# Patient Record
Sex: MALE | Race: WHITE | NOT HISPANIC OR LATINO | Employment: OTHER | URBAN - METROPOLITAN AREA
[De-identification: names, ages, dates, MRNs, and addresses within clinical notes are randomized per-mention and may not be internally consistent; named-entity substitution may affect disease eponyms.]

---

## 2017-03-13 ENCOUNTER — OFFICE VISIT (OUTPATIENT)
Dept: FAMILY MEDICINE | Facility: CLINIC | Age: 72
End: 2017-03-13
Payer: COMMERCIAL

## 2017-03-13 VITALS
SYSTOLIC BLOOD PRESSURE: 106 MMHG | HEIGHT: 70 IN | DIASTOLIC BLOOD PRESSURE: 60 MMHG | BODY MASS INDEX: 21.7 KG/M2 | HEART RATE: 88 BPM | WEIGHT: 151.6 LBS

## 2017-03-13 DIAGNOSIS — E78.5 HYPERLIPIDEMIA LDL GOAL <100: ICD-10-CM

## 2017-03-13 DIAGNOSIS — F17.200 TOBACCO USE DISORDER: ICD-10-CM

## 2017-03-13 DIAGNOSIS — E11.42 TYPE 2 DIABETES MELLITUS WITH DIABETIC POLYNEUROPATHY, WITHOUT LONG-TERM CURRENT USE OF INSULIN (H): Primary | ICD-10-CM

## 2017-03-13 DIAGNOSIS — Z12.11 SCREEN FOR COLON CANCER: ICD-10-CM

## 2017-03-13 LAB — HBA1C MFR BLD: 10.4 % (ref 4.3–6)

## 2017-03-13 PROCEDURE — 99214 OFFICE O/P EST MOD 30 MIN: CPT | Performed by: FAMILY MEDICINE

## 2017-03-13 PROCEDURE — 36415 COLL VENOUS BLD VENIPUNCTURE: CPT | Performed by: FAMILY MEDICINE

## 2017-03-13 PROCEDURE — 83036 HEMOGLOBIN GLYCOSYLATED A1C: CPT | Performed by: FAMILY MEDICINE

## 2017-03-13 RX ORDER — PIOGLITAZONEHYDROCHLORIDE 45 MG/1
45 TABLET ORAL DAILY
Qty: 90 TABLET | Refills: 0 | Status: SHIPPED | OUTPATIENT
Start: 2017-03-13 | End: 2017-06-13

## 2017-03-13 RX ORDER — GLIMEPIRIDE 4 MG/1
8 TABLET ORAL
Qty: 180 TABLET | Refills: 0 | Status: SHIPPED | OUTPATIENT
Start: 2017-03-13 | End: 2017-06-13

## 2017-03-13 NOTE — PROGRESS NOTES
"  SUBJECTIVE:                                                    Roddy Sidhu is a 71 year old male who presents to clinic today for the following health issues:      Diabetes Follow-up      Patient is checking blood sugars: not at all    Diabetic concerns: None     Symptoms of hypoglycemia (low blood sugar): shaky sometimes     Paresthesias (numbness or burning in feet) or sores: Yes for years. Pt. States Dr. Davenport is aware of this.      Date of last diabetic eye exam: 8/20/2015     Hyperlipidemia Follow-Up      Rate your low fat/cholesterol diet?: good    Taking statin?  Yes, no muscle aches from statin    Other lipid medications/supplements?:  none       Amount of exercise or physical activity: None    Problems taking medications regularly: No    Medication side effects: none  Diet: regular (no restrictions)        Problem list and histories reviewed & adjusted, as indicated.  Additional history: he admits that he has not been as active over the winter months because he can't get outside and do his usual walking in outdoor activities. He has been following his diet pretty carefully. He has been unable to give up smoking his pipe        Reviewed and updated as needed this visit by clinical staff  Tobacco  Allergies  Med Hx  Surg Hx  Fam Hx  Soc Hx      Reviewed and updated as needed this visit by Provider               ROS:  Constitutional, HEENT, cardiovascular, pulmonary, gi and gu systems are negative, except as otherwise noted.        OBJECTIVE:                                                    /60 (BP Location: Right arm, Patient Position: Chair, Cuff Size: Adult Regular)  Pulse 88  Ht 5' 9.5\" (1.765 m)  Wt 151 lb 9.6 oz (68.8 kg)  BMI 22.07 kg/m2    GENERAL: healthy, alert and no distress  EYES: Eyes grossly normal to inspection, extraocular movements - intact, and PERRL  NECK: no tenderness, no adenopathy, no asymmetry, no masses, no stiffness; thyroid- normal to palpation  RESP: lungs clear " to auscultation - no rales, no rhonchi, no wheezes  CV: regular rates and rhythm, normal S1 S2, no S3 or S4 and no murmur, no click or rub -  MS: extremities- no gross deformities noted, no edema  Diabetic foot exam: no trophic changes or ulcerative lesions and reduced sensation at plantar surface of both feet with filament testing    Diagnostic test results:  Results for orders placed or performed in visit on 03/13/17 (from the past 24 hour(s))   Hemoglobin A1c   Result Value Ref Range    Hemoglobin A1C 10.4 (H) 4.3 - 6.0 %        ASSESSMENT/PLAN:                                                    ASSESSMENT:  1. Type 2 diabetes mellitus with diabetic polyneuropathy, without long-term current use of insulin (H)    2. Hyperlipidemia LDL goal <100    3. Tobacco use disorder    4. Screen for colon cancer        PLAN:  Orders Placed This Encounter     Fecal colorectal cancer screen (FIT)     Hemoglobin A1c     pioglitazone (ACTOS) 45 MG tablet     metFORMIN (GLUCOPHAGE) 500 MG tablet     glimepiride (AMARYL) 4 MG tablet     We discussed the fact that since he has not been able to achieve adequate control of his blood sugars with 3 oral medications, the next step would be to go to insulin. He would really like to put this off as long as possible and states he will work hard at getting more physical activity and then we'll recheck a glycosylated hemoglobin in 3 months      Patient Instructions   Diabetes Plan  1. Hemoglobin A1c goal is between 7% and 8%  Your Hemoglobin A1c is not at goal  Plan is:Continue medications at current doses. Really work on increased exercise and losing weight.   2. LDL (bad cholesterol) goal is less than 100  Your LDL is at goal  Plan is: Continue medications at current doses  3. Blood pressure goal is less than 140/90.  Your blood pressure is at goal.  Plan is: Diet and exercise  Recheck in 3 months, will repeat the glycosylated hemoglobin then. If not improving, will need to consider  insulin. Also, important to quit smoking.        TRACY Davenport  [facility]

## 2017-03-13 NOTE — MR AVS SNAPSHOT
After Visit Summary   3/13/2017    Roddy Sidhu    MRN: [id number]           Patient Information     Date Of Birth          1945        Visit Information        Provider Department      3/13/2017 11:00 AM TRACY Davenport MD [facility]        Today's Diagnoses     Type 2 diabetes mellitus with diabetic polyneuropathy, without long-term current use of insulin (H)    -  1    Screen for colon cancer          Care Instructions    Diabetes Plan  1. Hemoglobin A1c goal is between 7% and 8%  Your Hemoglobin A1c is not at goal  Plan is:Continue medications at current doses. Really work on increased exercise and losing weight.   2. LDL (bad cholesterol) goal is less than 100  Your LDL is at goal  Plan is: Continue medications at current doses  3. Blood pressure goal is less than 140/90.  Your blood pressure is at goal.  Plan is: Diet and exercise  Recheck in 3 months, will repeat the glycosylated hemoglobin then. If not improving, will need to consider insulin. Also, important to quit smoking.          Follow-ups after your visit        Future tests that were ordered for you today     Open Future Orders        Priority Expected Expires Ordered    Hemoglobin A1c Routine 6/13/2017 9/13/2017 3/13/2017    Fecal colorectal cancer screen (FIT) Routine 4/3/2017 6/5/2017 3/13/2017            Who to contact     If you have questions or need follow up information about today's clinic visit or your schedule please contact [facility] directly at [phone].  Normal or non-critical lab and imaging results will be communicated to you by MyChart, letter or phone within 4 business days after the clinic has received the results. If you do not hear from us within 7 days, please contact the clinic through MyChart or phone. If you have a critical or abnormal lab result, we will notify you by phone as soon as possible.  Submit refill requests through [employer] or call your pharmacy and  "they will forward the refill request to us. Please allow 3 business days for your refill to be completed.          Additional Information About Your Visit        [employer] Information     [employer] lets you send messages to your doctor, view your test results, renew your prescriptions, schedule appointments and more. To sign up, go to [email or web address] . Click on \"Log in\" on the left side of the screen, which will take you to the Welcome page. Then click on \"Sign up Now\" on the right side of the page.     You will be asked to enter the access code listed below, as well as some personal information. Please follow the directions to create your username and password.     Your access code is: [email or web address]  Expires: 2017 11:44 AM     Your access code will  in 90 days. If you need help or a new code, please call your [place] clinic or [phone].        Care EveryWhere ID     This is your Care EveryWhere ID. This could be used by other organizations to access your [place] medical records  [id number]        Your Vitals Were     Pulse Height BMI (Body Mass Index)             88 5' 9.5\" (1.765 m) 22.07 kg/m2          Blood Pressure from Last 3 Encounters:   17 106/60   16 120/72   16 137/80    Weight from Last 3 Encounters:   17 151 lb 9.6 oz (68.8 kg)   16 146 lb 4.8 oz (66.4 kg)   16 143 lb (64.9 kg)              We Performed the Following     Hemoglobin A1c          Where to get your medicines      These medications were sent to [facility] [address]  [address] [facility] [address]     Phone:  [phone]     glimepiride 4 MG tablet    metFORMIN 500 MG tablet    pioglitazone 45 MG tablet          Primary Care Provider Office Phone # Fax #    TRACY Davenport [phone]       [facility] [address] [facility] [address]        Thank you!     Thank you for " choosing [facility]  for your care. Our goal is always to provide you with excellent care. Hearing back from our patients is one way we can continue to improve our services. Please take a few minutes to complete the written survey that you may receive in the mail after your visit with us. Thank you!             Your Updated Medication List - Protect others around you: Learn how to safely use, store and throw away your medicines at www.disposemymeds.org.          This list is accurate as of: 3/13/17 11:44 AM.  Always use your most recent med list.                   Brand Name Dispense Instructions for use    * ACE/ARB NOT PRESCRIBED (INTENTIONAL)      1 each continuous prn. ACE & ARB not prescribed due to Symptomatic hypotension not due to excessive diuresis       ALEVE 220 MG tablet   Generic drug:  naproxen sodium      Take  by mouth 2 times daily (with meals).       aspirin 81 MG tablet     100 tablet    Take 1 tablet by mouth daily.       blood glucose monitoring lancets     102 each    1 each 2 times daily.       * blood glucose test strip     3 each    1 Device by In Vitro route 4 times daily (before meals and nightly). Test 4 times daily, before meals and before bed.       * blood glucose monitoring test strip    ACCU-CHEK SMARTVIEW    1 Box    Test blood sugar two times daily       gabapentin 100 MG capsule    NEURONTIN    270 capsule    Take 1 capsule (100 mg) by mouth 3 times daily       glimepiride 4 MG tablet    AMARYL    180 tablet    Take 2 tablets (8 mg) by mouth every morning (before breakfast)       metFORMIN 500 MG tablet    GLUCOPHAGE    360 tablet    Take 2 before breakfast and 2 before dinner       * order for DME     1 each    Glucometer, brand as covered by insurance.       pioglitazone 45 MG tablet    ACTOS    90 tablet    Take 1 tablet (45 mg) by mouth daily       simvastatin 40 MG tablet    ZOCOR    90 tablet    Take 1 tablet (40 mg) by mouth At Bedtime       vitamin D  2000 UNITS Caps     30 capsule    Take 1 capsule by mouth daily       * Notice:  This list has 4 medication(s) that are the same as other medications prescribed for you. Read the directions carefully, and ask your doctor or other care provider to review them with you.

## 2017-03-13 NOTE — PATIENT INSTRUCTIONS
Diabetes Plan  1. Hemoglobin A1c goal is between 7% and 8%  Your Hemoglobin A1c is not at goal  Plan is:Continue medications at current doses. Really work on increased exercise and losing weight.   2. LDL (bad cholesterol) goal is less than 100  Your LDL is at goal  Plan is: Continue medications at current doses  3. Blood pressure goal is less than 140/90.  Your blood pressure is at goal.  Plan is: Diet and exercise  Recheck in 3 months, will repeat the glycosylated hemoglobin then. If not improving, will need to consider insulin. Also, important to quit smoking.

## 2017-03-13 NOTE — NURSING NOTE
"Chief Complaint   Patient presents with     Diabetes     Lipids     Health Maintenance     due for fit test.        Initial /60 (BP Location: Right arm, Patient Position: Chair, Cuff Size: Adult Regular)  Pulse 88  Ht 5' 9.5\" (1.765 m)  Wt 151 lb 9.6 oz (68.8 kg)  BMI 22.07 kg/m2 Estimated body mass index is 22.07 kg/(m^2) as calculated from the following:    Height as of this encounter: 5' 9.5\" (1.765 m).    Weight as of this encounter: 151 lb 9.6 oz (68.8 kg).  Medication Reconciliation: complete     Rhonda Soto, CMA      "

## 2017-03-20 PROCEDURE — 82274 ASSAY TEST FOR BLOOD FECAL: CPT | Performed by: FAMILY MEDICINE

## 2017-03-22 DIAGNOSIS — Z12.11 SCREEN FOR COLON CANCER: ICD-10-CM

## 2017-03-22 LAB — HEMOCCULT STL QL IA: NEGATIVE

## 2017-03-22 NOTE — LETTER
[facility]  [address]  [place] [address]  Phone: [phone]      3/23/2017     Roddy Sidhu  [address] [facility] [address]      Dear Roddy:    Thank you for allowing me to participate in your care. Your recent test results were reviewed and listed below.      Your results are provided below for your review  Your Fit test result:    Negative            Please repeat this test in one year    Thank you for choosing [facility]. As a result, please continue with the treatment plan discussed in the office. Return as discussed or sooner if symptoms worsen or fail to improve. If you have any further questions or concerns, please do not hesitate to contact us.      Sincerely,        Dr. Osvaldo Davenport

## 2017-04-10 DIAGNOSIS — E11.43 DIABETIC AUTONOMIC NEUROPATHY ASSOCIATED WITH TYPE 2 DIABETES MELLITUS (H): ICD-10-CM

## 2017-04-12 RX ORDER — GABAPENTIN 100 MG/1
100 CAPSULE ORAL 3 TIMES DAILY
Qty: 270 CAPSULE | Refills: 1 | Status: SHIPPED | OUTPATIENT
Start: 2017-04-12 | End: 2017-09-22

## 2017-04-12 NOTE — TELEPHONE ENCOUNTER
Routing refill request to provider for review/approval because:  Drug not on the [facility] refill protocol     Alison Yee RN

## 2017-06-13 ENCOUNTER — OFFICE VISIT (OUTPATIENT)
Dept: FAMILY MEDICINE | Facility: CLINIC | Age: 72
End: 2017-06-13
Payer: COMMERCIAL

## 2017-06-13 VITALS
BODY MASS INDEX: 21.3 KG/M2 | SYSTOLIC BLOOD PRESSURE: 122 MMHG | HEART RATE: 80 BPM | WEIGHT: 148.8 LBS | HEIGHT: 70 IN | DIASTOLIC BLOOD PRESSURE: 70 MMHG

## 2017-06-13 DIAGNOSIS — E11.42 TYPE 2 DIABETES MELLITUS WITH DIABETIC POLYNEUROPATHY, WITHOUT LONG-TERM CURRENT USE OF INSULIN (H): Primary | ICD-10-CM

## 2017-06-13 DIAGNOSIS — F17.200 TOBACCO USE DISORDER: ICD-10-CM

## 2017-06-13 DIAGNOSIS — E78.5 HYPERLIPIDEMIA LDL GOAL <100: ICD-10-CM

## 2017-06-13 LAB — HBA1C MFR BLD: 9.2 % (ref 4.3–6)

## 2017-06-13 PROCEDURE — 36415 COLL VENOUS BLD VENIPUNCTURE: CPT | Performed by: FAMILY MEDICINE

## 2017-06-13 PROCEDURE — 99214 OFFICE O/P EST MOD 30 MIN: CPT | Performed by: FAMILY MEDICINE

## 2017-06-13 PROCEDURE — 83036 HEMOGLOBIN GLYCOSYLATED A1C: CPT | Performed by: FAMILY MEDICINE

## 2017-06-13 RX ORDER — SIMVASTATIN 40 MG
40 TABLET ORAL AT BEDTIME
Qty: 90 TABLET | Refills: 1 | Status: SHIPPED | OUTPATIENT
Start: 2017-06-13 | End: 2018-01-02

## 2017-06-13 RX ORDER — GLIMEPIRIDE 4 MG/1
8 TABLET ORAL
Qty: 180 TABLET | Refills: 0 | Status: SHIPPED | OUTPATIENT
Start: 2017-06-13 | End: 2017-09-22 | Stop reason: ALTCHOICE

## 2017-06-13 RX ORDER — PIOGLITAZONEHYDROCHLORIDE 45 MG/1
45 TABLET ORAL DAILY
Qty: 90 TABLET | Refills: 0 | Status: SHIPPED | OUTPATIENT
Start: 2017-06-13 | End: 2017-09-22 | Stop reason: ALTCHOICE

## 2017-06-13 NOTE — NURSING NOTE
"Chief Complaint   Patient presents with     Diabetes       Initial /70 (BP Location: Right arm, Patient Position: Chair, Cuff Size: Adult Regular)  Pulse 80  Ht 5' 9.5\" (1.765 m)  Wt 148 lb 12.8 oz (67.5 kg)  BMI 21.66 kg/m2 Estimated body mass index is 21.66 kg/(m^2) as calculated from the following:    Height as of this encounter: 5' 9.5\" (1.765 m).    Weight as of this encounter: 148 lb 12.8 oz (67.5 kg).  Medication Reconciliation: complete     Rhonda Soto, CMA      "

## 2017-06-13 NOTE — PATIENT INSTRUCTIONS
Diabetes Plan  1. Hemoglobin A1c goal is between 7% and 8%  Your Hemoglobin A1c is not at goal, but better than last time  Plan is:Continue medications at current doses, Diabetes education referral and consider going to insulin  2. LDL (bad cholesterol) goal is less than 100  Your LDL is at goal  Plan is: Continue medications at current doses  3. Blood pressure goal is less than 140/90.  Your blood pressure is at goal.  Plan is: Continue medications at current doses    4. Other: Smoking cessation is recommended    Recheck in 3 months

## 2017-06-13 NOTE — PROGRESS NOTES
"  SUBJECTIVE:                                                    Roddy Sidhu is a 71 year old male who presents to clinic today for the following health issues:      Diabetes Follow-up      Patient is checking blood sugars: not at all    Diabetic concerns: None     Symptoms of hypoglycemia (low blood sugar): shaky, weak, fatigue     Paresthesias (numbness or burning in feet) or sores: Yes, pt. States Dr. Davenport is aware of this.     Date of last diabetic eye exam: pt. Is due        Amount of exercise or physical activity: very active outside    Problems taking medications regularly: No    Medication side effects: none    Diet: regular (no restrictions)    Last visit we discussed switching from oral medications to insulin because we were having difficulty getting adequate glycemic control. He was very reluctant to do this and stated he would get more physically active. He has done so and has lost some weight, so is hoping his glycosylated hemoglobin is improved at this time      PROBLEMS TO ADD ON...  Hyperlipidemia Follow-Up      Rate your low fat/cholesterol diet?: good    Taking statin?  Yes, no muscle aches from statin    Other lipid medications/supplements?:  none       Problem list and histories reviewed & adjusted, as indicated.  Additional history: none        Reviewed and updated as needed this visit by clinical staff  Tobacco  Allergies  Med Hx  Surg Hx  Fam Hx  Soc Hx      Reviewed and updated as needed this visit by Provider               ROS:  Constitutional, HEENT, cardiovascular, pulmonary, gi and gu systems are negative, except as otherwise noted.        OBJECTIVE:                                                    /70 (BP Location: Right arm, Patient Position: Chair, Cuff Size: Adult Regular)  Pulse 80  Ht 5' 9.5\" (1.765 m)  Wt 148 lb 12.8 oz (67.5 kg)  BMI 21.66 kg/m2    GENERAL: healthy, alert and no distress  EYES: Eyes grossly normal to inspection, extraocular movements - " intact, and PERRL  NECK: no tenderness, no adenopathy, no asymmetry, no masses, no stiffness; thyroid- normal to palpation  RESP: lungs clear to auscultation - no rales, no rhonchi, no wheezes  CV: regular rates and rhythm, normal S1 S2, no S3 or S4 and no murmur, no click or rub -  MS: extremities- no gross deformities noted, no edema    Diagnostic test results:  Results for orders placed or performed in visit on 06/13/17 (from the past 24 hour(s))   Hemoglobin A1c   Result Value Ref Range    Hemoglobin A1C 9.2 (H) 4.3 - 6.0 %        ASSESSMENT/PLAN:                                                    ASSESSMENT:  1. Type 2 diabetes mellitus with diabetic polyneuropathy, without long-term current use of insulin (H)    2. Hyperlipidemia LDL goal <100    3. Tobacco use disorder      Glycemic control is improved from last visit but still not at goal    PLAN:  Orders Placed This Encounter     Hemoglobin A1c     DIABETES EDUCATOR REFERRAL     pioglitazone (ACTOS) 45 MG tablet     metFORMIN (GLUCOPHAGE) 500 MG tablet     glimepiride (AMARYL) 4 MG tablet     simvastatin (ZOCOR) 40 MG tablet     He still is very reluctant to switch to insulin. We discussed that it might be helpful for him to see the diabetic educator and make sure there is not something else that he could be doing in terms of his diet and activity to improve glycemic control. Perhaps she could reassure him that taking insulin is not as difficult as he might imagine    Patient Instructions   Diabetes Plan  1. Hemoglobin A1c goal is between 7% and 8%  Your Hemoglobin A1c is not at goal, but better than last time  Plan is:Continue medications at current doses, Diabetes education referral and consider going to insulin  2. LDL (bad cholesterol) goal is less than 100  Your LDL is at goal  Plan is: Continue medications at current doses  3. Blood pressure goal is less than 140/90.  Your blood pressure is at goal.  Plan is: Continue medications at current  doses    4. Other: Smoking cessation is recommended    Recheck in 3 months                    TRACY Davenport  [facility]

## 2017-06-13 NOTE — MR AVS SNAPSHOT
After Visit Summary   6/13/2017    Roddy Sidhu    MRN: [id number]           Patient Information     Date Of Birth          1945        Visit Information        Provider Department      6/13/2017 9:00 AM TRACY Davenport MD [facility]        Today's Diagnoses     Tobacco use disorder    -  1    Type 2 diabetes mellitus with diabetic polyneuropathy, without long-term current use of insulin (H)        Hyperlipidemia LDL goal <100          Care Instructions    Diabetes Plan  1. Hemoglobin A1c goal is between 7% and 8%  Your Hemoglobin A1c is not at goal, but better than last time  Plan is:Continue medications at current doses, Diabetes education referral and consider going to insulin  2. LDL (bad cholesterol) goal is less than 100  Your LDL is at goal  Plan is: Continue medications at current doses  3. Blood pressure goal is less than 140/90.  Your blood pressure is at goal.  Plan is: Continue medications at current doses    4. Other: Smoking cessation is recommended    Recheck in 3 months                        Follow-ups after your visit        Additional Services     DIABETES EDUCATOR REFERRAL       DIABETES SELF MANAGEMENT TRAINING (DSMT)      Your provider has referred you to Diabetes Education: FMG: Diabetes Education - All [facility] [phone]   [email or web address]    Type of training and number of hours: Previous Diagnosis: Follow-up DSMT - 2 hours.    Medicare covers: 10 hours of initial DSMT in 12 month period from the time of first visit, plus 2 hours of follow-up DSMT annually, and additional hours as requested for insulin training.    Diabetes Type: Type 2 - On Oral Medication             Diabetes Co-Morbidities: neuropathy               A1C Goal:  <8.0       A1C is: Lab Results       Component                Value               Date                       A1C                      9.2                 06/13/2017               If an urgent visit is needed or A1C is above 12, Care Team to call the Diabetes Education Team at [phone] or send an In Basket message to the Diabetes Education Pool (P DIAB ED-PATIENT CARE).    Diabetes Education Topics: Knowledge: Healthy Eating and Reducing Risks (Preventing Acute and Chronic Diabetes Complications) and Other: Since not well controlled on 3 oral meds have discussed switching to insulin, but he is very reluctant; discuss this issue please    Special Educational Needs Requiring Individual DSMT: None       MEDICAL NUTRITION THERAPY (MNT) for Diabetes    Medical Nutrition Therapy with a Registered Dietitian can be provided in coordination with Diabetes Self-Management Training to assist in achieving optimal diabetes management.    MNT Type and Hours: Do not initiate MNT at this time.                       Medicare will cover: 3 hours initial MNT in 12 month period after first visit, plus 2 hours of follow-up MNT annually    Please be aware that coverage of these services is subject to the terms and limitations of your health insurance plan.  Call member services at your health plan to determine Diabetes Self-Management Training benefits and ask which blood glucose monitor brands are covered by your plan.      Please bring the following with you to your appointment:    (1)  List of current medications   (2)  List of Blood Glucose Monitor brands that are covered by your insurance plan  (3)  Blood Glucose Monitor and log book  (4)   Food records for the 3 days prior to your visit    The Certified Diabetes Educator may make diabetes medication adjustments per the CDE Protocol and Collaborative Practice Agreement.                  Future tests that were ordered for you today     Open Future Orders        Priority Expected Expires Ordered    Hemoglobin A1c Routine 9/13/2017 12/14/2017 6/13/2017            Who to contact     If you have questions or need follow up information about  "today's clinic visit or your schedule please contact [facility] directly at [phone].  Normal or non-critical lab and imaging results will be communicated to you by [employer], letter or phone within 4 business days after the clinic has received the results. If you do not hear from us within 7 days, please contact the clinic through [employer] or phone. If you have a critical or abnormal lab result, we will notify you by phone as soon as possible.  Submit refill requests through [employer] or call your pharmacy and they will forward the refill request to us. Please allow 3 business days for your refill to be completed.          Additional Information About Your Visit        [employer] Information     [employer] lets you send messages to your doctor, view your test results, renew your prescriptions, schedule appointments and more. To sign up, go to [email or web address] . Click on \"Log in\" on the left side of the screen, which will take you to the Welcome page. Then click on \"Sign up Now\" on the right side of the page.     You will be asked to enter the access code listed below, as well as some personal information. Please follow the directions to create your username and password.     Your access code is: [email or web address]  Expires: 2017  9:26 AM     Your access code will  in 90 days. If you need help or a new code, please call your [place] clinic or [phone].        Care EveryWhere ID     This is your Care EveryWhere ID. This could be used by other organizations to access your [place] medical records  [id number]        Your Vitals Were     Pulse Height BMI (Body Mass Index)             80 5' 9.5\" (1.765 m) 21.66 kg/m2          Blood Pressure from Last 3 Encounters:   17 122/70   17 106/60   16 120/72    Weight from Last 3 Encounters:   17 148 lb 12.8 oz (67.5 kg)   17 151 lb 9.6 oz (68.8 kg)   16 146 lb 4.8 oz (66.4 kg)              We Performed the " Following     DIABETES EDUCATOR REFERRAL     Hemoglobin A1c          Where to get your medicines      These medications were sent to [place], [place] - [address]  [address] [facility] [address]     Phone:  [phone]     glimepiride 4 MG tablet    metFORMIN 500 MG tablet    pioglitazone 45 MG tablet    simvastatin 40 MG tablet          Primary Care Provider Office Phone # Fax #    TRACY Davenport [phone]       [facility] [address] [facility] [address]        Thank you!     Thank you for choosing [facility]  for your care. Our goal is always to provide you with excellent care. Hearing back from our patients is one way we can continue to improve our services. Please take a few minutes to complete the written survey that you may receive in the mail after your visit with us. Thank you!             Your Updated Medication List - Protect others around you: Learn how to safely use, store and throw away your medicines at www.disposemymeds.org.          This list is accurate as of: 6/13/17  9:26 AM.  Always use your most recent med list.                   Brand Name Dispense Instructions for use    * ACE/ARB NOT PRESCRIBED (INTENTIONAL)      1 each continuous prn. ACE & ARB not prescribed due to Symptomatic hypotension not due to excessive diuresis       ALEVE 220 MG tablet   Generic drug:  naproxen sodium      Take  by mouth 2 times daily (with meals).       aspirin 81 MG tablet     100 tablet    Take 1 tablet by mouth daily.       blood glucose monitoring lancets     102 each    1 each 2 times daily.       * blood glucose test strip     3 each    1 Device by In Vitro route 4 times daily (before meals and nightly). Test 4 times daily, before meals and before bed.       * blood glucose monitoring test strip    ACCU-CHEK SMARTVIEW    1 Box    Test blood sugar two times daily       gabapentin 100 MG  capsule    NEURONTIN    270 capsule    Take 1 capsule (100 mg) by mouth 3 times daily       glimepiride 4 MG tablet    AMARYL    180 tablet    Take 2 tablets (8 mg) by mouth every morning (before breakfast)       metFORMIN 500 MG tablet    GLUCOPHAGE    360 tablet    Take 2 before breakfast and 2 before dinner       * order for DME     1 each    Glucometer, brand as covered by insurance.       pioglitazone 45 MG tablet    ACTOS    90 tablet    Take 1 tablet (45 mg) by mouth daily       simvastatin 40 MG tablet    ZOCOR    90 tablet    Take 1 tablet (40 mg) by mouth At Bedtime       vitamin D 2000 UNITS Caps     30 capsule    Take 1 capsule by mouth daily       * Notice:  This list has 4 medication(s) that are the same as other medications prescribed for you. Read the directions carefully, and ask your doctor or other care provider to review them with you.

## 2017-07-06 ENCOUNTER — TRANSFERRED RECORDS (OUTPATIENT)
Dept: HEALTH INFORMATION MANAGEMENT | Facility: CLINIC | Age: 72
End: 2017-07-06

## 2017-07-12 DIAGNOSIS — E11.42 TYPE 2 DIABETES MELLITUS WITH DIABETIC POLYNEUROPATHY, WITHOUT LONG-TERM CURRENT USE OF INSULIN (H): ICD-10-CM

## 2017-07-12 NOTE — TELEPHONE ENCOUNTER
Actos         Last Written Prescription Date: 06/13/2017  Last Fill Quantity: 90, # refills: 0  Last Office Visit with [facility] or [facility] prescribing provider:  06/13/2017        BP Readings from Last 3 Encounters:   06/13/17 122/70   03/13/17 106/60   12/08/16 120/72     Lab Results   Component Value Date    MICROL 25 06/17/2016     Lab Results   Component Value Date    UMALCR 31.34 06/17/2016     Creatinine   Date Value Ref Range Status   08/20/2015 0.73 0.66 - 1.25 mg/dL Final   ]  GFR Estimate   Date Value Ref Range Status   08/20/2015 >90  Non  GFR Calc   >60 mL/min/1.7m2 Final   12/10/2013 >90 >60 mL/min/1.7m2 Final   12/27/2012 >90 >60 mL/min/1.7m2 Final     GFR Estimate If Black   Date Value Ref Range Status   08/20/2015 >90   GFR Calc   >60 mL/min/1.7m2 Final   12/10/2013 >90 >60 mL/min/1.7m2 Final   12/27/2012 >90 >60 mL/min/1.7m2 Final     Lab Results   Component Value Date    CHOL 122 12/31/2014     Lab Results   Component Value Date    HDL 47 12/31/2014     Lab Results   Component Value Date    LDL 92 08/20/2015    LDL 46 12/31/2014     Lab Results   Component Value Date    TRIG 147 12/31/2014     Lab Results   Component Value Date    CHOLHDLRATIO 2.6 12/31/2014     Lab Results   Component Value Date    AST 27 12/27/2012     Lab Results   Component Value Date    ALT 40 12/27/2012     Lab Results   Component Value Date    A1C 9.2 06/13/2017    A1C 10.4 03/13/2017    A1C 9.2 11/28/2016    A1C 8.9 09/30/2016    A1C 9.2 09/23/2016     Potassium   Date Value Ref Range Status   08/20/2015 4.8 3.4 - 5.3 mmol/L Final

## 2017-07-13 RX ORDER — PIOGLITAZONEHYDROCHLORIDE 45 MG/1
TABLET ORAL
Qty: 90 TABLET | Refills: 0 | OUTPATIENT
Start: 2017-07-13

## 2017-07-18 DIAGNOSIS — E11.42 TYPE 2 DIABETES MELLITUS WITH DIABETIC POLYNEUROPATHY, WITHOUT LONG-TERM CURRENT USE OF INSULIN (H): ICD-10-CM

## 2017-07-19 RX ORDER — GLIMEPIRIDE 4 MG/1
TABLET ORAL
Qty: 180 TABLET | Refills: 0 | OUTPATIENT
Start: 2017-07-19

## 2017-07-19 RX ORDER — PIOGLITAZONEHYDROCHLORIDE 45 MG/1
TABLET ORAL
Qty: 90 TABLET | Refills: 0 | OUTPATIENT
Start: 2017-07-19

## 2017-09-22 ENCOUNTER — OFFICE VISIT (OUTPATIENT)
Dept: FAMILY MEDICINE | Facility: CLINIC | Age: 72
End: 2017-09-22
Payer: COMMERCIAL

## 2017-09-22 VITALS
HEIGHT: 70 IN | WEIGHT: 147.4 LBS | DIASTOLIC BLOOD PRESSURE: 78 MMHG | BODY MASS INDEX: 21.1 KG/M2 | SYSTOLIC BLOOD PRESSURE: 128 MMHG | HEART RATE: 72 BPM

## 2017-09-22 DIAGNOSIS — E11.42 TYPE 2 DIABETES MELLITUS WITH DIABETIC POLYNEUROPATHY, WITHOUT LONG-TERM CURRENT USE OF INSULIN (H): ICD-10-CM

## 2017-09-22 DIAGNOSIS — E11.43 DIABETIC AUTONOMIC NEUROPATHY ASSOCIATED WITH TYPE 2 DIABETES MELLITUS (H): ICD-10-CM

## 2017-09-22 LAB — HBA1C MFR BLD: 9.1 % (ref 4.3–6)

## 2017-09-22 PROCEDURE — 83036 HEMOGLOBIN GLYCOSYLATED A1C: CPT | Performed by: FAMILY MEDICINE

## 2017-09-22 PROCEDURE — 99214 OFFICE O/P EST MOD 30 MIN: CPT | Performed by: FAMILY MEDICINE

## 2017-09-22 PROCEDURE — 36415 COLL VENOUS BLD VENIPUNCTURE: CPT | Performed by: FAMILY MEDICINE

## 2017-09-22 RX ORDER — GABAPENTIN 100 MG/1
100 CAPSULE ORAL 3 TIMES DAILY
Qty: 270 CAPSULE | Refills: 1 | Status: SHIPPED | OUTPATIENT
Start: 2017-09-22 | End: 2018-01-02

## 2017-09-22 NOTE — MR AVS SNAPSHOT
After Visit Summary   9/22/2017    Roddy Sidhu    MRN: [id number]           Patient Information     Date Of Birth          1945        Visit Information        Provider Department      9/22/2017 10:40 AM TRACY Dav[facility]        Today's Diagnoses     Type 2 diabetes mellitus with diabetic polyneuropathy, without long-term current use of insulin (H)        Diabetic autonomic neuropathy associated with type 2 diabetes mellitus (H)          Care Instructions    Diabetes Plan  1. Hemoglobin A1c goal is between 7% and 8%  Your Hemoglobin A1c is not at goal  Plan is:We are stopping the pioglitasone and glimepiride, (continuing metformin). Adding Lantus insulin 14 units every am  2. LDL (bad cholesterol) goal is less than 100  Your LDL is at goal  Plan is: Continue medications at current doses  3. Blood pressure goal is less than 140/90.  Your blood pressure is at goal.  Plan is: diet and exercise  4. Other: Smoking cessation is recommended    Recheck in 3 months                        Follow-ups after your visit        Who to contact     If you have questions or need follow up information about today's clinic visit or your schedule please contact [facility] directly at [phone].  Normal or non-critical lab and imaging results will be communicated to you by MyChart, letter or phone within 4 business days after the clinic has received the results. If you do not hear from us within 7 days, please contact the clinic through [email or web address] or phone. If you have a critical or abnormal lab result, we will notify you by phone as soon as possible.  Submit refill requests through [employer] or call your pharmacy and they will forward the refill request to us. Please allow 3 business days for your refill to be completed.          Additional Information About Your Visit        [employer] Information     [employer] lets you send messages to your doctor, view your test  "results, renew your prescriptions, schedule appointments and more. To sign up, go to [email or web address] . Click on \"Log in\" on the left side of the screen, which will take you to the Welcome page. Then click on \"Sign up Now\" on the right side of the page.     You will be asked to enter the access code listed below, as well as some personal information. Please follow the directions to create your username and password.     Your access code is: [email or web address]  Expires: 2017 11:02 AM     Your access code will  in 90 days. If you need help or a new code, please call your [place] clinic or [phone].        Care EveryWhere ID     This is your Care EveryWhere ID. This could be used by other organizations to access your [place] medical records  [id number]        Your Vitals Were     Pulse Height BMI (Body Mass Index)             72 5' 9.5\" (1.765 m) 21.46 kg/m2          Blood Pressure from Last 3 Encounters:   17 128/78   17 122/70   17 106/60    Weight from Last 3 Encounters:   17 147 lb 6.4 oz (66.9 kg)   17 148 lb 12.8 oz (67.5 kg)   17 151 lb 9.6 oz (68.8 kg)              Today, you had the following     No orders found for display         Today's Medication Changes          These changes are accurate as of: 17 11:02 AM.  If you have any questions, ask your nurse or doctor.               Start taking these medicines.        Dose/Directions    insulin glargine 100 UNIT/ML injection   Commonly known as:  LANTUS SOLOSTAR   Used for:  Type 2 diabetes mellitus with diabetic polyneuropathy, without long-term current use of insulin (H)   Started by:  TRACY Davenport MD        Dose:  14 Units   Inject 14 Units Subcutaneous every morning   Quantity:  15 mL   Refills:  3         Stop taking these medicines if you haven't already. Please contact your care team if you have questions.     glimepiride 4 MG tablet   Commonly known as:  AMARYL   Stopped by:  TRACY Davenport MD "           pioglitazone 45 MG tablet   Commonly known as:  ACTOS   Stopped by:  TRACY Davenport MD                Where to get your medicines      [place] [facility] [place] [address]  [address] [facility] [address]     Phone:  [phone]     gabapentin 100 MG capsule    insulin glargine 100 UNIT/ML injection    metFORMIN 500 MG tablet                Primary Care Provider Office Phone # Fax #    R Osvaldo Davenport [phone] [address] [facility] [address]        Equal Access to Services     [facility]: Hadii aad ku hadasho Soomaali, waaxda luqadaha, qaybta kaalmada adeegyada, waxay idiin hayaan adeeg anju tellez . So [facility] [phone].    ATENCIÓN: Si habla español, tiene a purcell disposición servicios gratuitos de asistencia lingüística. [facility] [phone].    We comply with applicable federal civil rights laws and [place] laws. We do not discriminate on the basis of race, color, national origin, age, disability sex, sexual orientation or gender identity.            Thank you!     Thank you for choosing [facility]  for your care. Our goal is always to provide you with excellent care. Hearing back from our patients is one way we can continue to improve our services. Please take a few minutes to complete the written survey that you may receive in the mail after your visit with us. Thank you!             Your Updated Medication List - Protect others around you: Learn how to safely use, store and throw away your medicines at www.disposemymeds.org.          This list is accurate as of: 9/22/17 11:02 AM.  Always use your most recent med list.                   Brand Name Dispense Instructions for use Diagnosis    * ACE/ARB NOT PRESCRIBED (INTENTIONAL)      1 each continuous prn. ACE & ARB not prescribed due to Symptomatic hypotension not due to excessive diuresis    Type 2  diabetes, HbA1C goal < 8% (H)       ALEVE 220 MG tablet   Generic drug:  naproxen sodium      Take  by mouth 2 times daily (with meals).        aspirin 81 MG tablet     100 tablet    Take 1 tablet by mouth daily.    Type 2 diabetes, HbA1C goal < 8% (H)       blood glucose monitoring lancets     102 each    1 each 2 times daily.    Type 2 diabetes, HbA1C goal < 8% (H)       * blood glucose test strip     3 each    1 Device by In Vitro route 4 times daily (before meals and nightly). Test 4 times daily, before meals and before bed.    Type 2 diabetes, HbA1C goal < 8% (H)       * blood glucose monitoring test strip    ACCU-CHEK SMARTVIEW    1 Box    Test blood sugar two times daily    Type 2 diabetes, HbA1C goal < 8% (H)       gabapentin 100 MG capsule    NEURONTIN    270 capsule    Take 1 capsule (100 mg) by mouth 3 times daily    Diabetic autonomic neuropathy associated with type 2 diabetes mellitus (H)       insulin glargine 100 UNIT/ML injection    LANTUS SOLOSTAR    15 mL    Inject 14 Units Subcutaneous every morning    Type 2 diabetes mellitus with diabetic polyneuropathy, without long-term current use of insulin (H)       metFORMIN 500 MG tablet    GLUCOPHAGE    360 tablet    Take 2 before breakfast and 2 before dinner    Type 2 diabetes mellitus with diabetic polyneuropathy, without long-term current use of insulin (H)       * order for DME     1 each    Glucometer, brand as covered by insurance.    Type 2 diabetes, HbA1C goal < 8% (H)       simvastatin 40 MG tablet    ZOCOR    90 tablet    Take 1 tablet (40 mg) by mouth At Bedtime    Hyperlipidemia LDL goal <100       vitamin D 2000 UNITS Caps     30 capsule    Take 1 capsule by mouth daily        * Notice:  This list has 4 medication(s) that are the same as other medications prescribed for you. Read the directions carefully, and ask your doctor or other care provider to review them with you.

## 2017-09-22 NOTE — PROGRESS NOTES
"  SUBJECTIVE:   Roddy Sidhu is a 72 year old male who presents to clinic today for the following health issues:      Diabetes Follow-up      Patient is checking blood sugars: not at all    Diabetic concerns: None     Symptoms of hypoglycemia (low blood sugar): shaky, weak     Paresthesias (numbness or burning in feet) or sores: Yes, pt. States he has a hx of this and Dr. Davenport is aware of this.      Date of last diabetic eye exam: 7/2017    Hyperlipidemia Follow-Up      Rate your low fat/cholesterol diet?: good    Taking statin?  Yes, no muscle aches from statin    Other lipid medications/supplements?:  none          Amount of exercise or physical activity: tries to move around some everyday    Problems taking medications regularly: No    Medication side effects: none  Diet: regular (no restrictions)          Problem list and histories reviewed & adjusted, as indicated.  Additional history: none        Reviewed and updated as needed this visit by clinical staffTobacco  Allergies  Med Hx  Surg Hx  Fam Hx  Soc Hx      Reviewed and updated as needed this visit by Provider               ROS:  Constitutional, HEENT, cardiovascular, pulmonary, gi and gu systems are negative, except as otherwise noted.          OBJECTIVE:                                                    /78 (BP Location: Right arm, Patient Position: Chair, Cuff Size: Adult Regular)  Pulse 72  Ht 5' 9.5\" (1.765 m)  Wt 147 lb 6.4 oz (66.9 kg)  BMI 21.46 kg/m2    GENERAL: healthy, alert and no distress  EYES: Eyes grossly normal to inspection, extraocular movements - intact, and PERRL  HENT: ear canals- normal; TMs- normal; Nose- normal; Mouth- no ulcers, no lesions  NECK: no tenderness, no adenopathy, no asymmetry, no masses, no stiffness; thyroid- normal to palpation  RESP: lungs clear to auscultation - no rales, no rhonchi, no wheezes  CV: regular rates and rhythm, normal S1 S2, no S3 or S4 and no murmur, no click or rub -  MS: " extremities- no gross deformities noted, no edema    Diagnostic test results:  Results for orders placed or performed in visit on 09/22/17 (from the past 24 hour(s))   Hemoglobin A1c   Result Value Ref Range    Hemoglobin A1C 9.1 (H) 4.3 - 6.0 %          ASSESSMENT/PLAN:                                                    ASSESSMENT:  1. Type 2 diabetes mellitus with diabetic polyneuropathy, without long-term current use of insulin (H)    2. Diabetic autonomic neuropathy associated with type 2 diabetes mellitus (H)        PLAN:  We had discussed at his last visit that if he was unable to bring his glycosylated hemoglobin down into the acceptable range by today's visit that we would need to go to insulin. He is agreeable at this point, so the glimepiride and I'll glitazone were discontinued, and we'll start on Lantus 14 units in the morning. The RN came into the room and showed him how to administer the insulin injections        Orders Placed This Encounter     metFORMIN (GLUCOPHAGE) 500 MG tablet     gabapentin (NEURONTIN) 100 MG capsule     insulin glargine (LANTUS SOLOSTAR) 100 UNIT/ML injection       Patient Instructions   Diabetes Plan  1. Hemoglobin A1c goal is between 7% and 8%  Your Hemoglobin A1c is not at goal  Plan is:We are stopping the pioglitasone and glimepiride, (continuing metformin). Adding Lantus insulin 14 units every am  2. LDL (bad cholesterol) goal is less than 100  Your LDL is at goal  Plan is: Continue medications at current doses  3. Blood pressure goal is less than 140/90.  Your blood pressure is at goal.  Plan is: diet and exercise  4. Other: Smoking cessation is recommended    Recheck in 3 months                    TRACY Davenport  [facility]

## 2017-09-22 NOTE — NURSING NOTE
"Chief Complaint   Patient presents with     Diabetes     Lipids       Initial /78 (BP Location: Right arm, Patient Position: Chair, Cuff Size: Adult Regular)  Pulse 72  Ht 5' 9.5\" (1.765 m)  Wt 147 lb 6.4 oz (66.9 kg)  BMI 21.46 kg/m2 Estimated body mass index is 21.46 kg/(m^2) as calculated from the following:    Height as of this encounter: 5' 9.5\" (1.765 m).    Weight as of this encounter: 147 lb 6.4 oz (66.9 kg).  Medication Reconciliation: complete     Rhonda Soto, CMA      "

## 2017-09-22 NOTE — PATIENT INSTRUCTIONS
Diabetes Plan  1. Hemoglobin A1c goal is between 7% and 8%  Your Hemoglobin A1c is not at goal  Plan is:We are stopping the pioglitasone and glimepiride, (continuing metformin). Adding Lantus insulin 14 units every am  2. LDL (bad cholesterol) goal is less than 100  Your LDL is at goal  Plan is: Continue medications at current doses  3. Blood pressure goal is less than 140/90.  Your blood pressure is at goal.  Plan is: diet and exercise  4. Other: Smoking cessation is recommended    Recheck in 3 months

## 2018-01-02 ENCOUNTER — OFFICE VISIT (OUTPATIENT)
Dept: FAMILY MEDICINE | Facility: CLINIC | Age: 73
End: 2018-01-02
Payer: COMMERCIAL

## 2018-01-02 VITALS
DIASTOLIC BLOOD PRESSURE: 70 MMHG | WEIGHT: 143 LBS | RESPIRATION RATE: 16 BRPM | HEART RATE: 68 BPM | BODY MASS INDEX: 20.47 KG/M2 | HEIGHT: 70 IN | SYSTOLIC BLOOD PRESSURE: 120 MMHG

## 2018-01-02 DIAGNOSIS — F17.200 TOBACCO USE DISORDER: ICD-10-CM

## 2018-01-02 DIAGNOSIS — Z13.6 ENCOUNTER FOR ABDOMINAL AORTIC ANEURYSM SCREENING: ICD-10-CM

## 2018-01-02 DIAGNOSIS — E11.43 DIABETIC AUTONOMIC NEUROPATHY ASSOCIATED WITH TYPE 2 DIABETES MELLITUS (H): ICD-10-CM

## 2018-01-02 DIAGNOSIS — E11.42 TYPE 2 DIABETES MELLITUS WITH DIABETIC POLYNEUROPATHY, WITHOUT LONG-TERM CURRENT USE OF INSULIN (H): Primary | ICD-10-CM

## 2018-01-02 DIAGNOSIS — E78.5 HYPERLIPIDEMIA LDL GOAL <100: ICD-10-CM

## 2018-01-02 DIAGNOSIS — Z11.59 NEED FOR HEPATITIS C SCREENING TEST: ICD-10-CM

## 2018-01-02 LAB
ANION GAP SERPL CALCULATED.3IONS-SCNC: 8 MMOL/L (ref 3–14)
BUN SERPL-MCNC: 16 MG/DL (ref 7–30)
CALCIUM SERPL-MCNC: 9.1 MG/DL (ref 8.5–10.1)
CHLORIDE SERPL-SCNC: 99 MMOL/L (ref 94–109)
CHOLEST SERPL-MCNC: 118 MG/DL
CO2 SERPL-SCNC: 26 MMOL/L (ref 20–32)
CREAT SERPL-MCNC: 0.7 MG/DL (ref 0.66–1.25)
GFR SERPL CREATININE-BSD FRML MDRD: >90 ML/MIN/1.7M2
GLUCOSE SERPL-MCNC: 127 MG/DL (ref 70–99)
HBA1C MFR BLD: 7.3 % (ref 4.3–6)
HCV AB SERPL QL IA: NONREACTIVE
HDLC SERPL-MCNC: 48 MG/DL
LDLC SERPL CALC-MCNC: 35 MG/DL
NONHDLC SERPL-MCNC: 70 MG/DL
POTASSIUM SERPL-SCNC: 4.8 MMOL/L (ref 3.4–5.3)
SODIUM SERPL-SCNC: 133 MMOL/L (ref 133–144)
TRIGL SERPL-MCNC: 174 MG/DL

## 2018-01-02 PROCEDURE — 36415 COLL VENOUS BLD VENIPUNCTURE: CPT | Performed by: FAMILY MEDICINE

## 2018-01-02 PROCEDURE — 83036 HEMOGLOBIN GLYCOSYLATED A1C: CPT | Performed by: FAMILY MEDICINE

## 2018-01-02 PROCEDURE — 80048 BASIC METABOLIC PNL TOTAL CA: CPT | Performed by: FAMILY MEDICINE

## 2018-01-02 PROCEDURE — 99214 OFFICE O/P EST MOD 30 MIN: CPT | Performed by: FAMILY MEDICINE

## 2018-01-02 PROCEDURE — G0472 HEP C SCREEN HIGH RISK/OTHER: HCPCS | Performed by: FAMILY MEDICINE

## 2018-01-02 PROCEDURE — 80061 LIPID PANEL: CPT | Performed by: FAMILY MEDICINE

## 2018-01-02 RX ORDER — GABAPENTIN 100 MG/1
100 CAPSULE ORAL 3 TIMES DAILY
Qty: 270 CAPSULE | Refills: 1 | Status: SHIPPED | OUTPATIENT
Start: 2018-01-02 | End: 2018-06-08

## 2018-01-02 RX ORDER — SIMVASTATIN 40 MG
40 TABLET ORAL AT BEDTIME
Qty: 90 TABLET | Refills: 1 | Status: SHIPPED | OUTPATIENT
Start: 2018-01-02 | End: 2018-06-08

## 2018-01-02 NOTE — PATIENT INSTRUCTIONS
Diabetes Plan  1. Hemoglobin A1c goal is between 7% and 8%  Your Hemoglobin A1c is at goal  Plan is:Continue medications at current doses  2. LDL (bad cholesterol) goal is less than 100  Your LDL is at goal  Plan is: Continue medications at current doses  3. Blood pressure goal is less than 140/90.  Your blood pressure is at goal.  Plan is: Continue diet and exercise  4. Other: Smoking cessation is recommended    Schedule the ultrasound  Soon.    Recheck in 6 months

## 2018-01-02 NOTE — MR AVS SNAPSHOT
After Visit Summary   1/2/2018    Roddy Sidhu    MRN: [id number]           Patient Information     Date Of Birth          1945        Visit Information        Provider Department      1/2/2018 9:40 AM Ethel, TRACY Riley MD [facility]        Today's Diagnoses     Type 2 diabetes mellitus with diabetic polyneuropathy, without long-term current use of insulin (H)    -  1    Encounter for abdominal aortic aneurysm screening        Need for hepatitis C screening test        Hyperlipidemia LDL goal <100        Tobacco use disorder        Diabetic autonomic neuropathy associated with type 2 diabetes mellitus (H)          Care Instructions    Diabetes Plan  1. Hemoglobin A1c goal is between 7% and 8%  Your Hemoglobin A1c is at goal  Plan is:Continue medications at current doses  2. LDL (bad cholesterol) goal is less than 100  Your LDL is at goal  Plan is: Continue medications at current doses  3. Blood pressure goal is less than 140/90.  Your blood pressure is at goal.  Plan is: Continue medications at current doses  4. Other: Smoking cessation is recommended    Schedule the ultrasound  Soon.    Recheck in 6 months                        Follow-ups after your visit        Future tests that were ordered for you today     Open Future Orders        Priority Expected Expires Ordered    US abdominal aorta limited Routine  1/2/2019 1/2/2018            Who to contact     If you have questions or need follow up information about today's clinic visit or your schedule please contact [facility] directly at [phone].  Normal or non-critical lab and imaging results will be communicated to you by MyChart, letter or phone within 4 business days after the clinic has received the results. If you do not hear from us within 7 days, please contact the clinic through MyChart or phone. If you have a critical or abnormal lab result, we will notify you by phone as soon as  "possible.  Submit refill requests through [employer] or call your pharmacy and they will forward the refill request to us. Please allow 3 business days for your refill to be completed.          Additional Information About Your Visit        [employer] Information     [employer] lets you send messages to your doctor, view your test results, renew your prescriptions, schedule appointments and more. To sign up, go to [email or web address] . Click on \"Log in\" on the left side of the screen, which will take you to the Welcome page. Then click on \"Sign up Now\" on the right side of the page.     You will be asked to enter the access code listed below, as well as some personal information. Please follow the directions to create your username and password.     Your access code is: [email or web address]  Expires: 2018 10:13 AM     Your access code will  in 90 days. If you need help or a new code, please call your [place] clinic or [phone].        Care EveryWhere ID     This is your Care EveryWhere ID. This could be used by other organizations to access your [place] medical records  [id number]        Your Vitals Were     Pulse Respirations Height BMI (Body Mass Index)          68 16 5' 9.5\" (1.765 m) 20.81 kg/m2         Blood Pressure from Last 3 Encounters:   18 120/70   17 128/78   17 122/70    Weight from Last 3 Encounters:   18 143 lb (64.9 kg)   17 147 lb 6.4 oz (66.9 kg)   17 148 lb 12.8 oz (67.5 kg)              We Performed the Following     Basic metabolic panel     Hemoglobin A1c     Hepatitis C antibody     Lipid panel reflex to direct LDL Fasting          Where to get your medicines      These medications were sent to [facility] [place], [place] - [address]  [address], [facility] [address]     Phone:  [phone]     gabapentin 100 MG capsule    insulin glargine 100 UNIT/ML injection    metFORMIN 500 MG tablet    " simvastatin 40 MG tablet          Primary Care Provider Office Phone # Fax #    R Osvaldo Davenport [phone] [address] [facility] [address]        Equal Access to Services     DENNYTHOMPSON HYACINTH : Haddanelle huff ku alvertoo Sojulio cali, waaxda luqadaha, qaybta kaalmada adestephan, wolf sotogabino cesar. So [facility] [phone].    ATENCIÓN: Si habla español, tiene a purcell disposición servicios gratuitos de asistencia lingüística. Llame al [phone].    We comply with applicable federal civil rights laws and [place] laws. We do not discriminate on the basis of race, color, national origin, age, disability, sex, sexual orientation, or gender identity.            Thank you!     Thank you for choosing [facility]  for your care. Our goal is always to provide you with excellent care. Hearing back from our patients is one way we can continue to improve our services. Please take a few minutes to complete the written survey that you may receive in the mail after your visit with us. Thank you!             Your Updated Medication List - Protect others around you: Learn how to safely use, store and throw away your medicines at www.disposemymeds.org.          This list is accurate as of: 1/2/18 10:13 AM.  Always use your most recent med list.                   Brand Name Dispense Instructions for use Diagnosis    * ACE/ARB/ARNI NOT PRESCRIBED (INTENTIONAL)      1 each continuous prn. ACE & ARB not prescribed due to Symptomatic hypotension not due to excessive diuresis    Type 2 diabetes, HbA1C goal < 8% (H)       ALEVE 220 MG tablet   Generic drug:  naproxen sodium      Take  by mouth 2 times daily (with meals).        aspirin 81 MG tablet     100 tablet    Take 1 tablet by mouth daily.    Type 2 diabetes, HbA1C goal < 8% (H)       blood glucose monitoring lancets     102 each    1 each 2 times daily.    Type 2 diabetes, HbA1C goal < 8% (H)       * blood glucose test strip      3 each    1 Device by In Vitro route 4 times daily (before meals and nightly). Test 4 times daily, before meals and before bed.    Type 2 diabetes, HbA1C goal < 8% (H)       * blood glucose monitoring test strip    ACCU-CHEK SMARTVIEW    1 Box    Test blood sugar two times daily    Type 2 diabetes, HbA1C goal < 8% (H)       gabapentin 100 MG capsule    NEURONTIN    270 capsule    Take 1 capsule (100 mg) by mouth 3 times daily    Diabetic autonomic neuropathy associated with type 2 diabetes mellitus (H)       insulin glargine 100 UNIT/ML injection    LANTUS SOLOSTAR    15 mL    Inject 14 Units Subcutaneous every morning    Type 2 diabetes mellitus with diabetic polyneuropathy, without long-term current use of insulin (H)       metFORMIN 500 MG tablet    GLUCOPHAGE    360 tablet    Take 2 before breakfast and 2 before dinner    Type 2 diabetes mellitus with diabetic polyneuropathy, without long-term current use of insulin (H)       * order for DME     1 each    Glucometer, brand as covered by insurance.    Type 2 diabetes, HbA1C goal < 8% (H)       simvastatin 40 MG tablet    ZOCOR    90 tablet    Take 1 tablet (40 mg) by mouth At Bedtime    Hyperlipidemia LDL goal <100       vitamin D 2000 UNITS Caps     30 capsule    Take 1 capsule by mouth daily        * Notice:  This list has 4 medication(s) that are the same as other medications prescribed for you. Read the directions carefully, and ask your doctor or other care provider to review them with you.

## 2018-01-02 NOTE — NURSING NOTE
"Chief Complaint   Patient presents with     Diabetes     Lipids       Initial /70  Pulse 68  Resp 16  Ht 5' 9.5\" (1.765 m)  Wt 143 lb (64.9 kg)  BMI 20.81 kg/m2 Estimated body mass index is 20.81 kg/(m^2) as calculated from the following:    Height as of this encounter: 5' 9.5\" (1.765 m).    Weight as of this encounter: 143 lb (64.9 kg).  Medication Reconciliation: complete     Rhonda Soto, CMA      "

## 2018-01-02 NOTE — PROGRESS NOTES
"  SUBJECTIVE:   Roddy Sidhu is a 72 year old male who presents to clinic today for the following health issues:      Diabetes Follow-up      Patient is checking blood sugars: not at all    Diabetic concerns: None     Symptoms of hypoglycemia (low blood sugar): shaky, sweaty in the mornings before eating once in a while     Paresthesias (numbness or burning in feet) or sores: Yes, Dr. Davenport is aware of this     Date of last diabetic eye exam: 6 months ago    BP Readings from Last 2 Encounters:   01/02/18 120/70   09/22/17 128/78     Hemoglobin A1C (%)   Date Value   09/22/2017 9.1 (H)   06/13/2017 9.2 (H)     LDL Cholesterol Calculated (mg/dL)   Date Value   12/31/2014 46     LDL Cholesterol Direct (mg/dL)   Date Value   08/20/2015 92   12/10/2013 66     Hyperlipidemia Follow-Up      Rate your low fat/cholesterol diet?: good    Taking statin?  Yes, no muscle aches from statin    Other lipid medications/supplements?:  none          Amount of exercise or physical activity: None    Problems taking medications regularly: No    Medication side effects: none  Diet: regular (no restrictions)          Problem list and histories reviewed & adjusted, as indicated.  Additional history: none        Reviewed and updated as needed this visit by clinical staffTobacco  Allergies  Med Hx  Surg Hx  Fam Hx  Soc Hx      Reviewed and updated as needed this visit by Provider               ROS:  Constitutional, HEENT, cardiovascular, pulmonary, gi and gu systems are negative, except as otherwise noted.          OBJECTIVE:                                                    /70  Pulse 68  Resp 16  Ht 5' 9.5\" (1.765 m)  Wt 143 lb (64.9 kg)  BMI 20.81 kg/m2    GENERAL: healthy, alert and no distress  EYES: Eyes grossly normal to inspection, extraocular movements - intact, and PERRL  NECK: no tenderness, no adenopathy, no asymmetry, no masses, no stiffness; thyroid- normal to palpation  RESP: lungs clear to auscultation - " no rales, no rhonchi, no wheezes  CV: regular rates and rhythm, normal S1 S2, no S3 or S4 and no murmur, no click or rub -  ABDOMEN: soft, no tenderness, no  hepatosplenomegaly, no masses, normal bowel sounds  MS: extremities- no gross deformities noted, no edema    Diagnostic test results:  Results for orders placed or performed in visit on 01/02/18 (from the past 24 hour(s))   Hemoglobin A1c   Result Value Ref Range    Hemoglobin A1C 7.3 (H) 4.3 - 6.0 %          ASSESSMENT/PLAN:                                                    ASSESSMENT:  1. Type 2 diabetes mellitus with diabetic polyneuropathy, without long-term current use of insulin (H)    2. Encounter for abdominal aortic aneurysm screening    3. Need for hepatitis C screening test    4. Hyperlipidemia LDL goal <100    5. Tobacco use disorder    6. Diabetic autonomic neuropathy associated with type 2 diabetes mellitus (H)        PLAN:  Orders Placed This Encounter     US abdominal aorta limited     Hemoglobin A1c     Basic metabolic panel     Lipid panel reflex to direct LDL Fasting     Hepatitis C antibody     metFORMIN (GLUCOPHAGE) 500 MG tablet     gabapentin (NEURONTIN) 100 MG capsule     simvastatin (ZOCOR) 40 MG tablet     insulin glargine (LANTUS SOLOSTAR) 100 UNIT/ML injection       Patient Instructions   Diabetes Plan  1. Hemoglobin A1c goal is between 7% and 8%  Your Hemoglobin A1c is at goal  Plan is:Continue medications at current doses  2. LDL (bad cholesterol) goal is less than 100  Your LDL is at goal  Plan is: Continue medications at current doses  3. Blood pressure goal is less than 140/90.  Your blood pressure is at goal.  Plan is: Continue diet and exercise  4. Other: Smoking cessation is recommended    Schedule the ultrasound  Soon.    Recheck in 6 months                    TRACY Davenport  [facility]

## 2018-01-02 NOTE — LETTER
[facility]  [address]  [facility] [address]  Phone: [phone]      1/4/2018     Roddy Sidhu  [address]  [facility] [address]      Dear Roddy:    Thank you for allowing me to participate in your care. Your recent test results were reviewed and listed below.  acceptable results. Continue current medications and cares.    Your results are provided below for your review  Results for orders placed or performed in visit on 01/02/18   Hemoglobin A1c   Result Value Ref Range    Hemoglobin A1C 7.3 (H) 4.3 - 6.0 %   Basic metabolic panel   Result Value Ref Range    Sodium 133 133 - 144 mmol/L    Potassium 4.8 3.4 - 5.3 mmol/L    Chloride 99 94 - 109 mmol/L    Carbon Dioxide 26 20 - 32 mmol/L    Anion Gap 8 3 - 14 mmol/L    Glucose 127 (H) 70 - 99 mg/dL    Urea Nitrogen 16 7 - 30 mg/dL    Creatinine 0.70 0.66 - 1.25 mg/dL    GFR Estimate >90 >60 mL/min/1.7m2    GFR Estimate If Black >90 >60 mL/min/1.7m2    Calcium 9.1 8.5 - 10.1 mg/dL   Lipid panel reflex to direct LDL Fasting   Result Value Ref Range    Cholesterol 118 <200 mg/dL    Triglycerides 174 (H) <150 mg/dL    HDL Cholesterol 48 >39 mg/dL    LDL Cholesterol Calculated 35 <100 mg/dL    Non HDL Cholesterol 70 <130 mg/dL   Hepatitis C antibody   Result Value Ref Range    Hepatitis C Antibody Nonreactive NR^Nonreactive                 Thank you for choosing [facility]. As a result, please continue with the treatment plan discussed in the office. Return as discussed or sooner if symptoms worsen or fail to improve. If you have any further questions or concerns, please do not hesitate to contact us.      Sincerely,        Dr. Osvaldo Davenport

## 2018-01-04 NOTE — PROGRESS NOTES
Please SEND LETTER    Notify patient of acceptable results.     Continue current medications and cares.

## 2018-01-09 ENCOUNTER — HOSPITAL ENCOUNTER (OUTPATIENT)
Dept: ULTRASOUND IMAGING | Facility: CLINIC | Age: 73
Discharge: HOME | End: 2018-01-09
Attending: FAMILY MEDICINE | Admitting: FAMILY MEDICINE
Payer: COMMERCIAL

## 2018-01-09 DIAGNOSIS — Z13.6 ENCOUNTER FOR ABDOMINAL AORTIC ANEURYSM SCREENING: ICD-10-CM

## 2018-01-09 PROCEDURE — 76775 US EXAM ABDO BACK WALL LIM: CPT

## 2018-01-09 NOTE — PROGRESS NOTES
Please CALL PATIENT    Notify patient of NORMAL results.  No sign of abdominal aortic aneurysm.  No further screening is necessary

## 2018-03-07 NOTE — NURSING NOTE
Instructed on Lantus Solostar use.  Handout also given.  Reviewed site rotation.  Patient successfully demonstrated injection using pen.  No further questions.   back

## 2018-05-04 ENCOUNTER — TELEPHONE (OUTPATIENT)
Dept: FAMILY MEDICINE | Facility: CLINIC | Age: 73
End: 2018-05-04

## 2018-05-04 DIAGNOSIS — Z12.11 SPECIAL SCREENING FOR MALIGNANT NEOPLASMS, COLON: Primary | ICD-10-CM

## 2018-05-04 NOTE — TELEPHONE ENCOUNTER
Agreed to fit, kit was mailed.    Diana Khan MA    Panel Management Review      Patient has the following on his problem list:     Diabetes    ASA: Passed    Last A1C  Lab Results   Component Value Date    A1C 7.3 01/02/2018    A1C 9.1 09/22/2017    A1C 9.2 06/13/2017    A1C 10.4 03/13/2017    A1C 9.2 11/28/2016     A1C tested: MONITOR    Last LDL:    Lab Results   Component Value Date    CHOL 118 01/02/2018     Lab Results   Component Value Date    HDL 48 01/02/2018     Lab Results   Component Value Date    LDL 35 01/02/2018     Lab Results   Component Value Date    TRIG 174 01/02/2018     Lab Results   Component Value Date    CHOLHDLRATIO 2.6 12/31/2014     Lab Results   Component Value Date    NHDL 70 01/02/2018       Is the patient on a Statin? YES             Is the patient on Aspirin? YES    Medications     HMG CoA Reductase Inhibitors    simvastatin (ZOCOR) 40 MG tablet    Salicylates    aspirin 81 MG tablet          Last three blood pressure readings:  BP Readings from Last 3 Encounters:   01/02/18 120/70   09/22/17 128/78   06/13/17 122/70       Date of last diabetes office visit: 1/2/18     Tobacco History:     History   Smoking Status     Current Some Day Smoker     Types: Pipe   Smokeless Tobacco     Never Used           Composite cancer screening  Chart review shows that this patient is due/due soon for the following Fecal Colorectal (FIT)  Summary:    Patient is due/failing the following:   FIT    Action needed:   Patient needs referral/order: fit    Type of outreach:    Phone, left message for patient to call back.     Questions for provider review:    None                                                                                                                                    Diana Khan MA       Chart routed to Care Team .

## 2018-05-31 PROCEDURE — 82274 ASSAY TEST FOR BLOOD FECAL: CPT | Performed by: FAMILY MEDICINE

## 2018-06-04 DIAGNOSIS — Z12.11 SPECIAL SCREENING FOR MALIGNANT NEOPLASMS, COLON: ICD-10-CM

## 2018-06-05 LAB — HEMOCCULT STL QL IA: NEGATIVE

## 2018-06-08 ENCOUNTER — OFFICE VISIT (OUTPATIENT)
Dept: FAMILY MEDICINE | Facility: CLINIC | Age: 73
End: 2018-06-08
Payer: COMMERCIAL

## 2018-06-08 VITALS
HEART RATE: 84 BPM | RESPIRATION RATE: 16 BRPM | WEIGHT: 145 LBS | TEMPERATURE: 96.4 F | SYSTOLIC BLOOD PRESSURE: 130 MMHG | DIASTOLIC BLOOD PRESSURE: 66 MMHG | BODY MASS INDEX: 21.11 KG/M2

## 2018-06-08 DIAGNOSIS — E78.5 HYPERLIPIDEMIA LDL GOAL <100: ICD-10-CM

## 2018-06-08 DIAGNOSIS — Z79.4 TYPE 2 DIABETES MELLITUS WITH DIABETIC POLYNEUROPATHY, WITH LONG-TERM CURRENT USE OF INSULIN (H): Primary | ICD-10-CM

## 2018-06-08 DIAGNOSIS — R80.9 MICROALBUMINURIA DUE TO TYPE 2 DIABETES MELLITUS (H): ICD-10-CM

## 2018-06-08 DIAGNOSIS — E11.42 TYPE 2 DIABETES MELLITUS WITH DIABETIC POLYNEUROPATHY, WITH LONG-TERM CURRENT USE OF INSULIN (H): Primary | ICD-10-CM

## 2018-06-08 DIAGNOSIS — E11.29 MICROALBUMINURIA DUE TO TYPE 2 DIABETES MELLITUS (H): ICD-10-CM

## 2018-06-08 DIAGNOSIS — F17.200 TOBACCO USE DISORDER: ICD-10-CM

## 2018-06-08 LAB
CREAT UR-MCNC: 35 MG/DL
HBA1C MFR BLD: 7.7 % (ref 0–5.6)
MICROALBUMIN UR-MCNC: 90 MG/L
MICROALBUMIN/CREAT UR: 254.83 MG/G CR (ref 0–17)
TSH SERPL DL<=0.005 MIU/L-ACNC: 4.35 MU/L (ref 0.4–4)

## 2018-06-08 PROCEDURE — 83036 HEMOGLOBIN GLYCOSYLATED A1C: CPT | Performed by: FAMILY MEDICINE

## 2018-06-08 PROCEDURE — 84443 ASSAY THYROID STIM HORMONE: CPT | Performed by: FAMILY MEDICINE

## 2018-06-08 PROCEDURE — 99214 OFFICE O/P EST MOD 30 MIN: CPT | Performed by: FAMILY MEDICINE

## 2018-06-08 PROCEDURE — 82043 UR ALBUMIN QUANTITATIVE: CPT | Performed by: FAMILY MEDICINE

## 2018-06-08 PROCEDURE — 99207 ZZC FOOT EXAM  NO CHARGE: CPT | Performed by: FAMILY MEDICINE

## 2018-06-08 PROCEDURE — 36415 COLL VENOUS BLD VENIPUNCTURE: CPT | Performed by: FAMILY MEDICINE

## 2018-06-08 RX ORDER — SIMVASTATIN 40 MG
40 TABLET ORAL AT BEDTIME
Qty: 90 TABLET | Refills: 1 | Status: SHIPPED | OUTPATIENT
Start: 2018-06-08 | End: 2018-11-30

## 2018-06-08 RX ORDER — LISINOPRIL 10 MG/1
10 TABLET ORAL DAILY
Qty: 90 TABLET | Refills: 1 | Status: SHIPPED | OUTPATIENT
Start: 2018-06-08 | End: 2018-11-30

## 2018-06-08 RX ORDER — GABAPENTIN 100 MG/1
100 CAPSULE ORAL 3 TIMES DAILY
Qty: 270 CAPSULE | Refills: 1 | Status: SHIPPED | OUTPATIENT
Start: 2018-06-08 | End: 2018-11-30

## 2018-06-08 NOTE — PROGRESS NOTES
SUBJECTIVE:   Roddy Sidhu is a 72 year old male who presents to clinic today for the following health issues:    Diabetes Follow-up      Patient is checking blood sugars: not at all    Diabetic concerns: None     Symptoms of hypoglycemia (low blood sugar): shaky, weak     Paresthesias (numbness or burning in feet) or sores: Yes- has had for years. Also in hands.     Date of last diabetic eye exam: 1/2018    BP Readings from Last 2 Encounters:   01/02/18 120/70   09/22/17 128/78     Hemoglobin A1C (%)   Date Value   01/02/2018 7.3 (H)   09/22/2017 9.1 (H)     LDL Cholesterol Calculated (mg/dL)   Date Value   01/02/2018 35   12/31/2014 46     LDL Cholesterol Direct (mg/dL)   Date Value   08/20/2015 92     Hyperlipidemia Follow-Up      Rate your low fat/cholesterol diet?: good    Taking statin?  Yes, possible muscle aches from statin    Other lipid medications/supplements?:  none      Amount of exercise or physical activity: 4-5 days/week for an average of greater than 60 minutes    Problems taking medications regularly: No    Medication side effects: none    Diet: regular (no restrictions)        PROBLEMS TO ADD ON...    Tobacco use disorder: He is not ready to quit at this time    Problem list and histories reviewed & adjusted, as indicated.  Additional history: none        Reviewed and updated as needed this visit by clinical staff       Reviewed and updated as needed this visit by Provider               ROS:  Constitutional, HEENT, cardiovascular, pulmonary, gi and gu systems are negative, except as otherwise noted.        OBJECTIVE:                                                    /66  Pulse 84  Temp 96.4  F (35.8  C) (Tympanic)  Resp 16  Wt 145 lb (65.8 kg)  BMI 21.11 kg/m2    GENERAL: healthy, alert and no distress  EYES: Eyes grossly normal to inspection, extraocular movements - intact, and PERRL  NECK: no tenderness, no adenopathy, no asymmetry, no masses, no stiffness; thyroid- normal to  palpation  RESP: lungs clear to auscultation - no rales, no rhonchi, no wheezes  CV: regular rates and rhythm, normal S1 S2, no S3 or S4 and no murmur, no click or rub -  MS: extremities- no gross deformities noted, no edema  Diabetic foot exam: no trophic changes or ulcerative lesions, normal monofilament exam and yet he complains of numbness and tingling in his lower extremities, and that is why he is taking the gabapentin    Diagnostic test results:  Results for orders placed or performed in visit on 06/08/18 (from the past 24 hour(s))   Hemoglobin A1c   Result Value Ref Range    Hemoglobin A1C 7.7 (H) 0 - 5.6 %        ASSESSMENT/PLAN:                                                    ASSESSMENT:  1. Type 2 diabetes mellitus with diabetic polyneuropathy, with long-term current use of insulin (H)    2. Hyperlipidemia LDL goal <100    3. Tobacco use disorder        PLAN:  Orders Placed This Encounter     EYE EXAM (SIMPLE-NONBILLABLE)     FOOT EXAM     Albumin Random Urine Quantitative with Creat Ratio     TSH     Hemoglobin A1c     simvastatin (ZOCOR) 40 MG tablet     gabapentin (NEURONTIN) 100 MG capsule     metFORMIN (GLUCOPHAGE) 500 MG tablet     insulin glargine (LANTUS SOLOSTAR) 100 UNIT/ML pen       Patient Instructions   Diabetes Plan  1. Hemoglobin A1c goal is between 7% and 8%  Your Hemoglobin A1c is pending  Plan is:Continue medications at current doses  2. LDL (bad cholesterol) goal is less than 100  Your LDL is at goal  Plan is: Continue medications at current doses  3. Blood pressure goal is less than 140/90.  Your blood pressure is at goal.  Plan is: Diet and exercise  4. Microalbumin checks for protein in your urine which is an indicator of kidney damage.  Your microalbumin is pending  Plan is: Recheck in one year  Other: Smoking cessation is recommended      Recheck in 6 months                    TRACY [facility]

## 2018-06-08 NOTE — PATIENT INSTRUCTIONS
Diabetes Plan  1. Hemoglobin A1c goal is between 7% and 8%  Your Hemoglobin A1c is pending  Plan is:Continue medications at current doses  2. LDL (bad cholesterol) goal is less than 100  Your LDL is at goal  Plan is: Continue medications at current doses  3. Blood pressure goal is less than 140/90.  Your blood pressure is at goal.  Plan is: Diet and exercise  4. Microalbumin checks for protein in your urine which is an indicator of kidney damage.  Your microalbumin is pending  Plan is: Recheck in one year  Other: Smoking cessation is recommended      Recheck in 6 months

## 2018-06-08 NOTE — PROGRESS NOTES
Please CALL PATIENT    Her labs were in the acceptable range except for the urine showed a leakage of protein.  This is an indicator that the diabetes is causing some damage to your kidneys.  I would recommend a medication called lisinopril which will protect her kidneys and also help lower your blood pressure somewhat.  I sent a new prescription to the [facility] pharmacy.  Then we will recheck the albumin in the urine in 1 year

## 2018-06-08 NOTE — MR AVS SNAPSHOT
After Visit Summary   6/8/2018    Roddy Sidhu    MRN: [id number]           Patient Information     Date Of Birth          1945        Visit Information        Provider Department      6/8/2018 9:00 AM TRACY Davenport MD [facility]        Today's Diagnoses     Type 2 diabetes mellitus with diabetic polyneuropathy, without long-term current use of insulin (H)        Hyperlipidemia LDL goal <100        Diabetic autonomic neuropathy associated with type 2 diabetes mellitus (H)          Care Instructions    Diabetes Plan  1. Hemoglobin A1c goal is between 7% and 8%  Your Hemoglobin A1c is pending  Plan is:Continue medications at current doses  2. LDL (bad cholesterol) goal is less than 100  Your LDL is at goal  Plan is: Continue medications at current doses  3. Blood pressure goal is less than 140/90.  Your blood pressure is at goal.  Plan is: Diet and exercise  4. Microalbumin checks for protein in your urine which is an indicator of kidney damage.  Your microalbumin is pending  Plan is: Recheck in one year  Other: Smoking cessation is recommended      Recheck in 6 months                        Follow-ups after your visit        Who to contact     If you have questions or need follow up information about today's clinic visit or your schedule please contact [facility] directly at [phone].  Normal or non-critical lab and imaging results will be communicated to you by MyChart, letter or phone within 4 business days after the clinic has received the results. If you do not hear from us within 7 days, please contact the clinic through MyChart or phone. If you have a critical or abnormal lab result, we will notify you by phone as soon as possible.  Submit refill requests through [employer] or call your pharmacy and they will forward the refill request to us. Please allow 3 business days for your refill to be completed.          Additional Information About Your  Visit        Care EveryWhere ID     This is your Care EveryWhere ID. This could be used by other organizations to access your [place] medical records  [id number]        Your Vitals Were     Pulse Temperature Respirations BMI (Body Mass Index)          84 96.4  F (35.8  C) (Tympanic) 16 21.11 kg/m2         Blood Pressure from Last 3 Encounters:   06/08/18 130/66   01/02/18 120/70   09/22/17 128/78    Weight from Last 3 Encounters:   06/08/18 145 lb (65.8 kg)   01/02/18 143 lb (64.9 kg)   09/22/17 147 lb 6.4 oz (66.9 kg)              We Performed the Following     Albumin Random Urine Quantitative with Creat Ratio     EYE EXAM (SIMPLE-NONBILLABLE)     FOOT EXAM     Hemoglobin A1c     TSH          Where to get your medicines      These medications were sent to [facility] [address]  [address] [facility] [address]     Phone:  [phone]     gabapentin 100 MG capsule    insulin glargine 100 UNIT/ML injection    metFORMIN 500 MG tablet    simvastatin 40 MG tablet          Primary Care Provider Office Phone # Fax #    R Osvaldo Davenport [phone] [address] [facility] [address]        Equal Access to Services     NIKI CLAROS AH: Hadii daria ku hadasho Soomaali, waaxda luqadaha, qaybta kaalmada adeegyada, waxlupe hilario hayedward guerrero. So [facility] [phone].    ATENCIÓN: Si habla español, tiene a purcell disposición servicios gratuitos de asistencia lingüística. Llame al [phone].    We comply with applicable federal civil rights laws and [place] laws. We do not discriminate on the basis of race, color, national origin, age, disability, sex, sexual orientation, or gender identity.            Thank you!     Thank you for choosing [facility]  for your care. Our goal is always to provide you with excellent care. Hearing back from our patients is one way we can continue to improve our services.  Please take a few minutes to complete the written survey that you may receive in the mail after your visit with us. Thank you!             Your Updated Medication List - Protect others around you: Learn how to safely use, store and throw away your medicines at www.disposemymeds.org.          This list is accurate as of 6/8/18  9:31 AM.  Always use your most recent med list.                   Brand Name Dispense Instructions for use Diagnosis    * ACE/ARB/ARNI NOT PRESCRIBED (INTENTIONAL)      1 each continuous prn. ACE & ARB not prescribed due to Symptomatic hypotension not due to excessive diuresis    Type 2 diabetes, HbA1C goal < 8% (H)       ALEVE 220 MG tablet   Generic drug:  naproxen sodium      Take  by mouth 2 times daily (with meals).        aspirin 81 MG tablet     100 tablet    Take 1 tablet by mouth daily.    Type 2 diabetes, HbA1C goal < 8% (H)       blood glucose monitoring lancets     102 each    1 each 2 times daily.    Type 2 diabetes, HbA1C goal < 8% (H)       * blood glucose test strip     3 each    1 Device by In Vitro route 4 times daily (before meals and nightly). Test 4 times daily, before meals and before bed.    Type 2 diabetes, HbA1C goal < 8% (H)       * blood glucose monitoring test strip    ACCU-CHEK SMARTVIEW    1 Box    Test blood sugar two times daily    Type 2 diabetes, HbA1C goal < 8% (H)       gabapentin 100 MG capsule    NEURONTIN    270 capsule    Take 1 capsule (100 mg) by mouth 3 times daily    Diabetic autonomic neuropathy associated with type 2 diabetes mellitus (H)       insulin glargine 100 UNIT/ML injection    LANTUS SOLOSTAR    15 mL    Inject 14 Units Subcutaneous every morning    Type 2 diabetes mellitus with diabetic polyneuropathy, without long-term current use of insulin (H)       metFORMIN 500 MG tablet    GLUCOPHAGE    360 tablet    Take 2 before breakfast and 2 before dinner    Type 2 diabetes mellitus with diabetic polyneuropathy, without long-term current use of  insulin (H)       * order for DME     1 each    Glucometer, brand as covered by insurance.    Type 2 diabetes, HbA1C goal < 8% (H)       simvastatin 40 MG tablet    ZOCOR    90 tablet    Take 1 tablet (40 mg) by mouth At Bedtime    Hyperlipidemia LDL goal <100       vitamin D 2000 units Caps     30 capsule    Take 1 capsule by mouth daily        * Notice:  This list has 4 medication(s) that are the same as other medications prescribed for you. Read the directions carefully, and ask your doctor or other care provider to review them with you.

## 2018-07-30 DIAGNOSIS — E11.9 TYPE 2 DIABETES, HBA1C GOAL < 8% (H): ICD-10-CM

## 2018-07-31 RX ORDER — LANCETS
1 EACH MISCELLANEOUS 2 TIMES DAILY
Qty: 200 EACH | Refills: 1 | Status: SHIPPED | OUTPATIENT
Start: 2018-07-31 | End: 2023-04-05

## 2018-11-08 ENCOUNTER — TELEPHONE (OUTPATIENT)
Dept: FAMILY MEDICINE | Facility: CLINIC | Age: 73
End: 2018-11-08

## 2018-11-08 DIAGNOSIS — Z79.4 TYPE 2 DIABETES MELLITUS WITH DIABETIC POLYNEUROPATHY, WITH LONG-TERM CURRENT USE OF INSULIN (H): Primary | ICD-10-CM

## 2018-11-08 DIAGNOSIS — E11.42 TYPE 2 DIABETES MELLITUS WITH DIABETIC POLYNEUROPATHY, WITH LONG-TERM CURRENT USE OF INSULIN (H): Primary | ICD-10-CM

## 2018-11-08 NOTE — TELEPHONE ENCOUNTER
[facility] refill request for BD PEN NEEDLE MINI 31G X 5MM  Not on med list   LOV: 6/8/2018 with Post

## 2018-11-30 ENCOUNTER — OFFICE VISIT (OUTPATIENT)
Dept: FAMILY MEDICINE | Facility: CLINIC | Age: 73
End: 2018-11-30
Payer: COMMERCIAL

## 2018-11-30 VITALS
HEART RATE: 78 BPM | WEIGHT: 147 LBS | SYSTOLIC BLOOD PRESSURE: 124 MMHG | OXYGEN SATURATION: 100 % | RESPIRATION RATE: 18 BRPM | TEMPERATURE: 97.5 F | BODY MASS INDEX: 21.05 KG/M2 | DIASTOLIC BLOOD PRESSURE: 62 MMHG | HEIGHT: 70 IN

## 2018-11-30 DIAGNOSIS — E11.42 TYPE 2 DIABETES MELLITUS WITH DIABETIC POLYNEUROPATHY, WITH LONG-TERM CURRENT USE OF INSULIN (H): Primary | ICD-10-CM

## 2018-11-30 DIAGNOSIS — E78.5 HYPERLIPIDEMIA LDL GOAL <100: ICD-10-CM

## 2018-11-30 DIAGNOSIS — E87.5 HYPERKALEMIA: ICD-10-CM

## 2018-11-30 DIAGNOSIS — E11.29 MICROALBUMINURIA DUE TO TYPE 2 DIABETES MELLITUS (H): ICD-10-CM

## 2018-11-30 DIAGNOSIS — Z79.4 TYPE 2 DIABETES MELLITUS WITH DIABETIC POLYNEUROPATHY, WITH LONG-TERM CURRENT USE OF INSULIN (H): Primary | ICD-10-CM

## 2018-11-30 DIAGNOSIS — R80.9 MICROALBUMINURIA DUE TO TYPE 2 DIABETES MELLITUS (H): ICD-10-CM

## 2018-11-30 DIAGNOSIS — F17.200 TOBACCO USE DISORDER: ICD-10-CM

## 2018-11-30 LAB
ANION GAP SERPL CALCULATED.3IONS-SCNC: 4 MMOL/L (ref 3–14)
BUN SERPL-MCNC: 14 MG/DL (ref 7–30)
CALCIUM SERPL-MCNC: 8.6 MG/DL (ref 8.5–10.1)
CHLORIDE SERPL-SCNC: 100 MMOL/L (ref 94–109)
CHOLEST SERPL-MCNC: 136 MG/DL
CO2 SERPL-SCNC: 29 MMOL/L (ref 20–32)
CREAT SERPL-MCNC: 0.87 MG/DL (ref 0.66–1.25)
GFR SERPL CREATININE-BSD FRML MDRD: 86 ML/MIN/1.7M2
GLUCOSE SERPL-MCNC: 270 MG/DL (ref 70–99)
HBA1C MFR BLD: 8 % (ref 0–5.6)
HDLC SERPL-MCNC: 41 MG/DL
LDLC SERPL CALC-MCNC: 39 MG/DL
NONHDLC SERPL-MCNC: 95 MG/DL
POTASSIUM SERPL-SCNC: 5.6 MMOL/L (ref 3.4–5.3)
SODIUM SERPL-SCNC: 133 MMOL/L (ref 133–144)
TRIGL SERPL-MCNC: 279 MG/DL

## 2018-11-30 PROCEDURE — 83036 HEMOGLOBIN GLYCOSYLATED A1C: CPT | Performed by: FAMILY MEDICINE

## 2018-11-30 PROCEDURE — 80061 LIPID PANEL: CPT | Performed by: FAMILY MEDICINE

## 2018-11-30 PROCEDURE — 36415 COLL VENOUS BLD VENIPUNCTURE: CPT | Performed by: FAMILY MEDICINE

## 2018-11-30 PROCEDURE — 99214 OFFICE O/P EST MOD 30 MIN: CPT | Performed by: FAMILY MEDICINE

## 2018-11-30 PROCEDURE — 80048 BASIC METABOLIC PNL TOTAL CA: CPT | Performed by: FAMILY MEDICINE

## 2018-11-30 RX ORDER — LISINOPRIL 10 MG/1
10 TABLET ORAL DAILY
Qty: 90 TABLET | Refills: 1 | Status: SHIPPED | OUTPATIENT
Start: 2018-11-30 | End: 2018-11-30

## 2018-11-30 RX ORDER — SIMVASTATIN 40 MG
40 TABLET ORAL AT BEDTIME
Qty: 90 TABLET | Refills: 1 | Status: SHIPPED | OUTPATIENT
Start: 2018-11-30 | End: 2019-08-08

## 2018-11-30 RX ORDER — LISINOPRIL 10 MG/1
5 TABLET ORAL DAILY
Qty: 45 TABLET | Refills: 1 | Status: SHIPPED | OUTPATIENT
Start: 2018-11-30 | End: 2019-06-11

## 2018-11-30 RX ORDER — GABAPENTIN 100 MG/1
100 CAPSULE ORAL 3 TIMES DAILY
Qty: 270 CAPSULE | Refills: 1 | Status: SHIPPED | OUTPATIENT
Start: 2018-11-30 | End: 2019-07-23

## 2018-11-30 ASSESSMENT — PAIN SCALES - GENERAL: PAINLEVEL: NO PAIN (0)

## 2018-11-30 NOTE — PATIENT INSTRUCTIONS
Diabetes Plan  1. Hemoglobin A1c goal is between 7% and 8%  Your Hemoglobin A1c is not at goal  Plan is:Increase lantus insulin to 16 units every am  2. LDL (bad cholesterol) goal is less than 100  Your LDL is at goal  Plan is: Continue medications at current doses  3. Blood pressure goal is less than 140/90.  Your blood pressure is at goal.  Plan is: Continue medications at current doses  Recheck in 6 months.        Thank you for choosing [facility].  You may be receiving a survey in the mail from [employer] [facility] regarding your visit today.  Please take a few minutes to complete and return the survey to let us know how we are doing.      Our Clinic hours are:  Mondays    7:20 am - 7 pm  Tues -  Fri  7:20 am - 5 pm    Clinic Phone: [phone]    The clinic lab opens at 7:30 am Mon - Fri and appointments are required.    [facility]  Ph. [phone]  Monday  8 am - 7pm  Tues - Fri 8 am - 5:30 pm

## 2018-11-30 NOTE — PROGRESS NOTES
Please CALL PATIENT    We already discussed the glycosylated hemoglobin when you were in the clinic.  Your cholesterol levels were fine.  Your potassium was slightly high.  Therefore, because your blood pressure was plenty low, I would recommend you cut your lisinopril tablets in half and only take 1/2 tablet daily.  (The lisinopril can tend to raise the potassium level).  And I recommend you have a repeat potassium and glycosylated hemoglobin in 3 months.  I will put the orders in at the lab

## 2018-11-30 NOTE — MR AVS SNAPSHOT
After Visit Summary   11/30/2018    Roddy Sidhu    MRN: [id number]           Patient Information     Date Of Birth          1945        Visit Information        Provider Department      11/30/2018 11:20 AM TRACY Davenport MD [facility]        Today's Diagnoses     Type 2 diabetes mellitus with diabetic polyneuropathy, with long-term current use of insulin (H)    -  1    Hyperlipidemia LDL goal <100        Microalbuminuria due to type 2 diabetes mellitus (H)          Care Instructions    Diabetes Plan  1. Hemoglobin A1c goal is between 7% and 8%  Your Hemoglobin A1c is not at goal  Plan is:Increase lantus insulin to 16 units every am  2. LDL (bad cholesterol) goal is less than 100  Your LDL is at goal  Plan is: Continue medications at current doses  3. Blood pressure goal is less than 140/90.  Your blood pressure is at goal.  Plan is: Continue medications at current doses  Recheck in 6 months.        Thank you for choosing [facility]  You may be receiving a survey in the mail from [employer] regarding your visit today.  Please take a few minutes to complete and return the survey to let us know how we are doing.      Our Clinic hours are:  Mondays    7:20 am - 7 pm  Tues -  Fri  7:20 am - 5 pm    Clinic Phone: [phone]    The clinic lab opens at 7:30 am Mon - Fri and appointments are required.    [facility]  Ph. [phone]  Monday  8 am - 7pm  Tues - Fri 8 am - 5:30 pm                 Follow-ups after your visit        Who to contact     If you have questions or need follow up information about today's clinic visit or your schedule please contact [facility] directly at [phone].  Normal or non-critical lab and imaging results will be communicated to you by MyChart, letter or phone within 4 business days after the clinic has received the results. If you do not hear from us within 7 days, please contact the clinic through  "MyChart or phone. If you have a critical or abnormal lab result, we will notify you by phone as soon as possible.  Submit refill requests through [employer] or call your pharmacy and they will forward the refill request to us. Please allow 3 business days for your refill to be completed.          Additional Information About Your Visit        Care EveryWhere ID     This is your Care EveryWhere ID. This could be used by other organizations to access your [place] medical records  [id number]        Your Vitals Were     Pulse Temperature Respirations Height Pulse Oximetry BMI (Body Mass Index)    78 97.5  F (36.4  C) (Tympanic) 18 5' 9.5\" (1.765 m) 100% 21.4 kg/m2       Blood Pressure from Last 3 Encounters:   11/30/18 124/62   06/08/18 130/66   01/02/18 120/70    Weight from Last 3 Encounters:   11/30/18 147 lb (66.7 kg)   06/08/18 145 lb (65.8 kg)   01/02/18 143 lb (64.9 kg)              We Performed the Following     Basic metabolic panel     Hemoglobin A1c     Lipid panel reflex to direct LDL Non-fasting          Today's Medication Changes          These changes are accurate as of 11/30/18 11:38 AM.  If you have any questions, ask your nurse or doctor.               These medicines have changed or have updated prescriptions.        Dose/Directions    insulin glargine 100 UNIT/ML pen   Commonly known as:  LANTUS SOLOSTAR   This may have changed:  how much to take   Used for:  Type 2 diabetes mellitus with diabetic polyneuropathy, with long-term current use of insulin (H)   Changed by:  TRACY Davenport MD        Dose:  16 Units   Inject 16 Units Subcutaneous every morning   Quantity:  15 mL   Refills:  3            Where to get your medicines      These medications were sent to [facility] [place], [place] - [address]  [address], [facility] [address]     Phone:  [phone]     gabapentin 100 MG capsule    insulin glargine 100 UNIT/ML pen    lisinopril 10 MG tablet    " metFORMIN 500 MG tablet    simvastatin 40 MG tablet                Primary Care Provider Office Phone # Fax #    R Osvaldo Davenport [phone] [address] [facility] [address]        Equal Access to Services     NIKI CLAROS : Stuart daria new alvertoo Sojulio cali, waaxda luqadaha, qaybta kaalmada adeegyada, wolf recion demetra rodriguezedward guerrero. So [facility] [phone].    ATENCIÓN: Si habla español, tiene a purcell disposición servicios gratuitos de asistencia lingüística. Llame al [phone].    We comply with applicable federal civil rights laws and [place] laws. We do not discriminate on the basis of race, color, national origin, age, disability, sex, sexual orientation, or gender identity.            Thank you!     Thank you for choosing [facility]  for your care. Our goal is always to provide you with excellent care. Hearing back from our patients is one way we can continue to improve our services. Please take a few minutes to complete the written survey that you may receive in the mail after your visit with us. Thank you!             Your Updated Medication List - Protect others around you: Learn how to safely use, store and throw away your medicines at www.disposemymeds.org.          This list is accurate as of 11/30/18 11:38 AM.  Always use your most recent med list.                   Brand Name Dispense Instructions for use Diagnosis    ALEVE 220 MG tablet   Generic drug:  naproxen sodium      Take  by mouth 2 times daily (with meals).        aspirin 81 MG tablet    ASA    100 tablet    Take 1 tablet by mouth daily.    Type 2 diabetes, HbA1C goal < 8% (H)       blood glucose monitoring lancets     200 each    1 each 2 times daily    Type 2 diabetes, HbA1C goal < 8% (H)       * blood glucose test strip     3 each    1 Device by In Vitro route 4 times daily (before meals and nightly). Test 4 times daily, before meals and before bed.    Type 2 diabetes, HbA1C goal < 8%  (H)       * blood glucose monitoring test strip    ACCU-CHEK SMARTVIEW    1 Box    Test blood sugar two times daily    Type 2 diabetes, HbA1C goal < 8% (H)       gabapentin 100 MG capsule    NEURONTIN    270 capsule    Take 1 capsule (100 mg) by mouth 3 times daily    Type 2 diabetes mellitus with diabetic polyneuropathy, with long-term current use of insulin (H)       insulin glargine 100 UNIT/ML pen    LANTUS SOLOSTAR    15 mL    Inject 16 Units Subcutaneous every morning    Type 2 diabetes mellitus with diabetic polyneuropathy, with long-term current use of insulin (H)       insulin pen needle 31G X 5 MM miscellaneous    B-D U/F    100 each    Use daily or as directed.    Type 2 diabetes mellitus with diabetic polyneuropathy, with long-term current use of insulin (H)       lisinopril 10 MG tablet    PRINIVIL/ZESTRIL    90 tablet    Take 1 tablet (10 mg) by mouth daily    Microalbuminuria due to type 2 diabetes mellitus (H)       metFORMIN 500 MG tablet    GLUCOPHAGE    360 tablet    Take 2 before breakfast and 2 before dinner    Type 2 diabetes mellitus with diabetic polyneuropathy, with long-term current use of insulin (H)       order for DME     1 each    Glucometer, brand as covered by insurance.    Type 2 diabetes, HbA1C goal < 8% (H)       simvastatin 40 MG tablet    ZOCOR    90 tablet    Take 1 tablet (40 mg) by mouth At Bedtime    Hyperlipidemia LDL goal <100       vitamin D 2000 units Caps     30 capsule    Take 1 capsule by mouth daily        * Notice:  This list has 2 medication(s) that are the same as other medications prescribed for you. Read the directions carefully, and ask your doctor or other care provider to review them with you.

## 2018-11-30 NOTE — PROGRESS NOTES
"  SUBJECTIVE:   Roddy Sidhu is a 73 year old male who presents to clinic today for the following health issues:      Diabetes Follow-up      Patient is checking blood sugars: not at all    Diabetic concerns: None     Symptoms of hypoglycemia (low blood sugar): shaky     Paresthesias (numbness or burning in feet) or sores: Yes      Date of last diabetic eye exam: completed 9/2018    Diabetes Management Resources    Hyperlipidemia Follow-Up      Rate your low fat/cholesterol diet?: good    Taking statin?  Yes, no muscle aches from statin    Other lipid medications/supplements?:  none    Hypertension Follow-up      Outpatient blood pressures are not being checked.    Low Salt Diet: low salt    BP Readings from Last 2 Encounters:   06/08/18 130/66   01/02/18 120/70     Hemoglobin A1C (%)   Date Value   06/08/2018 7.7 (H)   01/02/2018 7.3 (H)     LDL Cholesterol Calculated (mg/dL)   Date Value   01/02/2018 35   12/31/2014 46     LDL Cholesterol Direct (mg/dL)   Date Value   08/20/2015 92       Amount of exercise or physical activity: trys to get around a little bit    Problems taking medications regularly: No    Medication side effects: none    Diet: regular (no restrictions) and low salt        PROBLEMS TO ADD ON...  Tobacco use disorder: He has been unable to quit smoking and is not really motivated to do so    Problem list and histories reviewed & adjusted, as indicated.  Additional history: as documented        Reviewed and updated as needed this visit by clinical staff  Tobacco  Allergies       Reviewed and updated as needed this visit by Provider               ROS:  Constitutional, HEENT, cardiovascular, pulmonary, gi and gu systems are negative, except as otherwise noted.        OBJECTIVE:                                                    /62  Pulse 78  Temp 97.5  F (36.4  C) (Tympanic)  Resp 18  Ht 5' 9.5\" (1.765 m)  Wt 147 lb (66.7 kg)  SpO2 100%  BMI 21.4 kg/m2    GENERAL: healthy, alert " and no distress  EYES: Eyes grossly normal to inspection, extraocular movements - intact, and PERRL  NECK: no tenderness, no adenopathy, no asymmetry, no masses, no stiffness; thyroid- normal to palpation  RESP: lungs clear to auscultation - no rales, no rhonchi, no wheezes  CV: regular rates and rhythm, normal S1 S2, no S3 or S4 and no murmur, no click or rub -  MS: extremities- no gross deformities noted, no edema    Diagnostic test results:  Results for orders placed or performed in visit on 11/30/18 (from the past 24 hour(s))   Hemoglobin A1c   Result Value Ref Range    Hemoglobin A1C 8.0 (H) 0 - 5.6 %        ASSESSMENT/PLAN:                                                    ASSESSMENT:  1. Type 2 diabetes mellitus with diabetic polyneuropathy, with long-term current use of insulin (H)    2. Hyperlipidemia LDL goal <100    3. Microalbuminuria due to type 2 diabetes mellitus (H)    4. Tobacco use disorder        PLAN:  Orders Placed This Encounter     Lipid panel reflex to direct LDL Non-fasting     Basic metabolic panel     Hemoglobin A1c     simvastatin (ZOCOR) 40 MG tablet     gabapentin (NEURONTIN) 100 MG capsule     insulin glargine (LANTUS SOLOSTAR PEN) 100 UNIT/ML pen     lisinopril (PRINIVIL/ZESTRIL) 10 MG tablet     metFORMIN (GLUCOPHAGE) 500 MG tablet       Patient Instructions   Diabetes Plan  1. Hemoglobin A1c goal is between 7% and 8%  Your Hemoglobin A1c is not at goal  Plan is:Increase lantus insulin to 16 units every am  2. LDL (bad cholesterol) goal is less than 100  Your LDL is at goal  Plan is: Continue medications at current doses  3. Blood pressure goal is less than 140/90.  Your blood pressure is at goal.  Plan is: Continue medications at current doses  Recheck in 6 months.        Thank you for choosing [facility].  You may be receiving a survey in the mail from [employer] regarding your visit today.  Please take a few minutes to complete and return the survey to let us know how we  are doing.      Our Clinic hours are:  Mondays    7:20 am - 7 pm  Tues -  Fri  7:20 am - 5 pm    Clinic Phone: [phone]    The clinic lab opens at 7:30 am Mon - Fri and appointments are required.    [facility]  Ph. [phone]  Monday  8 am - 7pm  Tues - Fri 8 am - 5:30 pm             TRACY Davenport  [facility]

## 2019-02-25 ENCOUNTER — TELEPHONE (OUTPATIENT)
Dept: FAMILY MEDICINE | Facility: CLINIC | Age: 74
End: 2019-02-25

## 2019-02-25 DIAGNOSIS — Z79.4 TYPE 2 DIABETES MELLITUS WITH DIABETIC POLYNEUROPATHY, WITH LONG-TERM CURRENT USE OF INSULIN (H): Primary | ICD-10-CM

## 2019-02-25 DIAGNOSIS — E11.42 TYPE 2 DIABETES MELLITUS WITH DIABETIC POLYNEUROPATHY, WITH LONG-TERM CURRENT USE OF INSULIN (H): Primary | ICD-10-CM

## 2019-02-25 NOTE — TELEPHONE ENCOUNTER
Pt of  Posts establishing care with  next week.  Pt wanting orders placed.  Last lab note states to repeat A1C and BMP in 3 months.  VO per Dr. Bennett.  Orders placed.  Jignesh

## 2019-03-12 DIAGNOSIS — E11.42 TYPE 2 DIABETES MELLITUS WITH DIABETIC POLYNEUROPATHY, WITH LONG-TERM CURRENT USE OF INSULIN (H): ICD-10-CM

## 2019-03-12 DIAGNOSIS — Z79.4 TYPE 2 DIABETES MELLITUS WITH DIABETIC POLYNEUROPATHY, WITH LONG-TERM CURRENT USE OF INSULIN (H): ICD-10-CM

## 2019-03-12 LAB
ANION GAP SERPL CALCULATED.3IONS-SCNC: 2 MMOL/L (ref 3–14)
BUN SERPL-MCNC: 21 MG/DL (ref 7–30)
CALCIUM SERPL-MCNC: 9 MG/DL (ref 8.5–10.1)
CHLORIDE SERPL-SCNC: 104 MMOL/L (ref 94–109)
CO2 SERPL-SCNC: 29 MMOL/L (ref 20–32)
CREAT SERPL-MCNC: 0.87 MG/DL (ref 0.66–1.25)
GFR SERPL CREATININE-BSD FRML MDRD: 87 ML/MIN/{1.73_M2}
GLUCOSE SERPL-MCNC: 186 MG/DL (ref 70–99)
HBA1C MFR BLD: 7.7 % (ref 0–5.6)
POTASSIUM SERPL-SCNC: 5.2 MMOL/L (ref 3.4–5.3)
SODIUM SERPL-SCNC: 135 MMOL/L (ref 133–144)

## 2019-03-12 PROCEDURE — 83036 HEMOGLOBIN GLYCOSYLATED A1C: CPT | Performed by: FAMILY MEDICINE

## 2019-03-12 PROCEDURE — 36415 COLL VENOUS BLD VENIPUNCTURE: CPT | Performed by: FAMILY MEDICINE

## 2019-03-12 PROCEDURE — 80048 BASIC METABOLIC PNL TOTAL CA: CPT | Performed by: FAMILY MEDICINE

## 2019-04-18 ENCOUNTER — TRANSFERRED RECORDS (OUTPATIENT)
Dept: HEALTH INFORMATION MANAGEMENT | Facility: CLINIC | Age: 74
End: 2019-04-18

## 2019-06-03 ENCOUNTER — TELEPHONE (OUTPATIENT)
Dept: FAMILY MEDICINE | Facility: CLINIC | Age: 74
End: 2019-06-03

## 2019-06-03 ENCOUNTER — OFFICE VISIT (OUTPATIENT)
Dept: FAMILY MEDICINE | Facility: CLINIC | Age: 74
End: 2019-06-03
Payer: COMMERCIAL

## 2019-06-03 VITALS
HEART RATE: 81 BPM | OXYGEN SATURATION: 99 % | BODY MASS INDEX: 21.3 KG/M2 | HEIGHT: 69 IN | TEMPERATURE: 97.2 F | SYSTOLIC BLOOD PRESSURE: 128 MMHG | WEIGHT: 143.8 LBS | RESPIRATION RATE: 18 BRPM | DIASTOLIC BLOOD PRESSURE: 74 MMHG

## 2019-06-03 DIAGNOSIS — Z79.4 TYPE 2 DIABETES MELLITUS WITH DIABETIC POLYNEUROPATHY, WITH LONG-TERM CURRENT USE OF INSULIN (H): ICD-10-CM

## 2019-06-03 DIAGNOSIS — E11.42 TYPE 2 DIABETES MELLITUS WITH DIABETIC POLYNEUROPATHY, WITH LONG-TERM CURRENT USE OF INSULIN (H): ICD-10-CM

## 2019-06-03 DIAGNOSIS — Z00.00 ROUTINE GENERAL MEDICAL EXAMINATION AT A HEALTH CARE FACILITY: Primary | ICD-10-CM

## 2019-06-03 DIAGNOSIS — F17.200 TOBACCO USE DISORDER: ICD-10-CM

## 2019-06-03 PROCEDURE — G0438 PPPS, INITIAL VISIT: HCPCS | Performed by: FAMILY MEDICINE

## 2019-06-03 PROCEDURE — 99207 C PAF COMPLETED  NO CHARGE: CPT | Performed by: FAMILY MEDICINE

## 2019-06-03 ASSESSMENT — MIFFLIN-ST. JEOR: SCORE: 1399.14

## 2019-06-03 NOTE — Clinical Note
Please abstract the following data from this visit with this patient into the appropriate field in Epic:Eye exam with ophthalmology on this date: 04/18/2019 Provider unknown, Done in [place]

## 2019-06-03 NOTE — PROGRESS NOTES
SUBJECTIVE:   Roddy Sidhu is a 73 year old male who presents for Preventive Visit.    Are you in the first 12 months of your Medicare coverage?  No    HPI  Do you feel safe in your environment? Yes    Do you have a Health Care Directive? No: Advance care planning reviewed with patient; information given to patient to review.      Fall risk  Fallen 2 or more times in the past year?: No  Any fall with injury in the past year?: No    Cognitive Screening   1) Repeat 3 items (Leader, Season, Table)    2) Clock draw: NORMAL  3) 3 item recall: Recalls 3 objects  Results: NORMAL clock, 1-2 items recalled: COGNITIVE IMPAIRMENT LESS LIKELY    Mini-CogTM Copyright S Nakul. Licensed by the author for use in [facility]; reprinted with permission (soob@[facility]). All rights reserved.      Do you have sleep apnea, excessive snoring or daytime drowsiness?: no    Reviewed and updated as needed this visit by clinical staff  Tobacco  Allergies  Med Hx  Surg Hx  Fam Hx  Soc Hx        Reviewed and updated as needed this visit by Provider        Social History     Tobacco Use     Smoking status: Current Some Day Smoker     Types: Pipe     Smokeless tobacco: Never Used   Substance Use Topics     Alcohol use: Yes     Comment: socially  -- not more than a liter of librado a month     If you drink alcohol do you typically have >3 drinks per day or >7 drinks per week? No    Alcohol Use 8/20/2015   Prescreen: >3 drinks/day or >7 drinks/week? The patient does not drink >3 drinks per day nor >7 drinks per week.               Current providers sharing in care for this patient include:   Patient Care Team:  Gino Bennett MD as PCP - General (Family Practice)  TRACY Davenport MD as Assigned PCP    The following health maintenance items are reviewed in Epic and correct as of today:  Health Maintenance   Topic Date Due     ZOSTER IMMUNIZATION (1 of 2) 08/20/1997     PNEUMOCOCCAL IMMUNIZATION 65+ LOW/MEDIUM RISK (1 of 2 - PCV13)  "08/20/2012     DIABETIC EYE EXAM  06/17/2015     MEDICARE ANNUAL WELLNESS VISIT  08/20/2016     ADVANCED DIRECTIVE PLANNING  12/27/2017     PHQ-2  01/01/2019     FALL RISK ASSESSMENT  01/02/2019     FIT  05/31/2019     MICROALBUMIN  06/08/2019     DIABETIC FOOT EXAM  06/08/2019     INFLUENZA VACCINE (Season Ended) 09/01/2019     A1C  09/12/2019     LIPID  11/30/2019     BMP  03/12/2020     TSH W/FREE T4 REFLEX  06/08/2020     DTAP/TDAP/TD IMMUNIZATION (2 - Td) 12/27/2022     HEPATITIS C SCREENING  Completed     AORTIC ANEURYSM SCREENING (SYSTEM ASSIGNED)  Completed     IPV IMMUNIZATION  Aged Out     MENINGITIS IMMUNIZATION  Aged Out     Labs reviewed in [facility]  Pneumonia Vaccine:declines    Review of Systems  Constitutional, HEENT, cardiovascular, pulmonary, GI, , musculoskeletal, neuro, skin, endocrine and psych systems are negative, except as otherwise noted.    OBJECTIVE:   /74 (BP Location: Right arm, Patient Position: Sitting, Cuff Size: Adult Regular)   Pulse 81   Temp 97.2  F (36.2  C) (Tympanic)   Resp 18   Ht 1.755 m (5' 9.09\")   Wt 65.2 kg (143 lb 12.8 oz)   SpO2 99%   BMI 21.18 kg/m   Estimated body mass index is 21.18 kg/m  as calculated from the following:    Height as of this encounter: 1.755 m (5' 9.09\").    Weight as of this encounter: 65.2 kg (143 lb 12.8 oz).  Physical Exam  GENERAL: healthy, alert and no distress  EYES: Eyes grossly normal to inspection, PERRL and conjunctivae and sclerae normal  HENT: ear canals and TM's normal, nose and mouth without ulcers or lesions  NECK: no adenopathy, no asymmetry, masses, or scars and thyroid normal to palpation  RESP: lungs clear to auscultation - no rales, rhonchi or wheezes  CV: regular rate and rhythm, normal S1 S2, no S3 or S4, no murmur, click or rub, no peripheral edema and peripheral pulses strong  ABDOMEN: soft, nontender, no hepatosplenomegaly, no masses and bowel sounds normal  MS: no gross musculoskeletal defects noted, no " "edema  SKIN: no suspicious lesions or rashes  NEURO: Normal strength and tone, mentation intact and speech normal  PSYCH: mentation appears normal, affect normal/bright    Diagnostic Test Results:  Labs reviewed in Epic    ASSESSMENT / PLAN:   Roddy was seen today for wellness visit.    Diagnoses and all orders for this visit:    Routine general medical examination at a health care facility    Type 2 diabetes mellitus with diabetic polyneuropathy, with long-term current use of insulin (H)    Tobacco use disorder        End of Life Planning:  Patient currently has an advanced directive:     COUNSELING:  Reviewed preventive health counseling, as reflected in patient instructions       Regular exercise       Healthy diet/nutrition    Estimated body mass index is 21.18 kg/m  as calculated from the following:    Height as of this encounter: 1.755 m (5' 9.09\").    Weight as of this encounter: 65.2 kg (143 lb 12.8 oz).         reports that he has been smoking pipe.  He has never used smokeless tobacco.  Tobacco Cessation Action Plan: Information offered: Patient not interested at this time    Appropriate preventive services were discussed with this patient, including applicable screening as appropriate for cardiovascular disease, diabetes, osteopenia/osteoporosis, and glaucoma.  As appropriate for age/gender, discussed screening for colorectal cancer, prostate cancer, breast cancer, and cervical cancer. Checklist reviewing preventive services available has been given to the patient.    Reviewed patients plan of care and provided an AVS. The Basic Care Plan (routine screening as documented in Health Maintenance) for Roddy meets the Care Plan requirement. This Care Plan has been established and reviewed with the Patient.    Counseling Resources:  ATP IV Guidelines  Pooled Cohorts Equation Calculator  Breast Cancer Risk Calculator  FRAX Risk Assessment  ICSI Preventive Guidelines  Dietary Guidelines for Americans, " 2010  USDA's MyPlate  ASA Prophylaxis  Lung CA Screening    Gino Bennett MD  [facility]    Identified Health Risks:

## 2019-06-03 NOTE — TELEPHONE ENCOUNTER
"Requested Prescriptions   Pending Prescriptions Disp Refills     insulin pen needle (B-D U/F) 31G X 5 MM miscellaneous 100 each 1     Sig: Use daily or as directed.  Last Written Prescription Date:  11/8/2018  Last Fill Quantity: 100,  # refills: 1   Last office visit: 6/3/2019 with prescribing provider:  Sabrina  Future Office Visit:           Diabetic Supplies Protocol Passed - 6/3/2019  3:24 PM        Passed - Medication is active on med list        Passed - Patient is 18 years of age or older        Passed - Recent (6 mo) or future (30 days) visit within the authorizing provider's specialty     Patient had office visit in the last 6 months or has a visit in the next 30 days with authorizing provider.  See \"Patient Info\" tab in inbasket, or \"Choose Columns\" in Meds & Orders section of the refill encounter.              "

## 2019-06-04 NOTE — TELEPHONE ENCOUNTER
Prescription approved per [facility] Refill Protocol.    Thank you    Trang SAMUELS RN

## 2019-06-04 NOTE — TELEPHONE ENCOUNTER
Pt wife called and they are out after today of the needles.      Allison Dubois  [facility]

## 2019-06-05 PROCEDURE — 82274 ASSAY TEST FOR BLOOD FECAL: CPT | Performed by: FAMILY MEDICINE

## 2019-06-10 ENCOUNTER — DOCUMENTATION ONLY (OUTPATIENT)
Dept: FAMILY MEDICINE | Facility: CLINIC | Age: 74
End: 2019-06-10

## 2019-06-10 DIAGNOSIS — Z12.11 SPECIAL SCREENING FOR MALIGNANT NEOPLASMS, COLON: Primary | ICD-10-CM

## 2019-06-10 DIAGNOSIS — R80.9 MICROALBUMINURIA DUE TO TYPE 2 DIABETES MELLITUS (H): ICD-10-CM

## 2019-06-10 DIAGNOSIS — E11.29 MICROALBUMINURIA DUE TO TYPE 2 DIABETES MELLITUS (H): ICD-10-CM

## 2019-06-10 NOTE — PROGRESS NOTES
Patient needs new order placed in Epic for FIT([id number]). The sample is already in the lab.    Thank you

## 2019-06-10 NOTE — TELEPHONE ENCOUNTER
"Requested Prescriptions   Pending Prescriptions Disp Refills     lisinopril (PRINIVIL/ZESTRIL) 10 MG tablet 45 tablet 1     Sig: Take 0.5 tablets (5 mg) by mouth daily  Last Written Prescription Date:  11/30/2018  Last Fill Quantity: 45,  # refills: 1   Last office visit: 6/3/2019 with prescribing provider:  Sabrina   Future Office Visit:           ACE Inhibitors (Including Combos) Protocol Passed - 6/10/2019  3:50 PM        Passed - Blood pressure under 140/90 in past 12 months     BP Readings from Last 3 Encounters:   06/03/19 128/74   11/30/18 124/62   06/08/18 130/66                 Passed - Recent (12 mo) or future (30 days) visit within the authorizing provider's specialty     Patient had office visit in the last 12 months or has a visit in the next 30 days with authorizing provider or within the authorizing provider's specialty.  See \"Patient Info\" tab in inbasket, or \"Choose Columns\" in Meds & Orders section of the refill encounter.              Passed - Medication is active on med list        Passed - Patient is age 18 or older        Passed - Normal serum creatinine on file in past 12 months     Recent Labs   Lab Test 03/12/19  0738   CR 0.87             Passed - Normal serum potassium on file in past 12 months     Recent Labs   Lab Test 03/12/19  0738   POTASSIUM 5.2               "

## 2019-06-11 DIAGNOSIS — Z12.11 SPECIAL SCREENING FOR MALIGNANT NEOPLASMS, COLON: ICD-10-CM

## 2019-06-11 LAB — HEMOCCULT STL QL IA: NEGATIVE

## 2019-06-11 RX ORDER — LISINOPRIL 10 MG/1
5 TABLET ORAL DAILY
Qty: 45 TABLET | Refills: 1 | Status: SHIPPED | OUTPATIENT
Start: 2019-06-11 | End: 2019-12-02

## 2019-06-11 NOTE — TELEPHONE ENCOUNTER
Routing refill request to provider for review/approval because: Last ordered by Dr. Ethel BLUE RN

## 2019-07-22 DIAGNOSIS — E11.42 TYPE 2 DIABETES MELLITUS WITH DIABETIC POLYNEUROPATHY, WITH LONG-TERM CURRENT USE OF INSULIN (H): ICD-10-CM

## 2019-07-22 DIAGNOSIS — Z79.4 TYPE 2 DIABETES MELLITUS WITH DIABETIC POLYNEUROPATHY, WITH LONG-TERM CURRENT USE OF INSULIN (H): ICD-10-CM

## 2019-07-22 NOTE — TELEPHONE ENCOUNTER
Requested Prescriptions   Pending Prescriptions Disp Refills     gabapentin (NEURONTIN) 100 MG capsule 270 capsule 1     Sig: Take 1 capsule (100 mg) by mouth 3 times daily       There is no refill protocol information for this order        Last Written Prescription Date:  11/30/2018  Last Fill Quantity: 270,  # refills: 1   Last office visit: 6/3/2019 with prescribing provider:  Sabrina  Future Office Visit:

## 2019-07-23 ENCOUNTER — TELEPHONE (OUTPATIENT)
Dept: FAMILY MEDICINE | Facility: CLINIC | Age: 74
End: 2019-07-23

## 2019-07-23 RX ORDER — GABAPENTIN 100 MG/1
100 CAPSULE ORAL 3 TIMES DAILY
Qty: 270 CAPSULE | Refills: 1 | Status: SHIPPED | OUTPATIENT
Start: 2019-07-23 | End: 2020-01-21

## 2019-07-23 NOTE — TELEPHONE ENCOUNTER
Routing refill request to provider for review/approval because:  Drug not on the [facility] refill protocol   Last ordered by Dr. Ethel BLUE RN

## 2019-08-07 DIAGNOSIS — E78.5 HYPERLIPIDEMIA LDL GOAL <100: ICD-10-CM

## 2019-08-08 RX ORDER — SIMVASTATIN 40 MG
40 TABLET ORAL AT BEDTIME
Qty: 90 TABLET | Refills: 0 | Status: SHIPPED | OUTPATIENT
Start: 2019-08-08 | End: 2019-12-06

## 2019-08-08 NOTE — TELEPHONE ENCOUNTER
S:  Refill request for simvastatin 40mg tab bedtime    B:  LOV 6/3/19  Simvastatin 40mg tab bedtime last written 11/30/18 for 6 month supply    A:  Passes [facility] refill protocol    R:  Filled per [facility] refill protocol    No further action necessary.  Encounter closed.    Chato Márquez RN

## 2019-08-08 NOTE — TELEPHONE ENCOUNTER
"Requested Prescriptions   Pending Prescriptions Disp Refills     simvastatin (ZOCOR) 40 MG tablet 90 tablet 1     Sig: Take 1 tablet (40 mg) by mouth At Bedtime       Statins Protocol Passed - 8/7/2019  4:59 PM        Passed - LDL on file in past 12 months     Recent Labs   Lab Test 11/30/18  1124   LDL 39             Passed - No abnormal creatine kinase in past 12 months     No lab results found.             Passed - Recent (12 mo) or future (30 days) visit within the authorizing provider's specialty     Patient had office visit in the last 12 months or has a visit in the next 30 days with authorizing provider or within the authorizing provider's specialty.  See \"Patient Info\" tab in inbasket, or \"Choose Columns\" in Meds & Orders section of the refill encounter.              Passed - Medication is active on med list        Passed - Patient is age 18 or older        Last Written Prescription Date:  11/30/18  Last Fill Quantity: 90,  # refills:1  Last office visit: 6/3/2019 with prescribing provider:  Post   Future Office Visit:      "

## 2019-08-14 DIAGNOSIS — Z79.4 TYPE 2 DIABETES MELLITUS WITH DIABETIC POLYNEUROPATHY, WITH LONG-TERM CURRENT USE OF INSULIN (H): ICD-10-CM

## 2019-08-14 DIAGNOSIS — E11.42 TYPE 2 DIABETES MELLITUS WITH DIABETIC POLYNEUROPATHY, WITH LONG-TERM CURRENT USE OF INSULIN (H): ICD-10-CM

## 2019-08-14 NOTE — TELEPHONE ENCOUNTER
"Requested Prescriptions   Pending Prescriptions Disp Refills     metFORMIN (GLUCOPHAGE) 500 MG tablet 360 tablet 1     Sig: Take 2 before breakfast and 2 before dinner   Last Written Prescription Date:  11/30/18  Last Fill Quantity: 360 tab,  # refills: 1   Last office visit: 6/3/2019 with prescribing provider:  Gino Bennett     Future Office Visit:        Biguanide Agents Passed - 8/14/2019  9:47 AM        Passed - Blood pressure less than 140/90 in past 6 months     BP Readings from Last 3 Encounters:   06/03/19 128/74   11/30/18 124/62   06/08/18 130/66                 Passed - Patient has documented LDL within the past 12 mos.     Recent Labs   Lab Test 11/30/18  1124   LDL 39             Passed - Patient has had a Microalbumin in the past 15 mos.     Recent Labs   Lab Test 06/08/18  0936   MICROL 90   UMALCR 254.83*             Passed - Patient is age 10 or older        Passed - Patient has documented A1c within the specified period of time.     If HgbA1C is 8 or greater, it needs to be on file within the past 3 months.  If less than 8, must be on file within the past 6 months.     Recent Labs   Lab Test 03/12/19  0738   A1C 7.7*             Passed - Patient's CR is NOT>1.4 OR Patient's EGFR is NOT<45 within past 12 mos.     Recent Labs   Lab Test 03/12/19  0738   GFRESTIMATED 87   GFRESTBLACK >90       Recent Labs   Lab Test 03/12/19  0738   CR 0.87             Passed - Patient does NOT have a diagnosis of CHF.        Passed - Medication is active on med list        Passed - Recent (6 mo) or future (30 days) visit within the authorizing provider's specialty     Patient had office visit in the last 6 months or has a visit in the next 30 days with authorizing provider or within the authorizing provider's specialty.  See \"Patient Info\" tab in inbasket, or \"Choose Columns\" in Meds & Orders section of the refill encounter.              "

## 2019-10-24 ENCOUNTER — TRANSFERRED RECORDS (OUTPATIENT)
Dept: HEALTH INFORMATION MANAGEMENT | Facility: CLINIC | Age: 74
End: 2019-10-24

## 2019-12-02 DIAGNOSIS — E11.29 MICROALBUMINURIA DUE TO TYPE 2 DIABETES MELLITUS (H): ICD-10-CM

## 2019-12-02 DIAGNOSIS — R80.9 MICROALBUMINURIA DUE TO TYPE 2 DIABETES MELLITUS (H): ICD-10-CM

## 2019-12-02 NOTE — TELEPHONE ENCOUNTER
"Requested Prescriptions   Pending Prescriptions Disp Refills     lisinopril (PRINIVIL/ZESTRIL) 10 MG tablet [Pharmacy Med Name: LISINOPRIL 10MG TABS] 45 tablet 1     Sig: TAKE ONE-HALF TABLET BY MOUTH EVERY DAY       ACE Inhibitors (Including Combos) Protocol Passed - 12/2/2019  9:04 AM        Passed - Blood pressure under 140/90 in past 12 months     BP Readings from Last 3 Encounters:   06/03/19 128/74   11/30/18 124/62   06/08/18 130/66                 Passed - Recent (12 mo) or future (30 days) visit within the authorizing provider's specialty     Patient has had an office visit with the authorizing provider or a provider within the authorizing providers department within the previous 12 mos or has a future within next 30 days. See \"Patient Info\" tab in inbasket, or \"Choose Columns\" in Meds & Orders section of the refill encounter.              Passed - Medication is active on med list        Passed - Patient is age 18 or older        Passed - Normal serum creatinine on file in past 12 months     Recent Labs   Lab Test 03/12/19  0738   CR 0.87             Passed - Normal serum potassium on file in past 12 months     Recent Labs   Lab Test 03/12/19  0738   POTASSIUM 5.2             Last Written Prescription Date:  6/11/2019  Last Fill Quantity: 45,  # refills: 1   Last office visit: 6/3/2019 with prescribing provider:  Sabrina   Future Office Visit:   Next 5 appointments (look out 90 days)    Dec 06, 2019  8:00 AM CST  SHORT with Gino Bennett MD  [facility] ([facility]) [address] [facility] [address]  [phone]           "

## 2019-12-03 RX ORDER — LISINOPRIL 10 MG/1
TABLET ORAL
Qty: 45 TABLET | Refills: 0 | Status: SHIPPED | OUTPATIENT
Start: 2019-12-03 | End: 2020-02-27

## 2019-12-03 NOTE — TELEPHONE ENCOUNTER
Prescription approved per [facility] Refill Protocol.    Mary Ellen BLUE RN

## 2019-12-04 DIAGNOSIS — E78.5 HYPERLIPIDEMIA LDL GOAL <100: ICD-10-CM

## 2019-12-04 NOTE — TELEPHONE ENCOUNTER
"Requested Prescriptions   Pending Prescriptions Disp Refills     simvastatin (ZOCOR) 40 MG tablet [Pharmacy Med Name: SIMVASTATIN 40MG TABS] 90 tablet 0     Sig: TAKE ONE TABLET BY MOUTH AT BEDTIME   Last Written Prescription Date:  8/8/19  Last Fill Quantity: 90 tab,  # refills: 0   Last office visit: 6/3/2019 with prescribing provider:  Gino Bennett     Future Office Visit:   Next 5 appointments (look out 90 days)    Dec 06, 2019  8:00 AM CST  [facility] MD  [facility] [address] [facility] [address]  [phone]             Statins Protocol Failed - 12/4/2019 12:01 PM        Failed - LDL on file in past 12 months     Recent Labs   Lab Test 11/30/18  1124   LDL 39             Passed - No abnormal creatine kinase in past 12 months     No lab results found.             Passed - Recent (12 mo) or future (30 days) visit within the authorizing provider's specialty     Patient has had an office visit with the authorizing provider or a provider within the authorizing providers department within the previous 12 mos or has a future within next 30 days. See \"Patient Info\" tab in inbasket, or \"Choose Columns\" in Meds & Orders section of the refill encounter.              Passed - Medication is active on med list        Passed - Patient is age 18 or older          "

## 2019-12-06 ENCOUNTER — OFFICE VISIT (OUTPATIENT)
Dept: FAMILY MEDICINE | Facility: CLINIC | Age: 74
End: 2019-12-06
Payer: COMMERCIAL

## 2019-12-06 VITALS
BODY MASS INDEX: 21.48 KG/M2 | HEIGHT: 69 IN | DIASTOLIC BLOOD PRESSURE: 60 MMHG | HEART RATE: 71 BPM | RESPIRATION RATE: 16 BRPM | OXYGEN SATURATION: 100 % | TEMPERATURE: 98.6 F | SYSTOLIC BLOOD PRESSURE: 102 MMHG | WEIGHT: 145 LBS

## 2019-12-06 DIAGNOSIS — E78.5 HYPERLIPIDEMIA LDL GOAL <100: ICD-10-CM

## 2019-12-06 DIAGNOSIS — Z79.4 TYPE 2 DIABETES MELLITUS WITH DIABETIC POLYNEUROPATHY, WITH LONG-TERM CURRENT USE OF INSULIN (H): Primary | ICD-10-CM

## 2019-12-06 DIAGNOSIS — E11.42 TYPE 2 DIABETES MELLITUS WITH DIABETIC POLYNEUROPATHY, WITH LONG-TERM CURRENT USE OF INSULIN (H): Primary | ICD-10-CM

## 2019-12-06 LAB
ANION GAP SERPL CALCULATED.3IONS-SCNC: 4 MMOL/L (ref 3–14)
BUN SERPL-MCNC: 20 MG/DL (ref 7–30)
CALCIUM SERPL-MCNC: 9.1 MG/DL (ref 8.5–10.1)
CHLORIDE SERPL-SCNC: 107 MMOL/L (ref 94–109)
CHOLEST SERPL-MCNC: 143 MG/DL
CO2 SERPL-SCNC: 27 MMOL/L (ref 20–32)
CREAT SERPL-MCNC: 0.93 MG/DL (ref 0.66–1.25)
GFR SERPL CREATININE-BSD FRML MDRD: 82 ML/MIN/{1.73_M2}
GLUCOSE SERPL-MCNC: 132 MG/DL (ref 70–99)
HBA1C MFR BLD: 7.9 % (ref 0–5.6)
HDLC SERPL-MCNC: 48 MG/DL
LDLC SERPL CALC-MCNC: 63 MG/DL
NONHDLC SERPL-MCNC: 95 MG/DL
POTASSIUM SERPL-SCNC: 5 MMOL/L (ref 3.4–5.3)
SODIUM SERPL-SCNC: 138 MMOL/L (ref 133–144)
TRIGL SERPL-MCNC: 159 MG/DL

## 2019-12-06 PROCEDURE — 36415 COLL VENOUS BLD VENIPUNCTURE: CPT | Performed by: FAMILY MEDICINE

## 2019-12-06 PROCEDURE — 80061 LIPID PANEL: CPT | Performed by: FAMILY MEDICINE

## 2019-12-06 PROCEDURE — 99213 OFFICE O/P EST LOW 20 MIN: CPT | Performed by: FAMILY MEDICINE

## 2019-12-06 PROCEDURE — 80048 BASIC METABOLIC PNL TOTAL CA: CPT | Performed by: FAMILY MEDICINE

## 2019-12-06 PROCEDURE — 83036 HEMOGLOBIN GLYCOSYLATED A1C: CPT | Performed by: FAMILY MEDICINE

## 2019-12-06 RX ORDER — SIMVASTATIN 40 MG
TABLET ORAL
Qty: 90 TABLET | Refills: 0 | OUTPATIENT
Start: 2019-12-06

## 2019-12-06 RX ORDER — SIMVASTATIN 40 MG
40 TABLET ORAL AT BEDTIME
Qty: 90 TABLET | Refills: 3 | Status: SHIPPED | OUTPATIENT
Start: 2019-12-06 | End: 2020-11-19

## 2019-12-06 ASSESSMENT — MIFFLIN-ST. JEOR: SCORE: 1398.1

## 2019-12-06 NOTE — PATIENT INSTRUCTIONS
Our Clinic hours are:  Mondays    7:20 am - 7 pm  Tues -  Fri  7:20 am - 5 pm    Clinic Phone: [phone]    The clinic lab opens at 7:30 am Mon - Fri and appointments are required.    [facility]. [phone]  Monday  8 am - 7pm  Tues - Fri 8 am - 5:30 pm

## 2019-12-06 NOTE — LETTER
[facility]  [address]  [place] [address]  Phone: [phone]      12/9/2019     Roddy Sidhu  [id number]  [facility] [address]      Dear Roddy:    Thank you for allowing me to participate in your care. Your recent test results were reviewed and listed below. tests are acceptable     Your results are provided below for your review  Results for orders placed or performed in visit on 12/06/19   Lipid panel reflex to direct LDL Fasting     Status: Abnormal   Result Value Ref Range    Cholesterol 143 <200 mg/dL    Triglycerides 159 (H) <150 mg/dL    HDL Cholesterol 48 >39 mg/dL    LDL Cholesterol Calculated 63 <100 mg/dL    Non HDL Cholesterol 95 <130 mg/dL   Basic metabolic panel     Status: Abnormal   Result Value Ref Range    Sodium 138 133 - 144 mmol/L    Potassium 5.0 3.4 - 5.3 mmol/L    Chloride 107 94 - 109 mmol/L    Carbon Dioxide 27 20 - 32 mmol/L    Anion Gap 4 3 - 14 mmol/L    Glucose 132 (H) 70 - 99 mg/dL    Urea Nitrogen 20 7 - 30 mg/dL    Creatinine 0.93 0.66 - 1.25 mg/dL    GFR Estimate 82 >60 mL/min/[1.73_m2]    GFR Estimate If Black >90 >60 mL/min/[1.73_m2]    Calcium 9.1 8.5 - 10.1 mg/dL   Hemoglobin A1c     Status: Abnormal   Result Value Ref Range    Hemoglobin A1C 7.9 (H) 0 - 5.6 %   Thank you for choosing [facility]. As a result, please continue with the treatment plan discussed in the office. Return as discussed or sooner if symptoms worsen or fail to improve.   If you have any further questions or concerns, please do not hesitate to contact us.    Sincerely,    Dr. Gino Bennett

## 2019-12-06 NOTE — PROGRESS NOTES
"Subjective     Roddy Sidhu is a 74 year old male who presents to clinic today for the following health issues:    HPI   Diabetes Follow-up      How often are you checking your blood sugar? Not at all    What concerns do you have today about your diabetes? None     Do you have any of these symptoms? (Select all that apply)  Numbness in feet     Have you had a diabetic eye exam in the last 12 months? Yes- Date of last eye exam: 11/1/19    Diabetes Management Resources    Hyperlipidemia Follow-Up      Are you regularly taking any medication or supplement to lower your cholesterol?   Yes- Lipitor     Are you having muscle aches or other side effects that you think could be caused by your cholesterol lowering medication?  Yes- in lower legs     Hypertension Follow-up      Do you check your blood pressure regularly outside of the clinic? No     Are you following a low salt diet? Yes    Are your blood pressures ever more than 140 on the top number (systolic) OR more   than 90 on the bottom number (diastolic), for example 140/90? No    BP Readings from Last 2 Encounters:   12/06/19 102/60   06/03/19 128/74     Hemoglobin A1C (%)   Date Value   03/12/2019 7.7 (H)   11/30/2018 8.0 (H)     LDL Cholesterol Calculated (mg/dL)   Date Value   11/30/2018 39   01/02/2018 35         How many servings of fruits and vegetables do you eat daily?  2-3    On average, how many sweetened beverages do you drink each day (Examples: soda, juice, sweet tea, etc.  Do NOT count diet or artificially sweetened beverages)?   0    How many days per week do you miss taking your medication? 0            Reviewed and updated as needed this visit by Provider         Review of Systems         Objective    /60   Pulse 71   Temp 98.6  F (37  C)   Resp 16   Ht 1.753 m (5' 9\")   Wt 65.8 kg (145 lb)   SpO2 100%   BMI 21.41 kg/m    Body mass index is 21.41 kg/m .  Physical Exam               OBJECTIVE:  /60   Pulse 71   Temp 98.6  F " "(37  C)   Resp 16   Ht 1.753 m (5' 9\")   Wt 65.8 kg (145 lb)   SpO2 100%   BMI 21.41 kg/m    LUNGS: clear to auscultation, normal breath sounds  CV: RRR without murmur  ABD: BS+, soft, nontender, no masses, no hepatosplenomegaly  EXTREMITIES: without joint tenderness, swelling or erythema.  No muscle tenderness or abnormality.  SKIN: No rashes or abnormalities  NEURO:non focal exam    ASSESSMENT:     Type 2 diabetes mellitus with diabetic polyneuropathy, with long-term current use of insulin (H)  Hyperlipidemia LDL goal <100    PLAN:  Orders Placed This Encounter     Lipid panel reflex to direct LDL Fasting     Basic metabolic panel     Hemoglobin A1c     simvastatin (ZOCOR) 40 MG tablet         "

## 2019-12-16 DIAGNOSIS — E11.42 TYPE 2 DIABETES MELLITUS WITH DIABETIC POLYNEUROPATHY, WITH LONG-TERM CURRENT USE OF INSULIN (H): ICD-10-CM

## 2019-12-16 DIAGNOSIS — Z79.4 TYPE 2 DIABETES MELLITUS WITH DIABETIC POLYNEUROPATHY, WITH LONG-TERM CURRENT USE OF INSULIN (H): ICD-10-CM

## 2019-12-16 NOTE — TELEPHONE ENCOUNTER
"Requested Prescriptions   Pending Prescriptions Disp Refills     insulin pen needle (B-D U/F) 31G X 5 MM miscellaneous  Last Written Prescription Date:  06/04/19  Last Fill Quantity: 100,  # refills: 1   Last office visit: 12/6/2019 with prescribing provider:  Gino Bennett   Future Office Visit:     100 each 1     Sig: USE 1 PEN NEEDLE DAILY OR AS DIRECTED       Diabetic Supplies Protocol Passed - 12/16/2019  8:48 AM        Passed - Medication is active on med list        Passed - Patient is 18 years of age or older        Passed - Recent (6 mo) or future (30 days) visit within the authorizing provider's specialty     Patient had office visit in the last 6 months or has a visit in the next 30 days with authorizing provider.  See \"Patient Info\" tab in inbasket, or \"Choose Columns\" in Meds & Orders section of the refill encounter.            insulin glargine (LANTUS SOLOSTAR) 100 UNIT/ML pen  Last Written Prescription Date:  11/30/18  Last Fill Quantity: 15ml,  # refills: 3   Last office visit: 12/6/2019 with prescribing provider:  Gino Bennett   Future Office Visit:     15 mL 3     Sig: Inject 16 Units Subcutaneous every morning       Long Acting Insulin Protocol Failed - 12/16/2019  8:48 AM        Failed - Microalbumin on file in past 12 months     Recent Labs   Lab Test 06/08/18  0936   MICROL 90   UMALCR 254.83*             Passed - Blood pressure less than 140/90 in past 6 months     BP Readings from Last 3 Encounters:   12/06/19 102/60   06/03/19 128/74   11/30/18 124/62           Passed - LDL on file in past 12 months     Recent Labs   Lab Test 12/06/19  0813   LDL 63             Passed - Serum creatinine on file in past 12 months     Recent Labs   Lab Test 12/06/19  0813   CR 0.93           Passed - HgbA1C in past 3 or 6 months     If HgbA1C is 8 or greater, it needs to be on file within the past 3 months.  If less than 8, must be on file within the past 6 months.     Recent Labs   Lab Test 12/06/19  0813   A1C " "7.9*             Passed - Medication is active on med list        Passed - Patient is age 18 or older        Passed - Recent (6 mo) or future (30 days) visit within the authorizing provider's specialty     Patient had office visit in the last 6 months or has a visit in the next 30 days with authorizing provider or within the authorizing provider's specialty.  See \"Patient Info\" tab in inbasket, or \"Choose Columns\" in Meds & Orders section of the refill encounter.              "

## 2020-01-20 DIAGNOSIS — E11.42 TYPE 2 DIABETES MELLITUS WITH DIABETIC POLYNEUROPATHY, WITH LONG-TERM CURRENT USE OF INSULIN (H): ICD-10-CM

## 2020-01-20 DIAGNOSIS — Z79.4 TYPE 2 DIABETES MELLITUS WITH DIABETIC POLYNEUROPATHY, WITH LONG-TERM CURRENT USE OF INSULIN (H): ICD-10-CM

## 2020-01-20 NOTE — TELEPHONE ENCOUNTER
Requested Prescriptions   Pending Prescriptions Disp Refills     gabapentin (NEURONTIN) 100 MG capsule [Pharmacy Med Name: GABAPENTIN 100MG CAPS] 270 capsule 1     Sig: TAKE ONE CAPSULE BY MOUTH THREE TIMES A DAY       There is no refill protocol information for this order              Last Written Prescription Date:  7/23/2019  Last Fill Quantity: 270,   # refills: 1  Last Office Visit: 12/6/2019 with Sabrina   Future Office visit:       Routing refill request to provider for review/approval because:  Drug not on the [facility] or [facility] refill protocol or controlled substance

## 2020-01-21 RX ORDER — GABAPENTIN 100 MG/1
CAPSULE ORAL
Qty: 270 CAPSULE | Refills: 1 | Status: SHIPPED | OUTPATIENT
Start: 2020-01-21 | End: 2020-06-05

## 2020-01-21 NOTE — TELEPHONE ENCOUNTER
Routing refill request to provider for review/approval because:  Drug not on the [facility] refill protocol    Last OV 12/6/19: Return in about 6 months (around 6/6/2020).       Mary Ellen BLUE RN

## 2020-02-13 DIAGNOSIS — E11.42 TYPE 2 DIABETES MELLITUS WITH DIABETIC POLYNEUROPATHY, WITH LONG-TERM CURRENT USE OF INSULIN (H): ICD-10-CM

## 2020-02-13 DIAGNOSIS — Z79.4 TYPE 2 DIABETES MELLITUS WITH DIABETIC POLYNEUROPATHY, WITH LONG-TERM CURRENT USE OF INSULIN (H): ICD-10-CM

## 2020-02-13 NOTE — TELEPHONE ENCOUNTER
"Requested Prescriptions   Pending Prescriptions Disp Refills     metFORMIN (GLUCOPHAGE) 500 MG tablet [Pharmacy Med Name: METFORMIN HCL 500MG TABS] 360 tablet 1     Sig: TAKE TWO TABLETS BY MOUTH BEFORE BREAKFAST AND TAKE TWO TABLETS BY MOUTH BEFORE DINNER       Biguanide Agents Failed - 2/13/2020  8:01 AM        Failed - Patient has had a Microalbumin in the past 15 mos.     Recent Labs   Lab Test 06/08/18  0936   MICROL 90   UMALCR 254.83*             Passed - Blood pressure less than 140/90 in past 6 months     BP Readings from Last 3 Encounters:   12/06/19 102/60   06/03/19 128/74   11/30/18 124/62                 Passed - Patient has documented LDL within the past 12 mos.     Recent Labs   Lab Test 12/06/19  0813   LDL 63             Passed - Patient is age 10 or older        Passed - Patient has documented A1c within the specified period of time.     If HgbA1C is 8 or greater, it needs to be on file within the past 3 months.  If less than 8, must be on file within the past 6 months.     Recent Labs   Lab Test 12/06/19  0813   A1C 7.9*             Passed - Patient's CR is NOT>1.4 OR Patient's EGFR is NOT<45 within past 12 mos.     Recent Labs   Lab Test 12/06/19  0813   GFRESTIMATED 82   GFRESTBLACK >90       Recent Labs   Lab Test 12/06/19  0813   CR 0.93             Passed - Patient does NOT have a diagnosis of CHF.        Passed - Medication is active on med list        Passed - Recent (6 mo) or future (30 days) visit within the authorizing provider's specialty     Patient had office visit in the last 6 months or has a visit in the next 30 days with authorizing provider or within the authorizing provider's specialty.  See \"Patient Info\" tab in inbasket, or \"Choose Columns\" in Meds & Orders section of the refill encounter.            Last Written Prescription Date:  8/14/19  Last Fill Quantity: 360,  # refills: 1   Last office visit: 12/6/2019 with prescribing provider:  Sabrina   Future Office Visit:      "

## 2020-02-14 NOTE — TELEPHONE ENCOUNTER
Medication is being filled for 1 time refill only due to:  Future labs ordered microalbumin.  Will come in next week.    Lotus Bonilla RN

## 2020-02-17 DIAGNOSIS — Z79.4 TYPE 2 DIABETES MELLITUS WITH DIABETIC POLYNEUROPATHY, WITH LONG-TERM CURRENT USE OF INSULIN (H): ICD-10-CM

## 2020-02-17 DIAGNOSIS — E11.42 TYPE 2 DIABETES MELLITUS WITH DIABETIC POLYNEUROPATHY, WITH LONG-TERM CURRENT USE OF INSULIN (H): ICD-10-CM

## 2020-02-17 LAB
CREAT UR-MCNC: 60 MG/DL
MICROALBUMIN UR-MCNC: 167 MG/L
MICROALBUMIN/CREAT UR: 278.33 MG/G CR (ref 0–17)

## 2020-02-17 PROCEDURE — 82043 UR ALBUMIN QUANTITATIVE: CPT | Performed by: FAMILY MEDICINE

## 2020-02-25 DIAGNOSIS — E11.29 MICROALBUMINURIA DUE TO TYPE 2 DIABETES MELLITUS (H): ICD-10-CM

## 2020-02-25 DIAGNOSIS — R80.9 MICROALBUMINURIA DUE TO TYPE 2 DIABETES MELLITUS (H): ICD-10-CM

## 2020-02-25 NOTE — TELEPHONE ENCOUNTER
"LISINOPRIL 10MG TABS  Last Written Prescription Date:  12/3/2019  Last Fill Quantity: 45,  # refills: 0   Last office visit: 12/6/2019 with prescribing provider:  AGATHA   Future Office Visit:    Requested Prescriptions   Pending Prescriptions Disp Refills     lisinopril (ZESTRIL) 10 MG tablet [Pharmacy Med Name: LISINOPRIL 10MG TABS] 45 tablet 0     Sig: TAKE ONE-HALF TABLET BY MOUTH EVERY DAY       ACE Inhibitors (Including Combos) Protocol Passed - 2/25/2020  5:02 AM        Passed - Blood pressure under 140/90 in past 12 months     BP Readings from Last 3 Encounters:   12/06/19 102/60   06/03/19 128/74   11/30/18 124/62                 Passed - Recent (12 mo) or future (30 days) visit within the authorizing provider's specialty     Patient has had an office visit with the authorizing provider or a provider within the authorizing providers department within the previous 12 mos or has a future within next 30 days. See \"Patient Info\" tab in inbasket, or \"Choose Columns\" in Meds & Orders section of the refill encounter.              Passed - Medication is active on med list        Passed - Patient is age 18 or older        Passed - Normal serum creatinine on file in past 12 months     Recent Labs   Lab Test 12/06/19  0813   CR 0.93             Passed - Normal serum potassium on file in past 12 months     Recent Labs   Lab Test 12/06/19  0813   POTASSIUM 5.0               "

## 2020-02-27 RX ORDER — LISINOPRIL 10 MG/1
TABLET ORAL
Qty: 45 TABLET | Refills: 2 | Status: SHIPPED | OUTPATIENT
Start: 2020-02-27 | End: 2020-06-05

## 2020-02-27 NOTE — TELEPHONE ENCOUNTER
Prescription approved per [facility] [place].  Lotus Bonilla RN

## 2020-03-11 DIAGNOSIS — E11.42 TYPE 2 DIABETES MELLITUS WITH DIABETIC POLYNEUROPATHY, WITH LONG-TERM CURRENT USE OF INSULIN (H): ICD-10-CM

## 2020-03-11 DIAGNOSIS — Z79.4 TYPE 2 DIABETES MELLITUS WITH DIABETIC POLYNEUROPATHY, WITH LONG-TERM CURRENT USE OF INSULIN (H): ICD-10-CM

## 2020-03-11 NOTE — TELEPHONE ENCOUNTER
Requested Prescriptions   Pending Prescriptions Disp Refills     metFORMIN (GLUCOPHAGE) 500 MG tablet [Pharmacy Med Name: METFORMIN HCL 500MG TABS]  Last Written Prescription Date:  2/14/20  Last Fill Quantity: 120,  # refills: 0   Last Office Visit with [facility], P or [facility] prescribing provider:  12/6/19   Future Office Visit:    Next 5 appointments (look out 90 days)    May 29, 2020  9:00 AM CDT  SHORT with Gino Bennett MD  [facility] ([facility] [address]  [facility] [address]  [phone]          120 tablet 0     Sig: TAKE TWO TABLETS BY MOUTH BEFORE BREAKFAST AND TAKE TWO TABLETS BY MOUTH BEFORE DINNER (DUE FOR LAB ONLY APPOINTMENT BEFORE NEXT REFILL)       Biguanide Agents Passed - 3/11/2020  5:02 AM        Passed - Blood pressure less than 140/90 in past 6 months     BP Readings from Last 3 Encounters:   12/06/19 102/60   06/03/19 128/74   11/30/18 124/62                 Passed - Patient has documented LDL within the past 12 mos.     Recent Labs   Lab Test 12/06/19  0813   LDL 63             Passed - Patient has had a Microalbumin in the past 15 mos.     Recent Labs   Lab Test 02/17/20  0832   MICROL 167   UMALCR 278.33*             Passed - Patient is age 10 or older        Passed - Patient has documented A1c within the specified period of time.     If HgbA1C is 8 or greater, it needs to be on file within the past 3 months.  If less than 8, must be on file within the past 6 months.     Recent Labs   Lab Test 12/06/19  0813   A1C 7.9*             Passed - Patient's CR is NOT>1.4 OR Patient's EGFR is NOT<45 within past 12 mos.     Recent Labs   Lab Test 12/06/19  0813   GFRESTIMATED 82   GFRESTBLACK >90       Recent Labs   Lab Test 12/06/19  0813   CR 0.93             Passed - Patient does NOT have a diagnosis of CHF.        Passed - Medication is active on med list        Passed - Recent (6 mo) or future (30 days) visit within the authorizing provider's  "specialty     Patient had office visit in the last 6 months or has a visit in the next 30 days with authorizing provider or within the authorizing provider's specialty.  See \"Patient Info\" tab in inbasket, or \"Choose Columns\" in Meds & Orders section of the refill encounter.                 "

## 2020-03-13 ENCOUNTER — ALLIED HEALTH/NURSE VISIT (OUTPATIENT)
Dept: FAMILY MEDICINE | Facility: CLINIC | Age: 75
End: 2020-03-13
Payer: COMMERCIAL

## 2020-03-13 DIAGNOSIS — Z79.4 TYPE 2 DIABETES MELLITUS WITH DIABETIC POLYNEUROPATHY, WITH LONG-TERM CURRENT USE OF INSULIN (H): Primary | ICD-10-CM

## 2020-03-13 DIAGNOSIS — E11.42 TYPE 2 DIABETES MELLITUS WITH DIABETIC POLYNEUROPATHY, WITH LONG-TERM CURRENT USE OF INSULIN (H): Primary | ICD-10-CM

## 2020-03-13 PROCEDURE — 99207 ZZC NO CHARGE LOS: CPT

## 2020-03-13 NOTE — NURSING NOTE
Wife, Laurence here to see if she can get a refill of Metformin. (Request in chart from 3-11-20)     Had labs drawn, has 5-29-20 f/u appt scheduled, met protocol, so refilled as requested. Soledad Galvan RN

## 2020-06-04 DIAGNOSIS — Z79.4 TYPE 2 DIABETES MELLITUS WITH DIABETIC POLYNEUROPATHY, WITH LONG-TERM CURRENT USE OF INSULIN (H): ICD-10-CM

## 2020-06-04 DIAGNOSIS — E11.42 TYPE 2 DIABETES MELLITUS WITH DIABETIC POLYNEUROPATHY, WITH LONG-TERM CURRENT USE OF INSULIN (H): ICD-10-CM

## 2020-06-04 NOTE — TELEPHONE ENCOUNTER
"Routing refill request to provider for review/approval because:  Labs not current:  A1C        Requested Prescriptions   Pending Prescriptions Disp Refills     metFORMIN (GLUCOPHAGE) 500 MG tablet [Pharmacy Med Name: METFORMIN HCL 500MG TABS] 120 tablet 2     Sig: TAKE 2 TABLETS BY MOUTH BEFORE BREAKFAST AND 2 TABLETS BEFORE DINNER       Biguanide Agents Failed - 6/4/2020  5:02 AM        Failed - Patient has documented A1c within the specified period of time.     If HgbA1C is 8 or greater, it needs to be on file within the past 3 months.  If less than 8, must be on file within the past 6 months.     Recent Labs   Lab Test 12/06/19 0813   A1C 7.9*             Passed - Patient is age 10 or older        Passed - Patient's CR is NOT>1.4 OR Patient's EGFR is NOT<45 within past 12 mos.     Recent Labs   Lab Test 12/06/19 0813   GFRESTIMATED 82   GFRESTBLACK >90       Recent Labs   Lab Test 12/06/19 0813   CR 0.93             Passed - Patient does NOT have a diagnosis of CHF.        Passed - Medication is active on med list        Passed - Recent (6 mo) or future (30 days) visit within the authorizing provider's specialty     Patient had office visit in the last 6 months or has a visit in the next 30 days with authorizing provider or within the authorizing provider's specialty.  See \"Patient Info\" tab in inbasket, or \"Choose Columns\" in Meds & Orders section of the refill encounter.               \    "

## 2020-06-05 ENCOUNTER — OFFICE VISIT (OUTPATIENT)
Dept: FAMILY MEDICINE | Facility: CLINIC | Age: 75
End: 2020-06-05
Payer: COMMERCIAL

## 2020-06-05 VITALS
WEIGHT: 137 LBS | HEART RATE: 83 BPM | OXYGEN SATURATION: 100 % | DIASTOLIC BLOOD PRESSURE: 82 MMHG | SYSTOLIC BLOOD PRESSURE: 128 MMHG | TEMPERATURE: 98.3 F | HEIGHT: 69 IN | RESPIRATION RATE: 16 BRPM | BODY MASS INDEX: 20.29 KG/M2

## 2020-06-05 DIAGNOSIS — Z79.4 TYPE 2 DIABETES MELLITUS WITH DIABETIC POLYNEUROPATHY, WITH LONG-TERM CURRENT USE OF INSULIN (H): ICD-10-CM

## 2020-06-05 DIAGNOSIS — E11.42 TYPE 2 DIABETES MELLITUS WITH DIABETIC POLYNEUROPATHY, WITH LONG-TERM CURRENT USE OF INSULIN (H): ICD-10-CM

## 2020-06-05 DIAGNOSIS — E11.29 MICROALBUMINURIA DUE TO TYPE 2 DIABETES MELLITUS (H): ICD-10-CM

## 2020-06-05 DIAGNOSIS — R80.9 MICROALBUMINURIA DUE TO TYPE 2 DIABETES MELLITUS (H): ICD-10-CM

## 2020-06-05 LAB — HBA1C MFR BLD: 7.8 % (ref 0–5.6)

## 2020-06-05 PROCEDURE — 83036 HEMOGLOBIN GLYCOSYLATED A1C: CPT | Performed by: FAMILY MEDICINE

## 2020-06-05 PROCEDURE — 36415 COLL VENOUS BLD VENIPUNCTURE: CPT | Performed by: FAMILY MEDICINE

## 2020-06-05 PROCEDURE — 99213 OFFICE O/P EST LOW 20 MIN: CPT | Performed by: FAMILY MEDICINE

## 2020-06-05 RX ORDER — LISINOPRIL 10 MG/1
5 TABLET ORAL DAILY
Qty: 45 TABLET | Refills: 3 | Status: SHIPPED | OUTPATIENT
Start: 2020-06-05 | End: 2021-05-14

## 2020-06-05 RX ORDER — GABAPENTIN 100 MG/1
CAPSULE ORAL
Qty: 270 CAPSULE | Refills: 3 | Status: SHIPPED | OUTPATIENT
Start: 2020-06-05 | End: 2021-04-19

## 2020-06-05 ASSESSMENT — MIFFLIN-ST. JEOR: SCORE: 1365.77

## 2020-06-05 NOTE — LETTER
June 5, 2020      Roddy Sidhu  [address]  [facility] [place] [address]        Dear ,    We are writing to inform you of your test results.    Your test results fall within the expected range(s) or remain unchanged from previous results.  Please continue with current treatment plan.    Resulted Orders   Hemoglobin A1c   Result Value Ref Range    Hemoglobin A1C 7.8 (H) 0 - 5.6 %      Comment:      Normal <5.7% Prediabetes 5.7-6.4%  Diabetes 6.5% or higher - adopted from ADA   consensus guidelines.         If you have any questions or concerns, please call the clinic at the number listed above.       Sincerely,        Gino Bennett MD/[facility]

## 2020-06-05 NOTE — PROGRESS NOTES
"Subjective     Roddy Sidhu is a 74 year old male who presents to clinic today for the following health issues:    HPI   Annual Wellness Visit    Are you in the first 12 months of your Medicare Part B coverage?  No    Physical Health:    In general, how would you rate your overall physical health? good    Outside of work, how many days during the week do you exercise?2-3 days/week    Outside of work, approximately how many minutes a day do you exercise?15-30 minutes    If you drink alcohol do you typically have >3 drinks per day or >7 drinks per week? No    Do you usually eat at least 4 servings of fruit and vegetables a day, include whole grains & fiber and avoid regularly eating high fat or \"junk\" foods? Yes    Do you have any problems taking medications regularly? No    Do you have any side effects from medications? not applicable    Needs assistance for the following daily activities: no assistance needed    Which of the following safety concerns are present in your home?  none identified     Hearing impairment: Yes, Difficulty following a conversation in a noisy restaurant or crowded room.    Feel that people are mumbling or not speaking clearly.    Difficulty understanding soft or whispered speech.    Difficulty understanding speech on the telephone.    In the past 6 months, have you been bothered by leaking of urine? no    Mental Health:    In general, how would you rate your overall mental or emotional health? good  PHQ-2 Score: 0    Do you feel safe in your environment? Yes    Have you ever done Advance Care Planning? (For example, a Health Directive, POLST, or a discussion with a medical provider or your loved ones about your wishes)? No, advance care planning information given to patient to review.  Patient plans to discuss their wishes with loved ones or provider.      Fall risk:  Fallen 2 or more times in the past year?: No  Any fall with injury in the past year?: No    Cognitive Screenin) " "Repeat 3 items (Leader, Season, Table)    2) Clock draw: NORMAL  3) 3 item recall: Recalls 3 objects  Results: 3 items recalled: COGNITIVE IMPAIRMENT LESS LIKELY    Mini-CogTM Copyright S [employer]. Licensed by the author for use in [employer]; reprinted with permission (jose@.[facility]). All rights reserved.      Do you have sleep apnea, excessive snoring or daytime drowsiness?: no    Current providers sharing in care for this patient include:   Patient Care Team:  Gino Bennett MD as PCP - General (Family Practice)  Gino Bennett MD as Assigned PCP          Diabetes Follow-up      How often are you checking your blood sugar? Not at all    What concerns do you have today about your diabetes? None     Do you have any of these symptoms? (Select all that apply)  Numbness in feet and Burning in feet      BP Readings from Last 2 Encounters:   06/05/20 128/82   12/06/19 102/60     Hemoglobin A1C (%)   Date Value   12/06/2019 7.9 (H)   03/12/2019 7.7 (H)     LDL Cholesterol Calculated (mg/dL)   Date Value   12/06/2019 63   11/30/2018 39               Hyperlipidemia Follow-Up      Are you regularly taking any medication or supplement to lower your cholesterol?   Yes- Zorcor     Are you having muscle aches or other side effects that you think could be caused by your cholesterol lowering medication?  No          Reviewed and updated as needed this visit by Provider         Review of Systems   Constitutional, HEENT, cardiovascular, pulmonary, GI, , musculoskeletal, neuro, skin, endocrine and psych systems are negative, except as otherwise noted.      Objective    /82   Pulse 83   Temp 98.3  F (36.8  C)   Resp 16   Ht 1.759 m (5' 9.25\")   Wt 62.1 kg (137 lb)   SpO2 100%   BMI 20.09 kg/m    Body mass index is 20.09 kg/m .  Physical Exam   GENERAL: healthy, alert and no distress  EYES: Eyes grossly normal to inspection, PERRL and conjunctivae and sclerae normal  HENT: ear canals and TM's normal, nose " and mouth without ulcers or lesions  NECK: no adenopathy, no asymmetry, masses, or scars and thyroid normal to palpation  RESP: lungs clear to auscultation - no rales, rhonchi or wheezes  CV: regular rate and rhythm, normal S1 S2, no S3 or S4, no murmur, click or rub, no peripheral edema and peripheral pulses strong  ABDOMEN: soft, nontender, no hepatosplenomegaly, no masses and bowel sounds normal  MS: no gross musculoskeletal defects noted, no edema  SKIN: no suspicious lesions or rashes  NEURO: Normal strength and tone, mentation intact and speech normal  PSYCH: mentation appears normal, affect normal/bright    Diagnostic Test Results:  Labs reviewed in Epic        ASSESSMENT:     Type 2 diabetes mellitus with diabetic polyneuropathy, with long-term current use of insulin (H)  Microalbuminuria due to type 2 diabetes mellitus (H)    PLAN:  Orders Placed This Encounter     Hemoglobin A1c     gabapentin (NEURONTIN) 100 MG capsule     insulin glargine (LANTUS SOLOSTAR) 100 UNIT/ML pen     lisinopril (ZESTRIL) 10 MG tablet     metFORMIN (GLUCOPHAGE) 500 MG tablet

## 2020-06-25 DIAGNOSIS — Z79.4 TYPE 2 DIABETES MELLITUS WITH DIABETIC POLYNEUROPATHY, WITH LONG-TERM CURRENT USE OF INSULIN (H): ICD-10-CM

## 2020-06-25 DIAGNOSIS — E11.42 TYPE 2 DIABETES MELLITUS WITH DIABETIC POLYNEUROPATHY, WITH LONG-TERM CURRENT USE OF INSULIN (H): ICD-10-CM

## 2020-06-25 RX ORDER — FLURBIPROFEN SODIUM 0.3 MG/ML
SOLUTION/ DROPS OPHTHALMIC
Qty: 100 EACH | Refills: 1 | Status: SHIPPED | OUTPATIENT
Start: 2020-06-25 | End: 2020-07-03

## 2020-06-25 NOTE — TELEPHONE ENCOUNTER
"Requested Prescriptions   Pending Prescriptions Disp Refills     insulin pen needle (B-D U/F) 31G X 5 MM miscellaneous [Pharmacy Med Name: BD PEN NEEDLE MINI  31G X 5 MM MISC] 100 each 1     Sig: USE 1 PEN NEEDLE DAILY OR AS DIRECTED       Diabetic Supplies Protocol Passed - 6/25/2020  5:02 AM        Passed - Medication is active on med list        Passed - Patient is 18 years of age or older        Passed - Recent (6 mo) or future (30 days) visit within the authorizing provider's specialty     Patient had office visit in the last 6 months or has a visit in the next 30 days with authorizing provider.  See \"Patient Info\" tab in inbasket, or \"Choose Columns\" in Meds & Orders section of the refill encounter.               Prescription approved per [facility] Refill Protocol.    "

## 2020-07-03 DIAGNOSIS — Z79.4 TYPE 2 DIABETES MELLITUS WITH DIABETIC POLYNEUROPATHY, WITH LONG-TERM CURRENT USE OF INSULIN (H): ICD-10-CM

## 2020-07-03 DIAGNOSIS — E11.42 TYPE 2 DIABETES MELLITUS WITH DIABETIC POLYNEUROPATHY, WITH LONG-TERM CURRENT USE OF INSULIN (H): ICD-10-CM

## 2020-07-03 RX ORDER — FLURBIPROFEN SODIUM 0.3 MG/ML
SOLUTION/ DROPS OPHTHALMIC
Qty: 100 EACH | Refills: 1 | Status: SHIPPED | OUTPATIENT
Start: 2020-07-03 | End: 2021-01-22

## 2020-11-19 DIAGNOSIS — E78.5 HYPERLIPIDEMIA LDL GOAL <100: ICD-10-CM

## 2020-11-19 RX ORDER — SIMVASTATIN 40 MG
TABLET ORAL
Qty: 90 TABLET | Refills: 0 | Status: SHIPPED | OUTPATIENT
Start: 2020-11-19 | End: 2021-02-11

## 2020-11-19 NOTE — TELEPHONE ENCOUNTER
"Requested Prescriptions   Pending Prescriptions Disp Refills     simvastatin (ZOCOR) 40 MG tablet [Pharmacy Med Name: SIMVASTATIN 40MG TABS] 90 tablet 3     Sig: TAKE ONE TABLET BY MOUTH AT BEDTIME       Statins Protocol Passed - 11/19/2020  5:02 AM        Passed - LDL on file in past 12 months     Recent Labs   Lab Test 12/06/19  0813   LDL 63             Passed - No abnormal creatine kinase in past 12 months     No lab results found.             Passed - Recent (12 mo) or future (30 days) visit within the authorizing provider's specialty     Patient has had an office visit with the authorizing provider or a provider within the authorizing providers department within the previous 12 mos or has a future within next 30 days. See \"Patient Info\" tab in inbasket, or \"Choose Columns\" in Meds & Orders section of the refill encounter.              Passed - Medication is active on med list        Passed - Patient is age 18 or older             "

## 2021-01-20 DIAGNOSIS — Z79.4 TYPE 2 DIABETES MELLITUS WITH DIABETIC POLYNEUROPATHY, WITH LONG-TERM CURRENT USE OF INSULIN (H): ICD-10-CM

## 2021-01-20 DIAGNOSIS — E11.42 TYPE 2 DIABETES MELLITUS WITH DIABETIC POLYNEUROPATHY, WITH LONG-TERM CURRENT USE OF INSULIN (H): ICD-10-CM

## 2021-01-20 NOTE — TELEPHONE ENCOUNTER
"Requested Prescriptions   Pending Prescriptions Disp Refills     insulin pen needle (B-D U/F) 31G X 5 MM miscellaneous [Pharmacy Med Name: BD PEN NEEDLE MINI  31G X 5 MM MISC] 100 each 1     Sig: USE ONCE A DAY OR AS DIRECTED       Diabetic Supplies Protocol Failed - 1/20/2021  5:02 AM        Failed - Recent (6 mo) or future (30 days) visit within the authorizing provider's specialty     Patient had office visit in the last 6 months or has a visit in the next 30 days with authorizing provider.  See \"Patient Info\" tab in inbasket, or \"Choose Columns\" in Meds & Orders section of the refill encounter.            Passed - Medication is active on med list        Passed - Patient is 18 years of age or older             "

## 2021-01-22 RX ORDER — FLURBIPROFEN SODIUM 0.3 MG/ML
SOLUTION/ DROPS OPHTHALMIC
Qty: 100 EACH | Refills: 1 | Status: SHIPPED | OUTPATIENT
Start: 2021-01-22 | End: 2021-07-20

## 2021-03-13 DIAGNOSIS — E78.5 HYPERLIPIDEMIA LDL GOAL <100: ICD-10-CM

## 2021-03-15 RX ORDER — SIMVASTATIN 40 MG
40 TABLET ORAL AT BEDTIME
Qty: 30 TABLET | Refills: 0 | Status: SHIPPED | OUTPATIENT
Start: 2021-03-15 | End: 2021-04-15

## 2021-03-15 NOTE — TELEPHONE ENCOUNTER
"Requested Prescriptions   Pending Prescriptions Disp Refills     simvastatin (ZOCOR) 40 MG tablet [Pharmacy Med Name: SIMVASTATIN 40MG TABS] 30 tablet 0     Sig: TAKE 1 TABLET (40 MG) BY MOUTH AT BEDTIME NEED FASTING LABS BEFORE NEXT REFILL.       Statins Protocol Failed - 3/13/2021  5:01 AM        Failed - LDL on file in past 12 months     Recent Labs   Lab Test 12/06/19  0813   LDL 63             Passed - No abnormal creatine kinase in past 12 months     No lab results found.             Passed - Recent (12 mo) or future (30 days) visit within the authorizing provider's specialty     Patient has had an office visit with the authorizing provider or a provider within the authorizing providers department within the previous 12 mos or has a future within next 30 days. See \"Patient Info\" tab in inbasket, or \"Choose Columns\" in Meds & Orders section of the refill encounter.              Passed - Medication is active on med list        Passed - Patient is age 18 or older             "

## 2021-03-15 NOTE — TELEPHONE ENCOUNTER
Routing refill request to provider for review/approval because:  Devi given x1 and patient did not follow up, please advise  Labs not current:  Lipids  Has not been seen since 6/5/2020 (Type 2 diabetic)     Mary Ellen BLUE RN, BSN

## 2021-04-19 DIAGNOSIS — E11.42 TYPE 2 DIABETES MELLITUS WITH DIABETIC POLYNEUROPATHY, WITH LONG-TERM CURRENT USE OF INSULIN (H): ICD-10-CM

## 2021-04-19 DIAGNOSIS — Z79.4 TYPE 2 DIABETES MELLITUS WITH DIABETIC POLYNEUROPATHY, WITH LONG-TERM CURRENT USE OF INSULIN (H): ICD-10-CM

## 2021-04-19 NOTE — TELEPHONE ENCOUNTER
Pt needs 1 mo refill to get him through until his appt next mo.  No need to call patient back, unless there are questions or problems.

## 2021-04-20 RX ORDER — GABAPENTIN 100 MG/1
CAPSULE ORAL
Qty: 90 CAPSULE | Refills: 0 | Status: SHIPPED | OUTPATIENT
Start: 2021-04-20 | End: 2021-05-14

## 2021-05-05 DIAGNOSIS — Z79.4 TYPE 2 DIABETES MELLITUS WITH DIABETIC POLYNEUROPATHY, WITH LONG-TERM CURRENT USE OF INSULIN (H): ICD-10-CM

## 2021-05-05 DIAGNOSIS — E11.42 TYPE 2 DIABETES MELLITUS WITH DIABETIC POLYNEUROPATHY, WITH LONG-TERM CURRENT USE OF INSULIN (H): ICD-10-CM

## 2021-05-07 RX ORDER — INSULIN GLARGINE 100 [IU]/ML
INJECTION, SOLUTION SUBCUTANEOUS
Qty: 3 ML | Refills: 0 | Status: SHIPPED | OUTPATIENT
Start: 2021-05-07 | End: 2021-07-26

## 2021-05-07 NOTE — TELEPHONE ENCOUNTER
Next 5 appointments (look out 90 days)    May 14, 2021  6:40 AM  SHORT with Gino Bennett MD  [facility] ([facility] [address] [facility] [address]  [phone]         Creatinine   Date Value Ref Range Status   12/06/2019 0.93 0.66 - 1.25 mg/dL Final     Lab Results   Component Value Date    A1C 7.8 06/05/2020    A1C 7.9 12/06/2019    A1C 7.7 03/12/2019    A1C 8.0 11/30/2018    A1C 7.7 06/08/2018

## 2021-05-14 ENCOUNTER — OFFICE VISIT (OUTPATIENT)
Dept: FAMILY MEDICINE | Facility: CLINIC | Age: 76
End: 2021-05-14
Payer: COMMERCIAL

## 2021-05-14 VITALS
WEIGHT: 138 LBS | BODY MASS INDEX: 20.44 KG/M2 | SYSTOLIC BLOOD PRESSURE: 112 MMHG | TEMPERATURE: 95.1 F | HEIGHT: 69 IN | DIASTOLIC BLOOD PRESSURE: 68 MMHG | HEART RATE: 75 BPM | RESPIRATION RATE: 16 BRPM | OXYGEN SATURATION: 100 %

## 2021-05-14 DIAGNOSIS — Z79.4 TYPE 2 DIABETES MELLITUS WITH DIABETIC POLYNEUROPATHY, WITH LONG-TERM CURRENT USE OF INSULIN (H): ICD-10-CM

## 2021-05-14 DIAGNOSIS — Z00.00 ROUTINE GENERAL MEDICAL EXAMINATION AT A HEALTH CARE FACILITY: Primary | ICD-10-CM

## 2021-05-14 DIAGNOSIS — E11.29 MICROALBUMINURIA DUE TO TYPE 2 DIABETES MELLITUS (H): ICD-10-CM

## 2021-05-14 DIAGNOSIS — E78.5 HYPERLIPIDEMIA LDL GOAL <100: ICD-10-CM

## 2021-05-14 DIAGNOSIS — R80.9 MICROALBUMINURIA DUE TO TYPE 2 DIABETES MELLITUS (H): ICD-10-CM

## 2021-05-14 DIAGNOSIS — E11.42 TYPE 2 DIABETES MELLITUS WITH DIABETIC POLYNEUROPATHY, WITH LONG-TERM CURRENT USE OF INSULIN (H): ICD-10-CM

## 2021-05-14 LAB
ANION GAP SERPL CALCULATED.3IONS-SCNC: 2 MMOL/L (ref 3–14)
BUN SERPL-MCNC: 21 MG/DL (ref 7–30)
CALCIUM SERPL-MCNC: 8.8 MG/DL (ref 8.5–10.1)
CHLORIDE SERPL-SCNC: 107 MMOL/L (ref 94–109)
CHOLEST SERPL-MCNC: 142 MG/DL
CO2 SERPL-SCNC: 27 MMOL/L (ref 20–32)
CREAT SERPL-MCNC: 0.94 MG/DL (ref 0.66–1.25)
GFR SERPL CREATININE-BSD FRML MDRD: 79 ML/MIN/{1.73_M2}
GLUCOSE SERPL-MCNC: 126 MG/DL (ref 70–99)
HBA1C MFR BLD: 7.2 % (ref 0–5.6)
HDLC SERPL-MCNC: 56 MG/DL
LDLC SERPL CALC-MCNC: 58 MG/DL
NONHDLC SERPL-MCNC: 86 MG/DL
POTASSIUM SERPL-SCNC: 5.2 MMOL/L (ref 3.4–5.3)
SODIUM SERPL-SCNC: 136 MMOL/L (ref 133–144)
TRIGL SERPL-MCNC: 138 MG/DL

## 2021-05-14 PROCEDURE — 80048 BASIC METABOLIC PNL TOTAL CA: CPT | Performed by: FAMILY MEDICINE

## 2021-05-14 PROCEDURE — 99213 OFFICE O/P EST LOW 20 MIN: CPT | Performed by: FAMILY MEDICINE

## 2021-05-14 PROCEDURE — 80061 LIPID PANEL: CPT | Performed by: FAMILY MEDICINE

## 2021-05-14 PROCEDURE — 83036 HEMOGLOBIN GLYCOSYLATED A1C: CPT | Performed by: FAMILY MEDICINE

## 2021-05-14 PROCEDURE — 36415 COLL VENOUS BLD VENIPUNCTURE: CPT | Performed by: FAMILY MEDICINE

## 2021-05-14 RX ORDER — SIMVASTATIN 40 MG
TABLET ORAL
Qty: 90 TABLET | Refills: 3 | Status: SHIPPED | OUTPATIENT
Start: 2021-05-14 | End: 2022-05-05

## 2021-05-14 RX ORDER — LISINOPRIL 10 MG/1
5 TABLET ORAL DAILY
Qty: 45 TABLET | Refills: 3 | Status: SHIPPED | OUTPATIENT
Start: 2021-05-14 | End: 2021-11-02

## 2021-05-14 RX ORDER — GABAPENTIN 100 MG/1
CAPSULE ORAL
Qty: 270 CAPSULE | Refills: 3 | Status: SHIPPED | OUTPATIENT
Start: 2021-05-14 | End: 2022-05-10

## 2021-05-14 ASSESSMENT — MIFFLIN-ST. JEOR: SCORE: 1365.3

## 2021-07-18 DIAGNOSIS — E11.42 TYPE 2 DIABETES MELLITUS WITH DIABETIC POLYNEUROPATHY, WITH LONG-TERM CURRENT USE OF INSULIN (H): ICD-10-CM

## 2021-07-18 DIAGNOSIS — Z79.4 TYPE 2 DIABETES MELLITUS WITH DIABETIC POLYNEUROPATHY, WITH LONG-TERM CURRENT USE OF INSULIN (H): ICD-10-CM

## 2021-07-20 RX ORDER — FLURBIPROFEN SODIUM 0.3 MG/ML
SOLUTION/ DROPS OPHTHALMIC
Qty: 100 EACH | Refills: 1 | Status: SHIPPED | OUTPATIENT
Start: 2021-07-20 | End: 2022-02-09

## 2021-07-25 DIAGNOSIS — E11.42 TYPE 2 DIABETES MELLITUS WITH DIABETIC POLYNEUROPATHY, WITH LONG-TERM CURRENT USE OF INSULIN (H): ICD-10-CM

## 2021-07-25 DIAGNOSIS — Z79.4 TYPE 2 DIABETES MELLITUS WITH DIABETIC POLYNEUROPATHY, WITH LONG-TERM CURRENT USE OF INSULIN (H): ICD-10-CM

## 2021-07-26 RX ORDER — INSULIN GLARGINE 100 [IU]/ML
INJECTION, SOLUTION SUBCUTANEOUS
Qty: 15 ML | Refills: 1 | Status: SHIPPED | OUTPATIENT
Start: 2021-07-26 | End: 2021-11-02

## 2021-11-01 DIAGNOSIS — Z79.4 TYPE 2 DIABETES MELLITUS WITH DIABETIC POLYNEUROPATHY, WITH LONG-TERM CURRENT USE OF INSULIN (H): ICD-10-CM

## 2021-11-01 DIAGNOSIS — E11.42 TYPE 2 DIABETES MELLITUS WITH DIABETIC POLYNEUROPATHY, WITH LONG-TERM CURRENT USE OF INSULIN (H): ICD-10-CM

## 2021-11-02 ENCOUNTER — OFFICE VISIT (OUTPATIENT)
Dept: FAMILY MEDICINE | Facility: CLINIC | Age: 76
End: 2021-11-02
Payer: COMMERCIAL

## 2021-11-02 VITALS
BODY MASS INDEX: 20.59 KG/M2 | OXYGEN SATURATION: 99 % | TEMPERATURE: 96.5 F | RESPIRATION RATE: 16 BRPM | WEIGHT: 139 LBS | HEART RATE: 95 BPM | HEIGHT: 69 IN | DIASTOLIC BLOOD PRESSURE: 88 MMHG | SYSTOLIC BLOOD PRESSURE: 114 MMHG

## 2021-11-02 DIAGNOSIS — E11.29 MICROALBUMINURIA DUE TO TYPE 2 DIABETES MELLITUS (H): ICD-10-CM

## 2021-11-02 DIAGNOSIS — E11.42 TYPE 2 DIABETES MELLITUS WITH DIABETIC POLYNEUROPATHY, WITH LONG-TERM CURRENT USE OF INSULIN (H): Primary | ICD-10-CM

## 2021-11-02 DIAGNOSIS — R80.9 MICROALBUMINURIA DUE TO TYPE 2 DIABETES MELLITUS (H): ICD-10-CM

## 2021-11-02 DIAGNOSIS — Z79.4 TYPE 2 DIABETES MELLITUS WITH DIABETIC POLYNEUROPATHY, WITH LONG-TERM CURRENT USE OF INSULIN (H): Primary | ICD-10-CM

## 2021-11-02 LAB
ANION GAP SERPL CALCULATED.3IONS-SCNC: 6 MMOL/L (ref 3–14)
BUN SERPL-MCNC: 19 MG/DL (ref 7–30)
CALCIUM SERPL-MCNC: 9.3 MG/DL (ref 8.5–10.1)
CHLORIDE BLD-SCNC: 108 MMOL/L (ref 94–109)
CO2 SERPL-SCNC: 25 MMOL/L (ref 20–32)
CREAT SERPL-MCNC: 0.88 MG/DL (ref 0.66–1.25)
CREAT UR-MCNC: 46 MG/DL
GFR SERPL CREATININE-BSD FRML MDRD: 85 ML/MIN/1.73M2
GLUCOSE BLD-MCNC: 114 MG/DL (ref 70–99)
HBA1C MFR BLD: 6.9 % (ref 0–5.6)
HOLD SPECIMEN: NORMAL
HOLD SPECIMEN: NORMAL
MICROALBUMIN UR-MCNC: 151 MG/L
MICROALBUMIN/CREAT UR: 328.26 MG/G CR (ref 0–17)
POTASSIUM BLD-SCNC: 5.4 MMOL/L (ref 3.4–5.3)
SODIUM SERPL-SCNC: 139 MMOL/L (ref 133–144)

## 2021-11-02 PROCEDURE — 99207 PR FOOT EXAM NO CHARGE: CPT | Performed by: NURSE PRACTITIONER

## 2021-11-02 PROCEDURE — 82043 UR ALBUMIN QUANTITATIVE: CPT | Performed by: NURSE PRACTITIONER

## 2021-11-02 PROCEDURE — 99214 OFFICE O/P EST MOD 30 MIN: CPT | Performed by: NURSE PRACTITIONER

## 2021-11-02 PROCEDURE — 80048 BASIC METABOLIC PNL TOTAL CA: CPT | Performed by: NURSE PRACTITIONER

## 2021-11-02 PROCEDURE — 36415 COLL VENOUS BLD VENIPUNCTURE: CPT | Performed by: NURSE PRACTITIONER

## 2021-11-02 PROCEDURE — 83036 HEMOGLOBIN GLYCOSYLATED A1C: CPT | Performed by: NURSE PRACTITIONER

## 2021-11-02 RX ORDER — LISINOPRIL 10 MG/1
5 TABLET ORAL DAILY
Qty: 45 TABLET | Refills: 3 | Status: SHIPPED | OUTPATIENT
Start: 2021-11-02 | End: 2022-10-12

## 2021-11-02 ASSESSMENT — MIFFLIN-ST. JEOR: SCORE: 1364.84

## 2021-11-02 NOTE — LETTER
November 2, 2021      Roddy Sidhu  [address]  [facility] [place] [address]      Dear ,    We are writing to inform you of your test results.    Diabetes is controlled. Protein in the urine- will continue to monitor yearly- make sure not to take excessive over the counter pain medicines and drink plenty of water.        Resulted Orders   Hemoglobin A1c   Result Value Ref Range    Hemoglobin A1C 6.9 (H) 0.0 - 5.6 %      Comment:      Normal <5.7%   Prediabetes 5.7-6.4%    Diabetes 6.5% or higher     Note: Adopted from ADA consensus guidelines.   Extra Red Top Tube   Result Value Ref Range    Hold Specimen JIC    Extra Green Top (Lithium Heparin) Tube   Result Value Ref Range    Hold Specimen JIC    Albumin Random Urine Quantitative with Creat Ratio   Result Value Ref Range    Creatinine Urine mg/dL 46 mg/dL    Albumin Urine mg/L 151 mg/L    Albumin Urine mg/g Cr 328.26 (H) 0.00 - 17.00 mg/g Cr     If you have any questions or concerns, please call the clinic at the number listed above.     Sincerely,    JOSH Becker CNP

## 2021-11-02 NOTE — RESULT ENCOUNTER NOTE
Please send patient letter notifying of labs and provider message.    Diabetes is controlled. Protein in the urine- will continue to monitor yearly- make sure not to take excessive over the counter pain medicines and drink plenty of water.     Thanks,  JOSH Rincon CNP

## 2021-11-02 NOTE — PROGRESS NOTES
Assessment & Plan     Type 2 diabetes mellitus with diabetic polyneuropathy, with long-term current use of insulin (H)  Controlled, continue current meds  - Hemoglobin A1c; Future  - Hemoglobin A1c  - insulin glargine (LANTUS SOLOSTAR) 100 UNIT/ML pen; INJECT 16 UNITS SUBCUTANEOUSLY EVERY MORNING  - FOOT EXAM    Microalbuminuria due to type 2 diabetes mellitus (H)  Blood pressure inhibits bert dose, monitoring with annual test  - lisinopril (ZESTRIL) 10 MG tablet; Take 0.5 tablets (5 mg) by mouth daily  - Albumin Random Urine Quantitative with Creat Ratio; Future  - Albumin Random Urine Quantitative with Creat Ratio  - FOOT EXAM      Health Maintenance reviewed - patient asked to schedule diabetic eye exam. Declines colon cancer screening and foot exam. Declines immunizations.          FUTURE APPOINTMENTS:       - Follow-up visit in 6 months, sooner PRN    No follow-ups on file.    JOSH Becker CNP  [facility]    Jermaine Pereyra is a 76 year old who presents for the following health issues     HPI     Hypertension Follow-up      Do you check your blood pressure regularly outside of the clinic? No     Are you following a low salt diet? Yes    Are your blood pressures ever more than 140 on the top number (systolic) OR more   than 90 on the bottom number (diastolic), for example 140/90? Unknown       Diabetes Follow-up      How often are you checking your blood sugar? Not at all    What concerns do you have today about your diabetes? None     Do you have any of these symptoms? (Select all that apply)  Numbness in feet    Have you had a diabetic eye exam in the last 12 months? No        BP Readings from Last 2 Encounters:   11/02/21 114/88   05/14/21 112/68     Hemoglobin A1C (%)   Date Value   05/14/2021 7.2 (H)   06/05/2020 7.8 (H)     LDL Cholesterol Calculated (mg/dL)   Date Value   05/14/2021 58   12/06/2019 63                 How many servings of fruits and  "vegetables do you eat daily?  2-3    On average, how many sweetened beverages do you drink each day (Examples: soda, juice, sweet tea, etc.  Do NOT count diet or artificially sweetened beverages)?   0    How many days per week do you exercise enough to make your heart beat faster? 3 or less    How many minutes a day do you exercise enough to make your heart beat faster? 9 or less    How many days per week do you miss taking your medication? 0        Review of Systems   Constitutional, HEENT, cardiovascular, pulmonary, gi and gu systems are negative, except as otherwise noted.      Objective    /88   Pulse 95   Temp (!) 96.5  F (35.8  C) (Tympanic)   Resp 16   Ht 1.759 m (5' 9.25\")   Wt 63 kg (139 lb)   SpO2 99%   BMI 20.38 kg/m    Body mass index is 20.38 kg/m .  Physical Exam   GENERAL: alert and no distress  RESP: lungs clear to auscultation - no rales, rhonchi or wheezes  CV: regular rates and rhythm, normal S1 S2, no S3 or S4, no murmur, click or rub and no peripheral edema  MS: no gross musculoskeletal defects noted, no edema  NEURO: Normal strength and tone, mentation intact and speech normal  Diabetic foot exam: patient refuses exam    Results for orders placed or performed in visit on 11/02/21 (from the past 24 hour(s))   Hemoglobin A1c   Result Value Ref Range    Hemoglobin A1C 6.9 (H) 0.0 - 5.6 %   Extra Tube    Narrative    The following orders were created for panel order Extra Tube.  Procedure                               Abnormality         Status                     ---------                               -----------         ------                     Extra Red Top Tube[[id number]]                               In process                 Extra Green Top (Lithium...[[id number]]                      In process                   Please view results for these tests on the individual orders.               "

## 2021-11-03 ENCOUNTER — TELEPHONE (OUTPATIENT)
Dept: FAMILY MEDICINE | Facility: CLINIC | Age: 76
End: 2021-11-03

## 2021-11-03 DIAGNOSIS — E87.5 HYPERKALEMIA: Primary | ICD-10-CM

## 2021-11-03 NOTE — TELEPHONE ENCOUNTER
Please let Marvin know his potassium is elevated. I would like him to schedule a repeat lab draw in 1 month to recheck. Thank you. JOSH Rincon CNP [facility]

## 2021-11-08 DIAGNOSIS — Z79.4 TYPE 2 DIABETES MELLITUS WITH DIABETIC POLYNEUROPATHY, WITH LONG-TERM CURRENT USE OF INSULIN (H): ICD-10-CM

## 2021-11-08 DIAGNOSIS — E11.42 TYPE 2 DIABETES MELLITUS WITH DIABETIC POLYNEUROPATHY, WITH LONG-TERM CURRENT USE OF INSULIN (H): ICD-10-CM

## 2021-12-02 ENCOUNTER — LAB (OUTPATIENT)
Dept: LAB | Facility: CLINIC | Age: 76
End: 2021-12-02
Payer: COMMERCIAL

## 2021-12-02 DIAGNOSIS — E87.5 HYPERKALEMIA: ICD-10-CM

## 2021-12-02 LAB — POTASSIUM BLD-SCNC: 5.1 MMOL/L (ref 3.4–5.3)

## 2021-12-02 PROCEDURE — 84132 ASSAY OF SERUM POTASSIUM: CPT

## 2021-12-02 PROCEDURE — 36415 COLL VENOUS BLD VENIPUNCTURE: CPT

## 2022-02-09 DIAGNOSIS — Z79.4 TYPE 2 DIABETES MELLITUS WITH DIABETIC POLYNEUROPATHY, WITH LONG-TERM CURRENT USE OF INSULIN (H): ICD-10-CM

## 2022-02-09 DIAGNOSIS — E11.42 TYPE 2 DIABETES MELLITUS WITH DIABETIC POLYNEUROPATHY, WITH LONG-TERM CURRENT USE OF INSULIN (H): ICD-10-CM

## 2022-02-09 RX ORDER — FLURBIPROFEN SODIUM 0.3 MG/ML
SOLUTION/ DROPS OPHTHALMIC
Qty: 100 EACH | Refills: 1 | Status: SHIPPED | OUTPATIENT
Start: 2022-02-09 | End: 2022-09-19

## 2022-05-01 DIAGNOSIS — Z79.4 TYPE 2 DIABETES MELLITUS WITH DIABETIC POLYNEUROPATHY, WITH LONG-TERM CURRENT USE OF INSULIN (H): ICD-10-CM

## 2022-05-01 DIAGNOSIS — E11.42 TYPE 2 DIABETES MELLITUS WITH DIABETIC POLYNEUROPATHY, WITH LONG-TERM CURRENT USE OF INSULIN (H): ICD-10-CM

## 2022-05-03 DIAGNOSIS — E78.5 HYPERLIPIDEMIA LDL GOAL <100: ICD-10-CM

## 2022-05-03 NOTE — TELEPHONE ENCOUNTER
"Requested Prescriptions   Pending Prescriptions Disp Refills     metFORMIN (GLUCOPHAGE) 500 MG tablet [Pharmacy Med Name: METFORMIN HCL 500MG TABS] 360 tablet 1     Sig: TAKE 2 TABLETS BY MOUTH BEFORE BREAKFAST AND 2 TABLETS BEFORE DINNER       Biguanide Agents Failed - 5/1/2022  5:01 AM        Failed - Recent (6 mo) or future (30 days) visit within the authorizing provider's specialty     Patient had office visit in the last 6 months or has a visit in the next 30 days with authorizing provider or within the authorizing provider's specialty.  See \"Patient Info\" tab in inbasket, or \"Choose Columns\" in Meds & Orders section of the refill encounter.            Passed - Patient is age 10 or older        Passed - Patient has documented A1c within the specified period of time.     If HgbA1C is 8 or greater, it needs to be on file within the past 3 months.  If less than 8, must be on file within the past 6 months.     Recent Labs   Lab Test 11/02/21  0816   A1C 6.9*             Passed - Patient's CR is NOT>1.4 OR Patient's EGFR is NOT<45 within past 12 mos.     Recent Labs   Lab Test 11/02/21  0816 05/14/21  0657   GFRESTIMATED 85 79   GFRESTBLACK  --  >90       Recent Labs   Lab Test 11/02/21  0816   CR 0.88             Passed - Patient does NOT have a diagnosis of CHF.        Passed - Medication is active on med list               "

## 2022-05-05 RX ORDER — SIMVASTATIN 40 MG
TABLET ORAL
Qty: 90 TABLET | Refills: 0 | Status: SHIPPED | OUTPATIENT
Start: 2022-05-05 | End: 2022-05-17

## 2022-05-10 DIAGNOSIS — Z79.4 TYPE 2 DIABETES MELLITUS WITH DIABETIC POLYNEUROPATHY, WITH LONG-TERM CURRENT USE OF INSULIN (H): ICD-10-CM

## 2022-05-10 DIAGNOSIS — E11.42 TYPE 2 DIABETES MELLITUS WITH DIABETIC POLYNEUROPATHY, WITH LONG-TERM CURRENT USE OF INSULIN (H): ICD-10-CM

## 2022-05-10 RX ORDER — GABAPENTIN 100 MG/1
CAPSULE ORAL
Qty: 270 CAPSULE | Refills: 3 | Status: SHIPPED | OUTPATIENT
Start: 2022-05-10 | End: 2022-05-17

## 2022-05-10 NOTE — TELEPHONE ENCOUNTER
Requested Prescriptions   Pending Prescriptions Disp Refills     gabapentin (NEURONTIN) 100 MG capsule 270 capsule 3     Sig: TAKE ONE CAPSULE BY MOUTH THREE TIMES A DAY       There is no refill protocol information for this order        Last Written Prescription Date:  5/14/21  Last Fill Quantity: 270,  # refills: 3   Last office visit: 11/2/2021 with prescribing provider:     Future Office Visit:

## 2022-05-17 ENCOUNTER — OFFICE VISIT (OUTPATIENT)
Dept: FAMILY MEDICINE | Facility: CLINIC | Age: 77
End: 2022-05-17
Payer: COMMERCIAL

## 2022-05-17 VITALS
OXYGEN SATURATION: 100 % | WEIGHT: 137.2 LBS | BODY MASS INDEX: 20.32 KG/M2 | HEIGHT: 69 IN | TEMPERATURE: 96.4 F | DIASTOLIC BLOOD PRESSURE: 74 MMHG | RESPIRATION RATE: 16 BRPM | HEART RATE: 88 BPM | SYSTOLIC BLOOD PRESSURE: 124 MMHG

## 2022-05-17 DIAGNOSIS — Z12.11 SCREEN FOR COLON CANCER: ICD-10-CM

## 2022-05-17 DIAGNOSIS — Z00.00 MEDICARE ANNUAL WELLNESS VISIT, SUBSEQUENT: Primary | ICD-10-CM

## 2022-05-17 DIAGNOSIS — Z79.4 TYPE 2 DIABETES MELLITUS WITH DIABETIC POLYNEUROPATHY, WITH LONG-TERM CURRENT USE OF INSULIN (H): ICD-10-CM

## 2022-05-17 DIAGNOSIS — E78.5 HYPERLIPIDEMIA LDL GOAL <100: ICD-10-CM

## 2022-05-17 DIAGNOSIS — R80.9 MICROALBUMINURIA DUE TO TYPE 2 DIABETES MELLITUS (H): ICD-10-CM

## 2022-05-17 DIAGNOSIS — E11.42 TYPE 2 DIABETES MELLITUS WITH DIABETIC POLYNEUROPATHY, WITH LONG-TERM CURRENT USE OF INSULIN (H): ICD-10-CM

## 2022-05-17 DIAGNOSIS — E11.29 MICROALBUMINURIA DUE TO TYPE 2 DIABETES MELLITUS (H): ICD-10-CM

## 2022-05-17 LAB
ANION GAP SERPL CALCULATED.3IONS-SCNC: 8 MMOL/L (ref 3–14)
BUN SERPL-MCNC: 27 MG/DL (ref 7–30)
CALCIUM SERPL-MCNC: 9.4 MG/DL (ref 8.5–10.1)
CHLORIDE BLD-SCNC: 108 MMOL/L (ref 94–109)
CHOLEST SERPL-MCNC: 128 MG/DL
CO2 SERPL-SCNC: 23 MMOL/L (ref 20–32)
CREAT SERPL-MCNC: 1.04 MG/DL (ref 0.66–1.25)
FASTING STATUS PATIENT QL REPORTED: ABNORMAL
GFR SERPL CREATININE-BSD FRML MDRD: 75 ML/MIN/1.73M2
GLUCOSE BLD-MCNC: 109 MG/DL (ref 70–99)
HBA1C MFR BLD: 6.5 % (ref 0–5.6)
HDLC SERPL-MCNC: 53 MG/DL
LDLC SERPL CALC-MCNC: 40 MG/DL
NONHDLC SERPL-MCNC: 75 MG/DL
POTASSIUM BLD-SCNC: 5.3 MMOL/L (ref 3.4–5.3)
SODIUM SERPL-SCNC: 139 MMOL/L (ref 133–144)
TRIGL SERPL-MCNC: 177 MG/DL

## 2022-05-17 PROCEDURE — 99214 OFFICE O/P EST MOD 30 MIN: CPT | Mod: 25 | Performed by: NURSE PRACTITIONER

## 2022-05-17 PROCEDURE — 80061 LIPID PANEL: CPT | Performed by: NURSE PRACTITIONER

## 2022-05-17 PROCEDURE — 80048 BASIC METABOLIC PNL TOTAL CA: CPT | Performed by: NURSE PRACTITIONER

## 2022-05-17 PROCEDURE — 83036 HEMOGLOBIN GLYCOSYLATED A1C: CPT | Performed by: NURSE PRACTITIONER

## 2022-05-17 PROCEDURE — 36415 COLL VENOUS BLD VENIPUNCTURE: CPT | Performed by: NURSE PRACTITIONER

## 2022-05-17 PROCEDURE — G0439 PPPS, SUBSEQ VISIT: HCPCS | Performed by: NURSE PRACTITIONER

## 2022-05-17 RX ORDER — GABAPENTIN 300 MG/1
300 CAPSULE ORAL 3 TIMES DAILY
Qty: 270 CAPSULE | Refills: 3 | Status: SHIPPED | OUTPATIENT
Start: 2022-05-17 | End: 2023-05-02

## 2022-05-17 RX ORDER — GLIPIZIDE 5 MG/1
5 TABLET, FILM COATED, EXTENDED RELEASE ORAL DAILY
Qty: 90 TABLET | Refills: 1 | Status: SHIPPED | OUTPATIENT
Start: 2022-05-17 | End: 2023-04-05

## 2022-05-17 RX ORDER — INSULIN GLARGINE 100 [IU]/ML
INJECTION, SOLUTION SUBCUTANEOUS
Qty: 90 ML | Refills: 1
Start: 2022-05-17 | End: 2023-04-05

## 2022-05-17 RX ORDER — SIMVASTATIN 40 MG
TABLET ORAL
Qty: 90 TABLET | Refills: 3 | Status: SHIPPED | OUTPATIENT
Start: 2022-05-17 | End: 2023-05-02

## 2022-05-17 NOTE — RESULT ENCOUNTER NOTE
Please send patient letter notifying of labs and provider message.      Your cholesterol and diabetes are both at goal on current treatments. Change insulin as we discussed today. Kidney function and electrolytes are good. Please let us know if you have any questions.     Thanks,  JOSH Rincon CNP

## 2022-05-17 NOTE — LETTER
May 18, 2022      Roddy Sidhu  [id number]  [facility] [address] [place] [address]        Dear ,    We are writing to inform you of your test results.    Your cholesterol and diabetes are both at goal on current treatments. Change insulin as we discussed today. Kidney function and electrolytes are good. Please let us know if you have any questions.     Thanks,   JOSH Rincon CNP       Resulted Orders   HEMOGLOBIN A1C   Result Value Ref Range    Hemoglobin A1C 6.5 (H) 0.0 - 5.6 %      Comment:      Normal <5.7%   Prediabetes 5.7-6.4%    Diabetes 6.5% or higher     Note: Adopted from ADA consensus guidelines.   Lipid panel reflex to direct LDL Fasting   Result Value Ref Range    Cholesterol 128 <200 mg/dL    Triglycerides 177 (H) <150 mg/dL    Direct Measure HDL 53 >=40 mg/dL    LDL Cholesterol Calculated 40 <=100 mg/dL    Non HDL Cholesterol 75 <130 mg/dL    Patient Fasting > 8hrs? Unknown     Narrative    Cholesterol  Desirable:  <200 mg/dL    Triglycerides  Normal:  Less than 150 mg/dL  Borderline High:  150-199 mg/dL  High:  200-499 mg/dL  Very High:  Greater than or equal to 500 mg/dL    Direct Measure HDL  Female:  Greater than or equal to 50 mg/dL   Male:  Greater than or equal to 40 mg/dL    LDL Cholesterol  Desirable:  <100mg/dL  Above Desirable:  100-129 mg/dL   Borderline High:  130-159 mg/dL   High:  160-189 mg/dL   Very High:  >= 190 mg/dL    Non HDL Cholesterol  Desirable:  130 mg/dL  Above Desirable:  130-159 mg/dL  Borderline High:  160-189 mg/dL  High:  190-219 mg/dL  Very High:  Greater than or equal to 220 mg/dL   Basic metabolic panel  (Ca, Cl, CO2, Creat, Gluc, K, Na, BUN)   Result Value Ref Range    Sodium 139 133 - 144 mmol/L    Potassium 5.3 3.4 - 5.3 mmol/L    Chloride 108 94 - 109 mmol/L    Carbon Dioxide (CO2) 23 20 - 32 mmol/L    Anion Gap 8 3 - 14 mmol/L    Urea Nitrogen 27 7 - 30 mg/dL    Creatinine 1.04 0.66 - 1.25 mg/dL    Calcium 9.4 8.5 - 10.1 mg/dL    Glucose 109 (H) 70 - 99  mg/dL    GFR Estimate 75 >60 mL/min/1.73m2      Comment:      Effective December 21, 2021 eGFRcr in adults is calculated using the 2021 CKD-EPI creatinine equation which includes age and gender ( et al., NEJM, DOI: 10.1056/RGRJcs4004745)       If you have any questions or concerns, please call the clinic at the number listed above.       Sincerely,      JOSH Becker CNP

## 2022-05-17 NOTE — PROGRESS NOTES
Assessment & Plan     Medicare annual wellness visit, subsequent      Screen for colon cancer    - Fecal colorectal cancer screen (FIT); Future    Type 2 diabetes mellitus with diabetic polyneuropathy, with long-term current use of insulin (H)  Controlled- likely having low blood sugars- lower insulin down to 10 units nightly and monitor morning fasting sugars. After insulin supply is used up he prefers to discontinue and switch to oral due to cost. Start Glipizide after insulin is used up. Increase Gabapentin dose to better control neuropathy symptoms.   - HEMOGLOBIN A1C; Future  - HEMOGLOBIN A1C  - gabapentin (NEURONTIN) 300 MG capsule; Take 1 capsule (300 mg) by mouth 3 times daily  - Basic metabolic panel  (Ca, Cl, CO2, Creat, Gluc, K, Na, BUN); Future  - glipiZIDE (GLUCOTROL XL) 5 MG 24 hr tablet; Take 1 tablet (5 mg) by mouth daily Do not start until you use up the insulin and stop that  - insulin glargine (LANTUS SOLOSTAR) 100 UNIT/ML pen; INJECT 10 UNITS SUBCUTANEOUSLY EVERY MORNING  - OFFICE/OUTPT VISIT,EST,LEVL IV    Microalbuminuria due to type 2 diabetes mellitus (H)  Monitoring, is on ACE I  - HEMOGLOBIN A1C; Future  - Basic metabolic panel  (Ca, Cl, CO2, Creat, Gluc, K, Na, BUN); Future  - OFFICE/OUTPT VISIT,EST,LEVL IV    Hyperlipidemia LDL goal <100  controlled  - Lipid panel reflex to direct LDL Fasting; Future  - Lipid panel reflex to direct LDL Fasting  - simvastatin (ZOCOR) 40 MG tablet; TAKE ONE TABLET BY MOUTH EVERY NIGHT AT BEDTIME  - OFFICE/OUTPT VISIT,EST,LEVL IV    Prescription drug management         Tobacco Cessation:   reports that he has been smoking pipe. He has never used smokeless tobacco.  Tobacco Cessation Action Plan: Information offered: Patient not interested at this time    FUTURE APPOINTMENTS:       - Follow-up visit in 6 months, sooner PRN    SELF MONITORING:       - Please check blood glucose readings several times per week  See Patient Instructions    No follow-ups on  "file.    JOSH Becker CNP  [facility]    Jermaine Pereyra is a 76 year old who presents for the following health issues     History of Present Illness       Diabetes:   He presents for follow up of diabetes.  He is not checking blood glucose. He has no concerns regarding his diabetes at this time.  He is having numbness in feet. The patient has not had a diabetic eye exam in the last 12 months.         He eats 2-3 servings of fruits and vegetables daily.He consumes 0 sweetened beverage(s) daily.   He is taking medications regularly.       Hyperlipidemia Follow-Up      Are you regularly taking any medication or supplement to lower your cholesterol?   Yes- simvastatin    Are you having muscle aches or other side effects that you think could be caused by your cholesterol lowering medication?  No    Hypertension Follow-up      Do you check your blood pressure regularly outside of the clinic? No     Are you following a low salt diet? Yes    Are your blood pressures ever more than 140 on the top number (systolic) OR more   than 90 on the bottom number (diastolic), for example 140/90? Not checking     Annual Wellness Visit    Patient has been advised of split billing requirements and indicates understanding: Yes     Are you in the first 12 months of your Medicare Part B coverage?  No    Physical Health:    In general, how would you rate your overall physical health? good    Outside of work, how many days during the week do you exercise?none    Outside of work, approximately how many minutes a day do you exercise?not applicable    If you drink alcohol do you typically have >3 drinks per day or >7 drinks per week? No    Do you usually eat at least 4 servings of fruit and vegetables a day, include whole grains & fiber and avoid regularly eating high fat or \"junk\" foods? Yes    Do you have any problems taking medications regularly? No    Do you have any side effects from " medications? none    Needs assistance for the following daily activities: no assistance needed    Which of the following safety concerns are present in your home?  none identified     Hearing impairment: Yes, Difficulty following a conversation in a noisy restaurant or crowded room.    Feel that people are mumbling or not speaking clearly.    Need to ask people to speak up or repeat themselves.    Find that men's voices are easier to understand than women's.    Difficulty understanding soft or whispered speech.    In the past 6 months, have you been bothered by leaking of urine? no    Mental Health:    In general, how would you rate your overall mental or emotional health? good  PHQ-2 Score: (P) 0    Do you feel safe in your environment? Yes    Have you ever done Advance Care Planning? (For example, a Health Directive, POLST, or a discussion with a medical provider or your loved ones about your wishes)? No, advance care planning information given to patient to review.  Patient declined advance care planning discussion at this time.    Fall risk:  Fallen 2 or more times in the past year?: No  Any fall with injury in the past year?: No    Cognitive Screenin) Repeat 3 items (Leader, Season, Table)    2) Clock draw: NORMAL  3) 3 item recall: Recalls 2 objects   Results: NORMAL clock, 1-2 items recalled: COGNITIVE IMPAIRMENT LESS LIKELY    Mini-CogTM Copyright NORA Mota. Licensed by the author for use in [facility]; reprinted with permission (jose@.[facility]). All rights reserved.      Do you have sleep apnea, excessive snoring or daytime drowsiness?: no    Current providers sharing in care for this patient include:   Patient Care Team:  Sandra Low APRN CNP as PCP - General (Family Medicine)  Sandra Low APRN CNP as Assigned PCP    Patient has been advised of split billing requirements and indicates understanding: Yes      Review of Systems   Constitutional, HEENT, cardiovascular,  "pulmonary, gi and gu systems are negative, except as otherwise noted.      Objective    /74   Pulse 88   Temp (!) 96.4  F (35.8  C) (Tympanic)   Resp 16   Ht 1.759 m (5' 9.25\")   Wt 62.2 kg (137 lb 3.2 oz)   SpO2 100%   BMI 20.11 kg/m    Body mass index is 20.11 kg/m .  Physical Exam   GENERAL: alert and no distress  EYES: Eyes grossly normal to inspection and conjunctivae and sclerae normal  NECK: no adenopathy, no asymmetry, masses, or scars and thyroid normal to palpation  RESP: lungs clear to auscultation - no rales, rhonchi or wheezes  CV: regular rates and rhythm, normal S1 S2, no S3 or S4, no murmur, click or rub and no peripheral edema  ABDOMEN: soft, nontender, without hepatosplenomegaly or masses and no palpable or pulsatile masses  MS: no gross musculoskeletal defects noted, no edema  NEURO: Normal strength and tone, sensory deficit - [place], mentation intact and speech normal  PSYCH: mentation appears normal, affect normal/bright    Results for orders placed or performed in visit on 05/17/22 (from the past 24 hour(s))   HEMOGLOBIN A1C   Result Value Ref Range    Hemoglobin A1C 6.5 (H) 0.0 - 5.6 %               "

## 2022-06-04 DIAGNOSIS — Z79.4 TYPE 2 DIABETES MELLITUS WITH DIABETIC POLYNEUROPATHY, WITH LONG-TERM CURRENT USE OF INSULIN (H): ICD-10-CM

## 2022-06-04 DIAGNOSIS — E11.42 TYPE 2 DIABETES MELLITUS WITH DIABETIC POLYNEUROPATHY, WITH LONG-TERM CURRENT USE OF INSULIN (H): ICD-10-CM

## 2022-06-06 NOTE — TELEPHONE ENCOUNTER
"Requested Prescriptions   Pending Prescriptions Disp Refills     metFORMIN (GLUCOPHAGE) 500 MG tablet [Pharmacy Med Name: METFORMIN HCL 500MG TABS] 120 tablet 0     Sig: TAKE 2 TABLETS BY MOUTH BEFORE BREAKFAST AND TAKE 2 TABLETS BEFORE DINNER. (NEED TO BE SEEN IN CLINIC FOR FURTHER REFILLS)       Biguanide Agents Passed - 6/4/2022  5:02 AM        Passed - Patient is age 10 or older        Passed - Patient has documented A1c within the specified period of time.     If HgbA1C is 8 or greater, it needs to be on file within the past 3 months.  If less than 8, must be on file within the past 6 months.     Recent Labs   Lab Test 05/17/22 0752   A1C 6.5*             Passed - Patient's CR is NOT>1.4 OR Patient's EGFR is NOT<45 within past 12 mos.     Recent Labs   Lab Test 05/17/22 0752 11/02/21  0816 05/14/21  0657   GFRESTIMATED 75   < > 79   GFRESTBLACK  --   --  >90    < > = values in this interval not displayed.       Recent Labs   Lab Test 05/17/22 0752   CR 1.04             Passed - Patient does NOT have a diagnosis of CHF.        Passed - Medication is active on med list        Passed - Recent (6 mo) or future (30 days) visit within the authorizing provider's specialty     Patient had office visit in the last 6 months or has a visit in the next 30 days with authorizing provider or within the authorizing provider's specialty.  See \"Patient Info\" tab in inbasket, or \"Choose Columns\" in Meds & Orders section of the refill encounter.                 "

## 2022-08-23 ENCOUNTER — DOCUMENTATION ONLY (OUTPATIENT)
Dept: FAMILY MEDICINE | Facility: CLINIC | Age: 77
End: 2022-08-23

## 2022-09-17 DIAGNOSIS — E11.42 TYPE 2 DIABETES MELLITUS WITH DIABETIC POLYNEUROPATHY, WITH LONG-TERM CURRENT USE OF INSULIN (H): ICD-10-CM

## 2022-09-17 DIAGNOSIS — Z79.4 TYPE 2 DIABETES MELLITUS WITH DIABETIC POLYNEUROPATHY, WITH LONG-TERM CURRENT USE OF INSULIN (H): ICD-10-CM

## 2022-09-19 RX ORDER — FLURBIPROFEN SODIUM 0.3 MG/ML
SOLUTION/ DROPS OPHTHALMIC
Qty: 100 EACH | Refills: 1 | Status: SHIPPED | OUTPATIENT
Start: 2022-09-19 | End: 2023-05-02

## 2022-09-23 ENCOUNTER — TELEPHONE (OUTPATIENT)
Dept: FAMILY MEDICINE | Facility: CLINIC | Age: 77
End: 2022-09-23

## 2022-09-23 NOTE — TELEPHONE ENCOUNTER
Patient is on list of patient's needing AWV.  Patient is not overdue for AWV.  Patient was placed on list in error.  This encounter closed.    Chato RUIZ [facility]

## 2022-09-29 ENCOUNTER — TRANSFERRED RECORDS (OUTPATIENT)
Dept: FAMILY MEDICINE | Facility: CLINIC | Age: 77
End: 2022-09-29

## 2022-09-29 LAB — RETINOPATHY: NEGATIVE

## 2022-10-11 DIAGNOSIS — E11.29 MICROALBUMINURIA DUE TO TYPE 2 DIABETES MELLITUS (H): ICD-10-CM

## 2022-10-11 DIAGNOSIS — R80.9 MICROALBUMINURIA DUE TO TYPE 2 DIABETES MELLITUS (H): ICD-10-CM

## 2022-10-12 RX ORDER — LISINOPRIL 10 MG/1
TABLET ORAL
Qty: 45 TABLET | Refills: 1 | Status: SHIPPED | OUTPATIENT
Start: 2022-10-12 | End: 2023-04-03

## 2022-12-13 DIAGNOSIS — Z79.4 TYPE 2 DIABETES MELLITUS WITH DIABETIC POLYNEUROPATHY, WITH LONG-TERM CURRENT USE OF INSULIN (H): ICD-10-CM

## 2022-12-13 DIAGNOSIS — E11.42 TYPE 2 DIABETES MELLITUS WITH DIABETIC POLYNEUROPATHY, WITH LONG-TERM CURRENT USE OF INSULIN (H): ICD-10-CM

## 2022-12-14 NOTE — TELEPHONE ENCOUNTER
"Requested Prescriptions   Pending Prescriptions Disp Refills    metFORMIN (GLUCOPHAGE) 500 MG tablet [Pharmacy Med Name: METFORMIN HCL 500MG TABS] 360 tablet 1     Sig: TAKE 2 TABLETS BY MOUTH BEFORE BREAKFAST, THEN TAKE 2 TABLETS BEFORE DINNER       Biguanide Agents Failed - 12/13/2022  5:01 AM        Failed - Patient has documented A1c within the specified period of time.     If HgbA1C is 8 or greater, it needs to be on file within the past 3 months.  If less than 8, must be on file within the past 6 months.     Recent Labs   Lab Test 05/17/22 0752   A1C 6.5*             Failed - Recent (6 mo) or future (30 days) visit within the authorizing provider's specialty     Patient had office visit in the last 6 months or has a visit in the next 30 days with authorizing provider or within the authorizing provider's specialty.  See \"Patient Info\" tab in inbasket, or \"Choose Columns\" in Meds & Orders section of the refill encounter.            Passed - Patient is age 10 or older        Passed - Patient's CR is NOT>1.4 OR Patient's EGFR is NOT<45 within past 12 mos.     Recent Labs   Lab Test 05/17/22 0752 11/02/21  0816 05/14/21  0657   GFRESTIMATED 75   < > 79   GFRESTBLACK  --   --  >90    < > = values in this interval not displayed.       Recent Labs   Lab Test 05/17/22 0752   CR 1.04             Passed - Patient does NOT have a diagnosis of CHF.        Passed - Medication is active on med list             "

## 2022-12-14 NOTE — TELEPHONE ENCOUNTER
Pt called and will call back to schedule a appt for a lab.    Allison Dubois  [facility]

## 2023-04-02 DIAGNOSIS — E11.29 MICROALBUMINURIA DUE TO TYPE 2 DIABETES MELLITUS (H): ICD-10-CM

## 2023-04-02 DIAGNOSIS — R80.9 MICROALBUMINURIA DUE TO TYPE 2 DIABETES MELLITUS (H): ICD-10-CM

## 2023-04-03 RX ORDER — LISINOPRIL 10 MG/1
TABLET ORAL
Qty: 45 TABLET | Refills: 1 | Status: SHIPPED | OUTPATIENT
Start: 2023-04-03 | End: 2023-04-05

## 2023-04-04 PROBLEM — S72.91XA CLOSED FRACTURE OF RIGHT FEMUR (H): Status: ACTIVE | Noted: 2023-03-29

## 2023-04-04 NOTE — PROGRESS NOTES
[facility] GERIATRICS  Primary Care Provider & Clinic: JOSH Becker CNP, [address] [facility] [address]  Chief Complaint   Patient presents with     Hospital F/U     [facility] 3/29/2023 - 4/4/2023     [place] Medical Record Number: [id number]  Place of Service Where Encounter Took [facility] [[id number]]    Roddy Sidhu is a 77 year old (1945), admitted to the above facility from  [facility] . Hospital stay 3/29/23 through 4/4/23.  Patient's living condition: lives with spouse, children, and grandchildren.    HPI:    Brief Summary of Hospital Course: Admitted following a fall found to have right femur fracture and chronic right clavicle and chronic proximal humerus fracture.  Underwent surgical repair of the right femur on 3/29.  Past medical history significant for type 2 diabetes, hypertension, hyperlipidemia.  Hospital stay complicated by anemia requiring a blood transfusion  MEDICATION CHANGES: Start methocarbamol, Zofran, senna, aspirin therapy 5 mg daily for 28 days.  Changed to glipizide, metformin, MiraLAX.  Stop naproxen and oxycodone  RECOMMENDED FOLLOW UP: orthopedics in 2 weeks. Endocrine   Updates on Status Since Skilled nursing Admission: None    Today: Patient denies questions or concerns.  States pain is under good control.  He does agree to scheduling the Tylenol.  Informed him that he also has a left relaxer he can use if needed.  He reports that his bowels are moving okay.  He denies nausea.  Nurse reports fungus on feet.  Patient reports that this is an ongoing problem has been present for a few years.  Patient has hypertrophic toenails.  With yellow scaling on the bottom and top of the left foot.  He also has a history of neuropathy.  Hospital discharge summary and hospital orders reviewed.  Nursing home EHR reviewed    CODE STATUS/ADVANCE DIRECTIVES DISCUSSION: Full code  ALLERGIES: No Known Allergies   PAST MEDICAL HISTORY: No past  medical history on file.   PAST SURGICAL HISTORY:   has a past surgical history that includes surgical history of -  (1990).  FAMILY HISTORY: family history includes Breast Cancer (age of onset: 60) in his mother; C.A.D. (age of onset: 58) in his brother.  SOCIAL HISTORY:   reports that he has been smoking pipe. He has never used smokeless tobacco. He reports that he does not currently use alcohol. He reports that he does not use drugs.    Post Discharge Medication Reconciliation Status: discharge medications reconciled, continue medications without change  Current Outpatient Medications   Medication Sig     acetaminophen (TYLENOL) 500 MG tablet Take 1,000 mg by mouth every 8 hours as needed for pain     aspirin (ASA) 325 MG EC tablet Take 325 mg by mouth daily     gabapentin (NEURONTIN) 300 MG capsule Take 1 capsule (300 mg) by mouth 3 times daily     glipiZIDE (GLUCOTROL XL) 10 MG 24 hr tablet Take 10 mg by mouth daily     insulin glargine (LANTUS PEN) 100 UNIT/ML pen Inject 18 Units Subcutaneous daily     insulin lispro (HUMALOG VIAL) 100 UNIT/ML vial Inject 0-10 Units Subcutaneous 4 times daily (with meals and nightly) Inject as per sliding scale: if 0 - 149 = 0 units; 150 - 199 = 2 units; 200 - 249 = 4 units; 250 - 299 = 6 units; 300 - 350 = 8 units; 351 - 999 = 10 units     latanoprost (XALATAN) 0.005 % ophthalmic solution Place 1 drop into both eyes daily     lisinopril (ZESTRIL) 5 MG tablet Take 5 mg by mouth daily     metFORMIN (GLUCOPHAGE) 500 MG tablet Take 500 mg by mouth 2 times daily (with meals)     methocarbamol (ROBAXIN) 500 MG tablet Take 500 mg by mouth 4 times daily as needed for muscle spasms     ondansetron (ZOFRAN) 4 MG tablet Take 4 mg by mouth every 8 hours as needed for nausea or vomiting     polyethylene glycol (MIRALAX) 17 GM/Dose powder Take 17 g by mouth daily     senna-docusate (SENOKOT-S/PERICOLACE) 8.6-50 MG tablet Take 2 tablets by mouth 2 times daily as needed for constipation      "simvastatin (ZOCOR) 40 MG tablet TAKE ONE TABLET BY MOUTH EVERY NIGHT AT BEDTIME     vitamin D3 (CHOLECALCIFEROL) 50 mcg (2000 units) tablet Take 1 tablet by mouth daily     B-D U/F insulin pen needle USE ONCE A DAY OR AS DIRECTED     No current facility-administered medications for this visit.       ROS:  4 point ROS including Respiratory, CV, GI and , other than that noted in the HPI,  is negative    Vitals:  /61   Pulse 70   Temp 98.4  F (36.9  C)   Resp 15   Ht 1.753 m (5' 9\")   Wt 58.7 kg (129 lb 8 oz)   SpO2 98%   BMI 19.12 kg/m    Exam:  GENERAL APPEARANCE:  Alert, in no distress  RESP:  respiratory effort and palpation of chest normal, no respiratory distress, lungs sounds clear  CV:  Palpation and auscultation of heart done , regular rate and rhythm, no murmur, rub, or gallop, no ankle edema  ABDOMEN:  normal bowel sounds, soft, nontender,   M/S:  Gait and station - lying on his back in bed.   SKIN:  Inspection and palpation of skin and subcutaneous tissue at baseline.  Incision not observed.  Both feet with hypertrophic great toenails.  Left foot with scaling yellow patches on bottom and top of left and also in between toes.  PSYCH:  insight and judgement, memory seems intact, affect and mood normal    Lab/Diagnostic data: Pertinent hospital labs: 4/2 hemoglobin 8.83/31 sodium 134 potassium 5.8 BUN 29 creatinine 1.19 GFR greater than 60    ASSESSMENT/PLAN:  Aftercare following surgery of the musculoskeletal system  Admitted to the TCU for therapy and nursing care following a hospital stay for a right femur fracture.  -Continue PT/OT  -Follow-up with Ortho as directed  -Continue aspirin DVT prevention per Ortho  -Change Acetaminophen 1,000 mg by mouth three times daily and 1,000 mg by mouth once daily as needed.   - continue PRN methocarbamol    Microalbuminuria due to type 2 diabetes mellitus (H)  Known problem that I take into account for their medical decisions, no current exacerbations or " new concerns.    Type 2 diabetes mellitus with diabetic polyneuropathy, with long-term current use of insulin (H)  Admitted to the TCU on glargine and NovoLog and glipizide.  -Monitor blood sugars and adjust as needed.    Tinea pedis of both feet  Terbinafine twice daily.  Podiatry recommended after discharge.     Mixed hyperlipidemia  Continue statin      Orders:  Terbinafine 1% apply to both feet in between toes twice daily    55 MINUTES SPENT BY ME on the date of service doing chart review, history, exam, documentation & further activities per the note.     Electronically signed by: Rosa Phillips NP

## 2023-04-05 ENCOUNTER — TRANSITIONAL CARE UNIT VISIT (OUTPATIENT)
Dept: GERIATRICS | Facility: CLINIC | Age: 78
End: 2023-04-05
Payer: COMMERCIAL

## 2023-04-05 VITALS
WEIGHT: 129.5 LBS | HEART RATE: 70 BPM | DIASTOLIC BLOOD PRESSURE: 61 MMHG | OXYGEN SATURATION: 98 % | SYSTOLIC BLOOD PRESSURE: 132 MMHG | HEIGHT: 69 IN | TEMPERATURE: 98.4 F | RESPIRATION RATE: 15 BRPM | BODY MASS INDEX: 19.18 KG/M2

## 2023-04-05 DIAGNOSIS — Z79.4 TYPE 2 DIABETES MELLITUS WITH DIABETIC POLYNEUROPATHY, WITH LONG-TERM CURRENT USE OF INSULIN (H): ICD-10-CM

## 2023-04-05 DIAGNOSIS — Z47.89 AFTERCARE FOLLOWING SURGERY OF THE MUSCULOSKELETAL SYSTEM: Primary | ICD-10-CM

## 2023-04-05 DIAGNOSIS — E78.2 MIXED HYPERLIPIDEMIA: ICD-10-CM

## 2023-04-05 DIAGNOSIS — R80.9 MICROALBUMINURIA DUE TO TYPE 2 DIABETES MELLITUS (H): ICD-10-CM

## 2023-04-05 DIAGNOSIS — B35.3 TINEA PEDIS OF BOTH FEET: ICD-10-CM

## 2023-04-05 DIAGNOSIS — E11.29 MICROALBUMINURIA DUE TO TYPE 2 DIABETES MELLITUS (H): ICD-10-CM

## 2023-04-05 DIAGNOSIS — E11.42 TYPE 2 DIABETES MELLITUS WITH DIABETIC POLYNEUROPATHY, WITH LONG-TERM CURRENT USE OF INSULIN (H): ICD-10-CM

## 2023-04-05 PROCEDURE — 99310 SBSQ NF CARE HIGH MDM 45: CPT | Performed by: NURSE PRACTITIONER

## 2023-04-05 RX ORDER — TERBINAFINE HYDROCHLORIDE 250 MG/1
250 TABLET ORAL DAILY
Qty: 90 TABLET | Refills: 0 | Status: CANCELLED
Start: 2023-04-05 | End: 2023-07-04

## 2023-04-05 RX ORDER — ONDANSETRON 4 MG/1
4 TABLET, FILM COATED ORAL EVERY 8 HOURS PRN
COMMUNITY
Start: 2023-04-05 | End: 2023-04-18

## 2023-04-05 RX ORDER — GLIPIZIDE 10 MG/1
10 TABLET, FILM COATED, EXTENDED RELEASE ORAL DAILY
COMMUNITY
Start: 2023-04-05 | End: 2023-05-02 | Stop reason: ALTCHOICE

## 2023-04-05 RX ORDER — LATANOPROST 50 UG/ML
1 SOLUTION/ DROPS OPHTHALMIC AT BEDTIME
COMMUNITY
Start: 2023-04-05

## 2023-04-05 RX ORDER — INSULIN LISPRO 100 [IU]/ML
0-10 INJECTION, SOLUTION INTRAVENOUS; SUBCUTANEOUS
COMMUNITY
Start: 2023-04-05 | End: 2023-05-02 | Stop reason: ALTCHOICE

## 2023-04-05 RX ORDER — POLYETHYLENE GLYCOL 3350 17 G/17G
17 POWDER, FOR SOLUTION ORAL DAILY
COMMUNITY
Start: 2023-04-05 | End: 2023-05-02

## 2023-04-05 RX ORDER — PRENATAL VIT 91/IRON/FOLIC/DHA 28-975-200
COMBINATION PACKAGE (EA) ORAL 2 TIMES DAILY
Status: CANCELLED
Start: 2023-04-05

## 2023-04-05 RX ORDER — LISINOPRIL 5 MG/1
5 TABLET ORAL DAILY
COMMUNITY
Start: 2023-04-05 | End: 2023-04-11

## 2023-04-05 RX ORDER — ACETAMINOPHEN 500 MG
1000 TABLET ORAL EVERY 8 HOURS PRN
COMMUNITY
Start: 2023-04-05 | End: 2023-05-02

## 2023-04-05 RX ORDER — METHOCARBAMOL 500 MG/1
500 TABLET, FILM COATED ORAL 4 TIMES DAILY PRN
COMMUNITY
Start: 2023-04-05 | End: 2023-04-18

## 2023-04-05 RX ORDER — AMOXICILLIN 250 MG
2 CAPSULE ORAL 2 TIMES DAILY PRN
COMMUNITY
Start: 2023-04-05 | End: 2023-04-18

## 2023-04-05 NOTE — LETTER
4/5/2023        RE: Roddy Sidhu  [address] [facility] [address]        [facility] GERIATRICS  Primary Care Provider & Clinic: [facility], [address] [facility] [address]  Chief Complaint   Patient presents with     Hospital F/U     [facility] 3/29/2023 - 4/4/2023     [place] Medical Record Number: [id number]  Place of Service Where Encounter Took Place: [facility] ON [facility] (U) [[id number]]    Roddy Sidhu is a 77 year old (1945), admitted to the above facility from  [facility] . Hospital stay 3/29/23 through 4/4/23.  Patient's living condition: lives with spouse, children, and grandchildren.    HPI:    Brief Summary of Hospital Course: Admitted following a fall found to have right femur fracture and chronic right clavicle and chronic proximal humerus fracture.  Underwent surgical repair of the right femur on 3/29.  Past medical history significant for type 2 diabetes, hypertension, hyperlipidemia.  Hospital stay complicated by anemia requiring a blood transfusion  MEDICATION CHANGES: Start methocarbamol, Zofran, senna, aspirin therapy 5 mg daily for 28 days.  Changed to glipizide, metformin, MiraLAX.  Stop naproxen and oxycodone  RECOMMENDED FOLLOW UP: orthopedics in 2 weeks. Endocrine   Updates on Status Since Skilled nursing Admission: None    Today: Patient denies questions or concerns.  States pain is under good control.  He does agree to scheduling the Tylenol.  Informed him that he also has a left relaxer he can use if needed.  He reports that his bowels are moving okay.  He denies nausea.  Nurse reports fungus on feet.  Patient reports that this is an ongoing problem has been present for a few years.  Patient has hypertrophic toenails.  With yellow scaling on the bottom and top of the left foot.  He also has a history of neuropathy.  Hospital discharge summary and hospital orders reviewed.  Nursing home EHR reviewed    CODE STATUS/ADVANCE DIRECTIVES  DISCUSSION: Full code  ALLERGIES: No Known Allergies   PAST MEDICAL HISTORY: No past medical history on file.   PAST SURGICAL HISTORY:   has a past surgical history that includes surgical history of -  (1990).  FAMILY HISTORY: family history includes Breast Cancer (age of onset: 60) in his mother; C.A.D. (age of onset: 58) in his brother.  SOCIAL HISTORY:   reports that he has been smoking pipe. He has never used smokeless tobacco. He reports that he does not currently use alcohol. He reports that he does not use drugs.    Post Discharge Medication Reconciliation Status: discharge medications reconciled, continue medications without change  Current Outpatient Medications   Medication Sig     acetaminophen (TYLENOL) 500 MG tablet Take 1,000 mg by mouth every 8 hours as needed for pain     aspirin (ASA) 325 MG EC tablet Take 325 mg by mouth daily     gabapentin (NEURONTIN) 300 MG capsule Take 1 capsule (300 mg) by mouth 3 times daily     glipiZIDE (GLUCOTROL XL) 10 MG 24 hr tablet Take 10 mg by mouth daily     insulin glargine (LANTUS PEN) 100 UNIT/ML pen Inject 18 Units Subcutaneous daily     insulin lispro (HUMALOG VIAL) 100 UNIT/ML vial Inject 0-10 Units Subcutaneous 4 times daily (with meals and nightly) Inject as per sliding scale: if 0 - 149 = 0 units; 150 - 199 = 2 units; 200 - 249 = 4 units; 250 - 299 = 6 units; 300 - 350 = 8 units; 351 - 999 = 10 units     latanoprost (XALATAN) 0.005 % ophthalmic solution Place 1 drop into both eyes daily     lisinopril (ZESTRIL) 5 MG tablet Take 5 mg by mouth daily     metFORMIN (GLUCOPHAGE) 500 MG tablet Take 500 mg by mouth 2 times daily (with meals)     methocarbamol (ROBAXIN) 500 MG tablet Take 500 mg by mouth 4 times daily as needed for muscle spasms     ondansetron (ZOFRAN) 4 MG tablet Take 4 mg by mouth every 8 hours as needed for nausea or vomiting     polyethylene glycol (MIRALAX) 17 GM/Dose powder Take 17 g by mouth daily     senna-docusate (SENOKOT-S/PERICOLACE)  "8.6-50 MG tablet Take 2 tablets by mouth 2 times daily as needed for constipation     simvastatin (ZOCOR) 40 MG tablet TAKE ONE TABLET BY MOUTH EVERY NIGHT AT BEDTIME     vitamin D3 (CHOLECALCIFEROL) 50 mcg (2000 units) tablet Take 1 tablet by mouth daily     B-D U/F insulin pen needle USE ONCE A DAY OR AS DIRECTED     No current facility-administered medications for this visit.       ROS:  4 point ROS including Respiratory, CV, GI and , other than that noted in the HPI,  is negative    Vitals:  /61   Pulse 70   Temp 98.4  F (36.9  C)   Resp 15   Ht 1.753 m (5' 9\")   Wt 58.7 kg (129 lb 8 oz)   SpO2 98%   BMI 19.12 kg/m    Exam:  GENERAL APPEARANCE:  Alert, in no distress  RESP:  respiratory effort and palpation of chest normal, no respiratory distress, lungs sounds clear  CV:  Palpation and auscultation of heart done , regular rate and rhythm, no murmur, rub, or gallop, no ankle edema  ABDOMEN:  normal bowel sounds, soft, nontender,   M/S:  Gait and station - lying on his back in bed.   SKIN:  Inspection and palpation of skin and subcutaneous tissue at baseline.  Incision not observed.  Both feet with hypertrophic great toenails.  Left foot with scaling yellow patches on bottom and top of left and also in between toes.  PSYCH:  insight and judgement, memory seems intact, affect and mood normal    Lab/Diagnostic data: Pertinent hospital labs: 4/2 hemoglobin 8.83/31 sodium 134 potassium 5.8 BUN 29 creatinine 1.19 GFR greater than 60    ASSESSMENT/PLAN:  Aftercare following surgery of the musculoskeletal system  Admitted to the TCU for therapy and nursing care following a hospital stay for a right femur fracture.  -Continue PT/OT  -Follow-up with Ortho as directed  -Continue aspirin DVT prevention per Ortho  -Change Acetaminophen 1,000 mg by mouth three times daily and 1,000 mg by mouth once daily as needed.   - continue PRN methocarbamol    Microalbuminuria due to type 2 diabetes mellitus (H)  Known " problem that I take into account for their medical decisions, no current exacerbations or new concerns.    Type 2 diabetes mellitus with diabetic polyneuropathy, with long-term current use of insulin (H)  Admitted to the TCU on glargine and NovoLog and glipizide.  -Monitor blood sugars and adjust as needed.    Tinea pedis of both feet  Terbinafine twice daily.  Podiatry recommended after discharge.     Mixed hyperlipidemia  Continue statin      Orders:  Terbinafine 1% apply to both feet in between toes twice daily    55 MINUTES SPENT BY ME on the date of service doing chart review, history, exam, documentation & further activities per the note.     Electronically signed by: Rosa Phillips NP          Sincerely,        Rosa Phillips NP

## 2023-04-05 NOTE — PROGRESS NOTES
[facility] GERIATRICS  Primary Care Provider & Clinic: Sandra Low, APRN CNP, [address] [facility] [address]  Chief Complaint   Patient presents with     Hospital F/U     [facility] 3/29/2023 - 4/4/2023     [place] Medical Record Number: [id number]  Place of Service Where Encounter Took Place: [facility] (TCU) [[id number]]    Roddy Sidhu is a 77 year old (1945), admitted to the above facility from  [facility] . Hospital stay 3/29/23 through 4/4/23.    HPI:    Brief Summary of Hospital Course:   * Pt with PMH notable for:  -Type 2 diabetes with diabetic polyneuropathy with long-term current use of insulin  -Essential hypertension  Hyperlipidemia    * hospitalized with:  -A fall that resulted in closed displaced subtrochanteric fracture of right femur  Status post IMN [3/29  - CHRONIC Closed fracture of proximal end of right humerus, unspecified fracture morphology: managed conservatively  -CHRONIC Closed nondisplaced fracture of right clavicle unspecified part of clavicle  -Complicated with acute blood loss anemia status post blood transfusion  - * Evaluated by PMR and felt to benefit from TCU placement.       TODAY  - RT femur fx:  Report that causes him a little more pain, worse   - Rt humerus / clavicle: reports still hurting, with movement, better with rest, reports severe pain with putting weight on it.   - ABLA:  Reports appetite is good, and feeling hunger.  Deneis fainting or dizziness.  - rehab: reports started yesterday and went well.   ====================    CODE STATUS/ADVANCE DIRECTIVES DISCUSSION: No Order - CPR/Full code   Patient's living condition: lives with spouse, children, and grandchildren  ALLERGIES: No Known Allergies   PAST MEDICAL HISTORY: No past medical history on file.   PAST SURGICAL HISTORY:  has a past surgical history that includes surgical history of -  (1990).  FAMILY HISTORY: family history includes Breast Cancer (age of onset: 60) in his  mother; SALUDAHARRY. (age of onset: 58) in his brother.  SOCIAL HISTORY:  reports that he has been smoking pipe. He has never used smokeless tobacco. He reports that he does not currently use alcohol. He reports that he does not use drugs.    Post Discharge Medication Reconciliation Status:  MED REC REQUIRED  Post Medication Reconciliation Status: discharge medications reconciled and changed, per note/orders    Current Outpatient Medications   Medication Sig     acetaminophen (TYLENOL) 500 MG tablet Take 1,000 mg by mouth every 8 hours as needed for pain And 1,000 mg at Bedtime     aspirin (ASA) 325 MG EC tablet Take 325 mg by mouth daily     B-D U/F insulin pen needle USE ONCE A DAY OR AS DIRECTED     gabapentin (NEURONTIN) 300 MG capsule Take 1 capsule (300 mg) by mouth 3 times daily     glipiZIDE (GLUCOTROL XL) 10 MG 24 hr tablet Take 10 mg by mouth daily     insulin glargine (LANTUS PEN) 100 UNIT/ML pen Inject 18 Units Subcutaneous daily     insulin lispro (HUMALOG KWIKPEN) 100 UNIT/ML (1 unit dial) KWIKPEN Inject 4 Units Subcutaneous 3 times daily (before meals)     insulin lispro (HUMALOG VIAL) 100 UNIT/ML vial Inject 0-10 Units Subcutaneous 4 times daily (with meals and nightly) Inject as per sliding scale: if 0 - 149 = 0 units; 150 - 199 = 2 units; 200 - 249 = 4 units; 250 - 299 = 6 units; 300 - 350 = 8 units; 351 - 999 = 10 units     latanoprost (XALATAN) 0.005 % ophthalmic solution Place 1 drop into both eyes daily     metFORMIN (GLUCOPHAGE) 500 MG tablet Take 500 mg by mouth 2 times daily (with meals)     methocarbamol (ROBAXIN) 500 MG tablet Take 500 mg by mouth 4 times daily as needed for muscle spasms     ondansetron (ZOFRAN) 4 MG tablet Take 4 mg by mouth every 8 hours as needed for nausea or vomiting     polyethylene glycol (MIRALAX) 17 GM/Dose powder Take 17 g by mouth daily     senna-docusate (SENOKOT-S/PERICOLACE) 8.6-50 MG tablet Take 2 tablets by mouth 2 times daily as needed for constipation      "simvastatin (ZOCOR) 40 MG tablet TAKE ONE TABLET BY MOUTH EVERY NIGHT AT BEDTIME     vitamin D3 (CHOLECALCIFEROL) 50 mcg (2000 units) tablet Take 1 tablet by mouth daily     No current facility-administered medications for this visit.     ROS:  10 point ROS of systems including Constitutional, Eyes, Respiratory, Cardiovascular, Gastroenterology, Genitourinary, Integumentary, Musculoskeletal, Psychiatric were all negative except for pertinent positives noted in my HPI.    Vitals:  BP (!) 130/109   Pulse 67   Temp 96.9  F (36.1  C)   Resp 16   Ht 1.676 m (5' 6\")   Wt 59.1 kg (130 lb 4.8 oz)   SpO2 96%   BMI 21.03 kg/m    Exam:  GENERAL APPEARANCE:  in no distress,   RESP:  Unlabored breathing. CTA b/l.   CV:  S1S2 audible, regular HR, no murmur appreciated.   ABDOMEN:  soft, NT/ND, BS audible.   M/S:   no joint deformity noted on observation.   SKIN:  Surgical sites over lateral surface of right thigh is healing, no drainage or erythema.   NEURO:   No NFD appreciated on observation.   PSYCH:  affect and mood normal      Lab/Diagnostic Data: Reviewed in the chart and EHR.        ASSESSMENT/PLAN:  ------------------------------    * Pt with PMH notable for:  -Type 2 diabetes with diabetic polyneuropathy with long-term current use of insulin  -Essential hypertension  Hyperlipidemia    * hospitalized with:  -A fall that resulted in closed displaced subtrochanteric fracture of right femur  Status post IMN [3/29  - CHRONIC Closed fracture of proximal end of right humerus, unspecified fracture morphology: managed conservatively  -CHRONIC Closed nondisplaced fracture of right clavicle unspecified part of clavicle  -Complicated with acute blood loss anemia status post blood transfusion  - * Evaluated by PMR and felt to benefit from TCU placement.       # MUSCULOSKELETAL:   - Analgesia optimal with the current regimen. Continue present plan and medications.  - Followed by Orthopedic Team. Follow on the recommendations / " instructions.   - DVT prophylaxis according to Orthopedic team recommendations  - Started rehab program, making a progress, continue until desired goal is achieved.        # ENDO:   A1C 6.5 05/17/2022    A1C 6.9 11/02/2021    - over controlled.   - - In this frail elderly adult with a limited life expectancy, the goal of  the management is to address the hyperglycemia sx if any,  rather than the  BGs numbers per se.   - over glycemic control results in more hypoglycemia, increased cerebrovascular accident including hemorrhagic stroke and A-fib, which results in increased mortality rate      Electronically signed by  : Herbert Urbano MD

## 2023-04-06 ENCOUNTER — TRANSITIONAL CARE UNIT VISIT (OUTPATIENT)
Dept: GERIATRICS | Facility: CLINIC | Age: 78
End: 2023-04-06
Payer: COMMERCIAL

## 2023-04-06 VITALS
BODY MASS INDEX: 20.94 KG/M2 | SYSTOLIC BLOOD PRESSURE: 130 MMHG | RESPIRATION RATE: 16 BRPM | DIASTOLIC BLOOD PRESSURE: 109 MMHG | TEMPERATURE: 96.9 F | HEART RATE: 67 BPM | WEIGHT: 130.3 LBS | OXYGEN SATURATION: 96 % | HEIGHT: 66 IN

## 2023-04-06 DIAGNOSIS — E78.5 HYPERLIPIDEMIA LDL GOAL <100: ICD-10-CM

## 2023-04-06 DIAGNOSIS — I10 BENIGN ESSENTIAL HYPERTENSION: ICD-10-CM

## 2023-04-06 DIAGNOSIS — Z47.89 AFTERCARE FOLLOWING SURGERY OF THE MUSCULOSKELETAL SYSTEM: Primary | ICD-10-CM

## 2023-04-06 DIAGNOSIS — E11.42 TYPE 2 DIABETES MELLITUS WITH DIABETIC POLYNEUROPATHY, WITH LONG-TERM CURRENT USE OF INSULIN (H): ICD-10-CM

## 2023-04-06 DIAGNOSIS — Z79.4 TYPE 2 DIABETES MELLITUS WITH DIABETIC POLYNEUROPATHY, WITH LONG-TERM CURRENT USE OF INSULIN (H): ICD-10-CM

## 2023-04-06 DIAGNOSIS — S72.21XD CLOSED DISPLACED SUBTROCHANTERIC FRACTURE OF RIGHT FEMUR WITH ROUTINE HEALING, SUBSEQUENT ENCOUNTER: ICD-10-CM

## 2023-04-06 PROCEDURE — 99305 1ST NF CARE MODERATE MDM 35: CPT | Performed by: FAMILY MEDICINE

## 2023-04-06 NOTE — LETTER
4/6/2023        RE: Roddy Sidhu  [address] [facility] [address]        [facility] GERIATRICS  Primary Care Provider & Clinic: JOSH Becker CNP, [address] [facility] [address]  Chief Complaint   Patient presents with     Hospital F/U     [facility] 3/29/2023 - 4/4/2023     [place] Medical Record Number: [id number]  Place of Service Where Encounter Took Place: [facility] (TCU) [[id number]]    Roddy Sidhu is a 77 year old (1945), admitted to the above facility from  [facility] . Hospital stay 3/29/23 through 4/4/23.    HPI:    Brief Summary of Hospital Course:   * Pt with PMH notable for:  -Type 2 diabetes with diabetic polyneuropathy with long-term current use of insulin  -Essential hypertension  Hyperlipidemia    * hospitalized with:  -A fall that resulted in closed displaced subtrochanteric fracture of right femur  Status post IMN [3/29  - CHRONIC Closed fracture of proximal end of right humerus, unspecified fracture morphology: managed conservatively  -CHRONIC Closed nondisplaced fracture of right clavicle unspecified part of clavicle  -Complicated with acute blood loss anemia status post blood transfusion  - * Evaluated by PMR and felt to benefit from TCU placement.       TODAY  - RT femur fx:  Report that causes him a little more pain, worse   - Rt humerus / clavicle: reports still hurting, with movement, better with rest, reports severe pain with putting weight on it.   - ABLA:  Reports appetite is good, and feeling hunger.  Deneis fainting or dizziness.  - rehab: reports started yesterday and went well.   ====================    CODE STATUS/ADVANCE DIRECTIVES DISCUSSION: No Order - CPR/Full code   Patient's living condition: lives with spouse, children, and grandchildren  ALLERGIES: No Known Allergies   PAST MEDICAL HISTORY: No past medical history on file.   PAST SURGICAL HISTORY:  has a past surgical history that includes surgical history of -   (1990).  FAMILY HISTORY: family history includes Breast Cancer (age of onset: 60) in his mother; C.A.D. (age of onset: 58) in his brother.  SOCIAL HISTORY:  reports that he has been smoking pipe. He has never used smokeless tobacco. He reports that he does not currently use alcohol. He reports that he does not use drugs.    Post Discharge Medication Reconciliation Status:  MED REC REQUIRED  Post Medication Reconciliation Status: discharge medications reconciled and changed, per note/orders    Current Outpatient Medications   Medication Sig     acetaminophen (TYLENOL) 500 MG tablet Take 1,000 mg by mouth every 8 hours as needed for pain And 1,000 mg at Bedtime     aspirin (ASA) 325 MG EC tablet Take 325 mg by mouth daily     B-D U/F insulin pen needle USE ONCE A DAY OR AS DIRECTED     gabapentin (NEURONTIN) 300 MG capsule Take 1 capsule (300 mg) by mouth 3 times daily     glipiZIDE (GLUCOTROL XL) 10 MG 24 hr tablet Take 10 mg by mouth daily     insulin glargine (LANTUS PEN) 100 UNIT/ML pen Inject 18 Units Subcutaneous daily     insulin lispro (HUMALOG KWIKPEN) 100 UNIT/ML (1 unit dial) KWIKPEN Inject 4 Units Subcutaneous 3 times daily (before meals)     insulin lispro (HUMALOG VIAL) 100 UNIT/ML vial Inject 0-10 Units Subcutaneous 4 times daily (with meals and nightly) Inject as per sliding scale: if 0 - 149 = 0 units; 150 - 199 = 2 units; 200 - 249 = 4 units; 250 - 299 = 6 units; 300 - 350 = 8 units; 351 - 999 = 10 units     latanoprost (XALATAN) 0.005 % ophthalmic solution Place 1 drop into both eyes daily     metFORMIN (GLUCOPHAGE) 500 MG tablet Take 500 mg by mouth 2 times daily (with meals)     methocarbamol (ROBAXIN) 500 MG tablet Take 500 mg by mouth 4 times daily as needed for muscle spasms     ondansetron (ZOFRAN) 4 MG tablet Take 4 mg by mouth every 8 hours as needed for nausea or vomiting     polyethylene glycol (MIRALAX) 17 GM/Dose powder Take 17 g by mouth daily     senna-docusate (SENOKOT-S/PERICOLACE)  "8.6-50 MG tablet Take 2 tablets by mouth 2 times daily as needed for constipation     simvastatin (ZOCOR) 40 MG tablet TAKE ONE TABLET BY MOUTH EVERY NIGHT AT BEDTIME     vitamin D3 (CHOLECALCIFEROL) 50 mcg (2000 units) tablet Take 1 tablet by mouth daily     No current facility-administered medications for this visit.     ROS:  10 point ROS of systems including Constitutional, Eyes, Respiratory, Cardiovascular, Gastroenterology, Genitourinary, Integumentary, Musculoskeletal, Psychiatric were all negative except for pertinent positives noted in my HPI.    Vitals:  BP (!) 130/109   Pulse 67   Temp 96.9  F (36.1  C)   Resp 16   Ht 1.676 m (5' 6\")   Wt 59.1 kg (130 lb 4.8 oz)   SpO2 96%   BMI 21.03 kg/m    Exam:  GENERAL APPEARANCE:  in no distress,   RESP:  Unlabored breathing. CTA b/l.   CV:  S1S2 audible, regular HR, no murmur appreciated.   ABDOMEN:  soft, NT/ND, BS audible.   M/S:   no joint deformity noted on observation.   SKIN:  Surgical sites over lateral surface of right thigh is healing, no drainage or erythema.   NEURO:   No NFD appreciated on observation.   PSYCH:  affect and mood normal      Lab/Diagnostic Data: Reviewed in the chart and EHR.        ASSESSMENT/PLAN:  ------------------------------    * Pt with PMH notable for:  -Type 2 diabetes with diabetic polyneuropathy with long-term current use of insulin  -Essential hypertension  Hyperlipidemia    * hospitalized with:  -A fall that resulted in closed displaced subtrochanteric fracture of right femur  Status post IMN [3/29  - CHRONIC Closed fracture of proximal end of right humerus, unspecified fracture morphology: managed conservatively  -CHRONIC Closed nondisplaced fracture of right clavicle unspecified part of clavicle  -Complicated with acute blood loss anemia status post blood transfusion  - * Evaluated by PMR and felt to benefit from TCU placement.       # MUSCULOSKELETAL:   - Analgesia optimal with the current regimen. Continue present " plan and medications.  - Followed by Orthopedic Team. Follow on the recommendations / instructions.   - DVT prophylaxis according to Orthopedic team recommendations  - Started rehab program, making a progress, continue until desired goal is achieved.        # ENDO:   A1C 6.5 05/17/2022    A1C 6.9 11/02/2021    - over controlled.   - - In this frail elderly adult with a limited life expectancy, the goal of  the management is to address the hyperglycemia sx if any,  rather than the  BGs numbers per se.   - over glycemic control results in more hypoglycemia, increased cerebrovascular accident including hemorrhagic stroke and A-fib, which results in increased mortality rate      Electronically signed by  : Herbert Urbano MD        Sincerely,        Herbert Urbano MD

## 2023-04-10 ENCOUNTER — TRANSITIONAL CARE UNIT VISIT (OUTPATIENT)
Dept: GERIATRICS | Facility: CLINIC | Age: 78
End: 2023-04-10
Payer: COMMERCIAL

## 2023-04-10 VITALS
OXYGEN SATURATION: 98 % | RESPIRATION RATE: 14 BRPM | SYSTOLIC BLOOD PRESSURE: 82 MMHG | TEMPERATURE: 97.5 F | DIASTOLIC BLOOD PRESSURE: 53 MMHG | HEIGHT: 66 IN | WEIGHT: 128.4 LBS | HEART RATE: 52 BPM | BODY MASS INDEX: 20.63 KG/M2

## 2023-04-10 DIAGNOSIS — E11.42 TYPE 2 DIABETES MELLITUS WITH DIABETIC POLYNEUROPATHY, WITH LONG-TERM CURRENT USE OF INSULIN (H): ICD-10-CM

## 2023-04-10 DIAGNOSIS — Z79.4 TYPE 2 DIABETES MELLITUS WITH DIABETIC POLYNEUROPATHY, WITH LONG-TERM CURRENT USE OF INSULIN (H): ICD-10-CM

## 2023-04-10 DIAGNOSIS — D62 ANEMIA DUE TO BLOOD LOSS, ACUTE: Primary | ICD-10-CM

## 2023-04-10 DIAGNOSIS — Z47.89 AFTERCARE FOLLOWING SURGERY OF THE MUSCULOSKELETAL SYSTEM: ICD-10-CM

## 2023-04-10 DIAGNOSIS — I10 BENIGN ESSENTIAL HYPERTENSION: ICD-10-CM

## 2023-04-10 PROCEDURE — 99309 SBSQ NF CARE MODERATE MDM 30: CPT | Performed by: NURSE PRACTITIONER

## 2023-04-10 NOTE — PROGRESS NOTES
[facility] GERIATRICS  TCU FOLLOW UP VISIT    Chief Complaint   Patient presents with     RECHECK      Place of Service where encounter took place:  [facility] (TCU) [[id number]]    Roddy Sidhu  is a 77 year old  (1945), who is being seen today at the above facility to discuss progress in therapy, review nursing home EHR, recheck chronic medical problems as well as address any new concerns.     HPI:    Copied forward from previous note:  admitted to the above facility from  [facility] . Hospital stay 3/29/23 through 4/4/23.  Patient's living condition: lives with spouse, children, and grandchildren.  Brief Summary of Hospital Course: Admitted following a fall found to have right femur fracture and chronic right clavicle and chronic proximal humerus fracture.  Underwent surgical repair of the right femur on 3/29.  Past medical history significant for type 2 diabetes, hypertension, hyperlipidemia.  Hospital stay complicated by anemia requiring a blood transfusion  MEDICATION CHANGES: Start methocarbamol, Zofran, senna, aspirin therapy 5 mg daily for 28 days.  Changed to glipizide, metformin, MiraLAX.  Stop naproxen and oxycodone  RECOMMENDED FOLLOW UP: orthopedics in 2 weeks. Endocrine   Updates on Status Since Skilled nursing Admission: 4/5 Terbinafine 1% apply to both feet in between toes twice daily. Recommend podiatry  4/6 MD visit    Today: patient denies concerns. Reports his pain is minimal. He is not symptomatic with low b/ps. Denies fatigue, lightheadedness. Reports he is feeling a little constipated but has medications for that and doesn't need any more. THerapy notes patient is pivot transfers, ADL's and ambulates with assist of 1. His b/p last evening was quite low. Today's b/p not available yet. Patient did get his lisinopril this morning. Facility EHR reviewed and shows blood pressures 82/53-1 30/109, heart rates 52-70.  For hypertension on lisinopril 5 mg daily.  For diabetes  "he is on glargine 18 units daily and sliding scale insulin.  His blood sugars have been a.m. , 185- 290 6 -350, at bedtime 163-262.  His hemoglobin was low after surgery and he required a unit of blood.  We will recheck his hemoglobin to ensure stability.    ALLERGIES: Patient has no known allergies.    ROS:  4 point ROS including Respiratory, CV, GI and , other than that noted in the HPI,  is negative    Vitals:  BP (!) 82/53   Pulse 52   Temp 97.5  F (36.4  C)   Resp 14   Ht 1.676 m (5' 6\")   Wt 58.2 kg (128 lb 6.4 oz)   SpO2 98%   BMI 20.72 kg/m    Exam:  GENERAL APPEARANCE:  Alert, in no distress  RESP:  respiratory effort normal  CV: edema none ankles  M/S:  Gait and station lying flat on his back in bed.   SKIN:  Inspection and palpation of skin and subcutaneous tissue at baseline  PSYCH:  insight and judgement, memory seems intact, affect and mood normal    ASSESSMENT/PLAN:  Anemia due to blood loss, acute  - check hgb on Wednesday    Benign essential hypertension  Blood pressure too low.  Discontinue lisinopril and follow-up.  Continue daily  blood pressure checks    Aftercare following surgery of the musculoskeletal system  Admitted to the TCU for therapy and nursing care following a hospital stay for a right femur fracture.  -Continue PT/OT  -Follow-up with Ortho as directed  -Continue aspirin DVT prevention per Ortho  -Change Acetaminophen 1,000 mg by mouth three times daily and 1,000 mg by mouth once daily as needed.   - continue PRN methocarbamol     Type 2 diabetes mellitus with diabetic polyneuropathy, with long-term current use of insulin (H)  Admitted to the TCU on glargine and NovoLog and glipizide.  -Monitor blood sugars and adjust as needed.     Orders:  - Discontinue lisinopril  -Check hemoglobin on Wednesday    Electronically signed by: Rosa Phillips NP        "

## 2023-04-10 NOTE — LETTER
4/10/2023        RE: Roddy Sidhu  [address] [facility] [address]        [facility] GERIATRICS  TCU FOLLOW UP VISIT    Chief Complaint   Patient presents with     RECHECK      Place of Service where encounter took place:  [facility] (TCU) [[id number]]    Roddy Sidhu  is a 77 year old  (1945), who is being seen today at the above facility to discuss progress in therapy, review nursing home EHR, recheck chronic medical problems as well as address any new concerns.     HPI:    Copied forward from previous note:  admitted to the above facility from  [facility] . Hospital stay 3/29/23 through 4/4/23.  Patient's living condition: lives with spouse, children, and grandchildren.  Brief Summary of Hospital Course: Admitted following a fall found to have right femur fracture and chronic right clavicle and chronic proximal humerus fracture.  Underwent surgical repair of the right femur on 3/29.  Past medical history significant for type 2 diabetes, hypertension, hyperlipidemia.  Hospital stay complicated by anemia requiring a blood transfusion  MEDICATION CHANGES: Start methocarbamol, Zofran, senna, aspirin therapy 5 mg daily for 28 days.  Changed to glipizide, metformin, MiraLAX.  Stop naproxen and oxycodone  RECOMMENDED FOLLOW UP: orthopedics in 2 weeks. Endocrine   Updates on Status Since Skilled nursing Admission: 4/5 Terbinafine 1% apply to both feet in between toes twice daily. Recommend podiatry  4/6 MD visit    Today: patient denies concerns. Reports his pain is minimal. He is not symptomatic with low b/ps. Denies fatigue, lightheadedness. Reports he is feeling a little constipated but has medications for that and doesn't need any more. THerapy notes patient is pivot transfers, ADL's and ambulates with assist of 1. His b/p last evening was quite low. Today's b/p not available yet. Patient did get his lisinopril this morning. Facility EHR reviewed and shows blood pressures 82/53-1  "30/109, heart rates 52-70.  For hypertension on lisinopril 5 mg daily.  For diabetes he is on glargine 18 units daily and sliding scale insulin.  His blood sugars have been a.m. , 185- 290 6 -350, at bedtime 163-262.  His hemoglobin was low after surgery and he required a unit of blood.  We will recheck his hemoglobin to ensure stability.    ALLERGIES: Patient has no known allergies.    ROS:  4 point ROS including Respiratory, CV, GI and , other than that noted in the HPI,  is negative    Vitals:  BP (!) 82/53   Pulse 52   Temp 97.5  F (36.4  C)   Resp 14   Ht 1.676 m (5' 6\")   Wt 58.2 kg (128 lb 6.4 oz)   SpO2 98%   BMI 20.72 kg/m    Exam:  GENERAL APPEARANCE:  Alert, in no distress  RESP:  respiratory effort normal  CV: edema none ankles  M/S:  Gait and station lying flat on his back in bed.   SKIN:  Inspection and palpation of skin and subcutaneous tissue at baseline  PSYCH:  insight and judgement, memory seems intact, affect and mood normal    ASSESSMENT/PLAN:  Anemia due to blood loss, acute  - check hgb on Wednesday    Benign essential hypertension  Blood pressure too low.  Discontinue lisinopril and follow-up.  Continue daily  blood pressure checks    Aftercare following surgery of the musculoskeletal system  Admitted to the TCU for therapy and nursing care following a hospital stay for a right femur fracture.  -Continue PT/OT  -Follow-up with Ortho as directed  -Continue aspirin DVT prevention per Ortho  -Change Acetaminophen 1,000 mg by mouth three times daily and 1,000 mg by mouth once daily as needed.   - continue PRN methocarbamol     Type 2 diabetes mellitus with diabetic polyneuropathy, with long-term current use of insulin (H)  Admitted to the TCU on glargine and NovoLog and glipizide.  -Monitor blood sugars and adjust as needed.     Orders:  - Discontinue lisinopril  -Check hemoglobin on Wednesday    Electronically signed by: Rosa Phillips NP              Sincerely,        Rosa " Stone, NP

## 2023-04-11 ENCOUNTER — LAB REQUISITION (OUTPATIENT)
Dept: LAB | Facility: CLINIC | Age: 78
End: 2023-04-11
Payer: COMMERCIAL

## 2023-04-11 DIAGNOSIS — D64.9 ANEMIA, UNSPECIFIED: ICD-10-CM

## 2023-04-12 ENCOUNTER — TRANSITIONAL CARE UNIT VISIT (OUTPATIENT)
Dept: GERIATRICS | Facility: CLINIC | Age: 78
End: 2023-04-12
Payer: COMMERCIAL

## 2023-04-12 VITALS
HEART RATE: 52 BPM | WEIGHT: 128.4 LBS | HEIGHT: 66 IN | DIASTOLIC BLOOD PRESSURE: 60 MMHG | SYSTOLIC BLOOD PRESSURE: 119 MMHG | OXYGEN SATURATION: 98 % | RESPIRATION RATE: 14 BRPM | TEMPERATURE: 97.3 F | BODY MASS INDEX: 20.63 KG/M2

## 2023-04-12 DIAGNOSIS — Z79.4 TYPE 2 DIABETES MELLITUS WITH DIABETIC POLYNEUROPATHY, WITH LONG-TERM CURRENT USE OF INSULIN (H): Primary | ICD-10-CM

## 2023-04-12 DIAGNOSIS — E11.42 TYPE 2 DIABETES MELLITUS WITH DIABETIC POLYNEUROPATHY, WITH LONG-TERM CURRENT USE OF INSULIN (H): Primary | ICD-10-CM

## 2023-04-12 DIAGNOSIS — Z47.89 AFTERCARE FOLLOWING SURGERY OF THE MUSCULOSKELETAL SYSTEM: ICD-10-CM

## 2023-04-12 DIAGNOSIS — I10 BENIGN ESSENTIAL HYPERTENSION: ICD-10-CM

## 2023-04-12 DIAGNOSIS — D62 ANEMIA DUE TO BLOOD LOSS, ACUTE: ICD-10-CM

## 2023-04-12 LAB — HGB BLD-MCNC: 8.9 G/DL (ref 13.3–17.7)

## 2023-04-12 PROCEDURE — 99309 SBSQ NF CARE MODERATE MDM 30: CPT | Performed by: NURSE PRACTITIONER

## 2023-04-12 PROCEDURE — 36415 COLL VENOUS BLD VENIPUNCTURE: CPT | Performed by: NURSE PRACTITIONER

## 2023-04-12 PROCEDURE — 85018 HEMOGLOBIN: CPT | Performed by: NURSE PRACTITIONER

## 2023-04-12 RX ORDER — INSULIN LISPRO 100 [IU]/ML
4 INJECTION, SOLUTION INTRAVENOUS; SUBCUTANEOUS
COMMUNITY
End: 2023-05-02

## 2023-04-12 NOTE — LETTER
4/12/2023        RE: Roddy Sidhu  [address] [address]        [facility] GERIATRICS  TCU FOLLOW UP VISIT    Chief Complaint   Patient presents with     RECHECK      Place of Service where encounter took place:  [facility] (TCU) [[id number]]    Roddy Sidhu  is a 77 year old  (1945), who is being seen today at the above facility to discuss progress in therapy, review nursing home EHR, recheck chronic medical problems as well as address any new concerns.     HPI:    Copied forward from previous note:  admitted to the above facility from  [facility] . Hospital stay 3/29/23 through 4/4/23.  Patient's living condition: lives with spouse, children, and grandchildren.  Brief Summary of Hospital Course: Admitted following a fall found to have right femur fracture and chronic right clavicle and chronic proximal humerus fracture.  Underwent surgical repair of the right femur on 3/29.  Past medical history significant for type 2 diabetes, hypertension, hyperlipidemia.  Hospital stay complicated by anemia requiring a blood transfusion  MEDICATION CHANGES: Start methocarbamol, Zofran, senna, aspirin therapy 5 mg daily for 28 days.  Changed to glipizide, metformin, MiraLAX.  Stop naproxen and oxycodone  RECOMMENDED FOLLOW UP: orthopedics in 2 weeks. Endocrine   Updates on Status Since Skilled nursing Admission: 4/5 Terbinafine 1% apply to both feet in between toes twice daily. Recommend podiatry  4/6 MD visit  4/10 - Discontinue lisinopril  -Check hemoglobin on Wednesday  THerapy notes patient is pivot transfers, ADL's and ambulates with assist of 1    Today: patient reports he didn't sleep well last night because his hip and leg was really sore. He had both PRN tylenol and methocarbamol and encouraged him to use those. His last dose of tylenol was on 4/9. He reports that he doesn't have much pain now or when working with therapy. I will schedule the tylenol at . He denies  "constipation. Facility ehr reviewed and shows BM x 2 on 4/11. B/p on 4/10 was 119/60. pulst on 4/9 was 52. His blood sugars are AM 98 - 142, Noon 169 - 296,  - 350,  - 331. Blood sugars are trending on the higher side. He has glargine and sliding scale lispro. Will add a scheduled dose of lispro TID.      ALLERGIES: Patient has no known allergies.    ROS:  4 point ROS including Respiratory, CV, GI and , other than that noted in the HPI,  is negative    Vitals:  /60   Pulse 52   Temp 97.3  F (36.3  C)   Resp 14   Ht 1.676 m (5' 6\")   Wt 58.2 kg (128 lb 6.4 oz)   SpO2 98%   BMI 20.72 kg/m    Exam:  GENERAL APPEARANCE:  Alert, in no distress  RESP:  respiratory effort normal  CV: edema none ankles  M/S:  Gait and station lying flat on his back in bed.   SKIN:  Inspection and palpation of skin and subcutaneous tissue at baseline  PSYCH:  insight and judgement, memory seems intact, affect and mood normal    ASSESSMENT/PLAN:  Type 2 diabetes mellitus with diabetic polyneuropathy, with long-term current use of insulin (H)  Uncontrolled. Admitted to the TCU on glargine and NovoLog and glipizide.  - add lispro 4 units TID with meals.    Anemia due to blood loss, acute  Stable. Recheck in 1 week.     Benign essential hypertension  Blood pressure too low.  Discontinue lisinopril and follow-up.  Continue daily  blood pressure checks    Aftercare following surgery of the musculoskeletal system  Admitted to the TCU for therapy and nursing care following a hospital stay for a right femur fracture.  -Continue PT/OT  -Follow-up with Ortho as directed  -Continue aspirin DVT prevention per Ortho  -change acetaminophen to 1,000 mg at HS.  - continue PRN methocarbamol     Orders:  - check hgb in 1 week.  - add lispro 4 units subcutaneous tid  - add acetaminophen 1,000 mg PO at HS.     Electronically signed by: Rosa Phillips NP              Sincerely,        Rosa Phillips NP      "

## 2023-04-13 ENCOUNTER — TRANSITIONAL CARE UNIT VISIT (OUTPATIENT)
Dept: GERIATRICS | Facility: CLINIC | Age: 78
End: 2023-04-13
Payer: COMMERCIAL

## 2023-04-13 VITALS
TEMPERATURE: 97.3 F | BODY MASS INDEX: 20.63 KG/M2 | HEART RATE: 52 BPM | OXYGEN SATURATION: 98 % | DIASTOLIC BLOOD PRESSURE: 60 MMHG | HEIGHT: 66 IN | SYSTOLIC BLOOD PRESSURE: 119 MMHG | WEIGHT: 128.4 LBS | RESPIRATION RATE: 14 BRPM

## 2023-04-13 DIAGNOSIS — B35.1 ONYCHOMYCOSIS: ICD-10-CM

## 2023-04-13 DIAGNOSIS — B35.3 TINEA PEDIS OF BOTH FEET: Primary | ICD-10-CM

## 2023-04-13 DIAGNOSIS — L03.039 INFECTION OF TOENAIL: ICD-10-CM

## 2023-04-13 PROCEDURE — 99309 SBSQ NF CARE MODERATE MDM 30: CPT | Performed by: NURSE PRACTITIONER

## 2023-04-13 NOTE — LETTER
"    4/13/2023        RE: Roddy Sidhu  [address] [address]        [facility] GERIATRIC SERVICES  Acute visit    Chief Complaint   Patient presents with     RECHECK     HPI:  Roddy Sidhu is a 77 year old  (1945), who is being seen today for an episodic care visit at: [facility] ([facility]) [[id number]]. Today's concern is:      Tinea pedis of both feet  Onychomycosis  Infection of toenail    Nurse reports toe nail is nearly falling off. Patient denies pain. States it has been like this for over a month. Discussed podiatry referral and family wont take him.     ROS:  4 point ROS including Respiratory, CV, GI and , other than that noted in the HPI,  is negative    Vitals:  /60   Pulse 52   Temp 97.3  F (36.3  C)   Resp 14   Ht 1.676 m (5' 6\")   Wt 58.2 kg (128 lb 6.4 oz)   SpO2 98%   BMI 20.72 kg/m    Exam:  General: alert. In no distress.   Toe: left great toe nail nearly off. Only held on by the outer edge of the nail. I attempted to gently remove the nail but was unable. Will do daily soaks. He should also have clean socks daily as both his socks are dirty. The nail bed smells bad and has pus.     ASSESSMENT/PLAN:    ICD-10-CM    1. Tinea pedis of both feet  B35.3       2. Onychomycosis  B35.1       3. Infection of toenail  L03.039          Orders:  - warm water and soap foot soaks x 15 minutes BID  - apply bacitracin over the left great toe   - clean soaks every day  - ok to wrap left foot with kerlix for comfort PRN.     Electronically signed by: Rosa Phillips NP           Sincerely,        Rosa Phillips NP      "

## 2023-04-13 NOTE — PROGRESS NOTES
[facility] GERIATRICS  TCU FOLLOW UP VISIT    Chief Complaint   Patient presents with     RECHECK      Place of Service where encounter took place:  [facility] (TCU) [[id number]]    Roddy Sidhu  is a 77 year old  (1945), who is being seen today at the above facility to discuss progress in therapy, review nursing home EHR, recheck chronic medical problems as well as address any new concerns.     HPI:    Copied forward from previous note:  admitted to the above facility from  [facility] . Hospital stay 3/29/23 through 4/4/23.  Patient's living condition: lives with spouse, children, and grandchildren.  Brief Summary of Hospital Course: Admitted following a fall found to have right femur fracture and chronic right clavicle and chronic proximal humerus fracture.  Underwent surgical repair of the right femur on 3/29.  Past medical history significant for type 2 diabetes, hypertension, hyperlipidemia.  Hospital stay complicated by anemia requiring a blood transfusion  MEDICATION CHANGES: Start methocarbamol, Zofran, senna, aspirin therapy 5 mg daily for 28 days.  Changed to glipizide, metformin, MiraLAX.  Stop naproxen and oxycodone  RECOMMENDED FOLLOW UP: orthopedics in 2 weeks. Endocrine   Updates on Status Since Skilled nursing Admission: 4/5 Terbinafine 1% apply to both feet in between toes twice daily. Recommend podiatry  4/6 MD visit  4/10 - Discontinue lisinopril  -Check hemoglobin on Wednesday  THerapy notes patient is pivot transfers, ADL's and ambulates with assist of 1    Today: patient reports he didn't sleep well last night because his hip and leg was really sore. He had both PRN tylenol and methocarbamol and encouraged him to use those. His last dose of tylenol was on 4/9. He reports that he doesn't have much pain now or when working with therapy. I will schedule the tylenol at . He denies constipation. Facility ehr reviewed and shows BM x 2 on 4/11. B/p on 4/10 was 119/60. rosales  "on 4/9 was 52. His blood sugars are AM 98 - 142, Noon 169 - 296,  - 350,  - 331. Blood sugars are trending on the higher side. He has glargine and sliding scale lispro. Will add a scheduled dose of lispro TID.      ALLERGIES: Patient has no known allergies.    ROS:  4 point ROS including Respiratory, CV, GI and , other than that noted in the HPI,  is negative    Vitals:  /60   Pulse 52   Temp 97.3  F (36.3  C)   Resp 14   Ht 1.676 m (5' 6\")   Wt 58.2 kg (128 lb 6.4 oz)   SpO2 98%   BMI 20.72 kg/m    Exam:  GENERAL APPEARANCE:  Alert, in no distress  RESP:  respiratory effort normal  CV: edema none ankles  M/S:  Gait and station lying flat on his back in bed.   SKIN:  Inspection and palpation of skin and subcutaneous tissue at baseline  PSYCH:  insight and judgement, memory seems intact, affect and mood normal    ASSESSMENT/PLAN:  Type 2 diabetes mellitus with diabetic polyneuropathy, with long-term current use of insulin (H)  Uncontrolled. Admitted to the TCU on glargine and NovoLog and glipizide.  - add lispro 4 units TID with meals.    Anemia due to blood loss, acute  Stable. Recheck in 1 week.     Benign essential hypertension  Blood pressure too low.  Discontinue lisinopril and follow-up.  Continue daily  blood pressure checks    Aftercare following surgery of the musculoskeletal system  Admitted to the TCU for therapy and nursing care following a hospital stay for a right femur fracture.  -Continue PT/OT  -Follow-up with Ortho as directed  -Continue aspirin DVT prevention per Ortho  -change acetaminophen to 1,000 mg at HS.  - continue PRN methocarbamol     Orders:  - check hgb in 1 week.  - add lispro 4 units subcutaneous tid  - add acetaminophen 1,000 mg PO at HS.     Electronically signed by: Rosa Phillips NP        "

## 2023-04-14 NOTE — PROGRESS NOTES
"[facility] GERIATRIC SERVICES  Acute visit    Chief Complaint   Patient presents with     RECHECK     HPI:  Roddy Sidhu is a 77 year old  (1945), who is being seen today for an episodic care visit at: [facility] ([facility]) [[id number]]. Today's concern is:      Tinea pedis of both feet  Onychomycosis  Infection of toenail    Nurse reports toe nail is nearly falling off. Patient denies pain. States it has been like this for over a month. Discussed podiatry referral and family wont take him.     ROS:  4 point ROS including Respiratory, CV, GI and , other than that noted in the HPI,  is negative    Vitals:  /60   Pulse 52   Temp 97.3  F (36.3  C)   Resp 14   Ht 1.676 m (5' 6\")   Wt 58.2 kg (128 lb 6.4 oz)   SpO2 98%   BMI 20.72 kg/m    Exam:  General: alert. In no distress.   Toe: left great toe nail nearly off. Only held on by the outer edge of the nail. I attempted to gently remove the nail but was unable. Will do daily soaks. He should also have clean socks daily as both his socks are dirty. The nail bed smells bad and has pus.     ASSESSMENT/PLAN:    ICD-10-CM    1. Tinea pedis of both feet  B35.3       2. Onychomycosis  B35.1       3. Infection of toenail  L03.039          Orders:  - warm water and soap foot soaks x 15 minutes BID  - apply bacitracin over the left great toe   - clean soaks every day  - ok to wrap left foot with kerlix for comfort PRN.     Electronically signed by: Rosa Phillips NP     "

## 2023-04-17 DIAGNOSIS — Z79.4 TYPE 2 DIABETES MELLITUS WITH DIABETIC POLYNEUROPATHY, WITH LONG-TERM CURRENT USE OF INSULIN (H): ICD-10-CM

## 2023-04-17 DIAGNOSIS — E11.42 TYPE 2 DIABETES MELLITUS WITH DIABETIC POLYNEUROPATHY, WITH LONG-TERM CURRENT USE OF INSULIN (H): ICD-10-CM

## 2023-04-17 RX ORDER — GLIPIZIDE 5 MG/1
TABLET, FILM COATED, EXTENDED RELEASE ORAL
Qty: 90 TABLET | Refills: 1 | OUTPATIENT
Start: 2023-04-17

## 2023-04-18 ENCOUNTER — LAB REQUISITION (OUTPATIENT)
Dept: LAB | Facility: CLINIC | Age: 78
End: 2023-04-18
Payer: COMMERCIAL

## 2023-04-18 ENCOUNTER — DISCHARGE SUMMARY NURSING HOME (OUTPATIENT)
Dept: GERIATRICS | Facility: CLINIC | Age: 78
End: 2023-04-18
Payer: COMMERCIAL

## 2023-04-18 VITALS
HEIGHT: 66 IN | SYSTOLIC BLOOD PRESSURE: 115 MMHG | WEIGHT: 128.4 LBS | TEMPERATURE: 97.2 F | DIASTOLIC BLOOD PRESSURE: 55 MMHG | HEART RATE: 87 BPM | RESPIRATION RATE: 16 BRPM | OXYGEN SATURATION: 98 % | BODY MASS INDEX: 20.63 KG/M2

## 2023-04-18 DIAGNOSIS — B35.1 ONYCHOMYCOSIS: ICD-10-CM

## 2023-04-18 DIAGNOSIS — D64.9 ANEMIA, UNSPECIFIED: ICD-10-CM

## 2023-04-18 DIAGNOSIS — I10 BENIGN ESSENTIAL HYPERTENSION: ICD-10-CM

## 2023-04-18 DIAGNOSIS — E11.42 TYPE 2 DIABETES MELLITUS WITH DIABETIC POLYNEUROPATHY, WITH LONG-TERM CURRENT USE OF INSULIN (H): ICD-10-CM

## 2023-04-18 DIAGNOSIS — Z47.89 AFTERCARE FOLLOWING SURGERY OF THE MUSCULOSKELETAL SYSTEM: ICD-10-CM

## 2023-04-18 DIAGNOSIS — Z79.4 TYPE 2 DIABETES MELLITUS WITH DIABETIC POLYNEUROPATHY, WITH LONG-TERM CURRENT USE OF INSULIN (H): ICD-10-CM

## 2023-04-18 DIAGNOSIS — D62 ANEMIA DUE TO BLOOD LOSS, ACUTE: ICD-10-CM

## 2023-04-18 DIAGNOSIS — B35.3 TINEA PEDIS OF BOTH FEET: Primary | ICD-10-CM

## 2023-04-18 DIAGNOSIS — L03.039 INFECTION OF TOENAIL: ICD-10-CM

## 2023-04-18 PROCEDURE — 99316 NF DSCHRG MGMT 30 MIN+: CPT | Performed by: NURSE PRACTITIONER

## 2023-04-18 NOTE — LETTER
4/18/2023        RE: Roddy Sidhu  [address] [place] [address]        [facility] DISCHARGE SUMMARY    PATIENT'S NAME: Roddy Sidhu  YOB: 1945  MEDICAL RECORD NUMBER:  [id number]  Place of Service where encounter took place:  [facility] ([facility]) [[id number]]    PRIMARY CARE PROVIDER AND CLINIC RESPONSIBLE AFTER TRANSFER: Sandra Low [address] [facility] [address]    [facility] Provider     Transferring providers: Rosa Phillips NP / Herbert Urbano MD  Recent Hospitalization/ED:  [facility] . Hospital stay 3/29/23 through 4/4/23.  Date of SNF Admission: 4/4  Date of SNF (anticipated) Discharge: 4/19  Discharged to: previous independent home  Cognitive Scores: 19/30  Physical Function: pivot transfers, ADL's and ambulates with assist of 1  DME: no new.     CODE STATUS/ADVANCE DIRECTIVES DISCUSSION: Full  ALLERGIES: Patient has no known allergies.    DISCHARGE DIAGNOSIS/NURSING FACILITY COURSE:   admitted to the above facility from  [facility] . Hospital stay 3/29/23 through 4/4/23.  Patient's living condition: lives with spouse, children, and grandchildren.  Brief Summary of Hospital Course: Admitted following a fall found to have right femur fracture and chronic right clavicle and chronic proximal humerus fracture.  Underwent surgical repair of the right femur on 3/29.  Past medical history significant for type 2 diabetes, hypertension, hyperlipidemia.  Hospital stay complicated by anemia requiring a blood transfusion  MEDICATION CHANGES: Start methocarbamol, Zofran, senna, aspirin therapy 5 mg daily for 28 days.  Changed to glipizide, metformin, MiraLAX.  Stop naproxen and oxycodone  RECOMMENDED FOLLOW UP: orthopedics in 2 weeks. Endocrine   Updates on Status Since Skilled nursing Admission: 4/5 Terbinafine 1% apply to both feet in between toes twice daily. Recommend podiatry  4/6 MD visit  4/10 - Discontinue lisinopril  -Check hemoglobin on  Wednesday  THerapy notes patient is pivot transfers, ADL's and ambulates with assist of 1  4/12 - check hgb in 1 week.  - add lispro 4 units subcutaneous tid  - add acetaminophen 1,000 mg PO at HS.    Tinea pedis of both feet  Onychomycosis  Infection of toenail  Applying bacitracin to toe. He was seen by wound care NP yesterday for new heel ulcers but orders not in nursing home EHR yet.   - wound cares per them.     Type 2 diabetes mellitus with diabetic polyneuropathy, with long-term current use of insulin (H)  Controlled. Continue without changes. Patient denies questions about insulin, it is not new to him.     Anemia due to blood loss, acute  Improving. hgb check for tomorrow. 4/12 hgb was 8.9     Benign essential hypertension  Well controlled. Was low in the 80s and lisinopril was stopped. Could add back at low dose if needed.     Aftercare following surgery of the musculoskeletal system  Admitted to the tcu for therapy and nursing care. He feels ready to discharge home. Nursing has some concerns of his ability to do wound care. He feels this wont be a problem and he will have home care nurse at discharge.       PAST MEDICAL HISTORY:  has no past medical history on file.    DISCHARGE MEDICATIONS:  Current Outpatient Medications   Medication Sig Dispense Refill     acetaminophen (TYLENOL) 500 MG tablet Take 1,000 mg by mouth every 8 hours as needed for pain And 1,000 mg at Bedtime       aspirin (ASA) 325 MG EC tablet Take 325 mg by mouth daily       gabapentin (NEURONTIN) 300 MG capsule Take 1 capsule (300 mg) by mouth 3 times daily 270 capsule 3     glipiZIDE (GLUCOTROL XL) 10 MG 24 hr tablet Take 10 mg by mouth daily       insulin glargine (LANTUS PEN) 100 UNIT/ML pen Inject 18 Units Subcutaneous daily       insulin lispro (HUMALOG KWIKPEN) 100 UNIT/ML (1 unit dial) KWIKPEN Inject 4 Units Subcutaneous 3 times daily (before meals)       insulin lispro (HUMALOG VIAL) 100 UNIT/ML vial Inject 0-10 Units  "Subcutaneous 3 times daily (before meals) Inject as per sliding scale: if 0 - 149 = 0 units; 150 - 199 = 2 units; 200 - 249 = 4 units; 250 - 299 = 6 units; 300 - 350 = 8 units; 351 - 999 = 10 units       latanoprost (XALATAN) 0.005 % ophthalmic solution Place 1 drop into both eyes daily       metFORMIN (GLUCOPHAGE) 500 MG tablet Take 500 mg by mouth 2 times daily (with meals)       polyethylene glycol (MIRALAX) 17 GM/Dose powder Take 17 g by mouth daily       simvastatin (ZOCOR) 40 MG tablet TAKE ONE TABLET BY MOUTH EVERY NIGHT AT BEDTIME 90 tablet 3     vitamin D3 (CHOLECALCIFEROL) 50 mcg (2000 units) tablet Take 1 tablet by mouth daily 30 capsule      B-D U/F insulin pen needle USE ONCE A DAY OR AS DIRECTED 100 each 1       MEDICATION CHANGES/RATIONALE:   Stopped lisinopril due to low b/p's may need to add back.  Changed lispro to 4 units TID and sliding scale insulin TID  Discontinued methocarbamol, zofran, senna due to no use.      ROS:    4 point ROS including Respiratory, CV, GI and , other than that noted in the HPI,  is negative    Physical Exam:   Vitals: /55   Pulse 87   Temp 97.2  F (36.2  C)   Resp 16   Ht 1.676 m (5' 6\")   Wt 58.2 kg (128 lb 6.4 oz)   SpO2 98%   BMI 20.72 kg/m    BMI= Body mass index is 20.72 kg/m .  Alert in no distress. Resting in bed with blue boots.     DISCHARGE PLAN:  Occupational Therapy, Physical Therapy, Registered Nurse and Home Health Aide  Patient instructed to follow-up with:  PCP in 7 days      Current [place] scheduled appointments:  No future appointments.    Pending labs: none  SNF labs: resulted in [facility] epic.   Discharge Instructions:  - Ok to discharge to home with current medications and treatments  - Home Physical Therapy / Ocupational Therapy / RN / HHA  - Follow up with Primary care provider in 1 week    TOTAL DISCHARGE TIME:   Greater than 30 minutes  Electronically signed by: Rosa Phillips NP    Documentation of Face to Face and Certification for " Home Health Services  I certify that services are/were furnished while this patient was under the care of a physician and that a physician or an allowed non-physician practitioner (NPP), had a face-to-face encounter that meets the physician face-to-face encounter requirements. The encounter was in whole, or in part, related to the primary reason for home health. The patient is confined to his/her home and needs intermittent skilled nursing, physical therapy, speech-language pathology, or the continued need for occupational therapy. A plan of care has been established by a physician and is periodically reviewed by a physician.  Date of Face-to-Face Encounter:  4/18/2023.    I certify that, based on my findings, the following services are medically necessary home health services: Nursing, Occupational Therapy and Physical Therapy.    My clinical findings support the need for the above skilled services because: Requires assistance of another person or specialized equipment to access medical services because patient: Range of motion limitations prevents ability to exit home safely...    Patient to re-establish plan of care with their PCP within 7-10 days after leaving the facility to reestablish care.  Medicare certified PECOS provider: Rosa Phillips NP   Date: April 18, 2023          Sincerely,        Rosa Phillips NP

## 2023-04-18 NOTE — PROGRESS NOTES
[facility] GERIATRIC SERVICES DISCHARGE SUMMARY    PATIENT'S NAME: Roddy Sidhu  YOB: 1945  MEDICAL RECORD NUMBER:  [id number]  Place of Service where encounter took place:  [facility] ([facility]) [[id number]]    PRIMARY CARE PROVIDER AND CLINIC RESPONSIBLE AFTER TRANSFER: Sandra Low [id number] [facility] [id number]    [facility] [place]     Transferring providers: Rosa Phillips NP / Herbert Urbano MD  Recent Hospitalization/ED:  [facility] . Hospital stay 3/29/23 through 4/4/23.  Date of SNF Admission: 4/4  Date of SNF (anticipated) Discharge: 4/19  Discharged to: previous independent home  Cognitive Scores: 19/30  Physical Function: pivot transfers, ADL's and ambulates with assist of 1  DME: no new.     CODE STATUS/ADVANCE DIRECTIVES DISCUSSION: Full  ALLERGIES: Patient has no known allergies.    DISCHARGE DIAGNOSIS/NURSING FACILITY COURSE:   admitted to the above facility from  [facility] . Hospital stay 3/29/23 through 4/4/23.  Patient's living condition: lives with spouse, children, and grandchildren.  Brief Summary of Hospital Course: Admitted following a fall found to have right femur fracture and chronic right clavicle and chronic proximal humerus fracture.  Underwent surgical repair of the right femur on 3/29.  Past medical history significant for type 2 diabetes, hypertension, hyperlipidemia.  Hospital stay complicated by anemia requiring a blood transfusion  MEDICATION CHANGES: Start methocarbamol, Zofran, senna, aspirin therapy 5 mg daily for 28 days.  Changed to glipizide, metformin, MiraLAX.  Stop naproxen and oxycodone  RECOMMENDED FOLLOW UP: orthopedics in 2 weeks. Endocrine   Updates on Status Since Skilled nursing Admission: 4/5 Terbinafine 1% apply to both feet in between toes twice daily. Recommend podiatry  4/6 MD visit  4/10 - Discontinue lisinopril  -Check hemoglobin on Wednesday  THerapy notes patient is pivot transfers, ADL's and ambulates with assist of  1  4/12 - check hgb in 1 week.  - add lispro 4 units subcutaneous tid  - add acetaminophen 1,000 mg PO at HS.    Tinea pedis of both feet  Onychomycosis  Infection of toenail  Applying bacitracin to toe. He was seen by wound care NP yesterday for new heel ulcers but orders not in nursing home EHR yet.   - wound cares per them.     Type 2 diabetes mellitus with diabetic polyneuropathy, with long-term current use of insulin (H)  Controlled. Continue without changes. Patient denies questions about insulin, it is not new to him.     Anemia due to blood loss, acute  Improving. hgb check for tomorrow. 4/12 hgb was 8.9     Benign essential hypertension  Well controlled. Was low in the 80s and lisinopril was stopped. Could add back at low dose if needed.     Aftercare following surgery of the musculoskeletal system  Admitted to the [facility]cu for therapy and nursing care. He feels ready to discharge home. Nursing has some concerns of his ability to do wound care. He feels this wont be a problem and he will have home care nurse at discharge.       PAST MEDICAL HISTORY:  has no past medical history on file.    DISCHARGE MEDICATIONS:  Current Outpatient Medications   Medication Sig Dispense Refill     acetaminophen (TYLENOL) 500 MG tablet Take 1,000 mg by mouth every 8 hours as needed for pain And 1,000 mg at Bedtime       aspirin (ASA) 325 MG EC tablet Take 325 mg by mouth daily       gabapentin (NEURONTIN) 300 MG capsule Take 1 capsule (300 mg) by mouth 3 times daily 270 capsule 3     glipiZIDE (GLUCOTROL XL) 10 MG 24 hr tablet Take 10 mg by mouth daily       insulin glargine (LANTUS PEN) 100 UNIT/ML pen Inject 18 Units Subcutaneous daily       insulin lispro (HUMALOG KWIKPEN) 100 UNIT/ML (1 unit dial) KWIKPEN Inject 4 Units Subcutaneous 3 times daily (before meals)       insulin lispro (HUMALOG VIAL) 100 UNIT/ML vial Inject 0-10 Units Subcutaneous 3 times daily (before meals) Inject as per sliding scale: if 0 - 149 = 0 units; 150  "- 199 = 2 units; 200 - 249 = 4 units; 250 - 299 = 6 units; 300 - 350 = 8 units; 351 - 999 = 10 units       latanoprost (XALATAN) 0.005 % ophthalmic solution Place 1 drop into both eyes daily       metFORMIN (GLUCOPHAGE) 500 MG tablet Take 500 mg by mouth 2 times daily (with meals)       polyethylene glycol (MIRALAX) 17 GM/Dose powder Take 17 g by mouth daily       simvastatin (ZOCOR) 40 MG tablet TAKE ONE TABLET BY MOUTH EVERY NIGHT AT BEDTIME 90 tablet 3     vitamin D3 (CHOLECALCIFEROL) 50 mcg (2000 units) tablet Take 1 tablet by mouth daily 30 capsule      B-D U/F insulin pen needle USE ONCE A DAY OR AS DIRECTED 100 each 1       MEDICATION CHANGES/RATIONALE:   Stopped lisinopril due to low b/p's may need to add back.  Changed lispro to 4 units TID and sliding scale insulin TID  Discontinued methocarbamol, zofran, senna due to no use.      ROS:    4 point ROS including Respiratory, CV, GI and , other than that noted in the HPI,  is negative    Physical Exam:   Vitals: /55   Pulse 87   Temp 97.2  F (36.2  C)   Resp 16   Ht 1.676 m (5' 6\")   Wt 58.2 kg (128 lb 6.4 oz)   SpO2 98%   BMI 20.72 kg/m    BMI= Body mass index is 20.72 kg/m .  Alert in no distress. Resting in bed with blue boots.     DISCHARGE PLAN:  Occupational Therapy, Physical Therapy, Registered Nurse and Home Health Aide  Patient instructed to follow-up with:  PCP in 7 days      [facility] scheduled appointments:  No future appointments.    Pending labs: none  SNF labs: resulted in [facility] epic.   Discharge Instructions:  - Ok to discharge to home with current medications and treatments  - Home Physical Therapy / Ocupational Therapy / RN / HHA  - Follow up with Primary care provider in 1 week    TOTAL DISCHARGE TIME:   Greater than 30 minutes  Electronically signed by: Rosa Phillips NP    Documentation of Face to Face and Certification for Home Health Services  I certify that services are/were furnished while this patient was under the " care of a physician and that a physician or an allowed non-physician practitioner (NPP), had a face-to-face encounter that meets the physician face-to-face encounter requirements. The encounter was in whole, or in part, related to the primary reason for home health. The patient is confined to his/her home and needs intermittent skilled nursing, physical therapy, speech-language pathology, or the continued need for occupational therapy. A plan of care has been established by a physician and is periodically reviewed by a physician.  Date of Face-to-Face Encounter:  4/18/2023.    I certify that, based on my findings, the following services are medically necessary home health services: Nursing, Occupational Therapy and Physical Therapy.    My clinical findings support the need for the above skilled services because: Requires assistance of another person or specialized equipment to access medical services because patient: Range of motion limitations prevents ability to exit home safely...    Patient to re-establish plan of care with their PCP within 7-10 days after leaving the facility to reestablish care.  Medicare certified PECOS provider: Rosa Phillips NP   Date: April 18, 2023

## 2023-04-19 LAB — HGB BLD-MCNC: 8.4 G/DL (ref 13.3–17.7)

## 2023-04-19 PROCEDURE — 36415 COLL VENOUS BLD VENIPUNCTURE: CPT | Performed by: NURSE PRACTITIONER

## 2023-04-19 PROCEDURE — 85018 HEMOGLOBIN: CPT | Performed by: NURSE PRACTITIONER

## 2023-04-20 ENCOUNTER — DOCUMENTATION ONLY (OUTPATIENT)
Dept: FAMILY MEDICINE | Facility: CLINIC | Age: 78
End: 2023-04-20
Payer: COMMERCIAL

## 2023-04-20 ENCOUNTER — PATIENT OUTREACH (OUTPATIENT)
Dept: CARE COORDINATION | Facility: CLINIC | Age: 78
End: 2023-04-20
Payer: COMMERCIAL

## 2023-05-02 ENCOUNTER — ANCILLARY PROCEDURE (OUTPATIENT)
Dept: GENERAL RADIOLOGY | Facility: CLINIC | Age: 78
End: 2023-05-02
Attending: NURSE PRACTITIONER
Payer: COMMERCIAL

## 2023-05-02 ENCOUNTER — LAB (OUTPATIENT)
Dept: LAB | Facility: CLINIC | Age: 78
End: 2023-05-02
Payer: COMMERCIAL

## 2023-05-02 ENCOUNTER — OFFICE VISIT (OUTPATIENT)
Dept: FAMILY MEDICINE | Facility: CLINIC | Age: 78
End: 2023-05-02
Payer: COMMERCIAL

## 2023-05-02 VITALS
OXYGEN SATURATION: 100 % | WEIGHT: 130.2 LBS | BODY MASS INDEX: 20.93 KG/M2 | HEIGHT: 66 IN | SYSTOLIC BLOOD PRESSURE: 128 MMHG | DIASTOLIC BLOOD PRESSURE: 86 MMHG | TEMPERATURE: 97 F | RESPIRATION RATE: 18 BRPM | HEART RATE: 112 BPM

## 2023-05-02 DIAGNOSIS — M86.279 SUBACUTE OSTEOMYELITIS OF FOOT, UNSPECIFIED LATERALITY (H): ICD-10-CM

## 2023-05-02 DIAGNOSIS — L89.609 PRESSURE INJURY OF SKIN OF HEEL, UNSPECIFIED INJURY STAGE, UNSPECIFIED LATERALITY: ICD-10-CM

## 2023-05-02 DIAGNOSIS — E11.29 MICROALBUMINURIA DUE TO TYPE 2 DIABETES MELLITUS (H): ICD-10-CM

## 2023-05-02 DIAGNOSIS — Z00.00 ENCOUNTER FOR MEDICARE ANNUAL WELLNESS EXAM: Primary | ICD-10-CM

## 2023-05-02 DIAGNOSIS — Z79.4 TYPE 2 DIABETES MELLITUS WITH DIABETIC POLYNEUROPATHY, WITH LONG-TERM CURRENT USE OF INSULIN (H): ICD-10-CM

## 2023-05-02 DIAGNOSIS — E78.5 HYPERLIPIDEMIA LDL GOAL <100: ICD-10-CM

## 2023-05-02 DIAGNOSIS — R80.9 MICROALBUMINURIA DUE TO TYPE 2 DIABETES MELLITUS (H): ICD-10-CM

## 2023-05-02 DIAGNOSIS — E11.42 TYPE 2 DIABETES MELLITUS WITH DIABETIC POLYNEUROPATHY, WITH LONG-TERM CURRENT USE OF INSULIN (H): ICD-10-CM

## 2023-05-02 LAB
ALT SERPL W P-5'-P-CCNC: 12 U/L (ref 10–50)
ANION GAP SERPL CALCULATED.3IONS-SCNC: 21 MMOL/L (ref 7–15)
BASOPHILS # BLD AUTO: 0.1 10E3/UL (ref 0–0.2)
BASOPHILS NFR BLD AUTO: 0 %
BUN SERPL-MCNC: 29.9 MG/DL (ref 8–23)
CALCIUM SERPL-MCNC: 10.1 MG/DL (ref 8.8–10.2)
CHLORIDE SERPL-SCNC: 98 MMOL/L (ref 98–107)
CHOLEST SERPL-MCNC: 126 MG/DL
CREAT SERPL-MCNC: 1.17 MG/DL (ref 0.67–1.17)
CRP SERPL-MCNC: 156.9 MG/L
DEPRECATED HCO3 PLAS-SCNC: 17 MMOL/L (ref 22–29)
EOSINOPHIL # BLD AUTO: 0 10E3/UL (ref 0–0.7)
EOSINOPHIL NFR BLD AUTO: 0 %
ERYTHROCYTE [DISTWIDTH] IN BLOOD BY AUTOMATED COUNT: 19.5 % (ref 10–15)
GFR SERPL CREATININE-BSD FRML MDRD: 65 ML/MIN/1.73M2
GLUCOSE SERPL-MCNC: 382 MG/DL (ref 70–99)
HBA1C MFR BLD: 8.3 % (ref 0–5.6)
HCT VFR BLD AUTO: 28.8 % (ref 40–53)
HDLC SERPL-MCNC: 36 MG/DL
HGB BLD-MCNC: 8.7 G/DL (ref 13.3–17.7)
IMM GRANULOCYTES # BLD: 0.3 10E3/UL
IMM GRANULOCYTES NFR BLD: 1 %
LDLC SERPL CALC-MCNC: 49 MG/DL
LYMPHOCYTES # BLD AUTO: 2.2 10E3/UL (ref 0.8–5.3)
LYMPHOCYTES NFR BLD AUTO: 7 %
MCH RBC QN AUTO: 24.9 PG (ref 26.5–33)
MCHC RBC AUTO-ENTMCNC: 30.2 G/DL (ref 31.5–36.5)
MCV RBC AUTO: 83 FL (ref 78–100)
MONOCYTES # BLD AUTO: 1.4 10E3/UL (ref 0–1.3)
MONOCYTES NFR BLD AUTO: 4 %
NEUTROPHILS # BLD AUTO: 26.5 10E3/UL (ref 1.6–8.3)
NEUTROPHILS NFR BLD AUTO: 88 %
NONHDLC SERPL-MCNC: 90 MG/DL
NRBC # BLD AUTO: 0 10E3/UL
NRBC BLD AUTO-RTO: 0 /100
PLATELET # BLD AUTO: 742 10E3/UL (ref 150–450)
POTASSIUM SERPL-SCNC: 5.7 MMOL/L (ref 3.4–5.3)
RBC # BLD AUTO: 3.49 10E6/UL (ref 4.4–5.9)
SODIUM SERPL-SCNC: 136 MMOL/L (ref 136–145)
TRIGL SERPL-MCNC: 207 MG/DL
WBC # BLD AUTO: 30.4 10E3/UL (ref 4–11)

## 2023-05-02 PROCEDURE — 90677 PCV20 VACCINE IM: CPT | Performed by: NURSE PRACTITIONER

## 2023-05-02 PROCEDURE — 73650 X-RAY EXAM OF HEEL: CPT | Mod: TC | Performed by: RADIOLOGY

## 2023-05-02 PROCEDURE — G0009 ADMIN PNEUMOCOCCAL VACCINE: HCPCS | Performed by: NURSE PRACTITIONER

## 2023-05-02 PROCEDURE — 99214 OFFICE O/P EST MOD 30 MIN: CPT | Mod: 25 | Performed by: NURSE PRACTITIONER

## 2023-05-02 PROCEDURE — 84460 ALANINE AMINO (ALT) (SGPT): CPT

## 2023-05-02 PROCEDURE — 80048 BASIC METABOLIC PNL TOTAL CA: CPT

## 2023-05-02 PROCEDURE — 36415 COLL VENOUS BLD VENIPUNCTURE: CPT

## 2023-05-02 PROCEDURE — 99207 PR FOOT EXAM NO CHARGE: CPT | Performed by: NURSE PRACTITIONER

## 2023-05-02 PROCEDURE — 86140 C-REACTIVE PROTEIN: CPT

## 2023-05-02 PROCEDURE — 80061 LIPID PANEL: CPT

## 2023-05-02 PROCEDURE — 83036 HEMOGLOBIN GLYCOSYLATED A1C: CPT

## 2023-05-02 PROCEDURE — G0439 PPPS, SUBSEQ VISIT: HCPCS | Performed by: NURSE PRACTITIONER

## 2023-05-02 PROCEDURE — 85025 COMPLETE CBC W/AUTO DIFF WBC: CPT

## 2023-05-02 RX ORDER — GABAPENTIN 300 MG/1
300 CAPSULE ORAL 3 TIMES DAILY
Qty: 270 CAPSULE | Refills: 3 | Status: SHIPPED | OUTPATIENT
Start: 2023-05-02 | End: 2024-06-04

## 2023-05-02 RX ORDER — SIMVASTATIN 40 MG
TABLET ORAL
Qty: 90 TABLET | Refills: 3 | Status: SHIPPED | OUTPATIENT
Start: 2023-05-02 | End: 2024-05-08

## 2023-05-02 RX ORDER — INSULIN LISPRO 100 [IU]/ML
INJECTION, SOLUTION INTRAVENOUS; SUBCUTANEOUS
Qty: 15 ML | Refills: 1 | Status: SHIPPED | OUTPATIENT
Start: 2023-05-02 | End: 2023-05-02 | Stop reason: ALTCHOICE

## 2023-05-02 RX ORDER — FLURBIPROFEN SODIUM 0.3 MG/ML
SOLUTION/ DROPS OPHTHALMIC
Qty: 400 EACH | Refills: 3 | Status: SHIPPED | OUTPATIENT
Start: 2023-05-02 | End: 2023-05-26

## 2023-05-02 RX ORDER — BLOOD-GLUCOSE CONTROL, NORMAL
EACH MISCELLANEOUS
Qty: 1 EACH | Refills: 1 | Status: SHIPPED | OUTPATIENT
Start: 2023-05-02 | End: 2023-05-26

## 2023-05-02 ASSESSMENT — ACTIVITIES OF DAILY LIVING (ADL)
CURRENT_FUNCTION: NEEDS ASSISTANCE

## 2023-05-02 NOTE — PROGRESS NOTES
The patient was provided with suggestions to help him develop a healthy physical lifestyle.  He is at risk for lack of exercise and has been provided with information to increase physical activity for the benefit of his well-being.  The patient reports that he has difficulty with activities of daily living. I have asked that the patient make a follow up appointment in 12 weeks where this issue will be further evaluated and addressed.  The patient was provided with written information regarding signs of hearing loss.  He is at risk for falling and has been provided with information to reduce the risk of falling at home.

## 2023-05-02 NOTE — PATIENT INSTRUCTIONS
Take 10 units of the Lantus in the morning.     Take 0.75 mg of the Trulicity once a week    Check your blood sugar every morning. Let me know if it is consistently under 120. See diabetic educator- bring your meter with you.       Patient Education   Personalized Prevention Plan  You are due for the preventive services outlined below.  Your care team is available to assist you in scheduling these services.  If you have already completed any of these items, please share that information with your care team to update in your medical record.  Health Maintenance Due   Topic Date Due    COVID-19 Vaccine (1) Never done    Pneumococcal Vaccine (1 - PCV) Never done    Zoster (Shingles) Vaccine (1 of 2) Never done    LUNG CANCER SCREENING  Never done    Colorectal Cancer Screening  06/05/2020    Flu Vaccine (1) Never done    Kidney Microalbumin Urine Test  11/02/2022    A1C Lab  11/17/2022    Diptheria Tetanus Pertussis (DTAP/TDAP/TD) Vaccine (2 - Td or Tdap) 12/27/2022    Talk to your care team about options to quit tobacco use.  05/17/2023    Basic Metabolic Panel  05/17/2023    Cholesterol Lab  05/17/2023     Your Health Risk Assessment indicates you feel you are not in good health    A healthy lifestyle helps keep the body fit and the mind alert. It helps protect you from disease, helps you fight disease, and helps prevent chronic disease (disease that doesn't go away) from getting worse. This is important as you get older and begin to notice twinges in muscles and joints and a decline in the strength and stamina you once took for granted. A healthy lifestyle includes good healthcare, good nutrition, weight control, recreation, and regular exercise. Avoid harmful substances and do what you can to keep safe. Another part of a healthy lifestyle is stay mentally active and socially involved.    Good healthcare   Have a wellness visit every year.   If you have new symptoms, let us know right away. Don't wait until the next  checkup.   Take medicines exactly as prescribed and keep your medicines in a safe place. Tell us if your medicine causes problems.   Healthy diet and weight control   Eat 3 or 4 small, nutritious, low-fat, high-fiber meals a day. Include a variety of fruits, vegetables, and whole-grain foods.   Make sure you get enough calcium in your diet. Calcium, vitamin D, and exercise help prevent osteoporosis (bone thinning).   If you live alone, try eating with others when you can. That way you get a good meal and have company while you eat it.   Try to keep a healthy weight. If you eat more calories than your body uses for energy, it will be stored as fat and you will gain weight.     Recreation   Recreation is not limited to sports and team events. It includes any activity that provides relaxation, interest, enjoyment, and exercise. Recreation provides an outlet for physical, mental, and social energy. It can give a sense of worth and achievement. It can help you stay healthy.    Mental Exercise and Social Involvement  Mental and emotional health is as important as physical health. Keep in touch with friends and family. Stay as active as possible. Continue to learn and challenge yourself.   Things you can do to stay mentally active are:  Learn something new, like a foreign language or musical instrument.   Play SCRABBLE or do crossword puzzles. If you cannot find people to play these games with you at home, you can play them with others on your computer through the Internet.   Join a games club--anything from card games to chess or checkers or lawn bowling.   Start a new hobby.   Go back to school.   Volunteer.   Read.   Keep up with world events.    Exercise for a Healthier Heart  You may wonder how you can improve the health of your heart. If you re thinking about exercise, you re on the right track. You don t need to become an athlete. But you do need a certain amount of brisk exercise to help strengthen your heart. If  you have been diagnosed with a heart condition, your healthcare provider may advise exercise to help your condition. To help make exercise a habit, choose safe, fun activities.      Exercise with a friend. When activity is fun, you're more likely to stick with it.     Before you start  Check with your healthcare provider before starting an exercise program. This is especially important if you haven't been active for a while. It's also important if you have a long-term (chronic) health problem such as heart disease, diabetes, or obesity. Also check with your provider if you're at high risk for having these problems.   Why exercise?  Exercising regularly offers many healthy rewards. It can help you do all of these:   Improve your blood cholesterol level to help prevent further heart trouble.  Lower your blood pressure to help prevent a stroke or heart attack.  Control diabetes or reduce your risk of getting this disease.  Improve your heart and lung function.  Reach and stay at a healthy weight.  Make your muscles stronger so you can stay active.  Prevent falls and fractures by slowing the loss of bone mass (osteoporosis).  Manage stress better.  Improve your sense of self and your body image.  Exercise tips    Ease into your routine. Set small goals. Then build on them. Talk with your healthcare provider first before starting an exercise routine if you're not sure what your activity level should be.  Exercise on most days. Aim for a total of at least 150 minutes (2 hours and 30 minutes) or more of moderate-intensity aerobic activity each week. You could also do 75 minutes (1 hour and 15 minutes) or more of vigorous-intensity aerobic activity each week. Or try for a combination of both. Moderate activity means that you breathe heavier and your heart rate increases, but you can still talk. Think about doing at least 30 minutes of moderate exercise, 5 times a week. It's OK to work up to the 30-minute period over time.  Examples of moderate-intensity activity are brisk walking, gardening, and water aerobics.  Step up your daily activity level.  Along with your exercise program, try being more active the whole day. Walk instead of drive. Or park further away so that you take more steps each day. Do more household tasks or yard work. You may not be able to meet the advised amount of physical activity. But doing some moderate- or vigorous-intensity aerobic activity can help reduce your risk for heart disease. Your healthcare provider can help you figure out what is best for you.  Choose 1 or more activities you enjoy.  Walking is one of the easiest things you can do. You can also try swimming, riding a bike, dancing, or taking an exercise class.    Call 911  Call 911 right away if any of these occur:   Chest pain that doesn't go away quickly with rest  New burning, tightness, pressure, or heaviness in your chest, neck, shoulders, back, or arms  Abnormal or severe shortness of breath  A very fast or irregular heartbeat (palpitations)  Fainting  When to call your healthcare provider  Call your healthcare provider if you have any of these:   Dizziness or lightheadedness  Mild shortness of breath or chest pain  Increased or new joint or muscle pain    [employer] last reviewed this educational content on 7/1/2022 2000-2022 [employer]. All rights reserved. This information is not intended as a substitute for professional medical care. Always follow your healthcare professional's instructions.        Activities of Daily Living    Your Health Risk Assessment indicates you have difficulties with activities of daily living such as housework, bathing, preparing meals, taking medication, etc. Please make a follow up appointment for us to address this issue in more detail.    Signs of Hearing Loss  Hearing loss is a problem shared by many people. In fact, it's one of the most common health problems, particularly as people age. Most  people aged 65 and older have some hearing loss. By age 80, almost everyone does. Hearing loss often occurs slowly over the years. So, you may not realize your hearing has gotten worse.   When sudden hearing loss occurs, it's important to contact your healthcare provider right away. Your provider will do a medical exam and a hearing exam as soon as possible. This is to help find the cause and type of your sudden hearing loss. Based on your diagnosis, your healthcare provider will discuss possible treatments.      Hearing much better with one ear can be a sign of hearing loss.     Have your hearing checked  Call your healthcare provider if you:   Have to strain to hear normal conversation  Have to watch other people s faces very carefully to follow what they re saying  Need to ask people to repeat what they ve said  Often misunderstand what people are saying  Turn the volume of the television or radio up so high that others complain  Feel that people are mumbling when they re talking to you  Find that the effort to hear leaves you feeling tired and irritated  Notice, when using the phone, that you hear better with one ear than the other  [employer] last reviewed this educational content on 6/1/2022 2000-2022 [employer]. All rights reserved. This information is not intended as a substitute for professional medical care. Always follow your healthcare professional's instructions.          Preventing Falls at Home  A person can fall for many reasons. Older adults may fall because reaction time slows down as we age. Your muscles and joints may get stiff, weak, or less flexible because of illness, medicines, or a physical condition.   Other health problems that make falls more likely include:   Arthritis  Dizziness or lightheadedness when you stand up (orthostatic hypotension)  History of a stroke  Dizziness  Anemia  Certain medicines taken for mental illness or to control blood pressure.  Problems with  balance or gait  Bladder or urinary problems  History of falling  Changes in vision (vision impairment)  Changes in thinking skills and memory (cognitive impairment)  Injuries from a fall can include serious injuries such as broken bones, dislocated joints, internal bleeding and cuts. Injuries like these can limit your independence.   Prevention tips  To help prevent falls and fall-related injuries, follow the tips below.    Floors  To make floors safer:   Put nonskid pads under area rugs.  Remove small rugs.  Replace worn floor coverings.  Tack carpets firmly to each step on carpeted stairs. Put nonskid strips on the edges of uncarpeted stairs.  Keep floors and stairs free of clutter and cords.  Arrange furniture so there are clear pathways.  Clean up any spills right away.  Bathrooms    To make bathrooms safer:   Install grab bars in the tub or shower.  Apply nonskid strips or put a nonskid rubber mat in the tub or shower.  Sit on a bath chair to bathe.  Use bathmats with nonskid backing.  Lighting  To improve visibility in your home:    Keep a flashlight in each room. Or put a lamp next to the bed within easy reach.  Put nightlights in the bedrooms, hallways, kitchen, and bathrooms.  Make sure all stairways have good lighting.  Take your time when going up and down stairs.  Put handrails on both sides of stairs and in walkways for more support. To prevent injury to your wrist or arm, don t use handrails to pull yourself up.  Install grab bars to pull yourself up.  Move or rearrange items that you use often. This will make them easier to find or reach.  Look at your home to find any safety hazards. Especially look at doorways, walkways, and the driveway. Remove or repair any safety problems that you find.  Other changes to make  Look around to find any safety hazards. Look closely at doorways, walkways, and the driveway. Remove or repair any safety problems that you find.  Wear shoes that fit well.  Take your time  when going up and down stairs.  Put handrails on both sides of stairs and in walkways for more support. To prevent injury to your wrist or arm, don t use handrails to pull yourself up.  Install grab bars wherever needed to pull yourself up.  Arrange items that you use often. This will make them easier to find or reach.    [employer] last reviewed this educational content on 3/1/2020    8295-5736 [employer]. All rights reserved. This information is not intended as a substitute for professional medical care. Always follow your healthcare professional's instructions.

## 2023-05-02 NOTE — LETTER
May 2, 2023      Marvin Sidhu  [address] [facility] [address]      Dear ,    We are writing to inform you of your test results. The left heel is suspicious for having the infection spread to the bone of the heel. See the surgical podiatrist ASAP.       Resulted Orders   XR Calcaneus Right G/E 2 Views    Narrative    XR CALCANEUS LEFT G/E 2 VIEWS, XR CALCANEUS RIGHT G/E 2 VIEWS   5/2/2023 11:54 AM     HISTORY: pressure sore- eval for osteomyelitis; Pressure injury of  skin of heel, unspecified injury stage, unspecified laterality  COMPARISON: None.       Impression    IMPRESSION:     LEFT CALCANEUS: There is suspected soft tissue ulceration over the  left calcaneus posteriorly. There is some lucency within the superior,  posterior aspect of the left calcaneus on the lateral view suspicious  for osteomyelitis. This can be confirmed with MRI.    RIGHT CALCANEUS: No osseous erosion or periosteal reaction to suggest  osteomyelitis.    CYNTHIA NEAL MD         SYSTEM ID:  [id number]     If you have any questions or concerns, please call the clinic at the number listed above.     Sincerely,        JOSH Becker CNP

## 2023-05-02 NOTE — RESULT ENCOUNTER NOTE
Please send patient letter notifying of labs and provider message.    Marvin- the left heel is suspicious for having the infection spread to the bone of the heel. See the surgical podiatrist ASAP.     Thanks,  JOSH Rincon CNP

## 2023-05-02 NOTE — PROGRESS NOTES
Assessment & Plan     Encounter for Medicare annual wellness exam      Microalbuminuria due to type 2 diabetes mellitus (H)    - Albumin Random Urine Quantitative with Creat Ratio; Future  - BASIC METABOLIC PANEL; Future  - Lipid panel reflex to direct LDL Non-fasting; Future  - Hemoglobin A1c; Future    Type 2 diabetes mellitus with diabetic polyneuropathy, with long-term current use of insulin (H)  Uncontrolled- improving with reinstating his insulin in rehab. discontinue meal insulin due to low blood sugars, start lantus and trulicity. Stop Glipizide and continue Metformin.  - gabapentin (NEURONTIN) 300 MG capsule; Take 1 capsule (300 mg) by mouth 3 times daily  - insulin glargine (LANTUS PEN) 100 UNIT/ML pen; Inject 10 Units Subcutaneous every morning  - insulin pen needle (B-D U/F) 31G X 5 MM miscellaneous; Use 4 pen needles daily or as directed.  - metFORMIN (GLUCOPHAGE) 500 MG tablet; Take 1 tablet (500 mg) by mouth 2 times daily (with meals)  - FOOT EXAM  - ALT; Future  - blood glucose calibration (NO BRAND SPECIFIED) solution; Use to calibrate blood glucose monitor as needed as directed.  - blood glucose (NO BRAND SPECIFIED) lancets standard; Use to test blood sugar 1 times daily or as directed.  - blood glucose (NO BRAND SPECIFIED) test strip; Use to test blood sugar 4 times daily or as directed.  - blood glucose monitoring (NO BRAND SPECIFIED) meter device kit; Use to test blood sugar 1 times daily or as directed.  - dulaglutide (TRULICITY) 0.75 MG/0.5ML pen; Inject 0.75 mg Subcutaneous every 7 days  - AMB Adult Diabetes Educator Referral; Future    Hyperlipidemia LDL goal <100    - simvastatin (ZOCOR) 40 MG tablet; TAKE ONE TABLET BY MOUTH EVERY NIGHT AT BEDTIME  - ALT; Future    Pressure injury of skin of heel, unspecified injury stage, unspecified laterality    - XR Calcaneus Right G/E 2 Views; Future  - XR Calcaneus Left G/E 2 Views; Future  - Orthopedic  Referral; Future  - AMB Adult  "Diabetes Educator Referral; Future    Subacute osteomyelitis of foot, unspecified laterality (H)  - Orthopedic  Referral; Future      Prescription drug management       MED REC REQUIRED  Post Medication Reconciliation Status: discharge medications reconciled and changed, per note/orders  Nicotine/Tobacco Cessation:  He reports that he has been smoking pipe. He has never used smokeless tobacco.  Nicotine/Tobacco Cessation Plan:   Information offered: Patient not interested at this time      CONSULTATION/REFERRAL to PODIATRY, DIAB ED    FUTURE APPOINTMENTS:       - Follow-up visit in 3 months, sooner PRN    See Patient Instructions    Sandra Low, JOSH ROLDAN  [facility]    Jermaine Wong is a 77 year old, presenting for the following health issues:  Medicare Visit        5/2/2023     9:58 AM   Additional Questions   Roomed by Guillermina PIZARRO     [facility]     Annual Wellness Visit    Patient has been advised of split billing requirements and indicates understanding: Yes     Are you in the first 12 months of your Medicare Part B coverage?  No    Physical Health:    In general, how would you rate your overall physical health? fair    Outside of work, how many days during the week do you exercise?none    Outside of work, approximately how many minutes a day do you exercise?not applicable    If you drink alcohol do you typically have >3 drinks per day or >7 drinks per week? No    Do you usually eat at least 4 servings of fruit and vegetables a day, include whole grains & fiber and avoid regularly eating high fat or \"junk\" foods? Yes    Do you have any problems taking medications regularly? No    Do you have any side effects from medications? none    Needs assistance for the following daily activities: transportation, shopping, preparing meals, housework and laundry    Which of the following safety concerns are present in your home?  none identified     Hearing impairment: Yes, Difficulty " following a conversation in a noisy restaurant or crowded room.    Feel that people are mumbling or not speaking clearly.    Need to ask people to speak up or repeat themselves.    Difficulty understanding soft or whispered speech.    In the past 6 months, have you been bothered by leaking of urine? no    Mental Health:    In general, how would you rate your overall mental or emotional health? good  PHQ-2 Score: 0    Do you feel safe in your environment? Yes    Have you ever done Advance Care Planning? (For example, a Health Directive, POLST, or a discussion with a medical provider or your loved ones about your wishes)? No, advance care planning information given to patient to review.  Patient declined advance care planning discussion at this time.    Fall risk:  Fallen 2 or more times in the past year?: Yes  Any fall with injury in the past year?: Yes    Cognitive Screenin) Repeat 3 items (Leader, Season, Table)    2) Clock draw: NORMAL  3) 3 item recall: Recalls 3 objects  Results: 3 items recalled: COGNITIVE IMPAIRMENT LESS LIKELY    Mini-CogTM Copyright NORA Mota. Licensed by the author for use in [facility]; reprinted with permission (jose@[facility]). All rights reserved.      Do you have sleep apnea, excessive snoring or daytime drowsiness?: no    Social History     Tobacco Use     Smoking status: Some Days     Types: Pipe     Smokeless tobacco: Never   Vaping Use     Vaping status: Never Used   Substance Use Topics     Alcohol use: Not Currently     Comment: socially  -- not more than a liter of librado a month              View : No data to display.              Do you have a current opioid prescription? No  Do you use any other controlled substances or medications that are not prescribed by a provider? None    Current providers sharing in care for this patient include:   Patient Care Team:  Sandra Low APRN CNP as PCP - General (Family Medicine)  Sandra Low APRN CNP as  "Assigned PCP  [facility]  Rosa Phillips NP as Nurse Practitioner (Geriatric Medicine)    Patient has been advised of split billing requirements and indicates understanding: Yes    I have reviewed Opioid Use Disorder and Substance Use Disorder risk factors and made any needed referrals.     Diabetes Follow-up      How often are you checking your blood sugar? Not at all    What concerns do you have today about your diabetes? None     Do you have any of these symptoms? (Select all that apply)  Numbness in feet and Redness, sores, or blisters on feet      BP Readings from Last 2 Encounters:   05/02/23 128/86   04/18/23 115/55     Hemoglobin A1C (%)   Date Value   05/02/2023 8.3 (H)   05/17/2022 6.5 (H)   05/14/2021 7.2 (H)   06/05/2020 7.8 (H)     LDL Cholesterol Calculated (mg/dL)   Date Value   05/17/2022 40   05/14/2021 58   12/06/2019 63           Hyperlipidemia Follow-Up      Are you regularly taking any medication or supplement to lower your cholesterol?   Yes- statin    Are you having muscle aches or other side effects that you think could be caused by your cholesterol lowering medication?  No        Review of Systems   Constitutional, HEENT, cardiovascular, pulmonary, gi and gu systems are negative, except as otherwise noted.      Objective    /86   Pulse 112   Temp 97  F (36.1  C) (Tympanic)   Resp 18   Ht 1.676 m (5' 6\")   Wt 59.1 kg (130 lb 3.2 oz)   SpO2 100%   BMI 21.01 kg/m    Body mass index is 21.01 kg/m .  Physical Exam   GENERAL: alert and no distress  EYES: Eyes grossly normal to inspection and conjunctivae and sclerae normal  RESP: no rhonchi and no wheezes  CV: tachycardia, normal S1 S2, no S3 or S4, no murmur, click or rub and no peripheral edema  MS: no gross musculoskeletal defects noted, no edema  NEURO: weakness of BLE, decreased tone - generalized and mentation intact  PSYCH: mentation appears normal, affect normal/bright  Diabetic " foot exam: reduced sensation at all monofilament points, gangrenous changes at bilateral heels L > R and onychomycosis    XR Calcaneus Right G/E 2 Views    Result Date: 5/2/2023  XR CALCANEUS LEFT G/E 2 VIEWS, XR CALCANEUS RIGHT G/E 2 VIEWS 5/2/2023 11:54 AM HISTORY: pressure sore- eval for osteomyelitis; Pressure injury of skin of heel, unspecified injury stage, unspecified laterality COMPARISON: None.     IMPRESSION: LEFT CALCANEUS: There is suspected soft tissue ulceration over the left calcaneus posteriorly. There is some lucency within the superior, posterior aspect of the left calcaneus on the lateral view suspicious for osteomyelitis. This can be confirmed with MRI. RIGHT CALCANEUS: No osseous erosion or periosteal reaction to suggest osteomyelitis. CYNTHIA NEAL MD   SYSTEM ID:  [id number]    XR Calcaneus Left G/E 2 Views    Result Date: 5/2/2023  XR CALCANEUS LEFT G/E 2 VIEWS, XR CALCANEUS RIGHT G/E 2 VIEWS 5/2/2023 11:54 AM HISTORY: pressure sore- eval for osteomyelitis; Pressure injury of skin of heel, unspecified injury stage, unspecified laterality COMPARISON: None.     IMPRESSION: LEFT CALCANEUS: There is suspected soft tissue ulceration over the left calcaneus posteriorly. There is some lucency within the superior, posterior aspect of the left calcaneus on the lateral view suspicious for osteomyelitis. This can be confirmed with MRI. RIGHT CALCANEUS: No osseous erosion or periosteal reaction to suggest osteomyelitis. CYNTHIA NEAL MD   SYSTEM ID:  [id number]

## 2023-05-03 ENCOUNTER — TELEPHONE (OUTPATIENT)
Dept: FAMILY MEDICINE | Facility: CLINIC | Age: 78
End: 2023-05-03
Payer: COMMERCIAL

## 2023-05-03 DIAGNOSIS — E87.5 HYPERKALEMIA: ICD-10-CM

## 2023-05-03 DIAGNOSIS — M86.279 SUBACUTE OSTEOMYELITIS OF FOOT, UNSPECIFIED LATERALITY (H): ICD-10-CM

## 2023-05-03 DIAGNOSIS — L08.9 DIABETIC FOOT INFECTION (H): Primary | ICD-10-CM

## 2023-05-03 DIAGNOSIS — L89.609 PRESSURE INJURY OF SKIN OF HEEL, UNSPECIFIED INJURY STAGE, UNSPECIFIED LATERALITY: ICD-10-CM

## 2023-05-03 DIAGNOSIS — E11.628 DIABETIC FOOT INFECTION (H): Primary | ICD-10-CM

## 2023-05-03 RX ORDER — CIPROFLOXACIN 500 MG/1
500 TABLET, FILM COATED ORAL 2 TIMES DAILY
Qty: 20 TABLET | Refills: 0 | Status: ON HOLD | OUTPATIENT
Start: 2023-05-03 | End: 2023-05-24

## 2023-05-03 NOTE — TELEPHONE ENCOUNTER
Please let Marvin know that his blood work shows a bad infection. The xray of the left heel shows probable infection to the bone. I sent an antibiotic to the pharmacy to start. I reached out to the foot surgeon to see if we can get him a sooner appointment than next Wednesday. If he develops swelling of his foot or fever he needs to go to ER. When he sees the diabetic nurse next week I want him to have some repeat labs done- can you please put him on the lab schedule to correlate with that appt. Thanks.

## 2023-05-03 NOTE — TELEPHONE ENCOUNTER
Called the patient. Marvin was unavailable. Wife answered the phone. Releases in place to talk with wife Read the following   Please let Marvin know that his blood work shows a bad infection. The xray of the left heel shows probable infection to the bone. I sent an antibiotic to the pharmacy to start. I reached out to the foot surgeon to see if we can get him a sooner appointment than next Wednesday. If he develops swelling of his foot or fever he needs to go to ER. When he sees the diabetic nurse next week I want him to have some repeat labs done- can you please put him on the lab schedule to correlate with that appt. Thanks.         Updated her that the provider wanted labs drawn with visit with diabetes educator and the time of appointment   The wife verbalized the new schedule of events and that she understood

## 2023-05-04 ENCOUNTER — APPOINTMENT (OUTPATIENT)
Dept: MRI IMAGING | Facility: CLINIC | Age: 78
DRG: 855 | End: 2023-05-04
Attending: EMERGENCY MEDICINE
Payer: COMMERCIAL

## 2023-05-04 ENCOUNTER — TELEPHONE (OUTPATIENT)
Dept: FAMILY MEDICINE | Facility: CLINIC | Age: 78
End: 2023-05-04
Payer: COMMERCIAL

## 2023-05-04 ENCOUNTER — APPOINTMENT (OUTPATIENT)
Dept: ULTRASOUND IMAGING | Facility: CLINIC | Age: 78
DRG: 855 | End: 2023-05-04
Attending: EMERGENCY MEDICINE
Payer: COMMERCIAL

## 2023-05-04 ENCOUNTER — HOSPITAL ENCOUNTER (INPATIENT)
Facility: CLINIC | Age: 78
LOS: 8 days | Discharge: ANOTHER HOSPITAL | DRG: 855 | End: 2023-05-12
Attending: EMERGENCY MEDICINE | Admitting: FAMILY MEDICINE
Payer: COMMERCIAL

## 2023-05-04 DIAGNOSIS — A41.9 SEPSIS WITH ACUTE ORGAN DYSFUNCTION WITHOUT SEPTIC SHOCK, DUE TO UNSPECIFIED ORGANISM, UNSPECIFIED TYPE: ICD-10-CM

## 2023-05-04 DIAGNOSIS — E11.621 DIABETIC ULCER OF LEFT HEEL ASSOCIATED WITH TYPE 2 DIABETES MELLITUS, UNSPECIFIED ULCER STAGE (H): ICD-10-CM

## 2023-05-04 DIAGNOSIS — L97.429 DIABETIC ULCER OF LEFT HEEL ASSOCIATED WITH TYPE 2 DIABETES MELLITUS, UNSPECIFIED ULCER STAGE (H): ICD-10-CM

## 2023-05-04 DIAGNOSIS — R65.20 SEPSIS WITH ACUTE ORGAN DYSFUNCTION WITHOUT SEPTIC SHOCK, DUE TO UNSPECIFIED ORGANISM, UNSPECIFIED TYPE: ICD-10-CM

## 2023-05-04 PROBLEM — M86.272 SUBACUTE OSTEOMYELITIS OF LEFT FOOT (H): Status: ACTIVE | Noted: 2023-05-02

## 2023-05-04 PROBLEM — L97.419 DIABETIC ULCER OF RIGHT HEEL ASSOCIATED WITH TYPE 2 DIABETES MELLITUS (H): Status: ACTIVE | Noted: 2023-05-04

## 2023-05-04 PROBLEM — E11.29 MICROALBUMINURIA DUE TO TYPE 2 DIABETES MELLITUS (H): Status: ACTIVE | Noted: 2021-11-02

## 2023-05-04 PROBLEM — R80.9 MICROALBUMINURIA DUE TO TYPE 2 DIABETES MELLITUS (H): Status: ACTIVE | Noted: 2021-11-02

## 2023-05-04 LAB
ANION GAP SERPL CALCULATED.3IONS-SCNC: 17 MMOL/L (ref 7–15)
BASOPHILS # BLD AUTO: 0 10E3/UL (ref 0–0.2)
BASOPHILS NFR BLD AUTO: 0 %
BUN SERPL-MCNC: 26.6 MG/DL (ref 8–23)
CALCIUM SERPL-MCNC: 9.5 MG/DL (ref 8.8–10.2)
CHLORIDE SERPL-SCNC: 98 MMOL/L (ref 98–107)
CREAT SERPL-MCNC: 1.11 MG/DL (ref 0.67–1.17)
DEPRECATED HCO3 PLAS-SCNC: 19 MMOL/L (ref 22–29)
EOSINOPHIL # BLD AUTO: 0.1 10E3/UL (ref 0–0.7)
EOSINOPHIL NFR BLD AUTO: 0 %
ERYTHROCYTE [DISTWIDTH] IN BLOOD BY AUTOMATED COUNT: 19.4 % (ref 10–15)
GFR SERPL CREATININE-BSD FRML MDRD: 69 ML/MIN/1.73M2
GLUCOSE BLDC GLUCOMTR-MCNC: 285 MG/DL (ref 70–99)
GLUCOSE SERPL-MCNC: 356 MG/DL (ref 70–99)
HCT VFR BLD AUTO: 26.1 % (ref 40–53)
HGB BLD-MCNC: 8.2 G/DL (ref 13.3–17.7)
HOLD SPECIMEN: NORMAL
HOLD SPECIMEN: NORMAL
IMM GRANULOCYTES # BLD: 0.4 10E3/UL
IMM GRANULOCYTES NFR BLD: 1 %
LACTATE SERPL-SCNC: 1.1 MMOL/L (ref 0.7–2)
LACTATE SERPL-SCNC: 3.2 MMOL/L (ref 0.7–2)
LYMPHOCYTES # BLD AUTO: 1.3 10E3/UL (ref 0.8–5.3)
LYMPHOCYTES NFR BLD AUTO: 5 %
MCH RBC QN AUTO: 25.1 PG (ref 26.5–33)
MCHC RBC AUTO-ENTMCNC: 31.4 G/DL (ref 31.5–36.5)
MCV RBC AUTO: 80 FL (ref 78–100)
MONOCYTES # BLD AUTO: 1.5 10E3/UL (ref 0–1.3)
MONOCYTES NFR BLD AUTO: 6 %
NEUTROPHILS # BLD AUTO: 23 10E3/UL (ref 1.6–8.3)
NEUTROPHILS NFR BLD AUTO: 88 %
NRBC # BLD AUTO: 0 10E3/UL
NRBC BLD AUTO-RTO: 0 /100
PLATELET # BLD AUTO: 588 10E3/UL (ref 150–450)
POTASSIUM SERPL-SCNC: 5.4 MMOL/L (ref 3.4–5.3)
RBC # BLD AUTO: 3.27 10E6/UL (ref 4.4–5.9)
SODIUM SERPL-SCNC: 134 MMOL/L (ref 136–145)
WBC # BLD AUTO: 26.3 10E3/UL (ref 4–11)

## 2023-05-04 PROCEDURE — 120N000001 HC R&B MED SURG/OB

## 2023-05-04 PROCEDURE — 83605 ASSAY OF LACTIC ACID: CPT | Performed by: EMERGENCY MEDICINE

## 2023-05-04 PROCEDURE — 85025 COMPLETE CBC W/AUTO DIFF WBC: CPT | Performed by: FAMILY MEDICINE

## 2023-05-04 PROCEDURE — 250N000011 HC RX IP 250 OP 636: Performed by: FAMILY MEDICINE

## 2023-05-04 PROCEDURE — 73720 MRI LWR EXTREMITY W/O&W/DYE: CPT | Mod: LT

## 2023-05-04 PROCEDURE — 85025 COMPLETE CBC W/AUTO DIFF WBC: CPT | Performed by: EMERGENCY MEDICINE

## 2023-05-04 PROCEDURE — 96365 THER/PROPH/DIAG IV INF INIT: CPT | Mod: 59 | Performed by: EMERGENCY MEDICINE

## 2023-05-04 PROCEDURE — 80048 BASIC METABOLIC PNL TOTAL CA: CPT | Performed by: EMERGENCY MEDICINE

## 2023-05-04 PROCEDURE — 99222 1ST HOSP IP/OBS MODERATE 55: CPT | Mod: AI | Performed by: FAMILY MEDICINE

## 2023-05-04 PROCEDURE — 99291 CRITICAL CARE FIRST HOUR: CPT | Performed by: EMERGENCY MEDICINE

## 2023-05-04 PROCEDURE — 96375 TX/PRO/DX INJ NEW DRUG ADDON: CPT | Performed by: EMERGENCY MEDICINE

## 2023-05-04 PROCEDURE — 36415 COLL VENOUS BLD VENIPUNCTURE: CPT | Performed by: EMERGENCY MEDICINE

## 2023-05-04 PROCEDURE — A9585 GADOBUTROL INJECTION: HCPCS | Performed by: EMERGENCY MEDICINE

## 2023-05-04 PROCEDURE — 96368 THER/DIAG CONCURRENT INF: CPT | Performed by: EMERGENCY MEDICINE

## 2023-05-04 PROCEDURE — 99291 CRITICAL CARE FIRST HOUR: CPT | Mod: 25 | Performed by: EMERGENCY MEDICINE

## 2023-05-04 PROCEDURE — 250N000013 HC RX MED GY IP 250 OP 250 PS 637: Performed by: FAMILY MEDICINE

## 2023-05-04 PROCEDURE — 87040 BLOOD CULTURE FOR BACTERIA: CPT | Performed by: EMERGENCY MEDICINE

## 2023-05-04 PROCEDURE — 255N000002 HC RX 255 OP 636: Performed by: EMERGENCY MEDICINE

## 2023-05-04 PROCEDURE — 36415 COLL VENOUS BLD VENIPUNCTURE: CPT | Performed by: FAMILY MEDICINE

## 2023-05-04 PROCEDURE — 258N000003 HC RX IP 258 OP 636: Performed by: EMERGENCY MEDICINE

## 2023-05-04 PROCEDURE — 250N000011 HC RX IP 250 OP 636: Performed by: EMERGENCY MEDICINE

## 2023-05-04 PROCEDURE — 99292 CRITICAL CARE ADDL 30 MIN: CPT | Mod: 25 | Performed by: EMERGENCY MEDICINE

## 2023-05-04 PROCEDURE — 99292 CRITICAL CARE ADDL 30 MIN: CPT | Performed by: EMERGENCY MEDICINE

## 2023-05-04 PROCEDURE — 83605 ASSAY OF LACTIC ACID: CPT | Performed by: FAMILY MEDICINE

## 2023-05-04 PROCEDURE — 93922 UPR/L XTREMITY ART 2 LEVELS: CPT

## 2023-05-04 PROCEDURE — 80048 BASIC METABOLIC PNL TOTAL CA: CPT | Performed by: FAMILY MEDICINE

## 2023-05-04 RX ORDER — LIDOCAINE 40 MG/G
CREAM TOPICAL
Status: DISCONTINUED | OUTPATIENT
Start: 2023-05-04 | End: 2023-05-09

## 2023-05-04 RX ORDER — ACETAMINOPHEN 325 MG/1
650 TABLET ORAL EVERY 6 HOURS PRN
Status: DISCONTINUED | OUTPATIENT
Start: 2023-05-04 | End: 2023-05-13 | Stop reason: HOSPADM

## 2023-05-04 RX ORDER — ACETAMINOPHEN 325 MG/1
650 TABLET ORAL ONCE
Status: COMPLETED | OUTPATIENT
Start: 2023-05-04 | End: 2023-05-04

## 2023-05-04 RX ORDER — CELECOXIB 100 MG/1
100 CAPSULE ORAL 2 TIMES DAILY
Status: DISCONTINUED | OUTPATIENT
Start: 2023-05-04 | End: 2023-05-05

## 2023-05-04 RX ORDER — LATANOPROST 50 UG/ML
1 SOLUTION/ DROPS OPHTHALMIC AT BEDTIME
Status: DISCONTINUED | OUTPATIENT
Start: 2023-05-04 | End: 2023-05-13 | Stop reason: HOSPADM

## 2023-05-04 RX ORDER — GABAPENTIN 300 MG/1
300 CAPSULE ORAL 3 TIMES DAILY
Status: DISCONTINUED | OUTPATIENT
Start: 2023-05-04 | End: 2023-05-13 | Stop reason: HOSPADM

## 2023-05-04 RX ORDER — SIMVASTATIN 40 MG
40 TABLET ORAL AT BEDTIME
Status: DISCONTINUED | OUTPATIENT
Start: 2023-05-04 | End: 2023-05-13 | Stop reason: HOSPADM

## 2023-05-04 RX ORDER — NICOTINE POLACRILEX 4 MG
15-30 LOZENGE BUCCAL
Status: DISCONTINUED | OUTPATIENT
Start: 2023-05-04 | End: 2023-05-04

## 2023-05-04 RX ORDER — NAPROXEN SODIUM 220 MG
220 TABLET ORAL 2 TIMES DAILY WITH MEALS
Status: ON HOLD | COMMUNITY
End: 2023-05-24

## 2023-05-04 RX ORDER — NICOTINE POLACRILEX 4 MG
15-30 LOZENGE BUCCAL
Status: DISCONTINUED | OUTPATIENT
Start: 2023-05-04 | End: 2023-05-13 | Stop reason: HOSPADM

## 2023-05-04 RX ORDER — HEPARIN SODIUM 5000 [USP'U]/.5ML
5000 INJECTION, SOLUTION INTRAVENOUS; SUBCUTANEOUS EVERY 12 HOURS
Status: DISCONTINUED | OUTPATIENT
Start: 2023-05-04 | End: 2023-05-05

## 2023-05-04 RX ORDER — VANCOMYCIN HYDROCHLORIDE 1 G/200ML
1000 INJECTION, SOLUTION INTRAVENOUS EVERY 24 HOURS
Status: DISCONTINUED | OUTPATIENT
Start: 2023-05-05 | End: 2023-05-09

## 2023-05-04 RX ORDER — DEXTROSE MONOHYDRATE 25 G/50ML
25-50 INJECTION, SOLUTION INTRAVENOUS
Status: DISCONTINUED | OUTPATIENT
Start: 2023-05-04 | End: 2023-05-13 | Stop reason: HOSPADM

## 2023-05-04 RX ORDER — DEXTROSE MONOHYDRATE 25 G/50ML
25-50 INJECTION, SOLUTION INTRAVENOUS
Status: DISCONTINUED | OUTPATIENT
Start: 2023-05-04 | End: 2023-05-04

## 2023-05-04 RX ORDER — GADOBUTROL 604.72 MG/ML
6 INJECTION INTRAVENOUS ONCE
Status: COMPLETED | OUTPATIENT
Start: 2023-05-04 | End: 2023-05-04

## 2023-05-04 RX ADMIN — GADOBUTROL 6 ML: 604.72 INJECTION INTRAVENOUS at 17:25

## 2023-05-04 RX ADMIN — ACETAMINOPHEN 650 MG: 325 TABLET ORAL at 22:09

## 2023-05-04 RX ADMIN — TAZOBACTAM SODIUM AND PIPERACILLIN SODIUM 4.5 G: 500; 4 INJECTION, SOLUTION INTRAVENOUS at 15:38

## 2023-05-04 RX ADMIN — HEPARIN SODIUM 5000 UNITS: 10000 INJECTION, SOLUTION INTRAVENOUS; SUBCUTANEOUS at 21:06

## 2023-05-04 RX ADMIN — ACETAMINOPHEN 650 MG: 325 TABLET ORAL at 14:03

## 2023-05-04 RX ADMIN — TAZOBACTAM SODIUM AND PIPERACILLIN SODIUM 4.5 G: 500; 4 INJECTION, SOLUTION INTRAVENOUS at 21:21

## 2023-05-04 RX ADMIN — SIMVASTATIN 40 MG: 40 TABLET, FILM COATED ORAL at 21:05

## 2023-05-04 RX ADMIN — GABAPENTIN 300 MG: 300 CAPSULE ORAL at 21:05

## 2023-05-04 RX ADMIN — LATANOPROST 1 DROP: 50 SOLUTION OPHTHALMIC at 21:24

## 2023-05-04 RX ADMIN — VANCOMYCIN HYDROCHLORIDE 1500 MG: 5 INJECTION, POWDER, LYOPHILIZED, FOR SOLUTION INTRAVENOUS at 16:52

## 2023-05-04 RX ADMIN — CELECOXIB 100 MG: 100 CAPSULE ORAL at 22:09

## 2023-05-04 RX ADMIN — SODIUM CHLORIDE 1000 ML: 9 INJECTION, SOLUTION INTRAVENOUS at 15:37

## 2023-05-04 ASSESSMENT — ACTIVITIES OF DAILY LIVING (ADL)
ADLS_ACUITY_SCORE: 33
ADLS_ACUITY_SCORE: 35
ADLS_ACUITY_SCORE: 28
ADLS_ACUITY_SCORE: 28
ADLS_ACUITY_SCORE: 35

## 2023-05-04 NOTE — TELEPHONE ENCOUNTER
Discussed Dr. Flores's message with patient and instructed him and wife to be seen in ER. Patient is agreeable. Report called to Vashti RUIZ at [facility].

## 2023-05-04 NOTE — ED PROVIDER NOTES
"  History     Chief Complaint   Patient presents with     Foot Pain     HPI  Roddy Sidhu is a 77 year old male with history notable for DM2 (insulin), diabetic polyneuropathy, right femur fracture s/p ORIF (3/29/23) who was sent in from clinic with worsening wound of his left heel and concern for osteomyelitis.  Patient reports that he does not have any pain in his left heel but that the wound has been developing for the past 2 to 3 weeks.  Was seen in clinic 2 days ago and had follow-up today when sent to emergency department.  Initial work-up revealed leukocytosis of 30.4 with left shift.  Plain film imaging concerning for early osteomyelitis.    Patient denies any fevers or chills.  No nausea or vomiting.  Otherwise feels well.  Started taking ciprofloxacin yesterday for infection.    The patient's PMHx, Surgical Hx, Allergies, and Medications were all reviewed with the patient.    Allergies:  No Known Allergies    Problem List:    Patient Active Problem List    Diagnosis Date Noted     Subacute osteomyelitis of foot, unspecified laterality (H) 05/02/2023     Priority: Medium     Closed fracture of right femur (H) 03/29/2023     Priority: Medium     Formatting of this note might be different from the original.  Added automatically from request for surgery [id number]       Microalbuminuria due to type 2 diabetes mellitus (H) 11/02/2021     Priority: Medium     Type 2 diabetes mellitus with diabetic polyneuropathy, with long-term current use of insulin (H) 12/08/2016     Priority: Medium     Tobacco use disorder 12/08/2016     Priority: Medium     Hyperlipidemia LDL goal <100 01/03/2013     Priority: Medium     Advanced directives, counseling/discussion 12/27/2012     Priority: Medium     Patient does not have an Advance/Health Care Directive (HCD), given \"What is Advance Care Planning?\" flyer.    Rhonda Vega  December 27, 2012         Health Care Home 12/27/2012     Priority: Medium     Given basic care " "plan on the visit of August 20, 2015  DX V65.8 REPLACED WITH [id number] HEALTH CARE HOME (04/08/2013)            Past Medical History:    No past medical history on file.    Past Surgical History:    Past Surgical History:   Procedure Laterality Date     SURGICAL HISTORY OF -   1990    removal of steel from left eye       Family History:    Family History   Problem Relation Age of Onset     Breast Cancer Mother 60     C.A.D. Brother 58        MI       Social History:  Marital Status:   [2]  Social History     Tobacco Use     Smoking status: Some Days     Types: Pipe     Smokeless tobacco: Never   Vaping Use     Vaping status: Never Used   Substance Use Topics     Alcohol use: Not Currently     Comment: socially  -- not more than a liter of librado a month     Drug use: No        Medications:    aspirin (ASA) 325 MG EC tablet  ciprofloxacin (CIPRO) 500 MG tablet  dulaglutide (TRULICITY) 0.75 MG/0.5ML pen  gabapentin (NEURONTIN) 300 MG capsule  insulin glargine (LANTUS PEN) 100 UNIT/ML pen  insulin pen needle (B-D U/F) 31G X 5 MM miscellaneous  blood glucose (NO BRAND SPECIFIED) lancets standard  blood glucose (NO BRAND SPECIFIED) test strip  blood glucose calibration (NO BRAND SPECIFIED) solution  blood glucose monitoring (NO BRAND SPECIFIED) meter device kit  latanoprost (XALATAN) 0.005 % ophthalmic solution  metFORMIN (GLUCOPHAGE) 500 MG tablet  simvastatin (ZOCOR) 40 MG tablet  vitamin D3 (CHOLECALCIFEROL) 50 mcg (2000 units) tablet          Review of Systems  Pertinent positives and negatives mentioned in HPI    Physical Exam   BP: 105/64  Pulse: (!) 133  Temp: (!) 100.8  F (38.2  C)  Resp: 18  Height: 176.5 cm (5' 9.5\")  Weight: 59 kg (130 lb)  SpO2: 100 %    Physical Exam  GEN: Awake, alert, and cooperative. No acute distress.  Resting comfortably on cart  HENT: MMM. External ears and nose normal bilaterally.  Dental caries and partial edentulism   EYES: EOM intact. Conjunctiva clear. No discharge.   NECK: " "Symmetric, freely mobile.   CV : Regular rate and rhythm.  Extremities warm well perfused  PULM: Normal effort. Speaking in full sentences.   NEURO: Normal speech. Following commands. CN II-XII grossly intact. Answering questions and interacting appropriately.   EXT: eschar to left heel with bulla on proximal wound laterally. Erythema proximally. surrounding skin is warm to touch. See photos below.   INT: See EXT above.                              ED Course        Procedures       Critical Care time:  was 35 minutes for this patient excluding procedures.    If one the following conditions is present, a 30 mL/kg bolus is recommended as part of the 6 hour bundle (IBW can be used for BMI >30, or document refusal/contraindication):      1.   Initial hypotension  defined as 2 bps < 90 or map < 65 in the 6hrs before or 3hrs after time zero.     2.  Lactate >4.      The patient has signs of Severe Sepsis as evidenced by:    1. 2 SIRS criteria, AND  2. Suspected infection, AND   3. Organ dysfunction: MAP>65 after initial IVF bolus, will continue to monitor fluid status and vital signs    Time severe sepsis diagnosis confirmed: 1525 05/04/23 as this was the time when I evaluated patient.     3 Hour Severe Sepsis Bundle Completion:    1. Initial Lactic Acid Result:   Recent Labs   Lab Test 05/04/23  1412   LACT 3.2*     2. Blood Cultures before Antibiotics: Yes  3. Broad Spectrum Antibiotics Administered:  yes       Anti-infectives (From admission through now)    Start     Dose/Rate Route Frequency Ordered Stop    05/04/23 1535  vancomycin (VANCOCIN) 1,500 mg in sodium chloride 0.9 % 250 mL intermittent infusion         1,500 mg  over 90 Minutes Intravenous ONCE 05/04/23 1531      05/04/23 1525  piperacillin-tazobactam (ZOSYN) intermittent infusion 4.5 g        Note to Pharmacy: For [facility], [facility] and [facility]: For Zosyn-naive patients, use the \"Zosyn initial dose + extended infusion\" order panel.    4.5 g  200 mL/hr over 30 Minutes " Intravenous ONCE 05/04/23 1524 05/04/23 1608          4. Is initial hypotension present?     No (IV fluid bolus NOT required). IV Fluid volume administered: 1L                     Severe Sepsis reassessment:  1. Repeat Lactic Acid Level within 6 hours of time zero: pending  2. MAP>65 after initial IVF bolus, will continue to monitor fluid status and vital signs              Results for orders placed or performed during the hospital encounter of 05/04/23 (from the past 24 hour(s))   CBC with platelets, differential    Narrative    The following orders were created for panel order CBC with platelets, differential.  Procedure                               Abnormality         Status                     ---------                               -----------         ------                     CBC with platelets and d...[[id number]]  Abnormal            Final result                 Please view results for these tests on the individual orders.   Basic metabolic panel   Result Value Ref Range    Sodium 134 (L) 136 - 145 mmol/L    Potassium 5.4 (H) 3.4 - 5.3 mmol/L    Chloride 98 98 - 107 mmol/L    Carbon Dioxide (CO2) 19 (L) 22 - 29 mmol/L    Anion Gap 17 (H) 7 - 15 mmol/L    Urea Nitrogen 26.6 (H) 8.0 - 23.0 mg/dL    Creatinine 1.11 0.67 - 1.17 mg/dL    Calcium 9.5 8.8 - 10.2 mg/dL    Glucose 356 (H) 70 - 99 mg/dL    GFR Estimate 69 >60 mL/min/1.73m2   Ward Draw    Narrative    The following orders were created for panel order Ward Draw.  Procedure                               Abnormality         Status                     ---------                               -----------         ------                     Extra Blue Top Tube[[id number]]                              Final result               Extra Red Top Tube[[id number]]                               Final result                 Please view results for these tests on the individual orders.   CBC with platelets and differential   Result Value Ref Range    WBC Count 26.3  (H) 4.0 - 11.0 10e3/uL    RBC Count 3.27 (L) 4.40 - 5.90 10e6/uL    Hemoglobin 8.2 (L) 13.3 - 17.7 g/dL    Hematocrit 26.1 (L) 40.0 - 53.0 %    MCV 80 78 - 100 fL    MCH 25.1 (L) 26.5 - 33.0 pg    MCHC 31.4 (L) 31.5 - 36.5 g/dL    RDW 19.4 (H) 10.0 - 15.0 %    Platelet Count 588 (H) 150 - 450 10e3/uL    % Neutrophils 88 %    % Lymphocytes 5 %    % Monocytes 6 %    % Eosinophils 0 %    % Basophils 0 %    % Immature Granulocytes 1 %    NRBCs per 100 WBC 0 <1 /100    Absolute Neutrophils 23.0 (H) 1.6 - 8.3 10e3/uL    Absolute Lymphocytes 1.3 0.8 - 5.3 10e3/uL    Absolute Monocytes 1.5 (H) 0.0 - 1.3 10e3/uL    Absolute Eosinophils 0.1 0.0 - 0.7 10e3/uL    Absolute Basophils 0.0 0.0 - 0.2 10e3/uL    Absolute Immature Granulocytes 0.4 <=0.4 10e3/uL    Absolute NRBCs 0.0 10e3/uL   Extra Blue Top Tube   Result Value Ref Range    Hold Specimen JIC    Extra Red Top Tube   Result Value Ref Range    Hold Specimen JIC    Lactic acid whole blood   Result Value Ref Range    Lactic Acid 3.2 (H) 0.7 - 2.0 mmol/L   US MARKY Doppler - No Exercise    Narrative    US MARKY DOPPLER NO EXERCISE, 1-2 LEVELS, BILATERAL   5/4/2023 4:23 PM     HISTORY: Diabetic foot ulceration with concern for osteomyelitis.    COMPARISON: None.    FINDINGS:  Right MARKY:   PT: 1.09.  DP: 1.19    Left MARKY:   PT: 0.78.  DP: 1.1     Waveforms: Biphasic and triphasic in the right posterior tibial, right  dorsalis pedis and left dorsalis pedis. Monophasic in the left  posterior tibial.      Impression    IMPRESSION:  1. Normal ABIs bilaterally.    MARKY CRITERIA:  >1.4 NC  0.95-1.4 Normal  0.90 - 0.94 Mild  0.5 - 0.89 Moderate  0.2 - 0.49 Severe  <0.2 Critical    KAITY TREJO DO         SYSTEM ID:  [id number]       Medications   vancomycin (VANCOCIN) 1,500 mg in sodium chloride 0.9 % 250 mL intermittent infusion (1,500 mg Intravenous $New Bag 5/4/23 3375)   vancomycin (VANCOCIN) 1000 mg in dextrose 5% 200 mL PREMIX (has no administration in time range)    acetaminophen (TYLENOL) tablet 650 mg (650 mg Oral $Given 5/4/23 1403)   piperacillin-tazobactam (ZOSYN) intermittent infusion 4.5 g (4.5 g Intravenous $New Bag 5/4/23 1538)   0.9% sodium chloride BOLUS (1,000 mLs Intravenous $New Bag 5/4/23 1537)       Assessments & Plan (with Medical Decision Making)   77 year old male with past medical history notable for insulin dependent DM2, recent right femur fracture s/p ORIF, with worsening wound on left heal concerning for acute cellulitis and osteomyelitis.     Temp of 100.8 on arrival. . Patient had work-up started prior to I assigned myself to patient. Lactic acid of 3.2 and leukocytosis of 26.3 w/ left shift, blood cultures pending. Elevated gap acidosis likely due to elevated lactic acid.     Patent has no somatic complaints. Tachycardia resolved w/out intervention. IV fluids given. Exam concerning for sepsis from soft tissue source and probable early osteomyelitis. Empiric zosyn and vancomycin ordered.     Will require admission for parental Abx. Case discussed with Dr. Flores with podiatry. Requesting MARKY and MR of foot to better evaluate. Repeat lactic acid pending. Patient currently in MR. Case discussed with Dr. Wooten who accepted admission. Transition orders placed.         I have reviewed the nursing notes.         New Prescriptions    No medications on file       Final diagnoses:   Sepsis with acute organ dysfunction without septic shock, due to unspecified organism, unspecified type (H)   Diabetic ulcer of left heel associated with type 2 diabetes mellitus, unspecified ulcer stage (H)     Juan Jack MD    5/4/2023   [facility] EMERGENCY DEPT    Disclaimer: This note consists of words and symbols derived from keyboarding and dictation using voice recognition software.  As a result, there may be errors that have gone undetected.  Please consider this when interpreting information found in this note.             Juan Jack  MD JENNA  05/04/23 [id number]

## 2023-05-04 NOTE — PHARMACY-ADMISSION MEDICATION HISTORY
Medication Scribe Admission Medication History    Admission medication history is complete. The information provided in this note is only as accurate as the sources available at the time of the update.    Medication reconciliation/reorder completed by provider prior to medication history? No    Information Source(s): Patient via in-person    Pertinent Information: Patient states the last time he checked his blood glucose at home was 8-9 years ago.    Changes made to PTA medication list:    Added: Aleve 220 mg    Deleted: None    Changed: None    Medication Affordability:  Not including over the counter (OTC) medications, was there a time in the past 12 months when you did not take your medications as prescribed because of cost?: No    Allergies reviewed with patient and updates made in EHR: yes    Medication History Completed By: Carla Villatoro 5/4/2023 5:12 PM    Prior to Admission medications    Medication Sig Last Dose Taking? Auth Provider Long Term End Date   aspirin (ASA) 325 MG EC tablet Take 325 mg by mouth daily 5/4/2023 at am Yes Reported, Patient     ciprofloxacin (CIPRO) 500 MG tablet Take 1 tablet (500 mg) by mouth 2 times daily for 10 days 5/4/2023 at am 1st dose Yes Sandra Low APRN CNP  5/13/23   dulaglutide (TRULICITY) 0.75 MG/0.5ML pen Inject 0.75 mg Subcutaneous every 7 days new, not on yet at new Yes Sandra Low APRN CNP     gabapentin (NEURONTIN) 300 MG capsule Take 1 capsule (300 mg) by mouth 3 times daily 5/4/2023 at 1100 2nd dose Yes Sandra Low APRN CNP Yes    insulin glargine (LANTUS PEN) 100 UNIT/ML pen Inject 10 Units Subcutaneous every morning 5/4/2023 at am Yes Sandra Low APRN CNP Yes    insulin pen needle (B-D U/F) 31G X 5 MM miscellaneous Use 4 pen needles daily or as directed. 5/4/2023 Yes Sandra Low APRN CNP     latanoprost (XALATAN) 0.005 % ophthalmic solution Place 1 drop into both eyes At Bedtime 5/3/2023 at hs not with today Yes  Reported, Patient     metFORMIN (GLUCOPHAGE) 500 MG tablet Take 1 tablet (500 mg) by mouth 2 times daily (with meals) 5/4/2023 at am Yes Sandra Low APRN CNP Yes    naproxen sodium (ANAPROX) 220 MG tablet Take 220 mg by mouth 2 times daily (with meals) 5/4/2023 at am Yes Reported, Patient     simvastatin (ZOCOR) 40 MG tablet TAKE ONE TABLET BY MOUTH EVERY NIGHT AT BEDTIME 5/3/2023 at hs Yes Sandra Low APRN CNP Yes    vitamin D3 (CHOLECALCIFEROL) 50 mcg (2000 units) tablet Take 1 tablet by mouth daily 5/4/2023 at am Yes Reported, Patient     blood glucose (NO BRAND SPECIFIED) lancets standard Use to test blood sugar 1 times daily or as directed. not doing  Sandra Low APRN CNP     blood glucose (NO BRAND SPECIFIED) test strip Use to test blood sugar 4 times daily or as directed. not doing  Sandra Low APRN CNP     blood glucose calibration (NO BRAND SPECIFIED) solution Use to calibrate blood glucose monitor as needed as directed. not doing  Sandra Low APRN CNP     blood glucose monitoring (NO BRAND SPECIFIED) meter device kit Use to test blood sugar 1 times daily or as directed. not doing  Sandra Low APRN CNP

## 2023-05-04 NOTE — ED TRIAGE NOTES
Pt sent by primary provider for eval of foot wound. Pt states that he is diabetic, wound has been evaluated by primary provider. Pt states that he is to have surgery on the foot/wound next week.      Triage Assessment     Row Name 05/04/23 5852       Triage Assessment (Adult)    Airway WDL WDL       Respiratory WDL    Respiratory WDL WDL       Skin Circulation/Temperature WDL    Skin Circulation/Temperature WDL WDL       Cardiac WDL    Cardiac WDL WDL       Peripheral/Neurovascular WDL    Peripheral Neurovascular WDL WDL       Cognitive/Neuro/Behavioral WDL    Cognitive/Neuro/Behavioral WDL WDL

## 2023-05-04 NOTE — TELEPHONE ENCOUNTER
----- Message from Paul Flores DPM sent at 5/4/2023  9:28 AM CDT -----  Sandra,    Given the WBC and CRP values, I would recommend that he go to the ER for further evaluation and IV antibiotics.    Brayan  ----- Message -----  From: Sandra Low APRN CNP  Sent: 5/2/2023   4:53 PM CDT  To: Paul Flores DPM    Just FYI- this carlton has WBC of 29, CRP of 156 and necrotic pressure ulcer- Xray shows probable osteomyelitis if you can get him in sooner for plan for treatment. Hx of diabetes and is a tobacco user. He has appt 5/10.

## 2023-05-04 NOTE — PHARMACY-VANCOMYCIN DOSING SERVICE
"Pharmacy Vancomycin Initial Note  Date of Service May 4, 2023  Patient's  1945  77 year old, male    Indication: Skin and Soft Tissue Infection    Current estimated CrCl = Estimated Creatinine Clearance: 48 mL/min (based on SCr of 1.11 mg/dL).    Creatinine for last 3 days  2023: 11:41 AM Creatinine 1.17 mg/dL  2023:  1:59 PM Creatinine 1.11 mg/dL    Recent Vancomycin Level(s) for last 3 days  No results found for requested labs within last 3 days.      Vancomycin IV Administrations (past 72 hours)      No vancomycin orders with administrations in past 72 hours.                Nephrotoxins and other renal medications (From now, onward)    Start     Dose/Rate Route Frequency Ordered Stop    23 1600  vancomycin (VANCOCIN) 1000 mg in dextrose 5% 200 mL PREMIX         1,000 mg  200 mL/hr over 1 Hours Intravenous EVERY 24 HOURS 23 1531      23 1535  vancomycin (VANCOCIN) 1,500 mg in sodium chloride 0.9 % 250 mL intermittent infusion         1,500 mg  over 90 Minutes Intravenous ONCE 23 1531      23 1525  piperacillin-tazobactam (ZOSYN) intermittent infusion 4.5 g        Note to Pharmacy: For [facility], [facility] and [facility]: For Zosyn-naive patients, use the \"Zosyn initial dose + extended infusion\" order panel.    4.5 g  200 mL/hr over 30 Minutes Intravenous ONCE 23 1524            Contrast Orders - past 72 hours (72h ago, onward)    None          [employer] Prediction of Planned Initial Vancomycin Regimen  Loading dose: 1500 mg at 16:00 2023.  Regimen: 1000 mg IV every 24 hours.  Start time: 15:30 on 2023  Exposure target: AUC24 (range)400-600 mg/L.hr   AUC24,ss: 449 mg/L.hr  Probability of AUC24 > 400: 63 %  Ctrough,ss: 13.2 mg/L  Probability of Ctrough,ss > 20: 15 %  Probability of nephrotoxicity (Lodise ALAYNA ): 8 %          Plan:  1. Start vancomycin  1500 mg IV X1, then 1000 mg Q24 h starting 23 @ 1600  2. Vancomycin monitoring method: AUC  3. Vancomycin " therapeutic monitoring goal: 400-600 mg*h/L  4. Pharmacy will check vancomycin levels as appropriate in 1-3 Days.    5. Serum creatinine levels will be ordered daily for the first week of therapy and at least twice weekly for subsequent weeks.      Maribel Vargas RPH

## 2023-05-04 NOTE — ED NOTES
"[facility]   Admission Handoff    The patient is Roddy Sidhu, 77 year old who arrived in the ED by CAR from home with a complaint of Foot Pain  . The patient's current symptoms are an exacerbation of a chronic illness and during this time the symptoms have increased. In the ED, patient was diagnosed with   Final diagnoses:   Sepsis with acute organ dysfunction without septic shock, due to unspecified organism, unspecified type (H)   Diabetic ulcer of left heel associated with type 2 diabetes mellitus, unspecified ulcer stage (H)         Needed?: No    Allergies:  No Known Allergies    Past Medical Hx: No past medical history on file.    Initial vitals were: BP: 105/64  Pulse: (!) 133  Temp: (!) 100.8  F (38.2  C)  Resp: 18  Height: 176.5 cm (5' 9.5\")  Weight: 59 kg (130 lb)  SpO2: 100 %   Recent vital Signs: /50   Pulse 86   Temp (!) 100.8  F (38.2  C) (Tympanic)   Resp 18   Ht 1.765 m (5' 9.5\")   Wt 59 kg (130 lb)   SpO2 99%   BMI 18.92 kg/m      Elimination Status: Continent: wears briefs     Activity Level: SBA w/ walker    Fall Status: Reason for falls risk:  Mobility and Reason for falls risk: Elimination  bed/chair alarm on, nonskid shoes/slippers when out of bed, arm band in place, patient and family education, assistive device/personal items within reach, activity supervised, and mobility aid in reach    Baseline Mental status: WDL  Current Mental Status changes: at basesline    Infection present or suspected this encounter: yes skin/wound/contact  Sepsis suspected: No    Isolation type: none    Bariatric equipment needed?: No    In the ED these meds were given:   Medications   vancomycin (VANCOCIN) 1000 mg in dextrose 5% 200 mL PREMIX (has no administration in time range)   acetaminophen (TYLENOL) tablet 650 mg (650 mg Oral $Given 5/4/23 6532)   piperacillin-tazobactam (ZOSYN) intermittent infusion 4.5 g (4.5 g Intravenous $New Bag 5/4/23 4754) "   vancomycin (VANCOCIN) 1,500 mg in sodium chloride 0.9 % 250 mL intermittent infusion (1,500 mg Intravenous $New Bag 5/4/23 1652)   0.9% sodium chloride BOLUS (1,000 mLs Intravenous $New Bag 5/4/23 1537)   gadobutrol (GADAVIST) injection 6 mL (6 mLs Intravenous $Given 5/4/23 1725)       Drips running?  No    Home pump  No    Current LDAs: Peripheral IV: Site right arm; Gauge 20 normal saline     Results:   Labs/Imaging  Ordered and Resulted from Time of ED Arrival Up to the Time of Departure from the ED  Results for orders placed or performed during the hospital encounter of 05/04/23 (from the past 24 hour(s))   CBC with platelets, differential    Narrative    The following orders were created for panel order CBC with platelets, differential.  Procedure                               Abnormality         Status                     ---------                               -----------         ------                     CBC with platelets and d...[[id number]]  Abnormal            Final result                 Please view results for these tests on the individual orders.   Basic metabolic panel   Result Value Ref Range    Sodium 134 (L) 136 - 145 mmol/L    Potassium 5.4 (H) 3.4 - 5.3 mmol/L    Chloride 98 98 - 107 mmol/L    Carbon Dioxide (CO2) 19 (L) 22 - 29 mmol/L    Anion Gap 17 (H) 7 - 15 mmol/L    Urea Nitrogen 26.6 (H) 8.0 - 23.0 mg/dL    Creatinine 1.11 0.67 - 1.17 mg/dL    Calcium 9.5 8.8 - 10.2 mg/dL    Glucose 356 (H) 70 - 99 mg/dL    GFR Estimate 69 >60 mL/min/1.73m2   Mobile Draw    Narrative    The following orders were created for panel order Mobile Draw.  Procedure                               Abnormality         Status                     ---------                               -----------         ------                     Extra Blue Top Tube[[id number]]                              Final result               Extra Red Top Tube[[id number]]                               Final result                 Please  view results for these tests on the individual orders.   CBC with platelets and differential   Result Value Ref Range    WBC Count 26.3 (H) 4.0 - 11.0 10e3/uL    RBC Count 3.27 (L) 4.40 - 5.90 10e6/uL    Hemoglobin 8.2 (L) 13.3 - 17.7 g/dL    Hematocrit 26.1 (L) 40.0 - 53.0 %    MCV 80 78 - 100 fL    MCH 25.1 (L) 26.5 - 33.0 pg    MCHC 31.4 (L) 31.5 - 36.5 g/dL    RDW 19.4 (H) 10.0 - 15.0 %    Platelet Count 588 (H) 150 - 450 10e3/uL    % Neutrophils 88 %    % Lymphocytes 5 %    % Monocytes 6 %    % Eosinophils 0 %    % Basophils 0 %    % Immature Granulocytes 1 %    NRBCs per 100 WBC 0 <1 /100    Absolute Neutrophils 23.0 (H) 1.6 - 8.3 10e3/uL    Absolute Lymphocytes 1.3 0.8 - 5.3 10e3/uL    Absolute Monocytes 1.5 (H) 0.0 - 1.3 10e3/uL    Absolute Eosinophils 0.1 0.0 - 0.7 10e3/uL    Absolute Basophils 0.0 0.0 - 0.2 10e3/uL    Absolute Immature Granulocytes 0.4 <=0.4 10e3/uL    Absolute NRBCs 0.0 10e3/uL   Extra Blue Top Tube   Result Value Ref Range    Hold Specimen JIC    Extra Red Top Tube   Result Value Ref Range    Hold Specimen JIC    Lactic acid whole blood   Result Value Ref Range    Lactic Acid 3.2 (H) 0.7 - 2.0 mmol/L   US MARKY Doppler - No Exercise    Narrative    US MARKY DOPPLER NO EXERCISE, 1-2 LEVELS, BILATERAL   5/4/2023 4:23 PM     HISTORY: Diabetic foot ulceration with concern for osteomyelitis.    COMPARISON: None.    FINDINGS:  Right MARKY:   PT: 1.09.  DP: 1.19    Left MARKY:   PT: 0.78.  DP: 1.1     Waveforms: Biphasic and triphasic in the right posterior tibial, right  dorsalis pedis and left dorsalis pedis. Monophasic in the left  posterior tibial.      Impression    IMPRESSION:  1. Normal ABIs bilaterally.    MARKY CRITERIA:  >1.4 NC  0.95-1.4 Normal  0.90 - 0.94 Mild  0.5 - 0.89 Moderate  0.2 - 0.49 Severe  <0.2 Critical    KAITY TREJO DO         SYSTEM ID:  [id number]   MR Foot Left w/o & w Contrast    Narrative    EXAM: MR FOOT LEFT W/O and W CONTRAST  LOCATION: [facility]  DATE/TIME: 5/4/2023 5:58 PM CDT    INDICATION: Concern for osteomyelitis of left calcaneus.  COMPARISON: None.  TECHNIQUE: Routine. Additional postgadolinium T1 sequences were obtained.  IV CONTRAST: 6 mL Gadavist    FINDINGS:     JOINTS AND BONES:   -No evidence for fracture. There is some reactive T2 signal abnormality within the posteroinferior calcaneus but no specific evidence for osteomyelitis. No additional areas worrisome for osteomyelitis are identified. No significant effusion to suggest   septic arthropathy.    TENDONS:   -Mild Achilles tendinopathy without tearing. The peroneal tendons are intact. The flexor tendons are intact. The extensor tendons are intact.     LIGAMENTS:   -The anterior and posterior talofibular ligaments are intact. The calcaneofibular ligament is intact. The deltoid ligamentous complex is intact.    MUSCLES AND SOFT TISSUES:   -There is an ulceration along the posterior aspect of the foot with some surrounding edema or cellulitis. There is no evidence for abscess.      Impression    IMPRESSION:  1.  No evidence for osteomyelitis, septic arthropathy, or abscess.  2.  Edema or cellulitis along the posterior aspect of the foot. There is apparent soft tissue irregularity and likely ulceration along the posterior aspect of the foot and there is susceptibly artifact suggestive of air tracking into the subcutaneous   soft tissues.  3.  No evidence for fracture.  4.  Mild reactive edema within the posteroinferior calcaneus.  5.  Mild Achilles tendinopathy without tearing.       For the majority of the shift this patient's behavior was Green     Cardiac Rhythm: Normal Sinus  Pt needs tele? No  Skin/wound Issues:  left heel, right heel    Code Status: Not addressed    Pain control: good    Nausea control: pt had none    Abnormal labs/tests/findings requiring intervention: WBC    Patient tested for COVID 19 prior to admission: NO     OBS brochure/video discussed/provided to  patient/family: Yes     Family present during ED course? Yes     Family Comments/Social Situation comments: None    Tasks needing completion: None    Dulce John RN

## 2023-05-05 ENCOUNTER — APPOINTMENT (OUTPATIENT)
Dept: WOUND CARE | Facility: CLINIC | Age: 78
DRG: 855 | End: 2023-05-05
Payer: COMMERCIAL

## 2023-05-05 LAB
ABO/RH(D): NORMAL
ANION GAP SERPL CALCULATED.3IONS-SCNC: 8 MMOL/L (ref 7–15)
ANTIBODY SCREEN: NEGATIVE
BASOPHILS # BLD AUTO: 0 10E3/UL (ref 0–0.2)
BASOPHILS NFR BLD AUTO: 0 %
BLD PROD TYP BPU: NORMAL
BLOOD COMPONENT TYPE: NORMAL
BUN SERPL-MCNC: 23.1 MG/DL (ref 8–23)
CALCIUM SERPL-MCNC: 8.3 MG/DL (ref 8.8–10.2)
CHLORIDE SERPL-SCNC: 104 MMOL/L (ref 98–107)
CODING SYSTEM: NORMAL
CREAT SERPL-MCNC: 1.07 MG/DL (ref 0.67–1.17)
CROSSMATCH: NORMAL
CRP SERPL-MCNC: 129.66 MG/L
DEPRECATED HCO3 PLAS-SCNC: 24 MMOL/L (ref 22–29)
EOSINOPHIL # BLD AUTO: 0.2 10E3/UL (ref 0–0.7)
EOSINOPHIL NFR BLD AUTO: 1 %
ERYTHROCYTE [DISTWIDTH] IN BLOOD BY AUTOMATED COUNT: 19.6 % (ref 10–15)
FERRITIN SERPL-MCNC: 197 NG/ML (ref 31–409)
GFR SERPL CREATININE-BSD FRML MDRD: 72 ML/MIN/1.73M2
GLUCOSE BLDC GLUCOMTR-MCNC: 155 MG/DL (ref 70–99)
GLUCOSE BLDC GLUCOMTR-MCNC: 187 MG/DL (ref 70–99)
GLUCOSE BLDC GLUCOMTR-MCNC: 188 MG/DL (ref 70–99)
GLUCOSE BLDC GLUCOMTR-MCNC: 195 MG/DL (ref 70–99)
GLUCOSE BLDC GLUCOMTR-MCNC: 230 MG/DL (ref 70–99)
GLUCOSE BLDC GLUCOMTR-MCNC: 232 MG/DL (ref 70–99)
GLUCOSE SERPL-MCNC: 232 MG/DL (ref 70–99)
HCT VFR BLD AUTO: 22.4 % (ref 40–53)
HGB BLD-MCNC: 6.7 G/DL (ref 13.3–17.7)
HGB BLD-MCNC: 6.9 G/DL (ref 13.3–17.7)
HGB BLD-MCNC: 8.4 G/DL (ref 13.3–17.7)
IMM GRANULOCYTES # BLD: 0.2 10E3/UL
IMM GRANULOCYTES NFR BLD: 1 %
IRON BINDING CAPACITY (ROCHE): 157 UG/DL (ref 240–430)
IRON SATN MFR SERPL: 10 % (ref 15–46)
IRON SERPL-MCNC: 15 UG/DL (ref 61–157)
ISSUE DATE AND TIME: NORMAL
LYMPHOCYTES # BLD AUTO: 1.7 10E3/UL (ref 0.8–5.3)
LYMPHOCYTES NFR BLD AUTO: 10 %
MCH RBC QN AUTO: 24.3 PG (ref 26.5–33)
MCHC RBC AUTO-ENTMCNC: 29.9 G/DL (ref 31.5–36.5)
MCV RBC AUTO: 81 FL (ref 78–100)
MONOCYTES # BLD AUTO: 1.1 10E3/UL (ref 0–1.3)
MONOCYTES NFR BLD AUTO: 6 %
NEUTROPHILS # BLD AUTO: 13.9 10E3/UL (ref 1.6–8.3)
NEUTROPHILS NFR BLD AUTO: 82 %
NRBC # BLD AUTO: 0 10E3/UL
NRBC BLD AUTO-RTO: 0 /100
PLATELET # BLD AUTO: 488 10E3/UL (ref 150–450)
POTASSIUM SERPL-SCNC: 4.4 MMOL/L (ref 3.4–5.3)
RBC # BLD AUTO: 2.76 10E6/UL (ref 4.4–5.9)
SODIUM SERPL-SCNC: 136 MMOL/L (ref 136–145)
SPECIMEN EXPIRATION DATE: NORMAL
UNIT ABO/RH: NORMAL
UNIT NUMBER: NORMAL
UNIT STATUS: NORMAL
UNIT TYPE ISBT: 7300
WBC # BLD AUTO: 17.1 10E3/UL (ref 4–11)

## 2023-05-05 PROCEDURE — 83550 IRON BINDING TEST: CPT | Performed by: FAMILY MEDICINE

## 2023-05-05 PROCEDURE — 85025 COMPLETE CBC W/AUTO DIFF WBC: CPT | Performed by: FAMILY MEDICINE

## 2023-05-05 PROCEDURE — 80048 BASIC METABOLIC PNL TOTAL CA: CPT | Performed by: FAMILY MEDICINE

## 2023-05-05 PROCEDURE — 86923 COMPATIBILITY TEST ELECTRIC: CPT

## 2023-05-05 PROCEDURE — 85018 HEMOGLOBIN: CPT | Performed by: FAMILY MEDICINE

## 2023-05-05 PROCEDURE — 250N000011 HC RX IP 250 OP 636: Performed by: FAMILY MEDICINE

## 2023-05-05 PROCEDURE — 86850 RBC ANTIBODY SCREEN: CPT

## 2023-05-05 PROCEDURE — 87077 CULTURE AEROBIC IDENTIFY: CPT | Performed by: PODIATRIST

## 2023-05-05 PROCEDURE — 250N000012 HC RX MED GY IP 250 OP 636 PS 637: Performed by: FAMILY MEDICINE

## 2023-05-05 PROCEDURE — 250N000013 HC RX MED GY IP 250 OP 250 PS 637: Performed by: FAMILY MEDICINE

## 2023-05-05 PROCEDURE — 85018 HEMOGLOBIN: CPT

## 2023-05-05 PROCEDURE — 120N000001 HC R&B MED SURG/OB

## 2023-05-05 PROCEDURE — 87205 SMEAR GRAM STAIN: CPT | Performed by: PODIATRIST

## 2023-05-05 PROCEDURE — 99223 1ST HOSP IP/OBS HIGH 75: CPT | Performed by: PODIATRIST

## 2023-05-05 PROCEDURE — 86140 C-REACTIVE PROTEIN: CPT | Performed by: FAMILY MEDICINE

## 2023-05-05 PROCEDURE — 36415 COLL VENOUS BLD VENIPUNCTURE: CPT | Performed by: FAMILY MEDICINE

## 2023-05-05 PROCEDURE — 99232 SBSQ HOSP IP/OBS MODERATE 35: CPT | Performed by: FAMILY MEDICINE

## 2023-05-05 PROCEDURE — 36415 COLL VENOUS BLD VENIPUNCTURE: CPT

## 2023-05-05 PROCEDURE — G0463 HOSPITAL OUTPT CLINIC VISIT: HCPCS

## 2023-05-05 PROCEDURE — P9016 RBC LEUKOCYTES REDUCED: HCPCS

## 2023-05-05 PROCEDURE — 82728 ASSAY OF FERRITIN: CPT | Performed by: FAMILY MEDICINE

## 2023-05-05 RX ORDER — PANTOPRAZOLE SODIUM 40 MG/1
40 TABLET, DELAYED RELEASE ORAL 2 TIMES DAILY
Status: DISCONTINUED | OUTPATIENT
Start: 2023-05-05 | End: 2023-05-11

## 2023-05-05 RX ORDER — NALOXONE HYDROCHLORIDE 0.4 MG/ML
0.4 INJECTION, SOLUTION INTRAMUSCULAR; INTRAVENOUS; SUBCUTANEOUS
Status: DISCONTINUED | OUTPATIENT
Start: 2023-05-05 | End: 2023-05-13 | Stop reason: HOSPADM

## 2023-05-05 RX ORDER — NALOXONE HYDROCHLORIDE 0.4 MG/ML
0.2 INJECTION, SOLUTION INTRAMUSCULAR; INTRAVENOUS; SUBCUTANEOUS
Status: DISCONTINUED | OUTPATIENT
Start: 2023-05-05 | End: 2023-05-13 | Stop reason: HOSPADM

## 2023-05-05 RX ORDER — OXYCODONE HYDROCHLORIDE 5 MG/1
5 TABLET ORAL EVERY 4 HOURS PRN
Status: DISCONTINUED | OUTPATIENT
Start: 2023-05-05 | End: 2023-05-13 | Stop reason: HOSPADM

## 2023-05-05 RX ADMIN — ACETAMINOPHEN 650 MG: 325 TABLET ORAL at 18:12

## 2023-05-05 RX ADMIN — INSULIN ASPART 1 UNITS: 100 INJECTION, SOLUTION INTRAVENOUS; SUBCUTANEOUS at 09:29

## 2023-05-05 RX ADMIN — TAZOBACTAM SODIUM AND PIPERACILLIN SODIUM 4.5 G: 500; 4 INJECTION, SOLUTION INTRAVENOUS at 18:12

## 2023-05-05 RX ADMIN — TAZOBACTAM SODIUM AND PIPERACILLIN SODIUM 4.5 G: 500; 4 INJECTION, SOLUTION INTRAVENOUS at 03:24

## 2023-05-05 RX ADMIN — INSULIN ASPART 1 UNITS: 100 INJECTION, SOLUTION INTRAVENOUS; SUBCUTANEOUS at 17:37

## 2023-05-05 RX ADMIN — INSULIN GLARGINE 10 UNITS: 100 INJECTION, SOLUTION SUBCUTANEOUS at 10:05

## 2023-05-05 RX ADMIN — PANTOPRAZOLE SODIUM 40 MG: 40 TABLET, DELAYED RELEASE ORAL at 21:31

## 2023-05-05 RX ADMIN — GABAPENTIN 300 MG: 300 CAPSULE ORAL at 21:31

## 2023-05-05 RX ADMIN — LATANOPROST 1 DROP: 50 SOLUTION OPHTHALMIC at 22:55

## 2023-05-05 RX ADMIN — CELECOXIB 100 MG: 100 CAPSULE ORAL at 09:28

## 2023-05-05 RX ADMIN — TAZOBACTAM SODIUM AND PIPERACILLIN SODIUM 4.5 G: 500; 4 INJECTION, SOLUTION INTRAVENOUS at 12:17

## 2023-05-05 RX ADMIN — ACETAMINOPHEN 650 MG: 325 TABLET ORAL at 04:33

## 2023-05-05 RX ADMIN — PANTOPRAZOLE SODIUM 40 MG: 40 TABLET, DELAYED RELEASE ORAL at 11:10

## 2023-05-05 RX ADMIN — GABAPENTIN 300 MG: 300 CAPSULE ORAL at 09:28

## 2023-05-05 RX ADMIN — GABAPENTIN 300 MG: 300 CAPSULE ORAL at 14:10

## 2023-05-05 RX ADMIN — SIMVASTATIN 40 MG: 40 TABLET, FILM COATED ORAL at 22:54

## 2023-05-05 RX ADMIN — TAZOBACTAM SODIUM AND PIPERACILLIN SODIUM 4.5 G: 500; 4 INJECTION, SOLUTION INTRAVENOUS at 23:24

## 2023-05-05 RX ADMIN — VANCOMYCIN HYDROCHLORIDE 1000 MG: 1 INJECTION, SOLUTION INTRAVENOUS at 16:32

## 2023-05-05 ASSESSMENT — ACTIVITIES OF DAILY LIVING (ADL)
ADLS_ACUITY_SCORE: 34
ADLS_ACUITY_SCORE: 38
ADLS_ACUITY_SCORE: 34

## 2023-05-05 NOTE — H&P
[facility]    History and Physical - Hospitalist Service       Date of Admission:  5/4/2023    Assessment & Plan      Roddy Sidhu is a 77 year old male admitted on 5/4/2023 for left heel diabetic ulcer. He had sepsis which resolved in the emergency department with goal-directed therapy.  He will be admitted for antibiotics and podiatry consult.    Principal Problem:    Infected diabetic ulcer of left heel associated with type 2 diabetes mellitus (H)  Active Problems:    Type 2 diabetes mellitus with diabetic polyneuropathy, with long-term current use of insulin (H)    Microalbuminuria due to type 2 diabetes mellitus (H)    Sepsis with acute organ dysfunction without septic shock, due to unspecified organism, unspecified type (H)    Diabetic ulcer of right heel associated with type 2 diabetes mellitus without infection (H)      Infected Diabetic ulcer of left heel associated with type 2 diabetes mellitus, unspecified ulcer stage (H)   Additional diabetic ulcer of right heel without infection  -The patient recently was diagnosed with osteomyelitis based on an x-ray, but this is not supported by the MRI performed today  -Continue Zosyn which was started in the emergency department  -Vancomycin was started in the emergency department.  Although it is nonpurulent his recent hospitalization and admission to a long-term care facility are risk factors which warrant continued vancomycin further evaluation by podiatry   -Float BOTH heels.  I stressed the importance of this with the patient  -Podiatry consult  -Start celecoxib for pain, which will not affect platelet aggregation in the perioperative period, nor will it interfere with the efficacy of his aspirin for ASCVD prevention      Type 2 diabetes mellitus with diabetic polyneuropathy, with long-term current use of insulin (H)  Hemoglobin A1C   Date/Time Value Ref Range Status   05/02/2023 11:41 AM 8.3 (H) 0.0 - 5.6 % Final     Glucose    Date/Time Value Ref Range Status   05/17/2022 07:52  (H) 70 - 99 mg/dL Final   -Primary care intended to start Trulicity but he has not begun this medication yet.  -Continue home Lantus 10 units  -Add low-dose sliding scale  -We will hold off on starting Trulicity until discharge      Microalbuminuria due to type 2 diabetes mellitus (H)  Estimated Creatinine Clearance: 48.7 mL/min (based on SCr of 1.11 mg/dL).   -Not on home ARB or ACE inhibitor, may possibly benefit, defer to PCP      Sepsis with acute organ dysfunction without septic shock, due to unspecified organism, unspecified type (H)  -Resolved in the emergency department       Diet: Combination Diet Regular Diet Adult; Moderate Consistent Carb (60 g CHO per Meal) Diet  NPO per Anesthesia Guidelines for Procedure/Surgery Except for: Meds    DVT Prophylaxis: Plan for heparin but wait until determination on possible surgery is made by podiatry  Polo Catheter: Not present  Lines: None     Cardiac Monitoring: None  Code Status: Full Code      Clinically Significant Risk Factors Present on Admission        # Hyperkalemia: Highest K = 5.4 mmol/L in last 2 days, will monitor as appropriate               # DMII: A1C = 8.3 % (Ref range: 0.0 - 5.6 %) within past 6 months            Disposition Plan      Expected Discharge Date: 05/06/2023                  Carter Wooten MD  Hospitalist Service  [facility]  Securely message with Vputi (more info)  Text page via [employer] Paging/Directory     ______________________________________________________________________    Chief Complaint   Sores on heels    History is obtained from the patient    History of Present Illness   Roddy Sidhu is a 77 year old male who comes at the recommendation of his clinic doctor for evaluation of worsening left diabetic heel infection.  Patient was recently seen 2 days ago and diagnosed with suspected osteomyelitis and started on ciprofloxacin.  He is  only taken a single dose.  He was referred to podiatrist for possible surgery for the osteomyelitis and when primary care called to move up the appointment today, the podiatrist recommended that he come into the emergency department.  He has had some malodorous discharge from the wound.  He has another ulcer on his right heel without infection.  The patient has a suboptimal A1c of 8.3 and is a current every day smoker.  Systemically he feels well and has not had fevers or chills at home, but was febrile in the emergency department with evidence of severe sepsis and was treated accordingly.    He has some pain from the wound.  The patient was recently in a nursing home convalescing from a polytrauma involving right femur, humerus, and clavicle fractures.  He sustained knees in a ground-level fall on March 29.  He is doing well from these conditions but did develop the heel ulcers in the nursing home.      Past Medical History    Pertinent for diabetes, recent multiple right-sided fractures from ground-level fall, diabetic microalbuminuria, tobacco use, hyperlipidemia    Past Surgical History   Past Surgical History:   Procedure Laterality Date     SURGICAL HISTORY OF -   1990    removal of steel from left eye   Recent open reduction internal fixation right femur    Prior to Admission Medications   Prior to Admission Medications   Prescriptions Last Dose Informant Patient Reported? Taking?   aspirin (ASA) 325 MG EC tablet 5/4/2023 at am Self Yes Yes   Sig: Take 325 mg by mouth daily   blood glucose (NO BRAND SPECIFIED) lancets standard not doing Self No No   Sig: Use to test blood sugar 1 times daily or as directed.   blood glucose (NO BRAND SPECIFIED) test strip not doing Self No No   Sig: Use to test blood sugar 4 times daily or as directed.   blood glucose calibration (NO BRAND SPECIFIED) solution not doing Self No No   Sig: Use to calibrate blood glucose monitor as needed as directed.   blood glucose monitoring  (NO BRAND SPECIFIED) meter device kit not doing Self No No   Sig: Use to test blood sugar 1 times daily or as directed.   ciprofloxacin (CIPRO) 500 MG tablet 5/4/2023 at am 1st dose Self No Yes   Sig: Take 1 tablet (500 mg) by mouth 2 times daily for 10 days   dulaglutide (TRULICITY) 0.75 MG/0.5ML pen new, not on yet at new Self No Yes   Sig: Inject 0.75 mg Subcutaneous every 7 days   gabapentin (NEURONTIN) 300 MG capsule 5/4/2023 at 1100 2nd dose Self No Yes   Sig: Take 1 capsule (300 mg) by mouth 3 times daily   insulin glargine (LANTUS PEN) 100 UNIT/ML pen 5/4/2023 at am Self No Yes   Sig: Inject 10 Units Subcutaneous every morning   insulin pen needle (B-D U/F) 31G X 5 MM miscellaneous 5/4/2023 Self No Yes   Sig: Use 4 pen needles daily or as directed.   latanoprost (XALATAN) 0.005 % ophthalmic solution 5/3/2023 at hs not with today Self Yes Yes   Sig: Place 1 drop into both eyes At Bedtime   metFORMIN (GLUCOPHAGE) 500 MG tablet 5/4/2023 at am Self No Yes   Sig: Take 1 tablet (500 mg) by mouth 2 times daily (with meals)   naproxen sodium (ANAPROX) 220 MG tablet 5/4/2023 at am Self Yes Yes   Sig: Take 220 mg by mouth 2 times daily (with meals)   simvastatin (ZOCOR) 40 MG tablet 5/3/2023 at hs Self No Yes   Sig: TAKE ONE TABLET BY MOUTH EVERY NIGHT AT BEDTIME   vitamin D3 (CHOLECALCIFEROL) 50 mcg (2000 units) tablet 5/4/2023 at am Self Yes Yes   Sig: Take 1 tablet by mouth daily      Facility-Administered Medications: None           Physical Exam   Vital Signs: Temp: (!) 100.8  F (38.2  C) Temp src: Tympanic BP: 112/50 Pulse: 75   Resp: 18 SpO2: 99 % O2 Device: None (Room air)    Weight: 130 lbs 0 oz    Physical Exam  Vitals and nursing note reviewed.   Constitutional:       General: He is not in acute distress.  HENT:      Mouth/Throat:      Mouth: Mucous membranes are moist.   Eyes:      General: No scleral icterus.     Conjunctiva/sclera: Conjunctivae normal.   Cardiovascular:      Rate and Rhythm: Normal rate  and regular rhythm.      Pulses: Normal pulses.      Heart sounds: Normal heart sounds.   Pulmonary:      Effort: Pulmonary effort is normal.      Breath sounds: Normal breath sounds.   Abdominal:      General: Abdomen is flat.      Palpations: Abdomen is soft.   Skin:     Comments: Bilateral heel ulcers see below imaging   Neurological:      Mental Status: He is oriented to person, place, and time.      Sensory: Sensory deficit (Bilateral feet) present.   Psychiatric:         Mood and Affect: Mood normal.         Behavior: Behavior normal.         Thought Content: Thought content normal.         Judgment: Judgment normal.           Additional imaging available in the media tab      Medical Decision Making             Data     I have personally reviewed the following data over the past 24 hrs:    26.3 (H)  \   8.2 (L)   / 588 (H)     134 (L) 98 26.6 (H) /  285 (H)   5.4 (H) 19 (L) 1.11 \       Procal: N/A CRP: N/A Lactic Acid: 1.1         Imaging results reviewed over the past 24 hrs:   Recent Results (from the past 24 hour(s))   US MARKY Doppler - No Exercise    Narrative    US MARKY DOPPLER NO EXERCISE, 1-2 LEVELS, BILATERAL   5/4/2023 4:23 PM     HISTORY: Diabetic foot ulceration with concern for osteomyelitis.    COMPARISON: None.    FINDINGS:  Right MARKY:   PT: 1.09.  DP: 1.19    Left MARKY:   PT: 0.78.  DP: 1.1     Waveforms: Biphasic and triphasic in the right posterior tibial, right  dorsalis pedis and left dorsalis pedis. Monophasic in the left  posterior tibial.      Impression    IMPRESSION:  1. Normal ABIs bilaterally.    MARKY CRITERIA:  >1.4 NC  0.95-1.4 Normal  0.90 - 0.94 Mild  0.5 - 0.89 Moderate  0.2 - 0.49 Severe  <0.2 Critical    KAITY TREJO DO         SYSTEM ID:  [id number]   MR Foot Left w/o & w Contrast    Narrative    EXAM: MR FOOT LEFT W/O and W CONTRAST  LOCATION: [facility]  DATE/TIME: 5/4/2023 5:58 PM CDT    INDICATION: Concern for osteomyelitis of left  calcaneus.  COMPARISON: None.  TECHNIQUE: Routine. Additional postgadolinium T1 sequences were obtained.  IV CONTRAST: 6 mL Gadavist    FINDINGS:     JOINTS AND BONES:   -No evidence for fracture. There is some reactive T2 signal abnormality within the posteroinferior calcaneus but no specific evidence for osteomyelitis. No additional areas worrisome for osteomyelitis are identified. No significant effusion to suggest   septic arthropathy.    TENDONS:   -Mild Achilles tendinopathy without tearing. The peroneal tendons are intact. The flexor tendons are intact. The extensor tendons are intact.     LIGAMENTS:   -The anterior and posterior talofibular ligaments are intact. The calcaneofibular ligament is intact. The deltoid ligamentous complex is intact.    MUSCLES AND SOFT TISSUES:   -There is an ulceration along the posterior aspect of the foot with some surrounding edema or cellulitis. There is no evidence for abscess.      Impression    IMPRESSION:  1.  No evidence for osteomyelitis, septic arthropathy, or abscess.  2.  Edema or cellulitis along the posterior aspect of the foot. There is apparent soft tissue irregularity and likely ulceration along the posterior aspect of the foot and there is susceptibly artifact suggestive of air tracking into the subcutaneous   soft tissues.  3.  No evidence for fracture.  4.  Mild reactive edema within the posteroinferior calcaneus.  5.  Mild Achilles tendinopathy without tearing.      yes

## 2023-05-05 NOTE — PROGRESS NOTES
[facility] Nurse Inpatient Assessment     Consulted for: bilateral heel ulcers    Summary: Pt admitted with deep tissue injury on right heel and unstageable pressure injury on left heel. Pt and spouse in disagreement of how long these have been present. Pt's spouse states that they were noted when the pt was admitted to [facility] at the end of March. Pt states they happened at [facility]. This writer unable to find documentation in CareEverywhere of these wounds.    Patient History (according to provider note(s):      Roddy Sidhu is a 77 year old male admitted on 5/4/2023 for left heel diabetic ulcer. He had sepsis which resolved in the emergency department with goal-directed therapy.  He will be admitted for antibiotics and podiatry consult.     Principal Problem:    Infected diabetic ulcer of left heel associated with type 2 diabetes mellitus (H)  Active Problems:    Type 2 diabetes mellitus with diabetic polyneuropathy, with long-term current use of insulin (H)    Microalbuminuria due to type 2 diabetes mellitus (H)    Sepsis with acute organ dysfunction without septic shock, due to unspecified organism, unspecified type (H)    Diabetic ulcer of right heel associated with type 2 diabetes mellitus without infection (H)       Infected Diabetic ulcer of left heel associated with type 2 diabetes mellitus/diabetic neuropathy, unspecified ulcer stage (H)   Additional diabetic ulcer of right heel without infection  -The patient recently was diagnosed with osteomyelitis based on an x-ray, but this is not supported by the MRI performed today  - ABIs done at podiatry's request and were normal.  - CRP  156 on 5/2 - following  -Continue Zosyn which was started in the emergency department  - continue Vancomycin as started in the emergency department.  Although it is nonpurulent his recent hospitalization and admission to a long-term care facility are risk factors which warrant continued  vancomycin further evaluation by podiatry   -Float BOTH heels.  I stressed the importance of this with the patient  -Podiatry consulted an planning to see patient 5/5   - Wound consult requested 5/5 if able.    - reassess antibiotics 5/6 if showing continued improvement.         Sepsis with acute organ dysfunction without septic shock, due to unspecified organism, unspecified type (H)  -Resolved in the emergency department       US MARKY DOPPLER NO EXERCISE, 1-2 LEVELS, BILATERAL   5/4/2023 4:23 PM      HISTORY: Diabetic foot ulceration with concern for osteomyelitis.     COMPARISON: None.     FINDINGS:  Right MARKY:   PT: 1.09.  DP: 1.19     Left MARKY:   PT: 0.78.  DP: 1.1      Waveforms: Biphasic and triphasic in the right posterior tibial, right  dorsalis pedis and left dorsalis pedis. Monophasic in the left  posterior tibial.                                                                      IMPRESSION:  1. Normal ABIs bilaterally.     MARKY CRITERIA:  >1.4 NC  0.95-1.4 Normal  0.90 - 0.94 Mild  0.5 - 0.89 Moderate  0.2 - 0.49 Severe  <0.2 Critical    EXAM: MR FOOT LEFT W/O and W CONTRAST  LOCATION: [facility]  DATE/TIME: 5/4/2023 5:58 PM CDT     INDICATION: Concern for osteomyelitis of left calcaneus.  COMPARISON: None.  TECHNIQUE: Routine. Additional postgadolinium T1 sequences were obtained.  IV CONTRAST: 6 mL Gadavist     FINDINGS:      JOINTS AND BONES:   -No evidence for fracture. There is some reactive T2 signal abnormality within the posteroinferior calcaneus but no specific evidence for osteomyelitis. No additional areas worrisome for osteomyelitis are identified. No significant effusion to suggest   septic arthropathy.     TENDONS:   -Mild Achilles tendinopathy without tearing. The peroneal tendons are intact. The flexor tendons are intact. The extensor tendons are intact.      LIGAMENTS:   -The anterior and posterior talofibular ligaments are intact. The calcaneofibular ligament  is intact. The deltoid ligamentous complex is intact.     MUSCLES AND SOFT TISSUES:   -There is an ulceration along the posterior aspect of the foot with some surrounding edema or cellulitis. There is no evidence for abscess.                                                                      IMPRESSION:  1.  No evidence for osteomyelitis, septic arthropathy, or abscess.  2.  Edema or cellulitis along the posterior aspect of the foot. There is apparent soft tissue irregularity and likely ulceration along the posterior aspect of the foot and there is susceptibly artifact suggestive of air tracking into the subcutaneous   soft tissues.  3.  No evidence for fracture.  4.  Mild reactive edema within the posteroinferior calcaneus.  5.  Mild Achilles tendinopathy without tearing.    Assessment:      Areas visualized during today's visit: bilateral feet     Areas of concern - great toe appears like reabsorbed blood blister; distal 3rd toe appears callused      Area on his great toe measures 3mm x 6mm. Area on his 3rd toe measures 7mm x 7mm.      Pressure Injury Location: Right heel    Last photo: 5/5/23  Wound type: Pressure Injury     Pressure Injury Stage: Deep Tissue Pressure Injury (DTPI), present on admission   Wound history/plan of care:   Unknown as pt/spouse not good historians. Possibly present for the last month.  Wound base: blood-filled bulla     Palpation of the wound bed: boggy      Drainage: not open     Description of drainage: none     Measurements (length x width x depth, in cm)    Overall area of purple discoloration and fluid-filled bulla measures 5.5cm x 3.5cm   Purple discoloration measures 3cm x 3.5cm and bulla measures 3cm x 2.5cm     Tunneling N/A     Undermining N/A  Periwound skin: Dry/scaly      Color: dusky and pink; pink is blanchable      Temperature: normal   Odor: none  Pain: absent and denies , none  Pain intervention prior to dressing change: N/A  Treatment goal: Protection  STATUS:  "initial assessment  Supplies ordered: at bedside, discussed with RN and discussed with patient      Pressure Injury Location: Left heel    Last photo: 5/5/23  Wound type: Pressure Injury     Pressure Injury Stage: Unstageable, present on admission   Wound history/plan of care:   Unknown as pt/spouse not good historians. Possibly present for the last month.    Wound base: majority of wound is relatively firm eschar (does have some \"give\"); surrounding central eschar are areas of fluctuance     Palpation of the wound bed: see above      Drainage: small     Description of drainage: serosanguinous     Measurements (length x width x depth, in cm)   Full area of fluctuance and eschar measures 9cm x 11cm   Eschar measures 7cm x 8.5cm     Tunneling N/A     Undermining N/A  Periwound skin: see photo      Color: see photo      Temperature: normal   Odor: moderate  Pain: sore with palpation by Dr. Riley  Pain intervention prior to dressing change: N/A; cares not performed by this writer  Treatment goal: Keep dry eschar stable to prevent wet gangrene and Protection  STATUS: initial assessment  Supplies ordered: gathered, discussed with RN and discussed with patient     My PI Risk Assessment     Sensory Perception: 3 - Slightly Limited     Moisture: 3 - Occasionally moist      Activity: 2 - Chairfast     Mobility: 2 - Very limited     Nutrition: 2 - Probably inadequate      Friction/Shear: 2 - Potential problem      TOTAL: 14    PT and pedal pulses palpable bilaterally  Swab culture of left heel obtained      Treatment Plan:     Bilateral heel wound(s): BID   Using non-woven gauze, paint both heels with povidone-iodine. Allow this to air dry.   OK to leave the right heel uncovered. If pt is ambulating, may want to cover with a Mepilex Lite 3x3 bandage to help pad/protect (can peel this back, paint area with betadine, allow it to dry, and recover)  For left heel, cover with a 5x9 ABD pad secured with rolled gauze and tape. " "    Pt to use offloading boots when in bed. OK per Dr. Riley for pt to ambulate with post-op shoes. Pt to keep feet elevated when sitting up in recliner (making sure heels are not on the footstool).    Pressure Injury Prevention (PIP) Plan:  If patient is declining pressure injury prevention interventions: Explore reason why and address patient's concerns, Educate on pressure injury risk and prevention intervention(s), If patient is still declining, document \"informed refusal\"  and Ensure Care team is aware ( provider, charge nurse, etc)  Mattress: Follow bed algorithm, reassess daily and order specialty mattress, if indicated.  HOB: Maintain at or below 30 degrees, unless contraindicated  Repositioning in bed: Every 1-2 hours , Left/right positioning; avoid supine and Raise foot of bed prior to raising head of bed, to reduce patient sliding down (shear)  Heels: Keep elevated off mattress and Heel lift boots  Protective Dressing: Sacral Mepilex for prevention (#[id number]),  especially for the agitated patient   Positioning Equipment: Seated Positioning System (SPS)  (#[id number]) Sling must have contact with chair, no pillow or chair cushion beneath if appropriate  Chair positioning: Chair cushion (#[id number]) OR SPS   If patient has a buttock pressure injury, or high risk for PI use chair cushion or SPS.  Moisture Management: Perineal cleansing /protection: Follow Incontinence Protocol and Avoid brief in bed  Under Devices: Inspect skin under all medical devices during skin inspection , Ensure tubes are stabilized without tension and Ensure patient is not lying on medical devices or equipment when repositioned  Ask provider to discontinue device when no longer needed.    Orders: Written    RECOMMEND PRIMARY TEAM ORDER: None, at this time  Education provided: plan of care, Infection prevention  and Off-loading pressure  Discussed plan of care with: Patient, Family, Nurse and Physician  [facility] nurse follow-up plan: " weekly  Notify [facility] if wound(s) deteriorate.  Nursing to notify the Provider(s) and re-consult the [facility] Nurse if new skin concern.    DATA:     Current support surface: Standard  Standard gel/foam mattress (IsoFlex, Atmos air, etc)  Containment of urine/stool: Continent of bladder and Continent of bowel  BMI: Body mass index is 19.22 kg/m .   Active diet order: Orders Placed This Encounter      Clear Liquid Diet     Output: I/O last 3 completed shifts:  In: 1250 [IV Piggyback:1250]  Out: -      Labs: Recent Labs   Lab 05/05/23  1339 05/05/23  0752 05/05/23  0428 05/04/23  1359 05/02/23  1141   HGB 8.4*   < > 6.7*   < > 8.7*   WBC  --   --  17.1*   < > 30.4*   A1C  --   --   --   --  8.3*    < > = values in this interval not displayed.     Pressure injury risk assessment:   Sensory Perception: 2-->very limited  Moisture: 3-->occasionally moist  Activity: 2-->chairfast  Mobility: 2-->very limited  Nutrition: 3-->adequate  Friction and Shear: 2-->potential problem  Kai Score: 14    Laureen King RN, CWOCN  [facility]udi group: [facility] Nurse  Dept. Office Number: [phone]

## 2023-05-05 NOTE — PROGRESS NOTES
[facility] Hospitalist Progress Note           Assessment & Plan        Roddy Sidhu is a 77 year old male admitted on 5/4/2023 for left heel diabetic ulcer. He had sepsis which resolved in the emergency department with goal-directed therapy.  He will be admitted for antibiotics and podiatry consult.     Principal Problem:    Infected diabetic ulcer of left heel associated with type 2 diabetes mellitus (H)  Active Problems:    Type 2 diabetes mellitus with diabetic polyneuropathy, with long-term current use of insulin (H)    Microalbuminuria due to type 2 diabetes mellitus (H)    Sepsis with acute organ dysfunction without septic shock, due to unspecified organism, unspecified type (H)    Diabetic ulcer of right heel associated with type 2 diabetes mellitus without infection (H)       Infected Diabetic ulcer of left heel associated with type 2 diabetes mellitus/diabetic neuropathy, unspecified ulcer stage (H)   Additional diabetic ulcer of right heel without infection  -The patient recently was diagnosed with osteomyelitis based on an x-ray, but this is not supported by the MRI performed today  - ABIs done at podiatry's request and were normal.  - CRP  156 on 5/2 - following  -Continue Zosyn which was started in the emergency department  - continue Vancomycin as started in the emergency department.  Although it is nonpurulent his recent hospitalization and admission to a long-term care facility are risk factors which warrant continued vancomycin further evaluation by podiatry   -Float BOTH heels.  I stressed the importance of this with the patient  -Podiatry consulted an planning to see patient 5/5   - Wound consult requested 5/5 if able.    - reassess antibiotics 5/6 if showing continued improvement.        Sepsis with acute organ dysfunction without septic shock, due to unspecified organism, unspecified type (H)  -Resolved in the emergency department       Acute on chronic anemia  - hemoglobin last normal  6 months ago, was 8's during recent admission, then 8/2 5/4 and dropped to 6.7-6.9 AM of 5/5.    -  No black or bloody stools.  No abdominal pain.   No other apparent source of acute blood loss.    - gave 1u RBCs AM 5/5, rechecking hemoglobin after  - guiac pending  - started on protonix twice daily 5/5    - held prophylactic heparin 5/5 that had been started on admission   - held aspirin - appears to be on this just due to diabetes - recommend reassessing at follow-up with primary care provider if this is really needed.     - continue with measures to keep pressure off heels   - stopped celebrex 100  Mg twice daily that had been started on admission, was on naprosy prn at home.     Recent hospitalization for closed fracture of right femur   - s/p Right intermedullary nail for right diaphyseal femur fracture on 3/29  -  has tylenol prn and oxycodone if needed while here.  Stopped celebrex as above.         Type 2 diabetes mellitus with diabetic polyneuropathy, with long-term current use of insulin (H)  - A1c 8.3 on 5/2   - held home metformin   -Primary care intended to start Trulicity but he has not begun this medication yet.  -Continue home Lantus 10 units  -Added low-dose sliding scale on admission   -We will hold off on starting Trulicity until discharge       Microalbuminuria due to type 2 diabetes mellitus (H)  Estimated Creatinine Clearance: 48.7 mL/min (based on SCr of 1.11 mg/dL).   -Not on home ARB or ACE inhibitor, may possibly benefit, defer to PCP     Hyperlipidemia  Continue home simvastatin.        DVT Prophylaxis: initially admitted on heparin, stopped 5/5 due to anemia   Polo Catheter: Not present  Lines: None     Cardiac Monitoring: None  Code Status: Full Code        Diet  Orders Placed This Encounter      NPO per Anesthesia Guidelines for Procedure/Surgery Except for: Meds  likely can advance to clears later today if podiatry decides no surgery needed.                       Disposition Plan     "  Anticipate at least 2 more days inpatient                 Joaquin Mehta MD, MD  Hospitalist Service  [facility]  Securely message with the [employer] Web Console (learn more here)  Text page via [employer] Paging/Directory             Interval History:   No new concerns.  No fever or chills no dyspnea.  Has some pain in his feet but this is typical for him, not out of the ordinary.   No abdominal pain.  No black or bloody stools.    No alcohol use for > 8 years.   No light-headedness or dizziness.                Review of Systems:    ROS: 10 point ROS neg other than the symptoms noted above in the HPI.           Medications:   Current active medications and PTA medications reviewed, see medication list for details.            Physical Exam:   Vitals were reviewed  Patient Vitals for the past 24 hrs:   BP Temp Temp src Pulse Resp SpO2 Height Weight   23 0908 103/58 98  F (36.7  C) Oral 65 16 96 % -- --   23 0853 98/57 98  F (36.7  C) Oral 64 16 99 % -- --   23 0811 (!) 98/39 98.1  F (36.7  C) Oral 65 16 90 % -- --   23 0020 98/47 98.6  F (37  C) Oral 62 16 97 % -- --   23 2030 114/61 98.4  F (36.9  C) Oral 74 16 100 % -- 59.9 kg (132 lb 0.9 oz)   23 1830 -- -- -- -- -- 99 % -- --   23 1700 -- -- -- 75 -- 97 % -- --   23 1655 112/50 -- -- -- -- 99 % -- --   23 1625 118/58 -- -- -- -- 100 % -- --   23 1555 113/57 -- -- -- -- 99 % -- --   23 1542 122/63 -- -- 86 -- 99 % -- --   23 1305 105/64 (!) 100.8  F (38.2  C) Tympanic (!) 133 18 100 % 1.765 m (5' 9.5\") 59 kg (130 lb)       Temperatures:  Current - Temp: 98  F (36.7  C); Max - Temp  Av.7  F (37.1  C)  Min: 98  F (36.7  C)  Max: 100.8  F (38.2  C)  Respiration range: Resp  Av.3  Min: 16  Max: 18  Pulse range: Pulse  Av  Min: 62  Max: 133  Blood pressure range: Systolic (24hrs), Av , Min:98 , Max:122   ; Diastolic (24hrs), Av, Min:39, Max:64    Pulse " oximetry range: SpO2  Av.9 %  Min: 90 %  Max: 100 %  I/O last 3 completed shifts:  In: 1250 [IV Piggyback:1250]  Out: -     Intake/Output Summary (Last 24 hours) at 2023  Last data filed at 2023 2013  Gross per 24 hour   Intake 1250 ml   Output --   Net 1250 ml     EXAM:  General: awake and alert, NAD, oriented x 3  Head: normocephalic  Neck: unremarkable, no lymphadenopathy   HEENT: oropharynx pink and moist    Heart: Regular rate and rhythm, no murmurs, rubs, or gallops  Lungs: clear to auscultation bilaterally with good air movement throughout  Abdomen: soft, non-tender, no masses or organomegaly  Extremities: no edema in lower extremities   Feet show bilateral black ulcers on posterior heels, unchanged from admission.    Skin unremarkable.               Data:     Reviewed data:  Results for orders placed or performed during the hospital encounter of 23 (from the past 24 hour(s))   CBC with platelets, differential    Narrative    The following orders were created for panel order CBC with platelets, differential.  Procedure                               Abnormality         Status                     ---------                               -----------         ------                     CBC with platelets and d...[[id number]]  Abnormal            Final result                 Please view results for these tests on the individual orders.   Basic metabolic panel   Result Value Ref Range    Sodium 134 (L) 136 - 145 mmol/L    Potassium 5.4 (H) 3.4 - 5.3 mmol/L    Chloride 98 98 - 107 mmol/L    Carbon Dioxide (CO2) 19 (L) 22 - 29 mmol/L    Anion Gap 17 (H) 7 - 15 mmol/L    Urea Nitrogen 26.6 (H) 8.0 - 23.0 mg/dL    Creatinine 1.11 0.67 - 1.17 mg/dL    Calcium 9.5 8.8 - 10.2 mg/dL    Glucose 356 (H) 70 - 99 mg/dL    GFR Estimate 69 >60 mL/min/1.73m2   [place] Draw    Narrative    The following orders were created for panel order [place] Draw.  Procedure                               Abnormality          Status                     ---------                               -----------         ------                     Extra Blue Top Tube[[id number]]                              Final result               Extra Red Top Tube[[id number]]                               Final result                 Please view results for these tests on the individual orders.   CBC with platelets and differential   Result Value Ref Range    WBC Count 26.3 (H) 4.0 - 11.0 10e3/uL    RBC Count 3.27 (L) 4.40 - 5.90 10e6/uL    Hemoglobin 8.2 (L) 13.3 - 17.7 g/dL    Hematocrit 26.1 (L) 40.0 - 53.0 %    MCV 80 78 - 100 fL    MCH 25.1 (L) 26.5 - 33.0 pg    MCHC 31.4 (L) 31.5 - 36.5 g/dL    RDW 19.4 (H) 10.0 - 15.0 %    Platelet Count 588 (H) 150 - 450 10e3/uL    % Neutrophils 88 %    % Lymphocytes 5 %    % Monocytes 6 %    % Eosinophils 0 %    % Basophils 0 %    % Immature Granulocytes 1 %    NRBCs per 100 WBC 0 <1 /100    Absolute Neutrophils 23.0 (H) 1.6 - 8.3 10e3/uL    Absolute Lymphocytes 1.3 0.8 - 5.3 10e3/uL    Absolute Monocytes 1.5 (H) 0.0 - 1.3 10e3/uL    Absolute Eosinophils 0.1 0.0 - 0.7 10e3/uL    Absolute Basophils 0.0 0.0 - 0.2 10e3/uL    Absolute Immature Granulocytes 0.4 <=0.4 10e3/uL    Absolute NRBCs 0.0 10e3/uL   Extra Blue Top Tube   Result Value Ref Range    Hold Specimen JIC    Extra Red Top Tube   Result Value Ref Range    Hold Specimen JIC    Lactic acid whole blood   Result Value Ref Range    Lactic Acid 3.2 (H) 0.7 - 2.0 mmol/L   Blood Culture Peripheral Blood    Specimen: Peripheral Blood   Result Value Ref Range    Culture No growth after 12 hours    Blood Culture Peripheral Blood    Specimen: Peripheral Blood   Result Value Ref Range    Culture No growth after 12 hours    US MARKY Doppler - No Exercise    Narrative    US MARKY DOPPLER NO EXERCISE, 1-2 LEVELS, BILATERAL   5/4/2023 4:23 PM     HISTORY: Diabetic foot ulceration with concern for osteomyelitis.    COMPARISON: None.    FINDINGS:  Right MARKY:   PT: 1.09.  DP:  1.19    Left MARKY:   PT: 0.78.  DP: 1.1     Waveforms: Biphasic and triphasic in the right posterior tibial, right  dorsalis pedis and left dorsalis pedis. Monophasic in the left  posterior tibial.      Impression    IMPRESSION:  1. Normal ABIs bilaterally.    MARKY CRITERIA:  >1.4 NC  0.95-1.4 Normal  0.90 - 0.94 Mild  0.5 - 0.89 Moderate  0.2 - 0.49 Severe  <0.2 Critical    KAITY DO NEIL         SYSTEM ID:  [id number]   MR Foot Left w/o & w Contrast    Narrative    EXAM: MR FOOT LEFT W/O and W CONTRAST  LOCATION: [facility]  DATE/TIME: 5/4/2023 5:58 PM CDT    INDICATION: Concern for osteomyelitis of left calcaneus.  COMPARISON: None.  TECHNIQUE: Routine. Additional postgadolinium T1 sequences were obtained.  IV CONTRAST: 6 mL Gadavist    FINDINGS:     JOINTS AND BONES:   -No evidence for fracture. There is some reactive T2 signal abnormality within the posteroinferior calcaneus but no specific evidence for osteomyelitis. No additional areas worrisome for osteomyelitis are identified. No significant effusion to suggest   septic arthropathy.    TENDONS:   -Mild Achilles tendinopathy without tearing. The peroneal tendons are intact. The flexor tendons are intact. The extensor tendons are intact.     LIGAMENTS:   -The anterior and posterior talofibular ligaments are intact. The calcaneofibular ligament is intact. The deltoid ligamentous complex is intact.    MUSCLES AND SOFT TISSUES:   -There is an ulceration along the posterior aspect of the foot with some surrounding edema or cellulitis. There is no evidence for abscess.      Impression    IMPRESSION:  1.  No evidence for osteomyelitis, septic arthropathy, or abscess.  2.  Edema or cellulitis along the posterior aspect of the foot. There is apparent soft tissue irregularity and likely ulceration along the posterior aspect of the foot and there is susceptibly artifact suggestive of air tracking into the subcutaneous   soft tissues.  3.   No evidence for fracture.  4.  Mild reactive edema within the posteroinferior calcaneus.  5.  Mild Achilles tendinopathy without tearing.   Lactic acid whole blood   Result Value Ref Range    Lactic Acid 1.1 0.7 - 2.0 mmol/L   Glucose by meter   Result Value Ref Range    GLUCOSE BY METER POCT 285 (H) 70 - 99 mg/dL   Glucose by meter   Result Value Ref Range    GLUCOSE BY METER POCT 232 (H) 70 - 99 mg/dL   CBC with platelets differential    Narrative    The following orders were created for panel order CBC with platelets differential.  Procedure                               Abnormality         Status                     ---------                               -----------         ------                     CBC with platelets and d...[[id number]]  Abnormal            Final result                 Please view results for these tests on the individual orders.   Basic metabolic panel   Result Value Ref Range    Sodium 136 136 - 145 mmol/L    Potassium 4.4 3.4 - 5.3 mmol/L    Chloride 104 98 - 107 mmol/L    Carbon Dioxide (CO2) 24 22 - 29 mmol/L    Anion Gap 8 7 - 15 mmol/L    Urea Nitrogen 23.1 (H) 8.0 - 23.0 mg/dL    Creatinine 1.07 0.67 - 1.17 mg/dL    Calcium 8.3 (L) 8.8 - 10.2 mg/dL    Glucose 232 (H) 70 - 99 mg/dL    GFR Estimate 72 >60 mL/min/1.73m2   CBC with platelets and differential   Result Value Ref Range    WBC Count 17.1 (H) 4.0 - 11.0 10e3/uL    RBC Count 2.76 (L) 4.40 - 5.90 10e6/uL    Hemoglobin 6.7 (LL) 13.3 - 17.7 g/dL    Hematocrit 22.4 (L) 40.0 - 53.0 %    MCV 81 78 - 100 fL    MCH 24.3 (L) 26.5 - 33.0 pg    MCHC 29.9 (L) 31.5 - 36.5 g/dL    RDW 19.6 (H) 10.0 - 15.0 %    Platelet Count 488 (H) 150 - 450 10e3/uL    % Neutrophils 82 %    % Lymphocytes 10 %    % Monocytes 6 %    % Eosinophils 1 %    % Basophils 0 %    % Immature Granulocytes 1 %    NRBCs per 100 WBC 0 <1 /100    Absolute Neutrophils 13.9 (H) 1.6 - 8.3 10e3/uL    Absolute Lymphocytes 1.7 0.8 - 5.3 10e3/uL    Absolute Monocytes 1.1 0.0 -  1.3 10e3/uL    Absolute Eosinophils 0.2 0.0 - 0.7 10e3/uL    Absolute Basophils 0.0 0.0 - 0.2 10e3/uL    Absolute Immature Granulocytes 0.2 <=0.4 10e3/uL    Absolute NRBCs 0.0 10e3/uL   ABO/Rh type and screen    Narrative    The following orders were created for panel order ABO/Rh type and screen.  Procedure                               Abnormality         Status                     ---------                               -----------         ------                     Adult Type and Screen[[id number]]                            Edited Result - FINAL        Please view results for these tests on the individual orders.   Adult Type and Screen   Result Value Ref Range    ABO/RH(D) B POS     Antibody Screen Negative Negative    SPECIMEN EXPIRATION DATE [id number]    Prepare red blood cells (unit)   Result Value Ref Range    Blood Component Type Red Blood Cells     Product Code [id number]     Unit Status Issued     Unit Number [id number]     CROSSMATCH Compatible     CODING SYSTEM [id number]     ISSUE DATE AND TIME [id number]     UNIT ABO/RH B+     UNIT TYPE ISBT 7300    Hemoglobin   Result Value Ref Range    Hemoglobin 6.9 (LL) 13.3 - 17.7 g/dL   Glucose by meter   Result Value Ref Range    GLUCOSE BY METER POCT 230 (H) 70 - 99 mg/dL           Attestation:  I have reviewed today's vital signs, notes, medications, labs and imaging.  Amount of time spent managing this patient today:  50 minutes.     Joaquin Mehta MD, MD

## 2023-05-05 NOTE — CONSULTS
Subjective:    Pt is seen today in consult for bilateral heel ulcers.  Patient has had this for approximately 3 to 4 weeks.  The left is worse than the right.  Patient has history of right femur fracture with ORIF approximately a month ago.  Patient has been in bed more.  Left became more painful.  Some pain when he hurts as well.  Aggravated by activity and relieved by rest.  Denies any fever or chills.  Denies calf pain.  Has wound on right heel as well but this is not bothersome.  Patient have diabetes with a hemoglobin A1c of 8.3.  He is an everyday smoker.  Family history of coronary artery disease.    ROS:  A 10-point review of systems was performed and is positive for that noted in the HPI and as seen below.  All other areas are negative.        No Known Allergies    No current outpatient medications on file.       Patient Active Problem List   Diagnosis     Advanced directives, counseling/discussion     Health Care Home     Hyperlipidemia LDL goal <100     Type 2 diabetes mellitus with diabetic polyneuropathy, with long-term current use of insulin (H)     Tobacco use disorder     Microalbuminuria due to type 2 diabetes mellitus (H)     Closed fracture of right femur (H)     Infected diabetic ulcer of left heel associated with type 2 diabetes mellitus (H)     Sepsis with acute organ dysfunction without septic shock, due to unspecified organism, unspecified type (H)     Diabetic ulcer of right heel associated with type 2 diabetes mellitus without infection (H)       No past medical history on file.    Past Surgical History:   Procedure Laterality Date     SURGICAL HISTORY OF -   1990    removal of steel from left eye       Family History   Problem Relation Age of Onset     Breast Cancer Mother 60     C.A.D. Brother 58        MI       Social History     Tobacco Use     Smoking status: Some Days     Types: Pipe     Smokeless tobacco: Never   Vaping Use     Vaping status: Never Used   Substance Use Topics      "Alcohol use: Not Currently         Exam:    Vitals: /57   Pulse 65   Temp 98.6  F (37  C) (Oral)   Resp 16   Ht 1.765 m (5' 9.5\")   Wt 59.9 kg (132 lb 0.9 oz)   SpO2 97%   BMI 19.22 kg/m    BMI: Body mass index is 19.22 kg/m .  Height: 5' 9.5\"    Constitutional/ general:  Pt is in no apparent distress, appears well-nourished.  Cooperative with history and physical exam.     Psych:  The patient answered questions appropriately.  Normal affect.  Seems to have reasonable expectations, in terms of treatment.     Eyes:  Visual scanning/ tracking without deficit.     Ears:  Response to auditory stimuli is normal.    Auricles in proper alignment.     Lymphatic:  Popliteal lymph nodes not enlarged.     Lungs:  Non labored breathing, non labored speech. No cough.  No audible wheezing. Even, quiet breathing.       Vascular: Pedal pulses weakly palpable.    Neuro:  Alert and oriented x 3. Coordinated gait.  Light touch sensation is intact on digits.  No gross forefoot or rear foot deformities noted.    Derm: Somewhat thin with scant hair growth noted.    Musculoskeletal:    Lower extremity muscle strength is normal.  Patient is lying in bed with heels offloaded.  Right heel posterior plantar lateral small ecchymotic area.  No surrounding erythema or drainage.  Left heel large black leathery eschar mostly posterior and somewhat plantar.  With incising the eschar there is no purulence.  There is minimal odor.  There is no crepitus with palpation anywhere.  It feels slightly boggy under the eschar.  Lateral portion of the left heel eschar has some erythema and slight edema.  There is slight serous drainage here.  There is no crepitus with palpation.  I see no underlying signs of abscess anywhere.    Radiographic Exam:    EXAM: MR FOOT LEFT W/O and W CONTRAST  LOCATION: [facility] [place]  DATE/TIME: 5/4/2023 5:58 PM CDT     INDICATION: Concern for osteomyelitis of left calcaneus.  COMPARISON: " None.  TECHNIQUE: Routine. Additional postgadolinium T1 sequences were obtained.  IV CONTRAST: 6 mL Gadavist     FINDINGS:      JOINTS AND BONES:   -No evidence for fracture. There is some reactive T2 signal abnormality within the posteroinferior calcaneus but no specific evidence for osteomyelitis. No additional areas worrisome for osteomyelitis are identified. No significant effusion to suggest   septic arthropathy.     TENDONS:   -Mild Achilles tendinopathy without tearing. The peroneal tendons are intact. The flexor tendons are intact. The extensor tendons are intact.      LIGAMENTS:   -The anterior and posterior talofibular ligaments are intact. The calcaneofibular ligament is intact. The deltoid ligamentous complex is intact.     MUSCLES AND SOFT TISSUES:   -There is an ulceration along the posterior aspect of the foot with some surrounding edema or cellulitis. There is no evidence for abscess.                                                                      IMPRESSION:  1.  No evidence for osteomyelitis, septic arthropathy, or abscess.  2.  Edema or cellulitis along the posterior aspect of the foot. There is apparent soft tissue irregularity and likely ulceration along the posterior aspect of the foot and there is susceptibly artifact suggestive of air tracking into the subcutaneous   soft tissues.  3.  No evidence for fracture.  4.  Mild reactive edema within the posteroinferior calcaneus.  5.  Mild Achilles tendinopathy without tearing.        US MARKY DOPPLER NO EXERCISE, 1-2 LEVELS, BILATERAL   5/4/2023 4:23 PM      HISTORY: Diabetic foot ulceration with concern for osteomyelitis.     COMPARISON: None.     FINDINGS:  Right MARKY:   PT: 1.09.  DP: 1.19     Left MARKY:   PT: 0.78.  DP: 1.1      Waveforms: Biphasic and triphasic in the right posterior tibial, right  dorsalis pedis and left dorsalis pedis. Monophasic in the left  posterior tibial.                                                                       IMPRESSION:  1. Normal ABIs bilaterally.     Latest Reference Range & Units 05/02/23 11:41   Hemoglobin A1C 0.0 - 5.6 % 8.3 (H)   (H): Data is abnormally high    Component Ref Range & Units  4:28 AM 1 d ago 3 d ago 2 wk ago 3 wk ago 10 yr ago    WBC Count 4.0 - 11.0 10e3/uL 17.1 High   26.3 High   30.4 High         Component Ref Range & Units  1:39 PM  7:52 AM  4:28 AM 1 d ago 3 d ago 2 wk ago 3 wk ago     Hemoglobin 13.3 - 17.7 g/dL 8.4 Low   6.9 Low Panic   6.7 Low Panic   8.2 Low   8.7 Low   8.4 Low   8.9 Low        Latest Reference Range & Units 12/27/12 14:08   Albumin 3.3 - 4.9 g/dL 4.2       A:  Diabetes mellitus poorly controlled   Status post right femur fracture with ORIF  Left heel ulcer with lateral cellulitis  Right heel ulcer    P:  Discussed with patient because of his decubitus ulcers on heels.  Discussed the importance of offloading these at all times to help them heal especially in bed.  We made an incision on the leathery eschar on the left heel today.  We took a culture of this.  Patient was seen at bedside with the wound care nurse.  She is going to order Betadine on both wounds twice daily.  Continue offloading strategies.  We will order postop shoes for each foot.  Patient may get up in chair and have bathroom privileges as long as wearing postop shoes.  Discussed the importance of ambulating and not becoming weak.  I see no need for urgent surgery at this point but discussed with patient this may change and he may need debridement in the OR at some point.  Encourage smoking cessation and good control of blood sugars.  Thanks allowing me to participate in the care of this patient.    Nile Riley, JARAD, FACFAS

## 2023-05-05 NOTE — PROVIDER NOTIFICATION
DATE:  5/5/2023   TIME OF RECEIPT FROM LAB:  0802  LAB TEST:  Hgb  LAB VALUE:  6.9  RESULTS GIVEN WITH READ-BACK TO (PROVIDER):  Olegario Rogers  TIME LAB VALUE REPORTED TO PROVIDER:   0805

## 2023-05-05 NOTE — PROGRESS NOTES
Patient has a critical lab for Hgb at 6.7, writer notified Tele on call and will notify hospitalist when they get here.

## 2023-05-05 NOTE — PROGRESS NOTES
"[facility] ADMISSION NOTE    Patient admitted to room 2303 at approximately 2030 via cart from emergency room. Patient was accompanied by transport tech.     Verbal SBAR report received from SARA Delvalle prior to patient arrival.     Patient trasferred to bed via self. Patient alert and oriented X 1. The patient is not having any pain.  . Admission vital signs: Blood pressure 114/61, pulse 74, temperature 98.4  F (36.9  C), temperature source Oral, resp. rate 16, height 1.765 m (5' 9.5\"), weight 59.9 kg (132 lb 0.9 oz), SpO2 100 %. Patient was oriented to plan of care, call light, bed controls, tv, telephone, bathroom and visiting hours.     Risk Assessment    The following safety risks were identified during admission: fall and skin. Yellow risk band applied: YES.     Skin Initial Assessment    This writer admitted this patient and completed a full skin assessment and Kia score in the Adult PCS flowsheet. Appropriate interventions initiated as needed.     Secondary skin check completed by TRACY Fletcher RN.         Education    Patient has a [place] to Observation order: No  Observation education completed and documented: N/A      Oneida Rizo RN    "

## 2023-05-05 NOTE — PROGRESS NOTES
Paged at approximately 0630 about critical hemoglobin 6.7. Hemoglobin upon admission yesterday was 8.2. Patient consented for blood. Transfusion was ordered. Recently had transfusion with left femur fracture. Tolerated transfusion. Surgical site appears well healed, no bruising. Patient is hemodynamically stable. No abdominal pain.

## 2023-05-05 NOTE — PROGRESS NOTES
This writer completed the secondary skin assessment and is in agreement with the findings listed in the PCS flowsheet and Kai score. Diana Mak RN on 5/5/2023 at 12:20 AM

## 2023-05-05 NOTE — CONSULTS
Care Management Initial Consult    General Information  Assessment completed with: Patient  Type of CM/SW Visit: Initial Assessment    Primary Care Provider verified and updated as needed: Yes   Readmission within the last 30 days: no previous admission in last 30 days      Reason for Consult: discharge planning  Advance Care Planning:          Communication Assessment  Patient's communication style: spoken language (English or Bilingual)    Hearing Difficulty or Deaf: yes   Wear Glasses or Blind: yes    Cognitive  Cognitive/Neuro/Behavioral: WDL                      Living Environment:   People in home: spouse, child(tomasa), adult, grandchild(tomasa)     Current living Arrangements: house      Able to return to prior arrangements: yes     Family/Social Support:  Care provided by: self  Provides care for: no one  Marital Status:   Wife, Children  Laurence       Description of Support System: Supportive, Involved    Support Assessment: Adequate family and caregiver support, Adequate social supports    Current Resources:   Patient receiving home care services: Yes     Community Resources:    Equipment currently used at home: walker, standard  Supplies currently used at home:      Employment/Financial:  Employment Status: retired        Financial Concerns: No concerns identified     Does the patient's insurance plan have a 3 day qualifying hospital stay waiver?  Yes   Will the waiver be used for post-acute placement? Unknown    Lifestyle & Psychosocial Needs:  Social Determinants of Health     Tobacco Use: High Risk (5/5/2023)    Patient History      Smoking Tobacco Use: Some Days      Smokeless Tobacco Use: Never      Passive Exposure: Not on file   Alcohol Use: Not on file   Financial Resource Strain: Not on file   Food Insecurity: Not on file   Transportation Needs: Not on file   Physical Activity: Not on file   Stress: Not on file   Social Connections: Not on file   Intimate Partner Violence: Not on file    Depression: Not at risk (5/2/2023)    PHQ-2      PHQ-2 Score: 0   Housing Stability: Not on file     Functional Status:  Prior to admission patient needed assistance:   Dependent ADLs:: Ambulation-walker     Mental Health Status:  Mental Health Status: No Current Concerns       Chemical Dependency Status:  Chemical Dependency Status: No Current Concerns           Values/Beliefs:  Spiritual, Cultural Beliefs, Adventism Practices, Values that affect care: no               Additional Information:  Care Management met with patient at bedside. CM introduced self and explained role.     Patient resides at home with spouse, daughter, son-in-law, and 2 grandchildren. Patient was recently at [facility] (Main: [phone] Admissions: [phone] Fax: [phone]) TCU from 4/4-4/19. Patient discharged to home and states he was referred to [facility] but it was never opened. He is uncertain of the [facility] agency.     Referral was placed to [facility] for potential [facility] referral.     CM will await discharge recommendations from therapy and assist with patient's discharge at that time.         Karla Neville BRITTON  Inpatient Care Coordinator   [facility] [phone]  [facility] [phone]

## 2023-05-06 ENCOUNTER — APPOINTMENT (OUTPATIENT)
Dept: PHYSICAL THERAPY | Facility: CLINIC | Age: 78
DRG: 855 | End: 2023-05-06
Payer: COMMERCIAL

## 2023-05-06 ENCOUNTER — APPOINTMENT (OUTPATIENT)
Dept: OCCUPATIONAL THERAPY | Facility: CLINIC | Age: 78
DRG: 855 | End: 2023-05-06
Payer: COMMERCIAL

## 2023-05-06 LAB
ANION GAP SERPL CALCULATED.3IONS-SCNC: 8 MMOL/L (ref 7–15)
BUN SERPL-MCNC: 14.1 MG/DL (ref 8–23)
CALCIUM SERPL-MCNC: 8.6 MG/DL (ref 8.8–10.2)
CHLORIDE SERPL-SCNC: 104 MMOL/L (ref 98–107)
CREAT SERPL-MCNC: 1.13 MG/DL (ref 0.67–1.17)
CRP SERPL-MCNC: 125.51 MG/L
DEPRECATED HCO3 PLAS-SCNC: 26 MMOL/L (ref 22–29)
ERYTHROCYTE [DISTWIDTH] IN BLOOD BY AUTOMATED COUNT: 18.1 % (ref 10–15)
GFR SERPL CREATININE-BSD FRML MDRD: 68 ML/MIN/1.73M2
GLUCOSE BLDC GLUCOMTR-MCNC: 172 MG/DL (ref 70–99)
GLUCOSE BLDC GLUCOMTR-MCNC: 194 MG/DL (ref 70–99)
GLUCOSE BLDC GLUCOMTR-MCNC: 223 MG/DL (ref 70–99)
GLUCOSE BLDC GLUCOMTR-MCNC: 278 MG/DL (ref 70–99)
GLUCOSE BLDC GLUCOMTR-MCNC: 360 MG/DL (ref 70–99)
GLUCOSE SERPL-MCNC: 185 MG/DL (ref 70–99)
HCT VFR BLD AUTO: 28.3 % (ref 40–53)
HEMOCCULT STL QL: NEGATIVE
HGB BLD-MCNC: 8.9 G/DL (ref 13.3–17.7)
MCH RBC QN AUTO: 25.7 PG (ref 26.5–33)
MCHC RBC AUTO-ENTMCNC: 31.4 G/DL (ref 31.5–36.5)
MCV RBC AUTO: 82 FL (ref 78–100)
PLATELET # BLD AUTO: 562 10E3/UL (ref 150–450)
POTASSIUM SERPL-SCNC: 5 MMOL/L (ref 3.4–5.3)
RBC # BLD AUTO: 3.46 10E6/UL (ref 4.4–5.9)
SODIUM SERPL-SCNC: 138 MMOL/L (ref 136–145)
WBC # BLD AUTO: 19.1 10E3/UL (ref 4–11)

## 2023-05-06 PROCEDURE — 86140 C-REACTIVE PROTEIN: CPT | Performed by: FAMILY MEDICINE

## 2023-05-06 PROCEDURE — 97535 SELF CARE MNGMENT TRAINING: CPT | Mod: GO

## 2023-05-06 PROCEDURE — 250N000013 HC RX MED GY IP 250 OP 250 PS 637: Performed by: FAMILY MEDICINE

## 2023-05-06 PROCEDURE — 80048 BASIC METABOLIC PNL TOTAL CA: CPT | Performed by: FAMILY MEDICINE

## 2023-05-06 PROCEDURE — 120N000001 HC R&B MED SURG/OB

## 2023-05-06 PROCEDURE — 36415 COLL VENOUS BLD VENIPUNCTURE: CPT | Performed by: FAMILY MEDICINE

## 2023-05-06 PROCEDURE — 97161 PT EVAL LOW COMPLEX 20 MIN: CPT | Mod: GP | Performed by: PHYSICAL THERAPIST

## 2023-05-06 PROCEDURE — 85027 COMPLETE CBC AUTOMATED: CPT | Performed by: FAMILY MEDICINE

## 2023-05-06 PROCEDURE — 97165 OT EVAL LOW COMPLEX 30 MIN: CPT | Mod: GO

## 2023-05-06 PROCEDURE — 250N000011 HC RX IP 250 OP 636: Performed by: FAMILY MEDICINE

## 2023-05-06 PROCEDURE — 99232 SBSQ HOSP IP/OBS MODERATE 35: CPT | Performed by: FAMILY MEDICINE

## 2023-05-06 PROCEDURE — 82272 OCCULT BLD FECES 1-3 TESTS: CPT | Performed by: FAMILY MEDICINE

## 2023-05-06 RX ADMIN — VANCOMYCIN HYDROCHLORIDE 1000 MG: 1 INJECTION, SOLUTION INTRAVENOUS at 15:38

## 2023-05-06 RX ADMIN — TAZOBACTAM SODIUM AND PIPERACILLIN SODIUM 4.5 G: 500; 4 INJECTION, SOLUTION INTRAVENOUS at 12:37

## 2023-05-06 RX ADMIN — TAZOBACTAM SODIUM AND PIPERACILLIN SODIUM 4.5 G: 500; 4 INJECTION, SOLUTION INTRAVENOUS at 05:38

## 2023-05-06 RX ADMIN — ACETAMINOPHEN 650 MG: 325 TABLET ORAL at 17:35

## 2023-05-06 RX ADMIN — INSULIN ASPART 1 UNITS: 100 INJECTION, SOLUTION INTRAVENOUS; SUBCUTANEOUS at 12:16

## 2023-05-06 RX ADMIN — PANTOPRAZOLE SODIUM 40 MG: 40 TABLET, DELAYED RELEASE ORAL at 20:08

## 2023-05-06 RX ADMIN — LATANOPROST 1 DROP: 50 SOLUTION OPHTHALMIC at 22:26

## 2023-05-06 RX ADMIN — GABAPENTIN 300 MG: 300 CAPSULE ORAL at 20:08

## 2023-05-06 RX ADMIN — ACETAMINOPHEN 650 MG: 325 TABLET ORAL at 08:22

## 2023-05-06 RX ADMIN — SIMVASTATIN 40 MG: 40 TABLET, FILM COATED ORAL at 22:26

## 2023-05-06 RX ADMIN — INSULIN ASPART 1 UNITS: 100 INJECTION, SOLUTION INTRAVENOUS; SUBCUTANEOUS at 08:26

## 2023-05-06 RX ADMIN — INSULIN GLARGINE 10 UNITS: 100 INJECTION, SOLUTION SUBCUTANEOUS at 08:26

## 2023-05-06 RX ADMIN — INSULIN ASPART 3 UNITS: 100 INJECTION, SOLUTION INTRAVENOUS; SUBCUTANEOUS at 17:35

## 2023-05-06 RX ADMIN — GABAPENTIN 300 MG: 300 CAPSULE ORAL at 08:20

## 2023-05-06 RX ADMIN — PANTOPRAZOLE SODIUM 40 MG: 40 TABLET, DELAYED RELEASE ORAL at 08:20

## 2023-05-06 RX ADMIN — TAZOBACTAM SODIUM AND PIPERACILLIN SODIUM 4.5 G: 500; 4 INJECTION, SOLUTION INTRAVENOUS at 17:38

## 2023-05-06 RX ADMIN — GABAPENTIN 300 MG: 300 CAPSULE ORAL at 14:57

## 2023-05-06 ASSESSMENT — ACTIVITIES OF DAILY LIVING (ADL)
ADLS_ACUITY_SCORE: 34
ADLS_ACUITY_SCORE: 30
ADLS_ACUITY_SCORE: 38
ADLS_ACUITY_SCORE: 30
ADLS_ACUITY_SCORE: 30
ADLS_ACUITY_SCORE: 34
ADLS_ACUITY_SCORE: 38
ADLS_ACUITY_SCORE: 34
PREVIOUS_RESPONSIBILITIES: MEAL PREP;HOUSEKEEPING;LAUNDRY;SHOPPING;YARDWORK;MEDICATION MANAGEMENT;FINANCES
ADLS_ACUITY_SCORE: 30
ADLS_ACUITY_SCORE: 38
ADLS_ACUITY_SCORE: 34
ADLS_ACUITY_SCORE: 38

## 2023-05-06 NOTE — PROGRESS NOTES
[facility]           Assessment & Plan        Roddy Sidhu is a 77 year old male admitted on 5/4/2023 for left heel diabetic ulcer. He had sepsis which resolved in the emergency department with goal-directed therapy.  He will be admitted for antibiotics and podiatry consult.     Principal Problem:    Infected diabetic ulcer of left heel associated with type 2 diabetes mellitus (H)  Active Problems:    Type 2 diabetes mellitus with diabetic polyneuropathy, with long-term current use of insulin (H)    Microalbuminuria due to type 2 diabetes mellitus (H)    Sepsis with acute organ dysfunction without septic shock, due to unspecified organism, unspecified type (H)    Diabetic ulcer of right heel associated with type 2 diabetes mellitus without infection (H)       Infected Diabetic ulcer of left heel associated with type 2 diabetes mellitus/diabetic neuropathy, unspecified ulcer stage (H)   Additional diabetic ulcer of right heel without infection  -The patient recently was diagnosed with osteomyelitis based on an x-ray, but this is not supported by the MRI performed today  - ABIs done at podiatry's request and were normal.  - CRP  156 on 5/2 - following  -Continue Zosyn which was started in the emergency department  - continue Vancomycin as started in the emergency department.  Although it is nonpurulent his recent hospitalization and admission to a long-term care facility are risk factors which warrant continued vancomycin further evaluation by podiatry   -Float BOTH heels.  I stressed the importance of this with the patient  -Podiatry and wound care consulted and saw patient 5/5 - recommended:  - betadine to both wounds twice daily   - continue offloading strategies  - wear post-op shoes on both feet.  Patient may get up in chair and have bathroom privileges as long as wearing postop shoes.   No urgent plan for surgery at present, but may need debridement in OR at some point.    -  wound left heel incised by podiatry 5/5, culture pending from this   - reassess antibiotics 5/7 if showing clear improvement.    - likely request re-evaluation by podiatry and/or wound care on Monday           Sepsis with acute organ dysfunction without septic shock, due to unspecified organism, unspecified type (H)  -Resolved in the emergency department       Acute on chronic anemia  - hemoglobin last normal 6 months ago, was 8's during recent admission, then 8/2 5/4 and dropped to 6.7-6.9 AM of 5/5.    -  No black or bloody stools.  No abdominal pain.   No other apparent source of acute blood loss.    - gave 1u RBCs AM 5/5, rechecking hemoglobin after  - guiac negative   - started on protonix twice daily 5/5    - held prophylactic heparin 5/5 that had been started on admission   - held aspirin - appears to be on this just due to diabetes - recommend reassessing at follow-up with primary care provider if this is really needed.     - stopped celebrex 100  Mg twice daily that had been started on admission, was on naprosyn prn at home.  - iron low but so is binding capacity, could be anemia of chronic disease    - hemoglobin stable 5/6.       Recent hospitalization for closed fracture of right femur   - s/p Right intermedullary nail for right diaphyseal femur fracture on 3/29  -  has tylenol prn and oxycodone if needed while here.  Stopped celebrex as above.          Type 2 diabetes mellitus with diabetic polyneuropathy, with long-term current use of insulin (H)  - A1c 8.3 on 5/2   - held home metformin   -Primary care intended to start Trulicity but he has not begun this medication yet.  -Continue home Lantus 10 units  -Added low-dose sliding scale on admission   -We will hold off on starting Trulicity until discharge       Microalbuminuria due to type 2 diabetes mellitus (H)  Estimated Creatinine Clearance: 48.7 mL/min (based on SCr of 1.11 mg/dL).   -Not on home ARB or ACE inhibitor, may possibly benefit, defer to  PCP     Hyperlipidemia  Continue home simvastatin.         DVT Prophylaxis: initially admitted on heparin, stopped  due to anemia   Polo Catheter: Not present  Lines: None     Cardiac Monitoring: None  Code Status: Full Code       Diet  Orders Placed This Encounter      High Consistent Carb (75 g CHO per Meal) Diet                      Disposition Plan      Hope for discharge home vs TCU in 2-3 days                 Joaquin Mehta MD, MD  Hospitalist Service  [facility]  Securely message with the Vocera Web Console (learn more here)  Text page via [employer] Paging/Directory             Interval History:   No new concerns, feels a bit better in general.  No fever or chills. Pain better.  No worsening symptoms.                  Review of Systems:    ROS: 10 point ROS neg other than the symptoms noted above in the HPI.           Medications:   Current active medications and PTA medications reviewed, see medication list for details.            Physical Exam:   Vitals were reviewed  Patient Vitals for the past 24 hrs:   BP Temp Temp src Pulse Resp SpO2   23 1544 (!) 150/61 98.6  F (37  C) Oral 80 20 100 %   23 0645 100/51 98.4  F (36.9  C) Oral 62 16 100 %   23 2230 109/59 98.2  F (36.8  C) Oral 61 15 100 %       Temperatures:  Current - Temp: 98.6  F (37  C); Max - Temp  Av.4  F (36.9  C)  Min: 98.2  F (36.8  C)  Max: 98.6  F (37  C)  Respiration range: Resp  Av  Min: 15  Max: 20  Pulse range: Pulse  Av.7  Min: 61  Max: 80  Blood pressure range: Systolic (24hrs), Av , Min:100 , Max:150   ; Diastolic (24hrs), Av, Min:51, Max:61    Pulse oximetry range: SpO2  Av %  Min: 100 %  Max: 100 %  I/O last 3 completed shifts:  In: 730 [P.O.:720; I.V.:10]  Out: 750 [Urine:750]    Intake/Output Summary (Last 24 hours) at 2023 1604  Last data filed at 2023 1545  Gross per 24 hour   Intake 950 ml   Output 1125 ml   Net -175 ml     EXAM:  General: awake  and alert, NAD, oriented x 3  Head: normocephalic  Neck: unremarkable, no lymphadenopathy   HEENT: oropharynx pink and moist    Heart: Regular rate and rhythm, no murmurs, rubs, or gallops  Lungs: clear to auscultation bilaterally with good air movement throughout  Abdomen: soft, non-tender, no masses or organomegaly  Extremities: unchanged edema in lower extremities   Skin unchanged around heel wounds, otherwise unremarkable.               Data:     Reviewed data:  Results for orders placed or performed during the hospital encounter of 05/04/23 (from the past 24 hour(s))   Glucose by meter   Result Value Ref Range    GLUCOSE BY METER POCT 155 (H) 70 - 99 mg/dL   Glucose by meter   Result Value Ref Range    GLUCOSE BY METER POCT 195 (H) 70 - 99 mg/dL   Glucose by meter   Result Value Ref Range    GLUCOSE BY METER POCT 172 (H) 70 - 99 mg/dL   CRP inflammation   Result Value Ref Range    CRP Inflammation 125.51 (H) <5.00 mg/L   CBC with platelets   Result Value Ref Range    WBC Count 19.1 (H) 4.0 - 11.0 10e3/uL    RBC Count 3.46 (L) 4.40 - 5.90 10e6/uL    Hemoglobin 8.9 (L) 13.3 - 17.7 g/dL    Hematocrit 28.3 (L) 40.0 - 53.0 %    MCV 82 78 - 100 fL    MCH 25.7 (L) 26.5 - 33.0 pg    MCHC 31.4 (L) 31.5 - 36.5 g/dL    RDW 18.1 (H) 10.0 - 15.0 %    Platelet Count 562 (H) 150 - 450 10e3/uL   Basic metabolic panel   Result Value Ref Range    Sodium 138 136 - 145 mmol/L    Potassium 5.0 3.4 - 5.3 mmol/L    Chloride 104 98 - 107 mmol/L    Carbon Dioxide (CO2) 26 22 - 29 mmol/L    Anion Gap 8 7 - 15 mmol/L    Urea Nitrogen 14.1 8.0 - 23.0 mg/dL    Creatinine 1.13 0.67 - 1.17 mg/dL    Calcium 8.6 (L) 8.8 - 10.2 mg/dL    Glucose 185 (H) 70 - 99 mg/dL    GFR Estimate 68 >60 mL/min/1.73m2   Glucose by meter   Result Value Ref Range    GLUCOSE BY METER POCT 194 (H) 70 - 99 mg/dL   Glucose by meter   Result Value Ref Range    GLUCOSE BY METER POCT 223 (H) 70 - 99 mg/dL   Occult blood stool   Result Value Ref Range    Occult Blood  Negative Negative           Attestation:  I have reviewed today's vital signs, notes, medications, labs and imaging.    Joaquin Mehta MD, MD

## 2023-05-06 NOTE — PROGRESS NOTES
05/06/23 1053   Appointment Info   Signing Clinician's Name / Credentials (PT) Bushra Mckinney, PT   Living Environment   People in Home spouse;child(tomasa), adult;grandchild(tomasa)   Current Living Arrangements house   Home Accessibility no concerns   Living Environment Comments no steps to enter, had not showered but was dressing himself   Self-Care   Usual Activity Tolerance fair   Current Activity Tolerance fair   Equipment Currently Used at Home walker, rolling;wheelchair, manual   Fall history within last six months yes   Activity/Exercise/Self-Care Comment was indep with walker in the home and used wc out of the home and was doing some garden work from the wc, walks about 15 ft at the most at 1 time per pt, prior to Fx was moving around more   General Information   Onset of Illness/Injury or Date of Surgery 05/04/23   Referring Physician Joaquin Mehta MD   Patient/Family Therapy Goals Statement (PT) to return home, is open to have homecare start   Pertinent History of Current Problem (include personal factors and/or comorbidities that impact the POC) L heel diabetic ulcer, concern for infection, saw podiatry who referred him to come to the ER, here podiatry had rec B post op shoes on when up short distances to bathroom   Cognition   Affect/Mental Status (Cognition) WFL   Orientation Status (Cognition) oriented x 4   Pain Assessment   Patient Currently in Pain Yes, see Vital Sign flowsheet  (L foot when walking, only discomfort R leg)   Range of Motion (ROM)   ROM Comment decreased B AROM ankle DF   Strength (Manual Muscle Testing)   Strength (Manual Muscle Testing) Deficits observed during functional mobility   Strength Comments can raise each leg in supine for SLR but only about 3-5 inches off the bed, yelled out in pain on R LE, effortful, did not MMT   Bed Mobility   Comment, (Bed Mobility) indep with rail supine<>sit, scoots mod I in sitting   Transfers   Comment, (Transfers) post op shoes and FWW SBA  sit<>stand   Gait/Stairs (Locomotion)   [place] Level (Gait) modified independence;supervision   Assistive Device (Gait) walker, front-wheeled   Distance in Feet 20   Pattern (Gait) step-through   Deviations/Abnormal Patterns (Gait) stride length decreased;johnson decreased;base of support, narrow   Clinical Impression   Criteria for Skilled Therapeutic Intervention Yes, treatment indicated   PT Diagnosis (PT) weakness, impaired functional mobility   Influenced by the following impairments weakness   Functional limitations due to impairments decreased act tolerance from baseline a few months ago   Clinical Presentation (PT Evaluation Complexity) Stable/Uncomplicated   Clinical Presentation Rationale clinical judgemnet   Clinical Decision Making (Complexity) low complexity   Planned Therapy Interventions (PT) home program guidelines;risk factor education;progressive activity/exercise;strengthening;gait training   Risk & Benefits of therapy have been explained evaluation/treatment results reviewed;care plan/treatment goals reviewed;current/potential barriers reviewed;participants voiced agreement with care plan;patient   Clinical Impression Comments pt is moving close to how he has been at home this past week but is overall below baseline from before his Fx, did not get any PT after left TCU as homecare had not started and is having pain in L heel with mobility, needs ongoing education on post op shoe wear and increasing strength and mobility   PT Total Evaluation Time   PT [place], Low Complexity Minutes (67998) 10   Physical Therapy Goals   PT Frequency 3x/week   PT Predicted Duration/Target Date for Goal Attainment 05/11/23   PT Goals PT Goal 1;Gait   PT: Gait Modified independent;50 feet;Rolling walker  (for mobility out of house with walker and not relying on wc)   PT: Goal 1 pt will demo HEP for LE ther ex mod I with handout for continued LE strengthening   PT Discharge Planning   PT Plan Sat 1/3- LE ther ex  with HEP and gait with walker  (1-2 more visits, is having wound re-assessed Mon for any other surgical needs, keep on PT caseload until DC determined)   PT Discharge Recommendation (DC Rec) home with assist;home with home care physical therapy   PT Rationale for DC Rec pt moving SBA with walker and post op boots on, has some pain and weakness but is safe to return home with his walker, w/c, wife for assist as needed, rec home PT for continued rehab for strengthening and mobility post R femur Fx   PT Brief overview of current status indep supine<>sit, SBA sit to stand and ambulated with walker 20 ft in room with post-op shoes   Total Session Time   Total Session Time (sum of timed and untimed services) 10

## 2023-05-06 NOTE — PROGRESS NOTES
Patient alert and oriented. Vitals stable; on room air. Mild pain in bilateral toes partially relieved with PRN tylenol. Wound cares done per orders. Good appetite. Voiding spontaneously - incontinent of urine x 1; primofit in place. PIV saline locked.    Ortho postop shoes obtained and patient encouraged to get up to the bathroom or into the recliner. Patient declined due to fatigue and stated that he will get up tomorrow. Patient turned and repositioned in bed with heels elevated.    Temp: 98.2  F (36.8  C) Temp src: Oral BP: 109/59 Pulse: 61   Resp: 15 SpO2: 100 % O2 Device: None (Room air)

## 2023-05-06 NOTE — PROGRESS NOTES
"Pt. A & O x 4, pain 5-7/10 controlled well with tylenol, on room air, able to make needs known, up with assist 1 walker and gait belt, post op shoes at bedside, heel off loading boots while in bed, pillows to rotate weight, education provided on the importance of washing up, pt declined washing up x 3 to this writer, NST offered and was also turned away, New IV placed with ultrasound, /51 (BP Location: Right arm)   Pulse 62   Temp 98.4  F (36.9  C) (Oral)   Resp 16   Ht 1.765 m (5' 9.5\")   Wt 59.9 kg (132 lb 0.9 oz)   SpO2 100%   BMI 19.22 kg/m    "

## 2023-05-06 NOTE — PROGRESS NOTES
Writer notified that patient's IV was up streaming. Upon inspection, IV was infiltrated and writer was not able to get blood return. Writer removed IV and placed a warm compress around the area. Tiara from pharmacy called and writer informed that ABX is not a vesicant. Writer informing patient's primary RN.

## 2023-05-06 NOTE — PROGRESS NOTES
05/06/23 1100   Appointment Info   Signing Clinician's Name / Credentials (OT) Leelee Pedersen OTR/L   Living Environment   People in Home spouse;child(tomasa), adult   Current Living Arrangements house   Home Accessibility no concerns   Transportation Anticipated family or friend will provide   Living Environment Comments Lives in multi level house with spouse. Dtr, VANNESSA, and grandkids live upstairs. All needs met on main level, no steps to enter. Has walk in shower, but has to go up 1 step to get into it. Grab bars in shower   Self-Care   Usual Activity Tolerance moderate   Current Activity Tolerance moderate   Regular Exercise No   Equipment Currently Used at Home walker, rolling;wheelchair, manual;grab bar, tub/shower;shower chair   Fall history within last six months yes   Number of times patient has fallen within last six months 2   Activity/Exercise/Self-Care Comment Wife assists with donning socks/shoes, ind toileting/UE dressing/grooming. Hasn't showered since returning home from TCU due to difficulty with transfer.   Instrumental Activities of Daily Living (IADL)   Previous Responsibilities meal prep;housekeeping;laundry;shopping;yardwork;medication management;finances   IADL Comments shares IADLs with spouse. Does lots of gardening   General Information   Onset of Illness/Injury or Date of Surgery 05/04/23   Referring Physician Dr. Mehta   Patient/Family Therapy Goal Statement (OT) to get better and go home   Additional Occupational Profile Info/Pertinent History of Current Problem Roddy Sidhu is a 77 year old male admitted on 5/4/2023 for left heel diabetic ulcer. He had sepsis which resolved in the emergency department with goal-directed therapy.  He will be admitted for antibiotics and podiatry consult.   Existing Precautions/Restrictions fall  (post-surgical shoes)   Limitations/Impairments hearing   Left Lower Extremity (Weight-bearing Status) weight-bearing as tolerated (WBAT)   Right Lower  Extremity (Weight-bearing Status) weight-bearing as tolerated (WBAT)   General Observations and Info pleasant and cooperative. Spouse present for session   Cognitive Status Examination   Orientation Status orientation to person, place and time   Visual Perception   Visual Impairment/Limitations corrective lenses full-time   Pain Assessment   Patient Currently in Pain No   Posture   Posture forward head position;protracted shoulders   Range of Motion Comprehensive   Comment, General Range of Motion R shoulder ROM limited due to prior injury. All other joints WFL   Strength Comprehensive (MMT)   Comment, General Manual Muscle Testing (MMT) Assessment R shoulder 3/5, R bicep/tricep 4-/5, L shoulder/bicep/tricep 4-/5   Coordination   Upper Extremity Coordination No deficits were identified   Transfers   Transfers sit-stand transfer;toilet transfer   Sit-Stand Transfer   Sit-Stand [place] (Transfers) set up   Assistive Device (Sit-Stand Transfers) walker, front-wheeled   Toilet Transfer   Type (Toilet Transfer) sit-stand;stand-sit   [place] Level (Toilet Transfer) set up   Assistive Device (Toilet Transfer) walker, front-wheeled   Activities of Daily Living   BADL Assessment/Intervention grooming   Grooming Assessment/Training   Position (Grooming) supported standing   [place] Level (Grooming) independent   Clinical Impression   Criteria for Skilled Therapeutic Interventions Met (OT) Evaluation only   OT Diagnosis decreased functional ind d/t BLE wounds/infection   OT Problem List-Impairments impacting ADL problems related to;post-surgical precautions   Assessment of Occupational Performance 1-3 Performance Deficits   Identified Performance Deficits LE dressing, standing tolerance   Planned Therapy Interventions (OT) ADL retraining   Clinical Decision Making Complexity (OT) low complexity   Anticipated Equipment Needs Upon Discharge (OT)   (none)   Risk & Benefits of therapy have been explained  evaluation/treatment results reviewed;care plan/treatment goals reviewed;risks/benefits reviewed;current/potential barriers reviewed;participants voiced agreement with care plan;participants included;patient;spouse/significant other   Clinical Impression Comments No further IP OT needs. Would benefit from home OT to address shower tx.   OT Total Evaluation Time   OT Eval, Low Complexity Minutes (14879) 10   OT Goals   Therapy Frequency (OT) One time eval and treatment   OT Predicted Duration/Target Date for Goal Attainment 05/06/23   OT Goals Hygiene/Grooming;Toilet Transfer/Toileting   OT: Hygiene/Grooming independent;while standing;Goal Met   OT: Toilet Transfer/Toileting toilet transfer;Goal Met;Supervision/stand-by assist   Interventions   Interventions Quick Adds Self-Care/Home Management   Self-Care/Home Management   Self-Care/Home Mgmt/ADL, Compensatory, Meal Prep Minutes (30392) 8   Symptoms Noted During/After Treatment (Meal Preparation/Planning Training) none   Treatment Detail/Skilled Intervention Provided education on the role of OT. Pt demo's ability to complete STS, short distance ambulation, and toilet tx with set up A and FWW. He tolerates standing at sink for 3 mins to complete g/h tasks. Pt/spouse report Pt is at baseline with ADLs. Spouse will assist with donning/doffing socks/shoes upon dc. Pt agreeable to homecare OT to assess walk in shower tx/bathing.   OT Discharge Planning   OT Plan dc   OT Discharge Recommendation (DC Rec) home with home care occupational therapy;home with assist   OT Rationale for DC Rec At baseline with ADLs/mobility. Hasn't showered since returning from TCU due to difficulty navigating shower transfer. Would benefit from home OT to address shower tx/bathing   OT Brief overview of current status set up A STS, toilet tx, ambulation with FWW. Ind g/h in stance at sink.   Total Session Time   Timed Code Treatment Minutes 8   Total Session Time (sum of timed and untimed  services) 18

## 2023-05-07 ENCOUNTER — APPOINTMENT (OUTPATIENT)
Dept: GENERAL RADIOLOGY | Facility: CLINIC | Age: 78
DRG: 855 | End: 2023-05-07
Attending: FAMILY MEDICINE
Payer: COMMERCIAL

## 2023-05-07 LAB
ANION GAP SERPL CALCULATED.3IONS-SCNC: 8 MMOL/L (ref 7–15)
BUN SERPL-MCNC: 15.5 MG/DL (ref 8–23)
CALCIUM SERPL-MCNC: 8.1 MG/DL (ref 8.8–10.2)
CHLORIDE SERPL-SCNC: 102 MMOL/L (ref 98–107)
CREAT SERPL-MCNC: 1.1 MG/DL (ref 0.67–1.17)
CRP SERPL-MCNC: 130.67 MG/L
DEPRECATED HCO3 PLAS-SCNC: 25 MMOL/L (ref 22–29)
ERYTHROCYTE [DISTWIDTH] IN BLOOD BY AUTOMATED COUNT: 18.6 % (ref 10–15)
GFR SERPL CREATININE-BSD FRML MDRD: 70 ML/MIN/1.73M2
GLUCOSE BLDC GLUCOMTR-MCNC: 231 MG/DL (ref 70–99)
GLUCOSE BLDC GLUCOMTR-MCNC: 260 MG/DL (ref 70–99)
GLUCOSE BLDC GLUCOMTR-MCNC: 262 MG/DL (ref 70–99)
GLUCOSE BLDC GLUCOMTR-MCNC: 316 MG/DL (ref 70–99)
GLUCOSE BLDC GLUCOMTR-MCNC: 324 MG/DL (ref 70–99)
GLUCOSE SERPL-MCNC: 240 MG/DL (ref 70–99)
HCT VFR BLD AUTO: 26.6 % (ref 40–53)
HGB BLD-MCNC: 8.2 G/DL (ref 13.3–17.7)
MCH RBC QN AUTO: 25.2 PG (ref 26.5–33)
MCHC RBC AUTO-ENTMCNC: 30.8 G/DL (ref 31.5–36.5)
MCV RBC AUTO: 82 FL (ref 78–100)
PLATELET # BLD AUTO: 533 10E3/UL (ref 150–450)
POTASSIUM SERPL-SCNC: 4.4 MMOL/L (ref 3.4–5.3)
RBC # BLD AUTO: 3.25 10E6/UL (ref 4.4–5.9)
SODIUM SERPL-SCNC: 135 MMOL/L (ref 136–145)
VANCOMYCIN SERPL-MCNC: 12.3 UG/ML
WBC # BLD AUTO: 17.8 10E3/UL (ref 4–11)

## 2023-05-07 PROCEDURE — 272N000579 HC TRAY POWER PICC SOLO 4FR SINGLE LUMEN

## 2023-05-07 PROCEDURE — 250N000011 HC RX IP 250 OP 636: Performed by: FAMILY MEDICINE

## 2023-05-07 PROCEDURE — 36415 COLL VENOUS BLD VENIPUNCTURE: CPT | Performed by: FAMILY MEDICINE

## 2023-05-07 PROCEDURE — 999N000065 XR CHEST PORT 1 VIEW

## 2023-05-07 PROCEDURE — 250N000013 HC RX MED GY IP 250 OP 250 PS 637: Performed by: FAMILY MEDICINE

## 2023-05-07 PROCEDURE — 120N000001 HC R&B MED SURG/OB

## 2023-05-07 PROCEDURE — 86140 C-REACTIVE PROTEIN: CPT | Performed by: FAMILY MEDICINE

## 2023-05-07 PROCEDURE — 80048 BASIC METABOLIC PNL TOTAL CA: CPT | Performed by: FAMILY MEDICINE

## 2023-05-07 PROCEDURE — 250N000009 HC RX 250: Performed by: FAMILY MEDICINE

## 2023-05-07 PROCEDURE — 99232 SBSQ HOSP IP/OBS MODERATE 35: CPT | Performed by: FAMILY MEDICINE

## 2023-05-07 PROCEDURE — 80202 ASSAY OF VANCOMYCIN: CPT | Performed by: FAMILY MEDICINE

## 2023-05-07 PROCEDURE — 36569 INSJ PICC 5 YR+ W/O IMAGING: CPT

## 2023-05-07 PROCEDURE — 85027 COMPLETE CBC AUTOMATED: CPT | Performed by: FAMILY MEDICINE

## 2023-05-07 RX ORDER — LIDOCAINE 40 MG/G
CREAM TOPICAL
Status: ACTIVE | OUTPATIENT
Start: 2023-05-07 | End: 2023-05-10

## 2023-05-07 RX ADMIN — INSULIN GLARGINE 10 UNITS: 100 INJECTION, SOLUTION SUBCUTANEOUS at 07:56

## 2023-05-07 RX ADMIN — PANTOPRAZOLE SODIUM 40 MG: 40 TABLET, DELAYED RELEASE ORAL at 20:32

## 2023-05-07 RX ADMIN — TAZOBACTAM SODIUM AND PIPERACILLIN SODIUM 4.5 G: 500; 4 INJECTION, SOLUTION INTRAVENOUS at 00:00

## 2023-05-07 RX ADMIN — GABAPENTIN 300 MG: 300 CAPSULE ORAL at 07:55

## 2023-05-07 RX ADMIN — TAZOBACTAM SODIUM AND PIPERACILLIN SODIUM 4.5 G: 500; 4 INJECTION, SOLUTION INTRAVENOUS at 05:36

## 2023-05-07 RX ADMIN — PANTOPRAZOLE SODIUM 40 MG: 40 TABLET, DELAYED RELEASE ORAL at 07:55

## 2023-05-07 RX ADMIN — LATANOPROST 1 DROP: 50 SOLUTION OPHTHALMIC at 23:01

## 2023-05-07 RX ADMIN — TAZOBACTAM SODIUM AND PIPERACILLIN SODIUM 4.5 G: 500; 4 INJECTION, SOLUTION INTRAVENOUS at 23:05

## 2023-05-07 RX ADMIN — GABAPENTIN 300 MG: 300 CAPSULE ORAL at 13:16

## 2023-05-07 RX ADMIN — ACETAMINOPHEN 650 MG: 325 TABLET ORAL at 14:52

## 2023-05-07 RX ADMIN — INSULIN ASPART 1 UNITS: 100 INJECTION, SOLUTION INTRAVENOUS; SUBCUTANEOUS at 08:35

## 2023-05-07 RX ADMIN — LIDOCAINE HYDROCHLORIDE 0.3 ML: 10 INJECTION, SOLUTION EPIDURAL; INFILTRATION; INTRACAUDAL; PERINEURAL at 20:32

## 2023-05-07 RX ADMIN — ACETAMINOPHEN 650 MG: 325 TABLET ORAL at 07:55

## 2023-05-07 RX ADMIN — VANCOMYCIN HYDROCHLORIDE 1000 MG: 1 INJECTION, SOLUTION INTRAVENOUS at 20:45

## 2023-05-07 RX ADMIN — TAZOBACTAM SODIUM AND PIPERACILLIN SODIUM 4.5 G: 500; 4 INJECTION, SOLUTION INTRAVENOUS at 13:16

## 2023-05-07 RX ADMIN — INSULIN ASPART 1 UNITS: 100 INJECTION, SOLUTION INTRAVENOUS; SUBCUTANEOUS at 12:10

## 2023-05-07 RX ADMIN — INSULIN ASPART 2 UNITS: 100 INJECTION, SOLUTION INTRAVENOUS; SUBCUTANEOUS at 17:17

## 2023-05-07 RX ADMIN — SIMVASTATIN 40 MG: 40 TABLET, FILM COATED ORAL at 23:01

## 2023-05-07 RX ADMIN — ACETAMINOPHEN 650 MG: 325 TABLET ORAL at 20:32

## 2023-05-07 RX ADMIN — GABAPENTIN 300 MG: 300 CAPSULE ORAL at 20:32

## 2023-05-07 ASSESSMENT — ACTIVITIES OF DAILY LIVING (ADL)
ADLS_ACUITY_SCORE: 32
ADLS_ACUITY_SCORE: 34
ADLS_ACUITY_SCORE: 34
ADLS_ACUITY_SCORE: 32
ADLS_ACUITY_SCORE: 34
ADLS_ACUITY_SCORE: 34
ADLS_ACUITY_SCORE: 32
ADLS_ACUITY_SCORE: 34
ADLS_ACUITY_SCORE: 32

## 2023-05-07 NOTE — PROGRESS NOTES
"Pt. A & O x 4, pain 4 to 5/10 left heel/coccyx relief with tylenol, on room air, able to make needs known, assist 1 walker and gait belt, post op shoes when ambulating and sitting in chair, wound care completed at 1450, provider was able to assess area prior to wrapping, PIV in R fore arm, flushed with 50 ml of saline and was able to have brisk blood return prior to running antibiotic, needs ultra sound for IV placement, /52 (BP Location: Left arm)   Pulse 69   Temp 98.6  F (37  C) (Oral)   Resp 18   Ht 1.765 m (5' 9.5\")   Wt 60.7 kg (133 lb 13.1 oz)   SpO2 99%   BMI 19.48 kg/m    "

## 2023-05-07 NOTE — PROGRESS NOTES
"Time: 1900-0730  Reason for Admission: Infected diabetic ulcer left heel.    Activity:  Has not gotten out of bed this shift.  Assist of one to turn in bed.    Neuro: Patient is alert and orientated x 4.  Has been calm and cooperative.    Resp: LS are clear.  O2 sats 100% on RA.  No SOB.    GI/:  No c/o nausea.  No urinary incontinence.    Skin: Wound care to bilateral heels completed per orders.      Diet: Diabetic diet.    IV Access: PIV left lower forearm saline locked.    Vitals: /57 (BP Location: Left arm)   Pulse 74   Temp 98.9  F (37.2  C) (Oral)   Resp 18   Ht 1.765 m (5' 9.5\")   Wt 60.7 kg (133 lb 13.1 oz)   SpO2 100%   BMI 19.48 kg/m      Pain: Denies    Plan: TCU at discharge.    "

## 2023-05-07 NOTE — PHARMACY-VANCOMYCIN DOSING SERVICE
"Pharmacy Vancomycin Note  Date of Service May 7, 2023  Patient's  1945   77 year old, male    Indication: Skin and Soft Tissue Infection  Day of Therapy: 4  Current vancomycin regimen:  1000 mg IV q24h  Current vancomycin monitoring method: AUC  Current vancomycin therapeutic monitoring goal: 400-600 mg*h/L    [employer] Prediction of Current Vancomycin Regimen  Loading dose: N/A  Regimen: 1000 mg IV every 24 hours.  Start time: 16:13 on 2023  Exposure target: AUC24 (range)400-600 mg/L.hr   AUC24,ss: 443 mg/L.hr  Probability of AUC24 > 400: 72 %  Ctrough,ss: 12.9 mg/L  Probability of Ctrough,ss > 20: 3 %  Probability of nephrotoxicity (Lodise ALAYNA ): 8 %      Current estimated CrCl = Estimated Creatinine Clearance: 49.8 mL/min (based on SCr of 1.1 mg/dL).    Creatinine for last 3 days  2023:  4:28 AM Creatinine 1.07 mg/dL  2023:  5:00 AM Creatinine 1.13 mg/dL  2023:  4:25 AM Creatinine 1.10 mg/dL    Recent Vancomycin Levels (past 3 days)  2023:  3:38 PM Vancomycin 12.3 ug/mL    Vancomycin IV Administrations (past 72 hours)                   vancomycin (VANCOCIN) 1000 mg in dextrose 5% 200 mL PREMIX (mg) 1,000 mg New Bag 23 1538     1,000 mg New Bag 23 1632    vancomycin (VANCOCIN) 1,500 mg in sodium chloride 0.9 % 250 mL intermittent infusion (mg) 1,500 mg New Bag 23 1652                Nephrotoxins and other renal medications (From now, onward)    Start     Dose/Rate Route Frequency Ordered Stop    23 1600  vancomycin (VANCOCIN) 1000 mg in dextrose 5% 200 mL PREMIX         1,000 mg  200 mL/hr over 1 Hours Intravenous EVERY 24 HOURS 23 1531      23 2130  piperacillin-tazobactam (ZOSYN) intermittent infusion 4.5 g        Note to Pharmacy: For [facility], [facility] and [facility]: For Zosyn-naive patients, use the \"Zosyn initial dose + extended infusion\" order panel.    4.5 g  200 mL/hr over 30 Minutes Intravenous EVERY 6 HOURS 23 2043               Contrast Orders " - past 72 hours (72h ago, onward)    Start     Dose/Rate Route Frequency Stop    05/04/23 1720  gadobutrol (GADAVIST) injection 6 mL         6 mL Intravenous ONCE 05/04/23 1725          Interpretation of levels and current regimen:  Vancomycin level is reflective of -600    Has serum creatinine changed greater than 50% in last 72 hours: No    Urine output:  good urine output    Renal Function: Stable    Plan:  1. Continue Current Dose  2. Vancomycin monitoring method: AUC  3. Vancomycin therapeutic monitoring goal: 400-600 mg*h/L  4. Pharmacy will check vancomycin levels as appropriate in 1-3 Days.  5. Serum creatinine levels will be ordered daily for the first week of therapy and at least twice weekly for subsequent weeks.    Cristin Goldman, [facility]

## 2023-05-07 NOTE — PROGRESS NOTES
[facility]           Assessment & Plan        Roddy Sidhu is a 77 year old male admitted on 5/4/2023 for left heel diabetic ulcer. He had sepsis which resolved in the emergency department with goal-directed therapy.  He will be admitted for antibiotics and podiatry consult.     Principal Problem:    Infected diabetic ulcer of left heel associated with type 2 diabetes mellitus (H)  Active Problems:    Type 2 diabetes mellitus with diabetic polyneuropathy, with long-term current use of insulin (H)    Microalbuminuria due to type 2 diabetes mellitus (H)    Sepsis with acute organ dysfunction without septic shock, due to unspecified organism, unspecified type (H)    Diabetic ulcer of right heel associated with type 2 diabetes mellitus without infection (H)       Infected Diabetic ulcer of left heel associated with type 2 diabetes mellitus/diabetic neuropathy, unspecified ulcer stage (H)   Additional diabetic ulcer of right heel without infection  -The patient recently was diagnosed with osteomyelitis based on an x-ray, but this is not supported by the MRI performed today  - ABIs done at podiatry's request and were normal.  - CRP  156 on 5/2 - following  -Continue Zosyn which was started in the emergency department  - continue Vancomycin as started in the emergency department.  Although it is nonpurulent his recent hospitalization and admission to a long-term care facility are risk factors which warrant continued vancomycin further evaluation by podiatry   -Float BOTH heels.  I stressed the importance of this with the patient  -Podiatry and wound care consulted and saw patient 5/5 - recommended:  - betadine to both wounds twice daily   - continue offloading strategies   - wear post-op shoes on both feet.  Patient may get up in chair and have bathroom privileges as long as wearing postop shoes.   No urgent plan for surgery at present, but may need debridement in OR at some point.    -  wound left heel incised by podiatry 5/5, culture pending from this   - not clearly better or worse based on labs/appearance 5/5-5/7   - request re-evaluation by podiatry and wound care on Monday, NPO midnight Sun           Sepsis with acute organ dysfunction without septic shock, due to unspecified organism, unspecified type (H)  -Resolved in the emergency department       Acute on chronic anemia  - hemoglobin last normal 6 months ago, was 8's during recent admission, then 8/2 5/4 and dropped to 6.7-6.9 AM of 5/5.    -  No black or bloody stools.  No abdominal pain.   No other apparent source of acute blood loss.    - gave 1u RBCs AM 5/5, rechecking hemoglobin after  - guiac negative   - started on protonix twice daily 5/5    - held prophylactic heparin 5/5 that had been started on admission   - held aspirin - appears to be on this just due to diabetes - recommend reassessing at follow-up with primary care provider if this is really needed.     - stopped celebrex 100  Mg twice daily that had been started on admission, was on naprosyn prn at home.  - iron low but so is binding capacity, could be anemia of chronic disease    - hemoglobin overall stable since     Recent hospitalization for closed fracture of right femur   - s/p Right intermedullary nail for right diaphyseal femur fracture on 3/29  -  has tylenol prn and oxycodone if needed while here.  Stopped celebrex as above.          Type 2 diabetes mellitus with diabetic polyneuropathy, with long-term current use of insulin (H)  - A1c 8.3 on 5/2   - held home metformin   -Primary care intended to start Trulicity but he has not begun this medication yet.  -Continue home Lantus 10 units  -Added low-dose sliding scale on admission   -We will hold off on starting Trulicity until discharge       Microalbuminuria due to type 2 diabetes mellitus (H)  Estimated Creatinine Clearance: 48.7 mL/min (based on SCr of 1.11 mg/dL).   -Not on home ARB or ACE inhibitor, may  possibly benefit, defer to PCP     Hyperlipidemia  Continue home simvastatin.         DVT Prophylaxis: initially admitted on heparin, stopped  due to anemia   Polo Catheter: Not present    Lines: has lost multiple IVs including multiple IVs placed under US guidance - with loss of IV again , ordered midline PICC      Cardiac Monitoring: None  Code Status: Full Code          Diet  Orders Placed This Encounter      High Consistent Carb (75 g CHO per Meal) Diet                        Disposition Plan   Hope for discharge home vs TCU in 2-3 days                     Joaquin Mehta MD, MD  Hospitalist Service  [facility]  Securely message with the Vocera Web Console (learn more here)  Text page via [employer] Paging/Directory             Interval History:   No new concerns.  Feet feel about the same.  No fever or chills. Mild pain unchanged.  No dyspnea.  Overall about the same as past couple of days.                  Review of Systems:    ROS: 10 point ROS neg other than the symptoms noted above in the HPI.           Medications:   Current active medications and PTA medications reviewed, see medication list for details.            Physical Exam:   Vitals were reviewed  Patient Vitals for the past 24 hrs:   BP Temp Temp src Pulse Resp SpO2 Weight   23 1600 123/66 98.4  F (36.9  C) Oral 75 18 98 % --   23 0614 121/52 98.6  F (37  C) Oral 69 18 99 % --   23 0535 -- -- -- -- -- -- 60.7 kg (133 lb 13.1 oz)   23 2205 100/57 98.9  F (37.2  C) Oral 74 18 100 % --       Temperatures:  Current - Temp: 98.4  F (36.9  C); Max - Temp  Av.6  F (37  C)  Min: 98.4  F (36.9  C)  Max: 98.9  F (37.2  C)  Respiration range: Resp  Av  Min: 18  Max: 18  Pulse range: Pulse  Av.7  Min: 69  Max: 75  Blood pressure range: Systolic (24hrs), Av , Min:100 , Max:123   ; Diastolic (24hrs), Av, Min:52, Max:66    Pulse oximetry range: SpO2  Av %  Min: 98 %  Max: 100  %  I/O last 3 completed shifts:  In: 220 [I.V.:220]  Out: 775 [Urine:775]    Intake/Output Summary (Last 24 hours) at 5/7/2023 1654  Last data filed at 5/7/2023 0545  Gross per 24 hour   Intake --   Output 400 ml   Net -400 ml     EXAM:  General: awake and alert, NAD, oriented x 3  Head: normocephalic  Neck: unremarkable, no lymphadenopathy   HEENT: oropharynx pink and moist    Heart: Regular rate and rhythm, no murmurs, rubs, or gallops  Lungs: clear to auscultation bilaterally with good air movement throughout  Abdomen: soft, non-tender, no masses or organomegaly  Extremities: evaluated with wraps off today - no notable change in appearance of either heel wound, no edema in lower extremities   Skin otherwise unremarkable.               Data:     Reviewed data:  Results for orders placed or performed during the hospital encounter of 05/04/23 (from the past 24 hour(s))   Glucose by meter   Result Value Ref Range    GLUCOSE BY METER POCT 360 (H) 70 - 99 mg/dL   Glucose by meter   Result Value Ref Range    GLUCOSE BY METER POCT 278 (H) 70 - 99 mg/dL   Glucose by meter   Result Value Ref Range    GLUCOSE BY METER POCT 260 (H) 70 - 99 mg/dL   CBC with platelets   Result Value Ref Range    WBC Count 17.8 (H) 4.0 - 11.0 10e3/uL    RBC Count 3.25 (L) 4.40 - 5.90 10e6/uL    Hemoglobin 8.2 (L) 13.3 - 17.7 g/dL    Hematocrit 26.6 (L) 40.0 - 53.0 %    MCV 82 78 - 100 fL    MCH 25.2 (L) 26.5 - 33.0 pg    MCHC 30.8 (L) 31.5 - 36.5 g/dL    RDW 18.6 (H) 10.0 - 15.0 %    Platelet Count 533 (H) 150 - 450 10e3/uL   Basic metabolic panel   Result Value Ref Range    Sodium 135 (L) 136 - 145 mmol/L    Potassium 4.4 3.4 - 5.3 mmol/L    Chloride 102 98 - 107 mmol/L    Carbon Dioxide (CO2) 25 22 - 29 mmol/L    Anion Gap 8 7 - 15 mmol/L    Urea Nitrogen 15.5 8.0 - 23.0 mg/dL    Creatinine 1.10 0.67 - 1.17 mg/dL    Calcium 8.1 (L) 8.8 - 10.2 mg/dL    Glucose 240 (H) 70 - 99 mg/dL    GFR Estimate 70 >60 mL/min/1.73m2   CRP inflammation    Result Value Ref Range    CRP Inflammation 130.67 (H) <5.00 mg/L   Glucose by meter   Result Value Ref Range    GLUCOSE BY METER POCT 231 (H) 70 - 99 mg/dL   Glucose by meter   Result Value Ref Range    GLUCOSE BY METER POCT 262 (H) 70 - 99 mg/dL   Vancomycin level   Result Value Ref Range    Vancomycin 12.3   ug/mL           Attestation:  I have reviewed today's vital signs, notes, medications, labs and imaging.  Amount of time spent managing this patient today:  45 minutes.     Joaquin Mehta MD, MD

## 2023-05-08 ENCOUNTER — ANESTHESIA EVENT (OUTPATIENT)
Dept: SURGERY | Facility: CLINIC | Age: 78
DRG: 855 | End: 2023-05-08
Payer: COMMERCIAL

## 2023-05-08 ENCOUNTER — APPOINTMENT (OUTPATIENT)
Dept: PHYSICAL THERAPY | Facility: CLINIC | Age: 78
DRG: 855 | End: 2023-05-08
Payer: COMMERCIAL

## 2023-05-08 ENCOUNTER — APPOINTMENT (OUTPATIENT)
Dept: WOUND CARE | Facility: CLINIC | Age: 78
DRG: 855 | End: 2023-05-08
Payer: COMMERCIAL

## 2023-05-08 LAB
ANION GAP SERPL CALCULATED.3IONS-SCNC: 6 MMOL/L (ref 7–15)
BUN SERPL-MCNC: 15.8 MG/DL (ref 8–23)
CALCIUM SERPL-MCNC: 8.2 MG/DL (ref 8.8–10.2)
CHLORIDE SERPL-SCNC: 99 MMOL/L (ref 98–107)
CREAT SERPL-MCNC: 1.03 MG/DL (ref 0.67–1.17)
CRP SERPL-MCNC: 129.52 MG/L
DEPRECATED HCO3 PLAS-SCNC: 27 MMOL/L (ref 22–29)
ERYTHROCYTE [DISTWIDTH] IN BLOOD BY AUTOMATED COUNT: 18.6 % (ref 10–15)
GFR SERPL CREATININE-BSD FRML MDRD: 76 ML/MIN/1.73M2
GLUCOSE BLDC GLUCOMTR-MCNC: 216 MG/DL (ref 70–99)
GLUCOSE BLDC GLUCOMTR-MCNC: 232 MG/DL (ref 70–99)
GLUCOSE BLDC GLUCOMTR-MCNC: 241 MG/DL (ref 70–99)
GLUCOSE BLDC GLUCOMTR-MCNC: 285 MG/DL (ref 70–99)
GLUCOSE BLDC GLUCOMTR-MCNC: 286 MG/DL (ref 70–99)
GLUCOSE SERPL-MCNC: 275 MG/DL (ref 70–99)
HCT VFR BLD AUTO: 25.2 % (ref 40–53)
HGB BLD-MCNC: 7.9 G/DL (ref 13.3–17.7)
MCH RBC QN AUTO: 25.6 PG (ref 26.5–33)
MCHC RBC AUTO-ENTMCNC: 31.3 G/DL (ref 31.5–36.5)
MCV RBC AUTO: 82 FL (ref 78–100)
PLATELET # BLD AUTO: 473 10E3/UL (ref 150–450)
POTASSIUM SERPL-SCNC: 4.2 MMOL/L (ref 3.4–5.3)
RBC # BLD AUTO: 3.09 10E6/UL (ref 4.4–5.9)
SODIUM SERPL-SCNC: 132 MMOL/L (ref 136–145)
WBC # BLD AUTO: 16 10E3/UL (ref 4–11)

## 2023-05-08 PROCEDURE — 85027 COMPLETE CBC AUTOMATED: CPT | Performed by: FAMILY MEDICINE

## 2023-05-08 PROCEDURE — 120N000001 HC R&B MED SURG/OB

## 2023-05-08 PROCEDURE — 250N000011 HC RX IP 250 OP 636: Performed by: FAMILY MEDICINE

## 2023-05-08 PROCEDURE — G0463 HOSPITAL OUTPT CLINIC VISIT: HCPCS

## 2023-05-08 PROCEDURE — 250N000013 HC RX MED GY IP 250 OP 250 PS 637: Performed by: FAMILY MEDICINE

## 2023-05-08 PROCEDURE — 250N000011 HC RX IP 250 OP 636: Performed by: INTERNAL MEDICINE

## 2023-05-08 PROCEDURE — 99232 SBSQ HOSP IP/OBS MODERATE 35: CPT | Performed by: INTERNAL MEDICINE

## 2023-05-08 PROCEDURE — 86140 C-REACTIVE PROTEIN: CPT | Performed by: FAMILY MEDICINE

## 2023-05-08 PROCEDURE — 80048 BASIC METABOLIC PNL TOTAL CA: CPT | Performed by: FAMILY MEDICINE

## 2023-05-08 PROCEDURE — 97110 THERAPEUTIC EXERCISES: CPT | Mod: GP

## 2023-05-08 RX ORDER — AMPICILLIN AND SULBACTAM 2; 1 G/1; G/1
3 INJECTION, POWDER, FOR SOLUTION INTRAMUSCULAR; INTRAVENOUS EVERY 6 HOURS
Status: DISCONTINUED | OUTPATIENT
Start: 2023-05-08 | End: 2023-05-10

## 2023-05-08 RX ADMIN — INSULIN ASPART 1 UNITS: 100 INJECTION, SOLUTION INTRAVENOUS; SUBCUTANEOUS at 12:17

## 2023-05-08 RX ADMIN — LATANOPROST 1 DROP: 50 SOLUTION OPHTHALMIC at 22:11

## 2023-05-08 RX ADMIN — PANTOPRAZOLE SODIUM 40 MG: 40 TABLET, DELAYED RELEASE ORAL at 09:14

## 2023-05-08 RX ADMIN — ACETAMINOPHEN 650 MG: 325 TABLET ORAL at 16:13

## 2023-05-08 RX ADMIN — INSULIN ASPART 1 UNITS: 100 INJECTION, SOLUTION INTRAVENOUS; SUBCUTANEOUS at 18:01

## 2023-05-08 RX ADMIN — PANTOPRAZOLE SODIUM 40 MG: 40 TABLET, DELAYED RELEASE ORAL at 21:32

## 2023-05-08 RX ADMIN — GABAPENTIN 300 MG: 300 CAPSULE ORAL at 15:37

## 2023-05-08 RX ADMIN — TAZOBACTAM SODIUM AND PIPERACILLIN SODIUM 4.5 G: 500; 4 INJECTION, SOLUTION INTRAVENOUS at 04:40

## 2023-05-08 RX ADMIN — TAZOBACTAM SODIUM AND PIPERACILLIN SODIUM 4.5 G: 500; 4 INJECTION, SOLUTION INTRAVENOUS at 09:21

## 2023-05-08 RX ADMIN — INSULIN ASPART 2 UNITS: 100 INJECTION, SOLUTION INTRAVENOUS; SUBCUTANEOUS at 08:59

## 2023-05-08 RX ADMIN — TAZOBACTAM SODIUM AND PIPERACILLIN SODIUM 4.5 G: 500; 4 INJECTION, SOLUTION INTRAVENOUS at 15:58

## 2023-05-08 RX ADMIN — AMPICILLIN SODIUM AND SULBACTAM SODIUM 3 G: 2; 1 INJECTION, POWDER, FOR SOLUTION INTRAMUSCULAR; INTRAVENOUS at 23:14

## 2023-05-08 RX ADMIN — GABAPENTIN 300 MG: 300 CAPSULE ORAL at 21:32

## 2023-05-08 RX ADMIN — ACETAMINOPHEN 650 MG: 325 TABLET ORAL at 09:17

## 2023-05-08 RX ADMIN — GABAPENTIN 300 MG: 300 CAPSULE ORAL at 09:14

## 2023-05-08 RX ADMIN — SIMVASTATIN 40 MG: 40 TABLET, FILM COATED ORAL at 21:31

## 2023-05-08 RX ADMIN — VANCOMYCIN HYDROCHLORIDE 1000 MG: 1 INJECTION, SOLUTION INTRAVENOUS at 21:32

## 2023-05-08 RX ADMIN — INSULIN GLARGINE 10 UNITS: 100 INJECTION, SOLUTION SUBCUTANEOUS at 09:01

## 2023-05-08 ASSESSMENT — ACTIVITIES OF DAILY LIVING (ADL)
ADLS_ACUITY_SCORE: 32
ADLS_ACUITY_SCORE: 32
ADLS_ACUITY_SCORE: 31
ADLS_ACUITY_SCORE: 31
ADLS_ACUITY_SCORE: 32
ADLS_ACUITY_SCORE: 32
ADLS_ACUITY_SCORE: 31
ADLS_ACUITY_SCORE: 32
ADLS_ACUITY_SCORE: 31

## 2023-05-08 ASSESSMENT — LIFESTYLE VARIABLES: TOBACCO_USE: 1

## 2023-05-08 NOTE — PROGRESS NOTES
"Antimicrobial Stewardship Team Note    Antimicrobial Stewardship Program - A joint venture between [facility] and  Physicians to optimize antibiotic management.  NOT a formal consult - Restricted Antimicrobial Review     Patient: Roddy Sidhu  MRN: [id number]  Allergies: Patient has no known allergies.    Brief Summary: Roddy Sidhu is a 77 year old male with PMH significant for tobacco use and T2DM who presented on 5/4/2023 to [facility] ED with sepsis 2/2 DFI of left heel.    History of Present Illness:Patient reports increased pain in the left heel and spending more time in bed prior to admission. He reports pain is aggravated by activity and relieved by rest; denies calf pain. Wound on left heel is extensive and appears black and necrotic on examination. No reports of purulence or drainage. On presentation, patient was tachycardic () and febrile (T 100.8) with leukocytosis (WBC 26.3) and lactic acid 3.2, meeting criteria for sepsis secondary to DFI. Empiric Zosyn and vancomycin were started in the ED given recent hospitalization and admission to a LTC facility were seen as risk factors warranting empiric MRSA coverage.  MRI of left heel without evidence of osteomyelitis, septic arthropathy or abscess. However, it showed T2 signal abnormalities at the postinferior calcaneous. Patient was evaluated by podiatry who \"made an incision on the leathery eschar on the left heel\" on 5/5 and sent for culture. Wound culture grew 2+ pan-susceptible Proteus mirabilis, 3+ E. Faecalis (ampicillin susceptible ), and  1+Staphylococcus aureus, pending susceptibility.            Active Anti-infective Medications   (From admission, onward)                 Start     Stop    05/05/23 1600  vancomycin in dextrose  1,000 mg,   Intravenous,   200 mL/hr,   EVERY 24 HOURS        Skin and Soft Tissue Infection        --    05/04/23 2130  piperacillin-tazobactam  4.5 g,   Intravenous,   200 mL/hr,   EVERY 6 HOURS   "      Sepsis, Skin and Soft Tissue Infection        --                  Assessment: Diabetic foot infection of the left heel  Presentation is consistent with DFI without evidence osteomyelitis at this time. However, T2 signal abnormalities may be an early sign of bone involvement. Patient has remained clinically stable after initiation of IV antibiotics. Given wound culture and susceptibility results, narrowing of antimicrobial therapy is warranted. We recommend deescalating Zosyn to Unasyn to cover both P. mirabilis and E. Faecalis. Consider oral step down to Augmentin with ongoing clinical improvement. Continue vancomycin pending Staphylococcus aureus susceptibility. If MSSA, will be covered by either Unasyn or Augmentin. If MRSA, consider doxycyline or Bactrim. We recommend at least 2 weeks of therapy given concern for early signs of osteomyelitis on MRI.    Recommendations:   Deescalating Zosyn to Augmentin 875/125 mg PO twice daily  Continue vancomycin pending Staphylococcus aureus susceptibility  Total duration 2 weeks (end date 5/18)    Pharmacy took the following actions: Called/paged provider.    Discussed with ID Staff: Georgette Bush MD, MS and Samantha Jackson, PharmD, BCIDP    Chase Patel, PharmD  [phone]    Vital Signs/Clinical Features:  Vitals         05/06 0700  05/07 0659 05/07 0700  05/08 0659 05/08 0700  05/08 1440   Most Recent      Temp ( F) 98.6 -  98.9    98.4 -  98.9       98.9 (37.2) 05/08 0609    Pulse 69 -  80    68 -  75       68 05/08 0609    Resp 18 -  20    16 -  18       16 05/08 0609    /57 -  150/61    111/63 -  123/66       118/62 05/08 0609    SpO2 (%) 99 -  100    97 -  98       98 05/08 0609            Labs  Estimated Creatinine Clearance: 53.2 mL/min (based on SCr of 1.03 mg/dL).  Recent Labs   Lab Test 05/02/23  1141 05/04/23  1359 05/05/23  0428 05/06/23  0500 05/07/23  0425 05/08/23  0440   CR 1.17 1.11 1.07 1.13 1.10 1.03       Recent Labs   Lab Test  05/02/23  1141 05/04/23  1359 05/05/23  0428 05/05/23  0752 05/05/23  1339 05/06/23  0500 05/07/23  0425 05/08/23  0440   WBC 30.4* 26.3* 17.1*  --   --  19.1* 17.8* 16.0*   HGB 8.7* 8.2* 6.7* 6.9* 8.4* 8.9* 8.2* 7.9*   HCT 28.8* 26.1* 22.4*  --   --  28.3* 26.6* 25.2*   MCV 83 80 81  --   --  82 82 82   * 588* 488*  --   --  562* 533* 473*       Recent Labs   Lab Test 05/02/23  1141   ALT 12       Recent Labs   Lab Test 05/02/23  1141 05/04/23  1412 05/04/23  2012 05/05/23  0428 05/06/23  0500 05/07/23  0425 05/08/23  0440   LACT  --  3.2* 1.1  --   --   --   --    CRPI 156.90*  --   --  129.66* 125.51* 130.67* 129.52*       Recent Labs   Lab Test 05/07/23  1538   VANCOMYCIN 12.3       Culture Results:  7-Day Micro Results       Procedure Component Value Units Date/Time    Wound Aerobic Bacterial Culture Routine with Gram Stain [[id number]]  (Abnormal)  (Susceptibility) Collected: 05/05/23 1455    Order Status: Completed Lab Status: Preliminary result Updated: 05/08/23 1323    Specimen: Wound from Heel, Left      Culture 2+ Proteus mirabilis      3+ Enterococcus faecalis      1+ Staphylococcus aureus      1+ Normal keke     Gram Stain Result 2+ Gram negative bacilli      2+ Gram positive cocci      2+ WBC seen    Susceptibility       Proteus mirabilis (1)       Antibiotic Interpretation Sensitivity   Method Status    Ampicillin Susceptible <=2 ug/mL ZULEYMA Final    Ampicillin/ Sulbactam Susceptible <=2 ug/mL ZULEYMA Final    Piperacillin/Tazobactam Susceptible <=4 ug/mL ZULEYMA Final    Cefoxitin  [*]  Susceptible <=4 ug/mL ZULEYMA Final    Ceftazidime Susceptible <=1 ug/mL ZULEYMA Final    Ceftriaxone Susceptible <=1 ug/mL ZULEYMA Final    Cefepime Susceptible <=1 ug/mL ZULEYMA Final    Meropenem Susceptible <=0.25 ug/mL ZULEYMA Final    Amikacin  [*]  Susceptible <=2 ug/mL ZULEYMA Final    Gentamicin Susceptible <=1 ug/mL ZULEYMA Final    Tobramycin Susceptible <=1 ug/mL ZULEYMA Final    Ciprofloxacin Susceptible <=0.25 ug/mL ZULEYMA Final     Levofloxacin Susceptible <=0.12 ug/mL ZULEYMA Final    Nitrofurantoin  [*]  Resistant 128 ug/mL ZULEYMA Final     Intrinsically Resistant       Trimethoprim/Sulfamethoxazole Susceptible <=1/19 ug/mL ZULEYMA Final              Enterococcus faecalis (2)       Antibiotic Interpretation Sensitivity   Method Status    Penicillin Susceptible 4 ug/mL ZULEYMA Final    Ampicillin Susceptible <=2 ug/mL ZULEYMA Final    Gentamicin Synergy Resistant Resistant ug/mL ZULEYMA Final     High level gentamicin resistance was found, and this is predictive of resistance to tobramycin and amikacin.       Streptomycin Synergy  [*]  Susceptible Susceptible ug/mL ZULEYMA Final    Ciprofloxacin  [*]  Susceptible <=0.5 ug/mL ZULEYMA Final    Levofloxacin  [*]  Susceptible 0.5 ug/mL ZULEYMA Final    Quinupristin/Dalfopristin  [*]  Resistant 4 ug/mL ZULEYMA Final    Linezolid  [*]  Susceptible 2 ug/mL ZULEYMA Final    Vancomycin Susceptible 1 ug/mL ZULEYMA Final    Tigecycline  [*]  Susceptible <=0.12 ug/mL ZULEYMA Final    Nitrofurantoin  [*]  Susceptible <=16 ug/mL ZULEYMA Final               [*]  Suppressed Antibiotic                   Blood Culture Peripheral Blood [[id number]]  (Normal) Collected: 05/04/23 1532    Order Status: Completed Lab Status: Preliminary result Updated: 05/07/23 2031    Specimen: Peripheral Blood      Culture No growth after 3 days    Blood Culture Peripheral Blood [[id number]]  (Normal) Collected: 05/04/23 1412    Order Status: Completed Lab Status: Preliminary result Updated: 05/07/23 2002    Specimen: Peripheral Blood      Culture No growth after 3 days    Blood Culture Peripheral Blood     Order Status: No result Lab Status: No result     Specimen: Peripheral Blood                                     Imaging: XR Chest Port 1 View    Result Date: 5/7/2023  EXAM: XR CHEST PORT 1 VIEW LOCATION: [facility] DATE/TIME: 5/7/2023 8:31 PM CDT INDICATION: RN placed PICC   verify tip placement COMPARISON: None.     IMPRESSION: Left PICC tip  overlies cavoatrial junction, appropriate position. No acute findings. Clear lungs. Normal heart size. Old left clavicle and right proximal humerus fractures.    MR Foot Left w/o & w Contrast    Result Date: 5/4/2023  EXAM: MR FOOT LEFT W/O and W CONTRAST LOCATION: [facility] DATE/TIME: 5/4/2023 5:58 PM CDT INDICATION: Concern for osteomyelitis of left calcaneus. COMPARISON: None. TECHNIQUE: Routine. Additional postgadolinium T1 sequences were obtained. IV CONTRAST: 6 mL Gadavist FINDINGS: JOINTS AND BONES: -No evidence for fracture. There is some reactive T2 signal abnormality within the posteroinferior calcaneus but no specific evidence for osteomyelitis. No additional areas worrisome for osteomyelitis are identified. No significant effusion to suggest septic arthropathy. TENDONS: -Mild Achilles tendinopathy without tearing. The peroneal tendons are intact. The flexor tendons are intact. The extensor tendons are intact. LIGAMENTS: -The anterior and posterior talofibular ligaments are intact. The calcaneofibular ligament is intact. The deltoid ligamentous complex is intact. MUSCLES AND SOFT TISSUES: -There is an ulceration along the posterior aspect of the foot with some surrounding edema or cellulitis. There is no evidence for abscess.     IMPRESSION: 1.  No evidence for osteomyelitis, septic arthropathy, or abscess. 2.  Edema or cellulitis along the posterior aspect of the foot. There is apparent soft tissue irregularity and likely ulceration along the posterior aspect of the foot and there is susceptibly artifact suggestive of air tracking into the subcutaneous soft tissues. 3.  No evidence for fracture. 4.  Mild reactive edema within the posteroinferior calcaneus. 5.  Mild Achilles tendinopathy without tearing.    US MARKY Doppler - No Exercise    Result Date: 5/4/2023  US MARKY DOPPLER NO EXERCISE, 1-2 LEVELS, BILATERAL   5/4/2023 4:23 PM HISTORY: Diabetic foot ulceration with concern  for osteomyelitis. COMPARISON: None. FINDINGS: Right MARKY: PT: 1.09. DP: 1.19 Left MARKY: PT: 0.78. DP: 1.1 Waveforms: Biphasic and triphasic in the right posterior tibial, right dorsalis pedis and left dorsalis pedis. Monophasic in the left posterior tibial.     IMPRESSION: 1. Normal ABIs bilaterally. MARKY CRITERIA: >1.4 NC 0.95-1.4 Normal 0.90 - 0.94 Mild 0.5 - 0.89 Moderate 0.2 - 0.49 Severe <0.2 Critical KAITY TREJO DO   SYSTEM ID:  [id number]    XR Calcaneus Right G/E 2 Views    Result Date: 5/2/2023  XR CALCANEUS LEFT G/E 2 VIEWS, XR CALCANEUS RIGHT G/E 2 VIEWS 5/2/2023 11:54 AM HISTORY: pressure sore- eval for osteomyelitis; Pressure injury of skin of heel, unspecified injury stage, unspecified laterality COMPARISON: None.     IMPRESSION: LEFT CALCANEUS: There is suspected soft tissue ulceration over the left calcaneus posteriorly. There is some lucency within the superior, posterior aspect of the left calcaneus on the lateral view suspicious for osteomyelitis. This can be confirmed with MRI. RIGHT CALCANEUS: No osseous erosion or periosteal reaction to suggest osteomyelitis. CYNTHIA NEAL MD   SYSTEM ID:  [id number]    XR Calcaneus Left G/E 2 Views    Result Date: 5/2/2023  XR CALCANEUS LEFT G/E 2 VIEWS, XR CALCANEUS RIGHT G/E 2 VIEWS 5/2/2023 11:54 AM HISTORY: pressure sore- eval for osteomyelitis; Pressure injury of skin of heel, unspecified injury stage, unspecified laterality COMPARISON: None.     IMPRESSION: LEFT CALCANEUS: There is suspected soft tissue ulceration over the left calcaneus posteriorly. There is some lucency within the superior, posterior aspect of the left calcaneus on the lateral view suspicious for osteomyelitis. This can be confirmed with MRI. RIGHT CALCANEUS: No osseous erosion or periosteal reaction to suggest osteomyelitis. CYNTHIA NEAL MD   SYSTEM ID:  [id number]

## 2023-05-08 NOTE — PROGRESS NOTES
Dr. Flores at bedside-ok to resume diabetic diet.  RN will reorder and call kitchen so he can get on lunch delivery.  Dr. Flores to place NPO at midnight orders for surgery 5/9/2023

## 2023-05-08 NOTE — PROGRESS NOTES
The patient was seen at bedside in no apparent distress.  The patient relates a good appetite with no nausea or vomiting.  The patient denies any fever or chills.  The patient relates the pain is under control.  The patient relates being able to go the the bathroom.     The patient's vitals are stable and is affebrile    Wound Cultures:     Gram stain:  2+ Gram negative bacilli      2+ Gram positive cocci      2+ WBC seen       Aerobic:  2+ Proteus mirabilis Abnormal        3+ Enterococcus faecalis Abnormal              Labs:   WBC:17.9   RBC:3.26   Hgb:8.3   Hematocrit:26.7      Glucose:254    Exam:  Neurovascular status remains intact with positive epicritic sensation and capillary filling to the digits on the left.  Motor is intact to the left.       The wound on the left heel appears necrotic with fluctuance on the perimeter.  No drainage noted.    Assessment: Infected decubitus heel ulcer, left foot  Plan:     The nature of the patient s condition was discussed at length.  I discussed with patient details of procedure(s), possible risks and complications, alternative treatment options and post-operative course detailed below.    Likely surgical procedures include surgical debridement of the nonviable skin and soft tissue of the heel decubitus ulcer on the left foot.  The patient was educated today about risks associated with surgery.  Risks of surgery include, but are not limited to: infection, painful scar, nerve injury, numbness, stiffness, over-correction, under-correction, non-union, need for repeat surgery, recurrence of condition, ongoing pain, delayed wound healing, blood clot in the legs or lungs, amputation or other unforeseen side effects from undergoing surgery.       After detailed discussed patient wishes to continue with the proposed surgical intervention.  The procedure will be performed under monitored anesthesia care with a local block for anesthesia.   Consents will be reviewed and signed on  the day of surgery.      I appreciate the assistance in the care of this patient from the Hospitalist Service.      GEORGE Flores, JARAD, FACFAS

## 2023-05-08 NOTE — PROGRESS NOTES
[facility] Medicine Progress Note  Date of Service: 05/08/2023    Assessment & Plan   Roddy Sidhu is a 77 year old male who presented on 5/4/2023 after being referred by his clinic doctor for evaluation of worsening left diabetic heel infection.   Roddy Sidhu is a 77 year old male admitted on 5/4/2023 for left heel diabetic ulcer. He had sepsis which resolved in the emergency department with goal-directed therapy.  He will be admitted for antibiotics and podiatry consult.        Infected Diabetic ulcer of left heel associated with type 2 diabetes mellitus/diabetic neuropathy, unspecified ulcer stage (H)  Additional diabetic ulcer of right heel without infection    The patient recently was diagnosed with osteomyelitis based on an x-ray, but this is not supported by the MRI performed this admission.    ABIs done at podiatry's request and were normal.    CRP  156 on 5/2 -> 129    Piperacillin-tazobactam and vancomycin IV were started in the emergency department    Cultures from wound positive for 2+ Proteus, 3+ Enterococcus, 1+ Staph aureus.    - change piperacillin-tazobactam to ampicillin-sulbactam to cover Proteus and Enterococcus, will plan to change to oral Augmentin postoperatively   -Continue vancomycin pending Staph aureus sensitivities   -Podiatry has been consulted and planning for OR for debridement tomorrow   - wound care RN following    Sepsis with acute organ dysfunction without septic shock, due to unspecified organism, unspecified type (H)  -Resolved in the emergency department       Acute on chronic anemia  - hemoglobin last normal 6 months ago, was 8's during recent admission, then 8/2 5/4 and dropped to 6.7-6.9 AM of 5/5.    -  No black or bloody stools.  No abdominal pain.   No other apparent source of acute blood loss.    - gave 1u RBCs AM 5/5, rechecking hemoglobin after  - guiac negative   - started on protonix twice daily 5/5    - held aspirin  - appears to be on this just due to diabetes - recommend reassessing at follow-up with primary care provider if this is really needed.     - stopped celebrex 100  Mg twice daily that had been started on admission, was on naprosyn prn at home.  - iron low but so is binding capacity, could be anemia of chronic disease    - hemoglobin overall stable since     Recent hospitalization for closed fracture of right femur   - s/p Right intermedullary nail for right diaphyseal femur fracture on 3/29  -  has tylenol prn and oxycodone if needed while here.  Stopped celebrex as above.        Type 2 diabetes mellitus with diabetic polyneuropathy, with long-term current use of insulin (H)    A1c 8.3 on 5/2 on metformin and lantus 10 U daily at home. Primary care intended to start Trulicity but he has not begun this medication yet    Held home metformin, continued Lantus 10. Poor control.   - increase Lantus to 14 AM   - increase SSI to medium     Hyperlipidemia  Continue home simvastatin.         DVT Prophylaxis: initially admitted on heparin, stopped 5/5 due to anemia   Polo Catheter: Not present    Lines: has lost multiple IVs including multiple IVs placed under US guidance - with loss of IV again 5/7, ordered midline PICC      Cardiac Monitoring: None  Code Status: Full Code      Diet  Orders Placed This Encounter      High Consistent Carb (75 g CHO per Meal) Diet      NPO per Anesthesia Guidelines for Procedure/Surgery Except for: Meds        Discussion: Stable but healed wound needs debridement which will be done tomorrow, discussed with Dr. Flores.  Patient has not had surgery before anesthesia.  Cardiovascular systems appears stable currently.    Disposition Plan      Expected Discharge Date: 05/10/2023      Destination: home with family;inpatient rehabilitation facility            Medical Decision Making       35 MINUTES SPENT BY ME on the date of service doing chart review, history, exam, documentation & further  activities per the note.        Ancelmo Marques MD  [facility] Medicine        Interval History   Has some pain in that heel and leg otherwise no complaints.  No chest pain or shortness of breath.  Is not very active at baseline.  Cannot climb a flight of stairs due to neuropathy.  No history of anesthesia.    Physical Exam   Temp:  [98.9  F (37.2  C)-99.6  F (37.6  C)] 99.6  F (37.6  C)  Pulse:  [68-71] 71  Resp:  [16] 16  BP: (111-129)/(62-85) 129/85  SpO2:  [97 %-98 %] 98 %    Weights:   Vitals:    05/04/23 1305 05/04/23 2030 05/07/23 0535   Weight: 59 kg (130 lb) 59.9 kg (132 lb 0.9 oz) 60.7 kg (133 lb 13.1 oz)    Body mass index is 19.48 kg/m .    Constitutional: Alert and oriented, appears older than stated age, no acute distress and nontoxic appearing  CV: Regular, no edema  Respiratory: CTA bilaterally  GI: Soft, thin, non-tender, bowel sounds normal  Skin: Warm and dry.  Heels are dressed    Data   Recent Labs   Lab 05/08/23  1628 05/08/23  1130 05/08/23  0745 05/08/23  0440 05/07/23  0821 05/07/23  0425 05/06/23  0821 05/06/23  0500 05/04/23  1359 05/02/23  1141   WBC  --   --   --  16.0*  --  17.8*  --  19.1*   < > 30.4*   HGB  --   --   --  7.9*  --  8.2*  --  8.9*   < > 8.7*   MCV  --   --   --  82  --  82  --  82   < > 83   PLT  --   --   --  473*  --  533*  --  562*   < > 742*   NA  --   --   --  132*  --  135*  --  138   < > 136   POTASSIUM  --   --   --  4.2  --  4.4  --  5.0   < > 5.7*   CHLORIDE  --   --   --  99  --  102  --  104   < > 98   CO2  --   --   --  27  --  25  --  26   < > 17*   BUN  --   --   --  15.8  --  15.5  --  14.1   < > 29.9*   CR  --   --   --  1.03  --  1.10  --  1.13   < > 1.17   ANIONGAP  --   --   --  6*  --  8  --  8   < > 21*   BEST  --   --   --  8.2*  --  8.1*  --  8.6*   < > 10.1   * 232* 241* 275*   < > 240*   < > 185*   < > 382*   ALT  --   --   --   --   --   --   --   --   --  12    < > = values in this interval not displayed.       Recent Labs   Lab  05/08/23  1628 05/08/23  1130 05/08/23  0745 05/08/23  0440 05/08/23  0218 05/07/23  2215   * 232* 241* 275* 286* 324*        Unresulted Labs Ordered in the Past 30 Days of this Admission     Date and Time Order Name Status Description    5/5/2023  2:52 PM Wound Aerobic Bacterial Culture Routine with Gram Stain Preliminary     5/4/2023  2:05 PM Blood Culture Peripheral Blood Preliminary     5/4/2023  2:05 PM Blood Culture Peripheral Blood Preliminary            Imaging:   Recent Results (from the past 24 hour(s))   XR Chest Port 1 View    Narrative    EXAM: XR CHEST PORT 1 VIEW  LOCATION: [facility]  DATE/TIME: 5/7/2023 8:31 PM CDT    INDICATION: RN placed PICC   verify tip placement  COMPARISON: None.      Impression    IMPRESSION: Left PICC tip overlies cavoatrial junction, appropriate position. No acute findings. Clear lungs. Normal heart size. Old left clavicle and right proximal humerus fractures.        I reviewed all new labs and imaging results over the last 24 hours. I personally reviewed no images or EKG's today.    Medications       gabapentin  300 mg Oral TID     insulin aspart  1-3 Units Subcutaneous TID AC     insulin aspart  1-3 Units Subcutaneous At Bedtime     insulin glargine  10 Units Subcutaneous QAM     latanoprost  1 drop Both Eyes At Bedtime     pantoprazole  40 mg Oral BID     piperacillin-tazobactam  4.5 g Intravenous Q6H     simvastatin  40 mg Oral At Bedtime     sodium chloride (PF)  3 mL Intracatheter Q8H     vancomycin  1,000 mg Intravenous Q24H   Reviewed medication    Ancelmo Marques MD  [facility] Medicine

## 2023-05-08 NOTE — ANESTHESIA PREPROCEDURE EVALUATION
Anesthesia Pre-Procedure Evaluation    Patient: Roddy Sidhu   MRN: [id number] : 1945        Procedure : Procedure(s):  Incision and debidement of pressure wound, left heel          No past medical history on file.   Past Surgical History:   Procedure Laterality Date     SURGICAL HISTORY OF -       removal of steel from left eye      No Known Allergies   Social History     Tobacco Use     Smoking status: Some Days     Types: Pipe     Smokeless tobacco: Never   Vaping Use     Vaping status: Never Used   Substance Use Topics     Alcohol use: Not Currently      Wt Readings from Last 1 Encounters:   23 60.7 kg (133 lb 13.1 oz)        Anesthesia Evaluation            ROS/MED HX  ENT/Pulmonary:     (+) tobacco use,     Neurologic:       Cardiovascular:     (+) Dyslipidemia -----    METS/Exercise Tolerance:     Hematologic:       Musculoskeletal:       GI/Hepatic:       Renal/Genitourinary:     (+) renal disease,     Endo:     (+) type II DM,     Psychiatric/Substance Use:       Infectious Disease:       Malignancy:       Other:            Physical Exam    Airway  airway exam normal           Respiratory Devices and Support         Dental       (+) Multiple visibly decayed, broken teeth      Cardiovascular   cardiovascular exam normal          Pulmonary   pulmonary exam normal                OUTSIDE LABS:  CBC:   Lab Results   Component Value Date    WBC 16.0 (H) 2023    WBC 17.8 (H) 2023    HGB 7.9 (L) 2023    HGB 8.2 (L) 2023    HCT 25.2 (L) 2023    HCT 26.6 (L) 2023     (H) 2023     (H) 2023     BMP:   Lab Results   Component Value Date     (L) 2023     (L) 2023    POTASSIUM 4.2 2023    POTASSIUM 4.4 2023    CHLORIDE 99 2023    CHLORIDE 102 2023    CO2 27 2023    CO2 25 2023    BUN 15.8 2023    BUN 15.5 2023    CR 1.03 2023    CR 1.10 2023     (H)  05/08/2023     (H) 05/08/2023     COAGS: No results found for: PTT, INR, FIBR  POC: No results found for: BGM, HCG, HCGS  HEPATIC:   Lab Results   Component Value Date    ALBUMIN 4.2 12/27/2012    PROTTOTAL 6.8 12/27/2012    ALT 12 05/02/2023    AST 27 12/27/2012    ALKPHOS 95 12/27/2012    BILITOTAL 0.5 12/27/2012     OTHER:   Lab Results   Component Value Date    LACT 1.1 05/04/2023    A1C 8.3 (H) 05/02/2023    BEST 8.2 (L) 05/08/2023    TSH 4.35 (H) 06/08/2018    T4 1.17 06/17/2016       Anesthesia Plan    ASA Status:  3   NPO Status:  NPO Appropriate    Anesthesia Type: General.     - Airway: Native airway      Maintenance: Balanced.        Consents    Anesthesia Plan(s) and associated risks, benefits, and realistic alternatives discussed. Questions answered and patient/representative(s) expressed understanding.    - Discussed:     - Discussed with:  Patient         Postoperative Care            Comments:                JOSH Godinez CRNA

## 2023-05-08 NOTE — PROGRESS NOTES
Patient alert and oriented. Up with assist of one and walker.   Tylenol administered for left foot pain.  Dressing change done per wound care note, bilateral feet in heel boots.  Picc line placed due to loss of IV access.   Insulin administered per mar.

## 2023-05-08 NOTE — PROGRESS NOTES
[facility] Nurse Inpatient Assessment     Consulted for: bilateral heel ulcers    Summary: Reassessment requested by hospitalist. This writer contacted Dr. Flores - as eschar isn't stable, wound care isn't going to be sufficient for the wound. He most likely will need surgical debridement of the wound.    5/5 Pt admitted with deep tissue injury on right heel and unstageable pressure injury on left heel. Pt and spouse in disagreement of how long these have been present. Pt's spouse states that they were noted when the pt was admitted to [facility] at the end of March. Pt states they happened at [facility]. This writer unable to find documentation in [facility] of these wounds.    Patient History (according to provider note(s):      Roddy Sidhu is a 77 year old male admitted on 5/4/2023 for left heel diabetic ulcer. He had sepsis which resolved in the emergency department with goal-directed therapy.  He will be admitted for antibiotics and podiatry consult.     Principal Problem:    Infected diabetic ulcer of left heel associated with type 2 diabetes mellitus (H)  Active Problems:    Type 2 diabetes mellitus with diabetic polyneuropathy, with long-term current use of insulin (H)    Microalbuminuria due to type 2 diabetes mellitus (H)    Sepsis with acute organ dysfunction without septic shock, due to unspecified organism, unspecified type (H)    Diabetic ulcer of right heel associated with type 2 diabetes mellitus without infection (H)       Infected Diabetic ulcer of left heel associated with type 2 diabetes mellitus/diabetic neuropathy, unspecified ulcer stage (H)   Additional diabetic ulcer of right heel without infection  -The patient recently was diagnosed with osteomyelitis based on an x-ray, but this is not supported by the MRI performed today  - ABIs done at podiatry's request and were normal.  - CRP  156 on 5/2 - following  -Continue Zosyn which was started in the emergency  department  - continue Vancomycin as started in the emergency department.  Although it is nonpurulent his recent hospitalization and admission to a long-term care facility are risk factors which warrant continued vancomycin further evaluation by podiatry   -Float BOTH heels.  I stressed the importance of this with the patient  -Podiatry consulted an planning to see patient 5/5   - Wound consult requested 5/5 if able.    - reassess antibiotics 5/6 if showing continued improvement.         Sepsis with acute organ dysfunction without septic shock, due to unspecified organism, unspecified type (H)  -Resolved in the emergency department       US MARKY DOPPLER NO EXERCISE, 1-2 LEVELS, BILATERAL   5/4/2023 4:23 PM      HISTORY: Diabetic foot ulceration with concern for osteomyelitis.     COMPARISON: None.     FINDINGS:  Right MARKY:   PT: 1.09.  DP: 1.19     Left MARKY:   PT: 0.78.  DP: 1.1      Waveforms: Biphasic and triphasic in the right posterior tibial, right  dorsalis pedis and left dorsalis pedis. Monophasic in the left  posterior tibial.                                                                      IMPRESSION:  1. Normal ABIs bilaterally.     MARKY CRITERIA:  >1.4 NC  0.95-1.4 Normal  0.90 - 0.94 Mild  0.5 - 0.89 Moderate  0.2 - 0.49 Severe  <0.2 Critical    EXAM: MR FOOT LEFT W/O and W CONTRAST  LOCATION: [facility]  DATE/TIME: 5/4/2023 5:58 PM CDT     INDICATION: Concern for osteomyelitis of left calcaneus.  COMPARISON: None.  TECHNIQUE: Routine. Additional postgadolinium T1 sequences were obtained.  IV CONTRAST: 6 mL Gadavist     FINDINGS:      JOINTS AND BONES:   -No evidence for fracture. There is some reactive T2 signal abnormality within the posteroinferior calcaneus but no specific evidence for osteomyelitis. No additional areas worrisome for osteomyelitis are identified. No significant effusion to suggest   septic arthropathy.     TENDONS:   -Mild Achilles tendinopathy without  tearing. The peroneal tendons are intact. The flexor tendons are intact. The extensor tendons are intact.      LIGAMENTS:   -The anterior and posterior talofibular ligaments are intact. The calcaneofibular ligament is intact. The deltoid ligamentous complex is intact.     MUSCLES AND SOFT TISSUES:   -There is an ulceration along the posterior aspect of the foot with some surrounding edema or cellulitis. There is no evidence for abscess.                                                                      IMPRESSION:  1.  No evidence for osteomyelitis, septic arthropathy, or abscess.  2.  Edema or cellulitis along the posterior aspect of the foot. There is apparent soft tissue irregularity and likely ulceration along the posterior aspect of the foot and there is susceptibly artifact suggestive of air tracking into the subcutaneous   soft tissues.  3.  No evidence for fracture.  4.  Mild reactive edema within the posteroinferior calcaneus.  5.  Mild Achilles tendinopathy without tearing.    Assessment:      Areas visualized during today's visit: bilateral feet     Areas of concern - great toe appears like reabsorbed blood blister; distal 3rd toe appears callused      Area on his great toe measures 3mm x 6mm. Area on his 3rd toe measures 7mm x 7mm - appear stable; no measurements obtained 5/8      Pressure Injury Location: Right heel            Last photo: 5/8/23  Wound type: Pressure Injury     Pressure Injury Stage: Deep Tissue Pressure Injury (DTPI), present on admission   Wound history/plan of care:   Unknown as pt/spouse not good historians. Possibly present for the last month.  Wound base: blood-filled bulla     Palpation of the wound bed: boggy      Drainage: not open     Description of drainage: none     Measurements (length x width x depth, in cm) - measurements unchanged 5/8 (coloring is r/t use of betadine and photo taken closer to wound)   Overall area of purple discoloration and fluid-filled bulla measures  5.5cm x 3.5cm   Purple discoloration measures 3cm x 3.5cm and bulla measures 3cm x 2.5cm     Tunneling N/A     Undermining N/A  Periwound skin: Dry/scaly      Color: dusky and pink; pink is blanchable      Temperature: normal   Odor: none  Pain: absent and denies , none  Pain intervention prior to dressing change: N/A  Treatment goal: Protection; to keep bulla dry/intact  STATUS: stable  Supplies ordered: at bedside, discussed with RN and discussed with patient      Pressure Injury Location: Left heel      Last photo: 5/5/23; Photos taken 5/8 but don't help to show changes with wound (can see in media tab)  Wound type: Pressure Injury     Pressure Injury Stage: Unstageable, present on admission   Wound history/plan of care:   Unknown as pt/spouse not good historians. Possibly present for the last month.    Wound base: majority of wound is eschar; some of this is firm but able to indent areas of the eschar with moderate pressure; surrounding central eschar are areas of fluctuance; plantar heel/distal is very boggy     Palpation of the wound bed: see above      Drainage: small     Description of drainage: serosanguinous     Measurements (length x width x depth, in cm) - no new measurements /58   Full area of fluctuance and eschar measures 9cm x 11cm   Eschar measures 7cm x 8.5cm     Tunneling N/A     Undermining N/A  Periwound skin: see photo; leg/foot is more swollen today; pt was sitting with legs dangling vs elevated      Color: erythema circumferentially with some extending on lateral side of foot extending      Temperature: possibly slightly warm   Odor: moderate  Pain: denies   Pain intervention prior to dressing change: N/A; cares not performed by this writer  Treatment goal: Protection - needs surgical debridement  STATUS: deteriorating  Supplies ordered: at bedside    My PI Risk Assessment     Sensory Perception: 3 - Slightly Limited     Moisture: 4 - Rarely moist     Activity: 3 - Walks occasionally       "Mobility: 3 - Slightly limited      Nutrition: 2 - Probably inadequate      Friction/Shear: 2 - Potential problem      TOTAL: 17    PT and pedal pulses palpable bilaterally    Treatment Plan:     Bilateral heel wound(s): BID   Using non-woven gauze, paint both heels with povidone-iodine. Allow this to air dry.   OK to leave the right heel uncovered. If pt is ambulating, may want to cover with a Mepilex Lite 3x3 bandage to help pad/protect (can peel this back, paint area with betadine, allow it to dry, and recover)  For left heel, cover with a 5x9 ABD pad secured with rolled gauze and tape.     Pt to use offloading boots when in bed. OK per Dr. Riley for pt to ambulate with post-op shoes. Pt to keep feet elevated when sitting up in recliner (making sure heels are not on the footstool).    Pressure Injury Prevention (PIP) Plan:  If patient is declining pressure injury prevention interventions: Explore reason why and address patient's concerns, Educate on pressure injury risk and prevention intervention(s), If patient is still declining, document \"informed refusal\"  and Ensure Care team is aware ( provider, charge nurse, etc)  Mattress: Follow bed algorithm, reassess daily and order specialty mattress, if indicated.  HOB: Maintain at or below 30 degrees, unless contraindicated  Repositioning in bed: Every 1-2 hours , Left/right positioning; avoid supine and Raise foot of bed prior to raising head of bed, to reduce patient sliding down (shear)  Heels: Keep elevated off mattress and Heel lift boots  Protective Dressing: Sacral Mepilex for prevention (#960997),  especially for the agitated patient   Positioning Equipment: Seated Positioning System (SPS)  (#845821) Sling must have contact with chair, no pillow or chair cushion beneath if appropriate  Chair positioning: Chair cushion (#881355) OR SPS   If patient has a buttock pressure injury, or high risk for PI use chair cushion or SPS.  Moisture Management: Perineal " cleansing /protection: Follow Incontinence Protocol and Avoid brief in bed  Under Devices: Inspect skin under all medical devices during skin inspection , Ensure tubes are stabilized without tension and Ensure patient is not lying on medical devices or equipment when repositioned  Ask provider to discontinue device when no longer needed.    Orders: Reviewed    RECOMMEND PRIMARY TEAM ORDER: None, at this time  Education provided: plan of care, Infection prevention  and Off-loading pressure  Discussed plan of care with: Patient, Family, Nurse and Physician  [facility] nurse follow-up plan: weekly  Notify WOC if wound(s) deteriorate.  Nursing to notify the Provider(s) and re-consult the WO Nurse if new skin concern.    DATA:     Current support surface: Standard  Standard gel/foam mattress (IsoFlex, Atmos air, etc)  Containment of urine/stool: Continent of bladder and Continent of bowel  BMI: Body mass index is 19.48 kg/m .   Active diet order: Orders Placed This Encounter      NPO for Medical/Clinical Reasons Except for: Meds     Output: I/O last 3 completed shifts:  In: 480 [P.O.:480]  Out: 600 [Urine:600]     Labs:   Recent Labs   Lab 05/08/23  0440 05/04/23  1359 05/02/23  1141   HGB 7.9*   < > 8.7*   WBC 16.0*   < > 30.4*   A1C  --   --  8.3*    < > = values in this interval not displayed.     Pressure injury risk assessment:   Sensory Perception: 3-->slightly limited  Moisture: 4-->rarely moist  Activity: 3-->walks occasionally  Mobility: 3-->slightly limited  Nutrition: 3-->adequate  Friction and Shear: 2-->potential problem  Kai Score: 18    Laureen King RN, CWOCN  Vocera group: [facility] Nurse  Dept. Office Number: [phone]

## 2023-05-08 NOTE — PROCEDURES
[facility]    Single Lumen PICC Placement    Date/Time: 5/7/2023 8:42 PM    Performed by: Lucy Mcarthur RN  Authorized by: Joaquin Mehta MD  Indications: vascular access      UNIVERSAL PROTOCOL   Site Marked: NA  Prior Images Obtained and Reviewed:  NA  Required items: Required blood products, implants, devices and special equipment available    Patient identity confirmed:  Verbally with patient, arm band and hospital-assigned identification number  NA - No sedation, light sedation, or local anesthesia  Confirmation Checklist:  Patient's identity using two indicators, relevant allergies, procedure was appropriate and matched the consent or emergent situation and correct equipment/implants were available  Time out: Immediately prior to the procedure a time out was called    Universal Protocol: the Joint Commission Universal Protocol was followed    Preparation: Patient was prepped and draped in usual sterile fashion    ESBL (mL):  2     ANESTHESIA    Anesthesia: Local infiltration  Local Anesthetic:  Lidocaine 1% without epinephrine  Anesthetic Total (mL):  0.3      SEDATION    Patient Sedated: No        Preparation: skin prepped with 2% chlorhexidine  Skin prep agent: skin prep agent completely dried prior to procedure  Sterile barriers: maximum sterile barriers were used: cap, mask, sterile gown, sterile gloves, and large sterile sheet  Hand hygiene: hand hygiene performed prior to central venous catheter insertion  Type of line used: PICC  Catheter type: single lumen  Lumen type: power PICC and valved  Catheter size: 4 Fr  Brand: Bard  Lot number: [id number]  Placement method: MST and ultrasound  Number of attempts: 1  Difficulty threading catheter: no  Successful placement: yes  Orientation: left  Catheter to Vein (%): 7  Location: basilic vein  Tip Location: SVC/RA Junction  Site rationale: Hx of right humerus and clavicle fracture  Arm circumference: adults 10 cm  Extremity  circumference: 23  Visible catheter length: 0  Total catheter length: 46  Dressing and securement: antibiotic disc placed, occlusive dressing applied and statlock  Post procedure assessment: blood return through all ports, free fluid flow and placement verified by x-ray  PROCEDURE   Patient Tolerance:  Patient tolerated the procedure well with no immediate complicationsDescribe Procedure: Consent per patient. Patient reports hx of right humerus fracture and right clavicle fracture. Left upper arm basilic accessed on first attempt using ultrasound guidance. Catheter placed without difficulty. Brisk blood return, flushes easily. Placement confirmed by xray as Left PICC tip overlies CA junction. Patient tolerated procedure well. Primary RN aware PICC is good to use. Educational material left for patient.   4F Single Lumen Valved PICC  Disposal: sharps and needle count correct at the end of procedure, needles and guidewire disposed in sharps container

## 2023-05-08 NOTE — PROGRESS NOTES
Care Management Follow Up    Length of Stay (days): 4    Expected Discharge Date: 05/10/2023     Concerns to be Addressed: discharge planning     Patient plan of care discussed at interdisciplinary rounds: Yes    Anticipated Discharge Disposition: Home, Home Care     Anticipated Discharge Services: None  Anticipated Discharge DME:  None    Patient/family educated on Medicare website which has current facility and service quality ratings: no  Education Provided on the Discharge Plan: Yes  Patient/Family in Agreement with the Plan: yes    Referrals Placed by CM/SW:  [facility]  Private pay costs discussed: Not applicable    Additional Information:  Per IDT rounds this AM, patient is not medically stable for discharge today. Anticipate patient being hospitalized for 2+ more days.     Discharge plan remains unchanged - patient plans to discharge to home with care from [facility] and  [facility] Phone: [phone] Fax: [phone] PT, RN for wound cares, [facility].    Family will transport patient to home.       GORDON Friend  Inpatient Care Coordinator   [facility] [phone]  [facility] [phone]

## 2023-05-09 ENCOUNTER — ANESTHESIA (OUTPATIENT)
Dept: SURGERY | Facility: CLINIC | Age: 78
DRG: 855 | End: 2023-05-09
Payer: COMMERCIAL

## 2023-05-09 LAB
ANION GAP SERPL CALCULATED.3IONS-SCNC: 8 MMOL/L (ref 7–15)
BACTERIA BLD CULT: NO GROWTH
BACTERIA BLD CULT: NO GROWTH
BACTERIA WND CULT: ABNORMAL
BUN SERPL-MCNC: 13.4 MG/DL (ref 8–23)
CALCIUM SERPL-MCNC: 8.3 MG/DL (ref 8.8–10.2)
CHLORIDE SERPL-SCNC: 101 MMOL/L (ref 98–107)
CREAT SERPL-MCNC: 1 MG/DL (ref 0.67–1.17)
DEPRECATED HCO3 PLAS-SCNC: 27 MMOL/L (ref 22–29)
ERYTHROCYTE [DISTWIDTH] IN BLOOD BY AUTOMATED COUNT: 18.5 % (ref 10–15)
GFR SERPL CREATININE-BSD FRML MDRD: 78 ML/MIN/1.73M2
GLUCOSE BLDC GLUCOMTR-MCNC: 108 MG/DL (ref 70–99)
GLUCOSE BLDC GLUCOMTR-MCNC: 122 MG/DL (ref 70–99)
GLUCOSE BLDC GLUCOMTR-MCNC: 149 MG/DL (ref 70–99)
GLUCOSE BLDC GLUCOMTR-MCNC: 183 MG/DL (ref 70–99)
GLUCOSE BLDC GLUCOMTR-MCNC: 246 MG/DL (ref 70–99)
GLUCOSE BLDC GLUCOMTR-MCNC: 254 MG/DL (ref 70–99)
GLUCOSE BLDC GLUCOMTR-MCNC: 259 MG/DL (ref 70–99)
GLUCOSE SERPL-MCNC: 252 MG/DL (ref 70–99)
GRAM STAIN RESULT: ABNORMAL
HCT VFR BLD AUTO: 26.7 % (ref 40–53)
HGB BLD-MCNC: 8.3 G/DL (ref 13.3–17.7)
MCH RBC QN AUTO: 25.5 PG (ref 26.5–33)
MCHC RBC AUTO-ENTMCNC: 31.1 G/DL (ref 31.5–36.5)
MCV RBC AUTO: 82 FL (ref 78–100)
PLATELET # BLD AUTO: 535 10E3/UL (ref 150–450)
POTASSIUM SERPL-SCNC: 4.4 MMOL/L (ref 3.4–5.3)
RBC # BLD AUTO: 3.26 10E6/UL (ref 4.4–5.9)
SODIUM SERPL-SCNC: 136 MMOL/L (ref 136–145)
WBC # BLD AUTO: 17.9 10E3/UL (ref 4–11)

## 2023-05-09 PROCEDURE — 87176 TISSUE HOMOGENIZATION CULTR: CPT | Performed by: PODIATRIST

## 2023-05-09 PROCEDURE — 99232 SBSQ HOSP IP/OBS MODERATE 35: CPT | Performed by: INTERNAL MEDICINE

## 2023-05-09 PROCEDURE — 11045 DBRDMT SUBQ TISS EACH ADDL: CPT | Performed by: PODIATRIST

## 2023-05-09 PROCEDURE — 258N000003 HC RX IP 258 OP 636

## 2023-05-09 PROCEDURE — 87075 CULTR BACTERIA EXCEPT BLOOD: CPT | Performed by: PODIATRIST

## 2023-05-09 PROCEDURE — 88304 TISSUE EXAM BY PATHOLOGIST: CPT | Mod: TC | Performed by: PODIATRIST

## 2023-05-09 PROCEDURE — 0JBR0ZZ EXCISION OF LEFT FOOT SUBCUTANEOUS TISSUE AND FASCIA, OPEN APPROACH: ICD-10-PCS | Performed by: PODIATRIST

## 2023-05-09 PROCEDURE — 85027 COMPLETE CBC AUTOMATED: CPT | Performed by: INTERNAL MEDICINE

## 2023-05-09 PROCEDURE — 2894A PR VOIDCORRECT: CPT | Performed by: PODIATRIST

## 2023-05-09 PROCEDURE — 80048 BASIC METABOLIC PNL TOTAL CA: CPT | Performed by: INTERNAL MEDICINE

## 2023-05-09 PROCEDURE — 120N000001 HC R&B MED SURG/OB

## 2023-05-09 PROCEDURE — 11042 DBRDMT SUBQ TIS 1ST 20SQCM/<: CPT | Performed by: PODIATRIST

## 2023-05-09 PROCEDURE — 258N000003 HC RX IP 258 OP 636: Performed by: NURSE ANESTHETIST, CERTIFIED REGISTERED

## 2023-05-09 PROCEDURE — 250N000011 HC RX IP 250 OP 636: Performed by: INTERNAL MEDICINE

## 2023-05-09 PROCEDURE — 250N000009 HC RX 250: Performed by: PODIATRIST

## 2023-05-09 PROCEDURE — 250N000013 HC RX MED GY IP 250 OP 250 PS 637: Performed by: FAMILY MEDICINE

## 2023-05-09 PROCEDURE — 710N000012 HC RECOVERY PHASE 2, PER MINUTE: Performed by: PODIATRIST

## 2023-05-09 PROCEDURE — 87205 SMEAR GRAM STAIN: CPT | Performed by: PODIATRIST

## 2023-05-09 PROCEDURE — 272N000001 HC OR GENERAL SUPPLY STERILE: Performed by: PODIATRIST

## 2023-05-09 PROCEDURE — 250N000013 HC RX MED GY IP 250 OP 250 PS 637: Performed by: PODIATRIST

## 2023-05-09 PROCEDURE — 250N000011 HC RX IP 250 OP 636: Performed by: NURSE ANESTHETIST, CERTIFIED REGISTERED

## 2023-05-09 PROCEDURE — 999N000141 HC STATISTIC PRE-PROCEDURE NURSING ASSESSMENT: Performed by: PODIATRIST

## 2023-05-09 PROCEDURE — 360N000075 HC SURGERY LEVEL 2, PER MIN: Performed by: PODIATRIST

## 2023-05-09 PROCEDURE — 87077 CULTURE AEROBIC IDENTIFY: CPT | Performed by: PODIATRIST

## 2023-05-09 PROCEDURE — 258N000003 HC RX IP 258 OP 636: Performed by: PODIATRIST

## 2023-05-09 PROCEDURE — 370N000017 HC ANESTHESIA TECHNICAL FEE, PER MIN: Performed by: PODIATRIST

## 2023-05-09 PROCEDURE — 88304 TISSUE EXAM BY PATHOLOGIST: CPT | Mod: 26 | Performed by: PATHOLOGY

## 2023-05-09 PROCEDURE — 250N000011 HC RX IP 250 OP 636: Performed by: PODIATRIST

## 2023-05-09 RX ORDER — SODIUM CHLORIDE, SODIUM LACTATE, POTASSIUM CHLORIDE, CALCIUM CHLORIDE 600; 310; 30; 20 MG/100ML; MG/100ML; MG/100ML; MG/100ML
INJECTION, SOLUTION INTRAVENOUS CONTINUOUS PRN
Status: DISCONTINUED | OUTPATIENT
Start: 2023-05-09 | End: 2023-05-09

## 2023-05-09 RX ORDER — FENTANYL CITRATE 50 UG/ML
50 INJECTION, SOLUTION INTRAMUSCULAR; INTRAVENOUS EVERY 5 MIN PRN
Status: DISCONTINUED | OUTPATIENT
Start: 2023-05-09 | End: 2023-05-09 | Stop reason: HOSPADM

## 2023-05-09 RX ORDER — BUPIVACAINE HYDROCHLORIDE 5 MG/ML
INJECTION, SOLUTION PERINEURAL PRN
Status: DISCONTINUED | OUTPATIENT
Start: 2023-05-09 | End: 2023-05-09 | Stop reason: HOSPADM

## 2023-05-09 RX ORDER — ONDANSETRON 4 MG/1
4 TABLET, ORALLY DISINTEGRATING ORAL EVERY 30 MIN PRN
Status: DISCONTINUED | OUTPATIENT
Start: 2023-05-09 | End: 2023-05-09 | Stop reason: HOSPADM

## 2023-05-09 RX ORDER — OXYCODONE HYDROCHLORIDE 5 MG/1
5 TABLET ORAL
Status: DISCONTINUED | OUTPATIENT
Start: 2023-05-09 | End: 2023-05-09 | Stop reason: HOSPADM

## 2023-05-09 RX ORDER — ACETAMINOPHEN 325 MG/1
975 TABLET ORAL ONCE
Status: COMPLETED | OUTPATIENT
Start: 2023-05-09 | End: 2023-05-09

## 2023-05-09 RX ORDER — ACETAMINOPHEN 325 MG/1
975 TABLET ORAL ONCE
Status: DISCONTINUED | OUTPATIENT
Start: 2023-05-09 | End: 2023-05-09

## 2023-05-09 RX ORDER — ONDANSETRON 2 MG/ML
4 INJECTION INTRAMUSCULAR; INTRAVENOUS EVERY 30 MIN PRN
Status: DISCONTINUED | OUTPATIENT
Start: 2023-05-09 | End: 2023-05-09 | Stop reason: HOSPADM

## 2023-05-09 RX ORDER — LIDOCAINE 40 MG/G
CREAM TOPICAL
Status: DISCONTINUED | OUTPATIENT
Start: 2023-05-09 | End: 2023-05-13 | Stop reason: HOSPADM

## 2023-05-09 RX ORDER — SILVER SULFADIAZINE 10 MG/G
CREAM TOPICAL PRN
Status: DISCONTINUED | OUTPATIENT
Start: 2023-05-09 | End: 2023-05-09 | Stop reason: HOSPADM

## 2023-05-09 RX ORDER — SODIUM CHLORIDE, SODIUM LACTATE, POTASSIUM CHLORIDE, CALCIUM CHLORIDE 600; 310; 30; 20 MG/100ML; MG/100ML; MG/100ML; MG/100ML
INJECTION, SOLUTION INTRAVENOUS CONTINUOUS
Status: DISCONTINUED | OUTPATIENT
Start: 2023-05-09 | End: 2023-05-10

## 2023-05-09 RX ORDER — CEFAZOLIN SODIUM 1 G/3ML
INJECTION, POWDER, FOR SOLUTION INTRAMUSCULAR; INTRAVENOUS PRN
Status: DISCONTINUED | OUTPATIENT
Start: 2023-05-09 | End: 2023-05-09

## 2023-05-09 RX ORDER — LIDOCAINE HYDROCHLORIDE 10 MG/ML
INJECTION, SOLUTION INFILTRATION; PERINEURAL PRN
Status: DISCONTINUED | OUTPATIENT
Start: 2023-05-09 | End: 2023-05-09 | Stop reason: HOSPADM

## 2023-05-09 RX ORDER — SODIUM CHLORIDE, SODIUM LACTATE, POTASSIUM CHLORIDE, CALCIUM CHLORIDE 600; 310; 30; 20 MG/100ML; MG/100ML; MG/100ML; MG/100ML
INJECTION, SOLUTION INTRAVENOUS CONTINUOUS
Status: DISCONTINUED | OUTPATIENT
Start: 2023-05-09 | End: 2023-05-09 | Stop reason: HOSPADM

## 2023-05-09 RX ORDER — HYDROMORPHONE HCL IN WATER/PF 6 MG/30 ML
0.4 PATIENT CONTROLLED ANALGESIA SYRINGE INTRAVENOUS EVERY 5 MIN PRN
Status: DISCONTINUED | OUTPATIENT
Start: 2023-05-09 | End: 2023-05-09 | Stop reason: HOSPADM

## 2023-05-09 RX ORDER — FENTANYL CITRATE 50 UG/ML
25 INJECTION, SOLUTION INTRAMUSCULAR; INTRAVENOUS
Status: DISCONTINUED | OUTPATIENT
Start: 2023-05-09 | End: 2023-05-09 | Stop reason: HOSPADM

## 2023-05-09 RX ORDER — HYDROMORPHONE HCL IN WATER/PF 6 MG/30 ML
0.2 PATIENT CONTROLLED ANALGESIA SYRINGE INTRAVENOUS EVERY 5 MIN PRN
Status: DISCONTINUED | OUTPATIENT
Start: 2023-05-09 | End: 2023-05-09 | Stop reason: HOSPADM

## 2023-05-09 RX ORDER — FENTANYL CITRATE 50 UG/ML
25 INJECTION, SOLUTION INTRAMUSCULAR; INTRAVENOUS EVERY 5 MIN PRN
Status: DISCONTINUED | OUTPATIENT
Start: 2023-05-09 | End: 2023-05-09 | Stop reason: HOSPADM

## 2023-05-09 RX ORDER — PROPOFOL 10 MG/ML
INJECTION, EMULSION INTRAVENOUS CONTINUOUS PRN
Status: DISCONTINUED | OUTPATIENT
Start: 2023-05-09 | End: 2023-05-09

## 2023-05-09 RX ORDER — OXYCODONE HYDROCHLORIDE 5 MG/1
10 TABLET ORAL
Status: DISCONTINUED | OUTPATIENT
Start: 2023-05-09 | End: 2023-05-09 | Stop reason: HOSPADM

## 2023-05-09 RX ORDER — FENTANYL CITRATE 50 UG/ML
INJECTION, SOLUTION INTRAMUSCULAR; INTRAVENOUS PRN
Status: DISCONTINUED | OUTPATIENT
Start: 2023-05-09 | End: 2023-05-09

## 2023-05-09 RX ORDER — GABAPENTIN 100 MG/1
100 CAPSULE ORAL
Status: DISCONTINUED | OUTPATIENT
Start: 2023-05-09 | End: 2023-05-13 | Stop reason: HOSPADM

## 2023-05-09 RX ADMIN — FENTANYL CITRATE 50 MCG: 50 INJECTION, SOLUTION INTRAMUSCULAR; INTRAVENOUS at 14:31

## 2023-05-09 RX ADMIN — AMPICILLIN SODIUM AND SULBACTAM SODIUM 3 G: 2; 1 INJECTION, POWDER, FOR SOLUTION INTRAMUSCULAR; INTRAVENOUS at 16:59

## 2023-05-09 RX ADMIN — ACETAMINOPHEN 650 MG: 325 TABLET ORAL at 20:14

## 2023-05-09 RX ADMIN — MIDAZOLAM 1 MG: 1 INJECTION INTRAMUSCULAR; INTRAVENOUS at 14:27

## 2023-05-09 RX ADMIN — PROPOFOL 25 MCG/KG/MIN: 10 INJECTION, EMULSION INTRAVENOUS at 14:25

## 2023-05-09 RX ADMIN — LATANOPROST 1 DROP: 50 SOLUTION OPHTHALMIC at 21:19

## 2023-05-09 RX ADMIN — PANTOPRAZOLE SODIUM 40 MG: 40 TABLET, DELAYED RELEASE ORAL at 20:14

## 2023-05-09 RX ADMIN — SODIUM CHLORIDE, POTASSIUM CHLORIDE, SODIUM LACTATE AND CALCIUM CHLORIDE: 600; 310; 30; 20 INJECTION, SOLUTION INTRAVENOUS at 21:39

## 2023-05-09 RX ADMIN — AMPICILLIN SODIUM AND SULBACTAM SODIUM 3 G: 2; 1 INJECTION, POWDER, FOR SOLUTION INTRAMUSCULAR; INTRAVENOUS at 11:04

## 2023-05-09 RX ADMIN — PANTOPRAZOLE SODIUM 40 MG: 40 TABLET, DELAYED RELEASE ORAL at 08:27

## 2023-05-09 RX ADMIN — GABAPENTIN 300 MG: 300 CAPSULE ORAL at 20:14

## 2023-05-09 RX ADMIN — GABAPENTIN 300 MG: 300 CAPSULE ORAL at 08:27

## 2023-05-09 RX ADMIN — FENTANYL CITRATE 50 MCG: 50 INJECTION, SOLUTION INTRAMUSCULAR; INTRAVENOUS at 14:27

## 2023-05-09 RX ADMIN — CEFAZOLIN 2 G: 1 INJECTION, POWDER, FOR SOLUTION INTRAMUSCULAR; INTRAVENOUS at 14:21

## 2023-05-09 RX ADMIN — AMPICILLIN SODIUM AND SULBACTAM SODIUM 3 G: 2; 1 INJECTION, POWDER, FOR SOLUTION INTRAMUSCULAR; INTRAVENOUS at 22:33

## 2023-05-09 RX ADMIN — AMPICILLIN SODIUM AND SULBACTAM SODIUM 3 G: 2; 1 INJECTION, POWDER, FOR SOLUTION INTRAMUSCULAR; INTRAVENOUS at 05:22

## 2023-05-09 RX ADMIN — SIMVASTATIN 40 MG: 40 TABLET, FILM COATED ORAL at 21:19

## 2023-05-09 RX ADMIN — OXYCODONE HYDROCHLORIDE 5 MG: 5 TABLET ORAL at 21:19

## 2023-05-09 RX ADMIN — SODIUM CHLORIDE, POTASSIUM CHLORIDE, SODIUM LACTATE AND CALCIUM CHLORIDE: 600; 310; 30; 20 INJECTION, SOLUTION INTRAVENOUS at 14:21

## 2023-05-09 RX ADMIN — SODIUM CHLORIDE, POTASSIUM CHLORIDE, SODIUM LACTATE AND CALCIUM CHLORIDE: 600; 310; 30; 20 INJECTION, SOLUTION INTRAVENOUS at 13:38

## 2023-05-09 RX ADMIN — ACETAMINOPHEN 650 MG: 325 TABLET ORAL at 08:27

## 2023-05-09 RX ADMIN — SODIUM CHLORIDE, POTASSIUM CHLORIDE, SODIUM LACTATE AND CALCIUM CHLORIDE: 600; 310; 30; 20 INJECTION, SOLUTION INTRAVENOUS at 15:55

## 2023-05-09 RX ADMIN — MIDAZOLAM 1 MG: 1 INJECTION INTRAMUSCULAR; INTRAVENOUS at 14:21

## 2023-05-09 ASSESSMENT — ACTIVITIES OF DAILY LIVING (ADL)
ADLS_ACUITY_SCORE: 32
ADLS_ACUITY_SCORE: 31
ADLS_ACUITY_SCORE: 32

## 2023-05-09 NOTE — PROGRESS NOTES
[facility] TRANSPORT NOTE  Data:   Reason for Transport:  Return to med/surg    Roddy Sidhu was transported to med/surg via cart at 1530.  Patient was accompanied by Registered Nurse. Equipment used for transport: None. Family was aware of reason for transport: yes    Action:  Report: received from PACU nurse    Response:  Patient's condition upon return was stable.    Lidia Rudd RN

## 2023-05-09 NOTE — OP NOTE
PREOPERATIVE DIAGNOSIS:   1.  Infected heel decubitus ulcer, left foot.     POSTOPERATIVE DIAGNOSIS:   1.  Infected heel decubitus ulcer, left foot.     PROCEDURE:   1.  Surgical debridement of the necrotic heel decubitus ulcer left foot.     PATHOLOGY:   1.  Soft tissue, heel, left foot.     TISSUE CULTURES: Aerobic/Anaerobic/Gram stain  1.  Soft tissue, heel left      ANESTHESIA: Local MAC     ESTIMATED BLOOD LOSS: Less than 50 mL     INDICATIONS FOR PROCEDURE: Roddy Sidhu is a 77 year old-year-old male with an infected heel decubitus ulcer of the left foot. The patient was consented for surgical excisional debridement of all nonviable infected skin, soft tissue and bone, left foot.     PROCEDURE IN DETAIL: Under mild sedation, the patient in the operating room and placed on the operating table in the supine position.  After adequate sedation, approximately 20 cc of a 50/50 mixture of 1%  lidocaine plain and 0.5% marcaine plain was injected around the forefoot of the left foot. The foot was then scrubbed, prepped and draped in the usual aseptic manner.      The procedure began with a circumferential incision around the necrotic margins of the heel decubitus ulcer on the plantar aspect of the left heel.  The incision was made full-thickness down to subcutaneous tissue.  All nonviable necrotic tissue was excised in total utilizing a #10 blade.  The tissue was sent to pathology and cultures of the soft tissue were taken and sent for aerobic anaerobic Gram stain.  The remaining tissue appears healthy with good perfusion.  The resulting wound measured approximately 8 x 8 x 1 cm with a total area of 64 cm   via sharp, excisional debridement with a #15 blade.     The wound was irrigated with copious amounts of normal sterile saline.  All active bleeding was cauterized and liggated as necessary.   The wound was dressed with sterile Silvadene impregnated gauze, ABD pad, 4 x 4s, cast padding and an Ace wrap. The  patient tolerated the anesthesia and procedure well, and was transferred to the PACU with vital signs stable and vascular status intact to the left lower extremity. The patient will be transferred back to the floor for continued antibiotic therapy and wound care.      ACACIA ISIDRO DPM, FACFAS

## 2023-05-09 NOTE — PROGRESS NOTES
[facility] Medicine Progress Note  Date of Service: 05/09/2023    Assessment & Plan   Roddy Sidhu is a 77 year old male who presented on 5/4/2023 after being referred by his clinic doctor for evaluation of worsening left diabetic heel infection.   Roddy Sidhu is a 77 year old male admitted on 5/4/2023 for left heel diabetic ulcer. He had sepsis which resolved in the emergency department with goal-directed therapy.  He will be admitted for antibiotics and podiatry consult.     Infected Diabetic ulcer of left heel associated with type 2 diabetes mellitus/diabetic neuropathy, unspecified ulcer stage (H)  Additional diabetic ulcer of right heel without infection    The patient recently was diagnosed with osteomyelitis based on an x-ray, but this is not supported by the MRI performed this admission.    ABIs done at podiatry's request and were normal.    CRP  156 on 5/2 -> 129    Piperacillin-tazobactam and vancomycin IV were started in the emergency department    Cultures from wound positive for 2+ Proteus, 3+ Enterococcus, 1+ MSSA   - change piperacillin-tazobactam to ampicillin-sulbactam to cover Proteus and Enterococcus and MSSA   -stop vancomycin given no MRSA   -s/p debridement today with podiatry, discussed with Dr. Flores   - wound care RN following    Sepsis with acute organ dysfunction without septic shock, due to unspecified organism, unspecified type (H)    Resolved in the emergency department       Acute on chronic anemia    Hemoglobin last normal 6 months ago, was 8's during recent admission, then 8/2 5/4 and dropped to 6.7-6.9 AM of 5/5.      No black or bloody stools.  No abdominal pain.   No other apparent source of acute blood loss. Guiac negative.  Iron studies suggestive of anemia of chronic inflammation.     Gave 1u RBCs AM 5/5.     Started on protonix twice daily 5/5.       Held aspirin - appears to be on this just due to diabetes - recommend  reassessing at follow-up with primary care provider if this is really needed.     - stopped celebrex 100  Mg twice daily that had been started on admission, was on naprosyn prn at home.  - hemoglobin overall stable since     Recent hospitalization for closed fracture of right femur   - s/p Right intermedullary nail for right diaphyseal femur fracture on 3/29  -  has tylenol prn and oxycodone if needed while here.  Stopped celebrex as above.        Type 2 diabetes mellitus with diabetic polyneuropathy, with long-term current use of insulin (H)    A1c 8.3 on 5/2 on metformin and lantus 10 U daily at home. Primary care intended to start Trulicity but he has not begun this medication yet    Held home metformin, continued Lantus 10. Poor control. Increased Lantus, increased SSI. BG improved 5/9.   - change Lantus back to 10 units AM tomorrow   - medium SSI   - resume metformin tomorrow     Hyperlipidemia  Continue home simvastatin.         DVT Prophylaxis: initially admitted on heparin, stopped 5/5 due to anemia   Polo Catheter: Not present    Lines: has lost multiple IVs including multiple IVs placed under US guidance - with loss of IV again 5/7, ordered midline PICC      Cardiac Monitoring: None  Code Status: Full Code      Diet  Orders Placed This Encounter      High Consistent Carb (75 g CHO per Meal) Diet          Discussion: Appears stable postop.  We will work on discharge planning tomorrow if no further debridement needed    Disposition Plan      Expected Discharge Date: 05/11/2023      Destination: home with family;inpatient rehabilitation facility            Medical Decision Making       40 MINUTES SPENT BY ME on the date of service doing chart review, history, exam, documentation & further activities per the note.        Ancelmo Marques MD  Hospital Medicine        Interval History   Pain controlled postop.  Appetite returning.  Feels a little tired and groggy after anesthesia.    Physical Exam   Temp:  [97.9  F  (36.6  C)-99.3  F (37.4  C)] 98.4  F (36.9  C)  Pulse:  [65-74] 72  Resp:  [14-16] 16  BP: (116-170)/(58-91) 164/66  SpO2:  [98 %-100 %] 98 %    Weights:   Vitals:    05/04/23 1305 05/04/23 2030 05/07/23 0535   Weight: 59 kg (130 lb) 59.9 kg (132 lb 0.9 oz) 60.7 kg (133 lb 13.1 oz)    Body mass index is 19.48 kg/m .    Constitutional: Awake, eating, no acute distress  CV: Regular  Respiratory: CTA bilaterally  GI: Soft, non-tender, bowel sounds normal  Skin: Warm and dry    Data   Recent Labs   Lab 05/09/23  1638 05/09/23  1501 05/09/23  1348 05/09/23  0804 05/09/23  0645 05/08/23  0745 05/08/23  0440 05/07/23  0821 05/07/23  0425   WBC  --   --   --   --  17.9*  --  16.0*  --  17.8*   HGB  --   --   --   --  8.3*  --  7.9*  --  8.2*   MCV  --   --   --   --  82  --  82  --  82   PLT  --   --   --   --  535*  --  473*  --  533*   NA  --   --   --   --  136  --  132*  --  135*   POTASSIUM  --   --   --   --  4.4  --  4.2  --  4.4   CHLORIDE  --   --   --   --  101  --  99  --  102   CO2  --   --   --   --  27  --  27  --  25   BUN  --   --   --   --  13.4  --  15.8  --  15.5   CR  --   --   --   --  1.00  --  1.03  --  1.10   ANIONGAP  --   --   --   --  8  --  6*  --  8   BEST  --   --   --   --  8.3*  --  8.2*  --  8.1*   * 108* 149*   < > 252*   < > 275*   < > 240*    < > = values in this interval not displayed.       Recent Labs   Lab 05/09/23  1638 05/09/23  1501 05/09/23  1348 05/09/23  1156 05/09/23  0804 05/09/23  0645   * 108* 149* 183* 254* 252*        Unresulted Labs Ordered in the Past 30 Days of this Admission     Date and Time Order Name Status Description    5/9/2023  2:57 PM Wound Aerobic Bacterial Culture Routine In process     5/9/2023  2:57 PM Gram Stain In process     5/9/2023  2:57 PM Anaerobic Bacterial Culture Routine In process     5/9/2023  2:56 PM Surgical Pathology Exam In process     5/4/2023  2:05 PM Blood Culture Peripheral Blood Preliminary     5/4/2023  2:05 PM Blood  Culture Peripheral Blood Preliminary            Imaging: No results found for this or any previous visit (from the past 24 hour(s)).     I reviewed all new labs and imaging results over the last 24 hours. I personally reviewed no images or EKG's today.    Medications     lactated ringers 100 mL/hr at 05/09/23 1555       ampicillin-sulbactam  3 g Intravenous Q6H     gabapentin  100 mg Oral Pre-Op/Pre-procedure x 1 dose     gabapentin  300 mg Oral TID     insulin aspart  1-7 Units Subcutaneous TID AC     insulin aspart  1-5 Units Subcutaneous At Bedtime     insulin glargine  14 Units Subcutaneous QAM     latanoprost  1 drop Both Eyes At Bedtime     pantoprazole  40 mg Oral BID     simvastatin  40 mg Oral At Bedtime     sodium chloride (PF)  3 mL Intracatheter Q8H     sodium chloride (PF)  3 mL Intracatheter Q8H     vancomycin  1,000 mg Intravenous Q24H   Reviewed    Ancelmo Marques MD  [facility] Medicine

## 2023-05-09 NOTE — PROGRESS NOTES
DATE & TIME: 5/9/2023 9537-0370    Cognitive Concerns/ Orientation : alert and orientated x4   BEHAVIOR & AGGRESSION TOOL COLOR: green             ABNL VS/O2: afebrile.  Returned from OR around 1530 and on frequent monitoring.  Remains on room air with continuous pulse oximetry.  -170's but patient also moving arm around and drinking coffee.  Did return from OR with LR @ 100/hour.    MOBILITY: assist of one, tbelt and walker.  Has bilateral boots to protect wounds  PAIN MANAGMENT: reports no pain at this time  DIET: diet ordered as regular post op.  -per nursing scope, will change to pre-operative diabetic diet.   BOWEL/BLADDER: continent, voided in urinal pre-op and due to void post op  ABNL LAB/BG: in epic, hemoglobin 8.3 today  DRAIN/DEVICES: none  TELEMETRY RHYTHM: n/a  SKIN: surgical dressing to left heel is clean, dry and intact.  Betadine treatment completed x 1 to right heel.  TESTS/PROCEDURES: I & D today  D/C DATE:

## 2023-05-09 NOTE — ANESTHESIA CARE TRANSFER NOTE
Patient: Roddy Sidhu    Procedure: Procedure(s):  Incision and debidement of pressure wound, left heel       Diagnosis: Diabetic ulcer of left heel associated with type 2 diabetes mellitus, unspecified ulcer stage (H) [E11.621, L97.429]  Diagnosis Additional Information: No value filed.    Anesthesia Type:   General     Note:    Oropharynx: oropharynx clear of all foreign objects and spontaneously breathing  Level of Consciousness: awake and drowsy  Oxygen Supplementation: face mask  Level of Supplemental Oxygen (L/min / FiO2): 8  Independent Airway: airway patency satisfactory and stable  Dentition: dentition unchanged  Vital Signs Stable: post-procedure vital signs reviewed and stable  Report to RN Given: handoff report given  Patient transferred to: Phase II    Handoff Report: Identifed the Patient, Identified the Reponsible Provider, Reviewed the pertinent medical history, Discussed the surgical course, Reviewed Intra-OP anesthesia mangement and issues during anesthesia, Set expectations for post-procedure period and Allowed opportunity for questions and acknowledgement of understanding      Vitals:  Vitals Value Taken Time   BP     Temp     Pulse     Resp     SpO2         Electronically Signed By: Alberto Sutherland CRNA, APRN CRNA  May 9, 2023  2:50 PM

## 2023-05-09 NOTE — PROGRESS NOTES
Care Management Follow Up    Length of Stay (days): 5    Expected Discharge Date: 05/11/2023     Concerns to be Addressed: discharge planning     Patient plan of care discussed at interdisciplinary rounds: Yes    Anticipated Discharge Disposition: Home, Home Care     Anticipated Discharge Services: None  Anticipated Discharge DME:      Patient/family educated on Medicare website which has current facility and service quality ratings: no    Education Provided on the Discharge Plan: Yes  Patient/Family in Agreement with the Plan: yes    Referrals Placed by CM/SW:    Private pay costs discussed: Not applicable    Additional Information:  Per IDT rounds this AM, patient is going to have a surgical debridement on his (L) heel today.    Anticipate patient being hospitalized for 2+ more days.    Discharge plan remains unchanged - patient plans to discharge to home with care from [facility] Phone: [phone] Fax: [phone] PT, RN for wound cares, [facility].     Family will transport patient to home.     Anticipate by the time patient discharges, he will be changed to oral abx.    GORDON Chadwick  [facility] [phone]/ [facility] [phone]  Care Management

## 2023-05-09 NOTE — ANESTHESIA POSTPROCEDURE EVALUATION
Patient: Roddy Sidhu    Procedure: Procedure(s):  Incision and debidement of pressure wound, left heel       Anesthesia Type:  General    Note:  Disposition: Inpatient   Postop Pain Control: Uneventful            Sign Out: Well controlled pain   PONV: No   Neuro/Psych: Uneventful            Sign Out: Acceptable/Baseline neuro status   Airway/Respiratory: Uneventful            Sign Out: Acceptable/Baseline resp. status   CV/Hemodynamics: Uneventful            Sign Out: Acceptable CV status; No obvious hypovolemia; No obvious fluid overload   Other NRE: NONE   DID A NON-ROUTINE EVENT OCCUR? No           Last vitals:  Vitals:    05/08/23 2048 05/09/23 0331 05/09/23 0801   BP: 124/70 121/58 116/67   Pulse: 68 69 65   Resp: 16 16 16   Temp: 36.6  C (97.9  F) 37.4  C (99.3  F) 37.2  C (98.9  F)   SpO2: 98% 99% 98%       Electronically Signed By: Alberto Sutherland CRNA, APRN CRNA  May 9, 2023  2:50 PM

## 2023-05-09 NOTE — BRIEF OP NOTE
[facility]    Brief Operative Note    Pre-operative diagnosis: Diabetic ulcer of left heel associated with type 2 diabetes mellitus, unspecified ulcer stage (H) [E11.621, L97.429]  Post-operative diagnosis Same as pre-operative diagnosis    Procedure: Procedure(s):  Incision and debidement of pressure wound, left heel  Surgeon: Surgeon(s) and Role:     * Paul Flores, JARAD - Primary  Anesthesia: MAC with Local   Estimated Blood Loss: 10 mL from 5/9/2023  2:21 PM to 5/9/2023  2:51 PM      Drains: None  Specimens: * No specimens in log *  Findings:   None.  Complications: None.  Implants: * No implants in log *

## 2023-05-10 ENCOUNTER — APPOINTMENT (OUTPATIENT)
Dept: WOUND CARE | Facility: CLINIC | Age: 78
DRG: 855 | End: 2023-05-10
Payer: COMMERCIAL

## 2023-05-10 ENCOUNTER — APPOINTMENT (OUTPATIENT)
Dept: PHYSICAL THERAPY | Facility: CLINIC | Age: 78
DRG: 855 | End: 2023-05-10
Payer: COMMERCIAL

## 2023-05-10 LAB
GLUCOSE BLDC GLUCOMTR-MCNC: 155 MG/DL (ref 70–99)
GLUCOSE BLDC GLUCOMTR-MCNC: 155 MG/DL (ref 70–99)
GLUCOSE BLDC GLUCOMTR-MCNC: 178 MG/DL (ref 70–99)
GLUCOSE BLDC GLUCOMTR-MCNC: 178 MG/DL (ref 70–99)
GLUCOSE BLDC GLUCOMTR-MCNC: 211 MG/DL (ref 70–99)
GRAM STAIN RESULT: ABNORMAL
GRAM STAIN RESULT: ABNORMAL

## 2023-05-10 PROCEDURE — 250N000013 HC RX MED GY IP 250 OP 250 PS 637: Performed by: PODIATRIST

## 2023-05-10 PROCEDURE — 120N000001 HC R&B MED SURG/OB

## 2023-05-10 PROCEDURE — 250N000011 HC RX IP 250 OP 636: Performed by: PODIATRIST

## 2023-05-10 PROCEDURE — 97530 THERAPEUTIC ACTIVITIES: CPT | Mod: GP | Performed by: PHYSICAL MEDICINE & REHABILITATION

## 2023-05-10 PROCEDURE — 99233 SBSQ HOSP IP/OBS HIGH 50: CPT | Performed by: INTERNAL MEDICINE

## 2023-05-10 PROCEDURE — 250N000013 HC RX MED GY IP 250 OP 250 PS 637: Performed by: INTERNAL MEDICINE

## 2023-05-10 PROCEDURE — 258N000003 HC RX IP 258 OP 636: Performed by: PODIATRIST

## 2023-05-10 PROCEDURE — 97602 WOUND(S) CARE NON-SELECTIVE: CPT

## 2023-05-10 RX ADMIN — LATANOPROST 1 DROP: 50 SOLUTION OPHTHALMIC at 22:59

## 2023-05-10 RX ADMIN — SIMVASTATIN 40 MG: 40 TABLET, FILM COATED ORAL at 22:59

## 2023-05-10 RX ADMIN — PANTOPRAZOLE SODIUM 40 MG: 40 TABLET, DELAYED RELEASE ORAL at 08:49

## 2023-05-10 RX ADMIN — GABAPENTIN 300 MG: 300 CAPSULE ORAL at 14:45

## 2023-05-10 RX ADMIN — ACETAMINOPHEN 650 MG: 325 TABLET ORAL at 08:48

## 2023-05-10 RX ADMIN — AMPICILLIN SODIUM AND SULBACTAM SODIUM 3 G: 2; 1 INJECTION, POWDER, FOR SOLUTION INTRAMUSCULAR; INTRAVENOUS at 06:35

## 2023-05-10 RX ADMIN — GABAPENTIN 300 MG: 300 CAPSULE ORAL at 20:31

## 2023-05-10 RX ADMIN — OXYCODONE HYDROCHLORIDE 5 MG: 5 TABLET ORAL at 06:47

## 2023-05-10 RX ADMIN — GABAPENTIN 300 MG: 300 CAPSULE ORAL at 08:49

## 2023-05-10 RX ADMIN — OXYCODONE HYDROCHLORIDE 5 MG: 5 TABLET ORAL at 13:00

## 2023-05-10 RX ADMIN — AMPICILLIN SODIUM AND SULBACTAM SODIUM 3 G: 2; 1 INJECTION, POWDER, FOR SOLUTION INTRAMUSCULAR; INTRAVENOUS at 13:00

## 2023-05-10 RX ADMIN — SODIUM CHLORIDE, POTASSIUM CHLORIDE, SODIUM LACTATE AND CALCIUM CHLORIDE: 600; 310; 30; 20 INJECTION, SOLUTION INTRAVENOUS at 02:40

## 2023-05-10 RX ADMIN — PANTOPRAZOLE SODIUM 40 MG: 40 TABLET, DELAYED RELEASE ORAL at 20:31

## 2023-05-10 RX ADMIN — METFORMIN HYDROCHLORIDE 500 MG: 500 TABLET, FILM COATED ORAL at 08:49

## 2023-05-10 RX ADMIN — AMOXICILLIN AND CLAVULANATE POTASSIUM 1 TABLET: 875; 125 TABLET, FILM COATED ORAL at 20:31

## 2023-05-10 RX ADMIN — METFORMIN HYDROCHLORIDE 500 MG: 500 TABLET, FILM COATED ORAL at 17:24

## 2023-05-10 ASSESSMENT — ACTIVITIES OF DAILY LIVING (ADL)
ADLS_ACUITY_SCORE: 41
ADLS_ACUITY_SCORE: 40
ADLS_ACUITY_SCORE: 40
ADLS_ACUITY_SCORE: 32
ADLS_ACUITY_SCORE: 41
ADLS_ACUITY_SCORE: 40
ADLS_ACUITY_SCORE: 32
ADLS_ACUITY_SCORE: 32
ADLS_ACUITY_SCORE: 40
ADLS_ACUITY_SCORE: 32

## 2023-05-10 NOTE — CONSULTS
CLINICAL NUTRITION SERVICES - ASSESSMENT NOTE     Nutrition Prescription    RECOMMENDATIONS FOR MDs/PROVIDERS TO ORDER:  None at this time    Malnutrition Status:    Patient does not meet two of the established criteria necessary for diagnosing malnutrition    Recommendations already ordered by Registered Dietitian (RD):  Please send chocolate glucerna at 10 am and 2 pm snack time  Nutrition education for wound healing     Future/Additional Recommendations:  Monitor patient weight, intakes, labs, wound healing and GI/BM  Monitor patient tolerance to supplement     REASON FOR ASSESSMENT  Roddy Sidhu is a/an 77 year old male assessed by the dietitian for Provider Order - Poor wound healing    NUTRITION HISTORY  Patient is a 77 year old male who presents with infected diabetic foot ulcer of left heel associated with type 2 diabetes; additional ulcer of right heel without infection, sepsis with acute organ dysfunction without septic shock, acute on chronic anemia, recent hospitalization for closed fracture of femur, type 2 diabetes, HLD.    Per patient interview  Patient reports no decreased appetite or concerns regarding weight loss. Pt stated he typically consumes 3 meals per day with some sort of protein, vegetable and usually a baked potato. Pt stated he will also snack in between meals typically on things like yogurt and cottage cheese. He will usually have about 1 boost drink per day but finds them very filling. He noted a preference for chocolate or strawberry flavors. RD went over the importance of increased protein intakes to promote wound healing. RD also went over micronutrients for wound healing. Patient vocalized understanding.     CURRENT NUTRITION ORDERS  Diet: Orders Placed This Encounter      High Consistent Carb (75 g CHO per Meal) Diet      Intake/Tolerance: Patient intakes variable; % of meal intakes since admission    LABS  Labs reviewed  Glucose elevated-211  "mg/dL    MEDICATIONS  Medications reviewed  Scheduled: Augmentin, Unasyn, Novolog, Lantus, Glucophage, Protonix  Continuous: None  PRN: none pertinent    ANTHROPOMETRICS  Height: 176.5 cm (5' 9.5\")  Most Recent Weight: 60.7 kg (133 lb 13.1 oz)    IBW: 74 kg  BMI: Normal BMI  Weight History:   Wt Readings from Last 15 Encounters:   05/07/23 60.7 kg (133 lb 13.1 oz)   05/02/23 59.1 kg (130 lb 3.2 oz)   04/18/23 58.2 kg (128 lb 6.4 oz)   04/13/23 58.2 kg (128 lb 6.4 oz)   04/12/23 58.2 kg (128 lb 6.4 oz)   04/10/23 58.2 kg (128 lb 6.4 oz)   04/06/23 59.1 kg (130 lb 4.8 oz)   04/05/23 58.7 kg (129 lb 8 oz)   05/17/22 62.2 kg (137 lb 3.2 oz)   11/02/21 63 kg (139 lb)   05/14/21 62.6 kg (138 lb)   06/05/20 62.1 kg (137 lb)   12/06/19 65.8 kg (145 lb)   06/03/19 65.2 kg (143 lb 12.8 oz)   11/30/18 66.7 kg (147 lb)   Patient weight loss does not meet criteria for significance.    Dosing Weight: 60.7 kg-actual BW used.    ASSESSED NUTRITION NEEDS  Estimated Energy Needs: 1,517-1,821 kcals/day (25 - 30 kcals/kg)  Justification: Maintenance  Estimated Protein Needs:  grams protein/day (1.2 - 1.8 grams of pro/kg)  Justification: Increased needs  Estimated Fluid Needs: 1,517-1,821 mL/day (1 mL/kcal)   Justification: Per provider pending fluid status    PHYSICAL FINDINGS  See malnutrition section below.  Patient has lower foot non healing wounds  Surgical incision to L foot.    MALNUTRITION  % Intake: Decreased intake does not meet criteria  % Weight Loss: Weight loss does not meet criteria  Subcutaneous Fat Loss: None observed  Muscle Loss: Thoracic region (clavicle, acromium bone, deltoid, trapezius, pectoral):  Moderate  Fluid Accumulation/Edema: None noted  Malnutrition Diagnosis: Patient does not meet two of the established criteria necessary for diagnosing malnutrition    NUTRITION DIAGNOSIS  Inadequate protein intake related to increased needs as evidenced by wound healing.  "     INTERVENTIONS  Implementation  Collaboration with other providers-IDT rounds  Medical food supplement therapy-Please send glucerna BID at 10 am and 2pm snack time  Nutrition education for nutrition relationship to health/disease     Goals  Patient to consume % of nutritionally adequate meal trays TID, or the equivalent with supplements/snacks.     Patient wound healing    Monitoring/Evaluation  Progress toward goals will be monitored and evaluated per protocol.    Suzette Bolaños RDN, JUAN  Clinical Dietitian  Office: [phone]  Weekend pager: [phone]

## 2023-05-10 NOTE — PROGRESS NOTES
Care Management Follow Up    Length of Stay (days): 6    Expected Discharge Date: 05/12/2023     Concerns to be Addressed: discharge planning     Patient plan of care discussed at interdisciplinary rounds: Yes    Anticipated Discharge Disposition: Home, Home Care, Transitional Care     Anticipated Discharge Services: None  Anticipated Discharge DME:      Patient/family educated on Medicare website which has current facility and service quality ratings: yes  Education Provided on the Discharge Plan: Yes  Patient/Family in Agreement with the Plan: yes    Referrals Placed by CM/SW:  [facility]  Private pay costs discussed: Not applicable    Additional Information:    Met with pt and wife. Completed AIDET. Discussed pt's medical disposition and recommendations for TCU. Pt is agreeable to discharging to a TCU. Writer talked about possible options and coverage. Pt has been to [facility] in the past and would like to return there. Writer sent referral - please refer to below. Awaiting response.     Destination    Service Provider Request Status Selected Services Address Phone Fax Patient Preferred   [facility] [place]  Pending - [facility] Sent N[facility]A [id number] [facility] [place] [id number] [phone]      PLAN: TCU    [facility]  Care Transitions   Tele:[phone]

## 2023-05-10 NOTE — PROGRESS NOTES
"Reason for Admission:        Activity: non-weight bearing left foot.  Heavy A2, pt struggled with A2,  ceiling lift used  to comply with weight bearing status.         Neuro: alert and orientated x 4          Cardiac: WNL      Respiratory: WNL      GI/: continent of urine and BM       Skin: pressure sores on bilateral feet, see wound care note, dressing changed this AM shift. and buttocks covered with mepilex.       Diet: [facility] diet       Lines/Drains: L ARM PICC       Vitals: /66 (BP Location: Right arm)   Pulse 75   Temp 98.1  F (36.7  C) (Oral)   Resp 16   Ht 1.765 m (5' 9.5\")   Wt 60.7 kg (133 lb 13.1 oz)   SpO2 98%   BMI 19.48 kg/m        Labs: labs to be collected off PICC line       Pain: Pt denies pain throughout writers shift, needed pain medication for dressing change.       Plan: continue with plan of care, awaiting TCU placement, IV ABX  Oneida Lopez RN on 5/10/2023 at 6:22 PM     "

## 2023-05-10 NOTE — PROGRESS NOTES
POD#1 surgical wound debridement of the decubitus ulcer of the left heel    The patient was seen at bedside in no apparent distress.  The patient relates a good appetite with no nausea or vomiting.  The patient denies any fever or chills.  The patient relates the pain is under control.  The patient relates being able to go the the bathroom.     The patient's vitals are stable and is affebrile    Operative Cultures:   Soft tissue left heel  Gram stain:  2+ Gram negative bacilli      No white blood cells seen       Aerobic: Pending  Anaerobic: Pending      Exam:  Neurovascular status remains intact with positive epicritic sensation and capillary filling to the digits on the left.  Motor is intact to the left.       The wound on the left foot reveals positive granulation and bleeding noted.  Positive capillery filling to the skin edges noted.  No malodor or purulent drainage noted.    Assessment: Diabetic foot infection status post surgical debridement of the decubitus ulcer left heel    Plan:     1. Wound: Dressings per wound care RN recommendations  2. Infection: Continue IV antibiotics pending cultures and sensitivities  3. Disposition: Patient is to remain nonweightbearing on the left foot.  The patient may follow-up in wound care clinic after discharge.    I appreciate the assistance in the care of this patient from the Hospitalist Service.      GEORGE Flores, JARAD, FACFAS

## 2023-05-10 NOTE — PROGRESS NOTES
[facility] Nurse Inpatient Assessment     Consulted for: bilateral heel ulcers    Summary: Left heel reassessed by this writer today. Dr. Flores also at bedside. Heel is concerning for boggy area along distal 1/3 of wound. This writer also noted 2 suspected deep tissue injuries - one on the dorsal foot and one on the posterior ankle. Nursing staff aware and will update Dr. Flores with any changes.    5/5 Pt admitted with deep tissue injury on right heel and unstageable pressure injury on left heel. Pt and spouse in disagreement of how long these have been present. Pt's spouse states that they were noted when the pt was admitted to [facility] at the end of March. [facility] This writer unable to find documentation in [facility] of these wounds.    Patient History (according to provider note(s):      Roddy Sidhu is a 77 year old male admitted on 5/4/2023 for left heel diabetic ulcer. He had sepsis which resolved in the emergency department with goal-directed therapy.  He will be admitted for antibiotics and podiatry consult.     Principal Problem:    Infected diabetic ulcer of left heel associated with type 2 diabetes mellitus (H)  Active Problems:    Type 2 diabetes mellitus with diabetic polyneuropathy, with long-term current use of insulin (H)    Microalbuminuria due to type 2 diabetes mellitus (H)    Sepsis with acute organ dysfunction without septic shock, due to unspecified organism, unspecified type (H)    Diabetic ulcer of right heel associated with type 2 diabetes mellitus without infection (H)       Infected Diabetic ulcer of left heel associated with type 2 diabetes mellitus/diabetic neuropathy, unspecified ulcer stage (H)   Additional diabetic ulcer of right heel without infection  -The patient recently was diagnosed with osteomyelitis based on an x-ray, but this is not supported by the MRI performed today  - ABIs done at podiatry's request and  were normal.  - CRP  156 on 5/2 - following  -Continue Zosyn which was started in the emergency department  - continue Vancomycin as started in the emergency department.  Although it is nonpurulent his recent hospitalization and admission to a long-term care facility are risk factors which warrant continued vancomycin further evaluation by podiatry   -Float BOTH heels.  I stressed the importance of this with the patient  -Podiatry consulted an planning to see patient 5/5   - Wound consult requested 5/5 if able.    - reassess antibiotics 5/6 if showing continued improvement.         Sepsis with acute organ dysfunction without septic shock, due to unspecified organism, unspecified type (H)  -Resolved in the emergency department       US MARKY DOPPLER NO EXERCISE, 1-2 LEVELS, BILATERAL   5/4/2023 4:23 PM      HISTORY: Diabetic foot ulceration with concern for osteomyelitis.     COMPARISON: None.     FINDINGS:  Right MARKY:   PT: 1.09.  DP: 1.19     Left MARKY:   PT: 0.78.  DP: 1.1      Waveforms: Biphasic and triphasic in the right posterior tibial, right  dorsalis pedis and left dorsalis pedis. Monophasic in the left  posterior tibial.                                                                      IMPRESSION:  1. Normal ABIs bilaterally.     MARKY CRITERIA:  >1.4 NC  0.95-1.4 Normal  0.90 - 0.94 Mild  0.5 - 0.89 Moderate  0.2 - 0.49 Severe  <0.2 Critical    EXAM: MR FOOT LEFT W/O and W CONTRAST  LOCATION: [facility]  DATE/TIME: 5/4/2023 5:58 PM CDT     INDICATION: Concern for osteomyelitis of left calcaneus.  COMPARISON: None.  TECHNIQUE: Routine. Additional postgadolinium T1 sequences were obtained.  IV CONTRAST: 6 mL Gadavist     FINDINGS:      JOINTS AND BONES:   -No evidence for fracture. There is some reactive T2 signal abnormality within the posteroinferior calcaneus but no specific evidence for osteomyelitis. No additional areas worrisome for osteomyelitis are identified. No  significant effusion to suggest   septic arthropathy.     TENDONS:   -Mild Achilles tendinopathy without tearing. The peroneal tendons are intact. The flexor tendons are intact. The extensor tendons are intact.      LIGAMENTS:   -The anterior and posterior talofibular ligaments are intact. The calcaneofibular ligament is intact. The deltoid ligamentous complex is intact.     MUSCLES AND SOFT TISSUES:   -There is an ulceration along the posterior aspect of the foot with some surrounding edema or cellulitis. There is no evidence for abscess.                                                                      IMPRESSION:  1.  No evidence for osteomyelitis, septic arthropathy, or abscess.  2.  Edema or cellulitis along the posterior aspect of the foot. There is apparent soft tissue irregularity and likely ulceration along the posterior aspect of the foot and there is susceptibly artifact suggestive of air tracking into the subcutaneous   soft tissues.  3.  No evidence for fracture.  4.  Mild reactive edema within the posteroinferior calcaneus.  5.  Mild Achilles tendinopathy without tearing.    Assessment:      Areas visualized during today's visit: bilateral feet     Areas of concern - great toe appears like reabsorbed blood blister; distal 3rd toe appears callused      Area on his great toe measures 3mm x 6mm. Area on his 3rd toe measures 7mm x 7mm - appear stable; no new measurements 5/10      Pressure Injury Location: Right heel    NOT REASSESSED 5/10  Last photo: 5/8/23  Wound type: Pressure Injury     Pressure Injury Stage: Deep Tissue Pressure Injury (DTPI), present on admission   Wound history/plan of care:   Unknown as pt/spouse not good historians. Possibly present for the last month.  Wound base: blood-filled bulla     Palpation of the wound bed: boggy      Drainage: not open     Description of drainage: none     Measurements (length x width x depth, in cm) - measurements unchanged 5/8 (coloring is r/t use  of betadine and photo taken closer to wound)   Overall area of purple discoloration and fluid-filled bulla measures 5.5cm x 3.5cm   Purple discoloration measures 3cm x 3.5cm and bulla measures 3cm x 2.5cm     Tunneling N/A     Undermining N/A  Periwound skin: Dry/scaly      Color: dusky and pink; pink is blanchable      Temperature: normal   Odor: none  Pain: absent and denies , none  Pain intervention prior to dressing change: N/A  Treatment goal: Protection; to keep bulla dry/intact  STATUS: stable  Supplies ordered: at bedside, discussed with RN and discussed with patient      Pressure Injury Location: Left heel      Posterior view, medial view, lateral view from L to R      Discoloration on dorsal foot measures 12.5cm x 7cm. This was present prior to surgery.    Last photo: 5/10/23  Wound type: Pressure Injury     Pressure Injury Stage: Unstageable, present on admission   Wound history/plan of care:   Unknown as pt/spouse not good historians. Possibly present for the last month.    Wound base: see above photos - wound has yellow coating; no clean tissue noted     Palpation of the wound bed: firm in area with thin coating over bone; distal 1/3 is boggy      Drainage: small to moderate     Description of drainage: serosanguinous; one area was actively bleeding during cares     Measurements (length x width x depth, in cm) 8.cm x 8cm x 0.5cm; area of eschar laterally (at 10:00) measures 1cm x 1cm     Tunneling N/A     Undermining N/A  Periwound skin: see photo - fluctuance distally from 3:00 to 9:00 extending 1-2cm (macerated, boggy, purple discoloration subcutaneously)      Color: see photo      Temperature: normal    Odor: moderate  Pain: during dressing change  Pain intervention prior to dressing change: none; this writer notified bedside nurse of need for pain medication  Treatment goal: Heal , Infection control/prevention and Remove necrotic tissue  STATUS: initial assessment after surgery  Supplies ordered: at  bedside     Pressure Injury Location: Left Dorsal Foot     Last photo: 5/10/23  Wound type: Pressure Injury     Pressure Injury Stage: Deep Tissue Pressure Injury (DTPI), hospital acquired      This is a Medical Device Related Pressure Injury (MDRPI) due to ace wrap applied post op  Wound history/plan of care: noted when post-op dressing removed for wound assessment    Wound base: 100% non-blanchable and purple with a few areas of pallor     Palpation of the wound bed: normal      Drainage: none     Description of drainage: none     Measurements (length x width x depth, in cm) 6cm  x 3cm      Tunneling N/A     Undermining N/A  Periwound skin: Intact      Color: normal and consistent with surrounding tissue      Temperature: normal   Odor: none  Pain: denies , none  Pain intervention prior to dressing change: N/A  Treatment goal: Protection  STATUS: initial assessment  Supplies ordered: at bedside and discussed with RN     Pressure Injury Location: Left posterior ankle        Last photo: 5/10/23  Wound type: Pressure Injury     Pressure Injury Stage: Deep Tissue Pressure Injury (DTPI), hospital acquired      This is a Medical Device Related Pressure Injury (MDRPI) due to ace wrap  Wound history/plan of care: noted when post-op dressing removed for wound assessment    Wound base: 100 % non-blanchable and purple     Palpation of the wound bed: normal      Drainage: none     Description of drainage: none     Measurements (length x width x depth, in cm) 0.7cm  x 1.5cm     Tunneling N/A     Undermining N/A  Periwound skin: Intact      Color: normal and consistent with surrounding tissue      Temperature: normal   Odor: none  Pain: denies , none  Pain intervention prior to dressing change: N/A  Treatment goal: Protection  STATUS: initial assessment  Supplies ordered: at bedside and discussed with RN    My PI Risk Assessment     Sensory Perception: 3 - Slightly Limited     Moisture: 4 - Rarely moist     Activity: 3 - Walks  "occasionally      Mobility: 3 - Slightly limited      Nutrition: 2 - Probably inadequate      Friction/Shear: 2 - Potential problem      TOTAL: 17    PT and pedal pulses palpable bilaterally    Treatment Plan:     Bilateral heel wound(s): BID   RIGHT HEEL  Using non-woven gauze, paint with povidone-iodine. Allow this to air dry.     LEFT HEEL/FOOT  Clean foot daily with chlorhexidine soap/water, rinse, and pat dry.    Then twice daily:  Spray wound cleanser to wound bed and wipe with gauze. Pat dry.  Paint skin surrounding wound with povidone-iodine and allow it to air dry. Also paint suspected deep tissue injury on posterior heel and plantar foot with povidone-iodine.  Using a gloved finger, apply a layer of Skintegrity Hydrogel to the wound bed.  Using 1 gauze fluff, moisten it with Vashe and also work more Skintegrity Hydrogel into the gauze. Apply this to the wound bed.  Cover with 2-3 dry gauze fluffs followed by a 5x9 ABD pad.  Secure with rolled gauze and tape.    Pt to wear heel offloading boots when in recliner or in bed. Also elevate LLE on pillows.  Pt is to remain nonweightbearing on the left foot.    Pressure Injury Prevention (PIP) Plan:  If patient is declining pressure injury prevention interventions: Explore reason why and address patient's concerns, Educate on pressure injury risk and prevention intervention(s), If patient is still declining, document \"informed refusal\"  and Ensure Care team is aware ( provider, charge nurse, etc)  Mattress: Follow bed algorithm, reassess daily and order specialty mattress, if indicated.  HOB: Maintain at or below 30 degrees, unless contraindicated  Repositioning in bed: Every 1-2 hours , Left/right positioning; avoid supine and Raise foot of bed prior to raising head of bed, to reduce patient sliding down (shear)  Heels: Keep elevated off mattress and Heel lift boots  Protective Dressing: Sacral Mepilex for prevention (#979031),  especially for the agitated " patient   Positioning Equipment: Seated Positioning System (SPS)  (#783003) Sling must have contact with chair, no pillow or chair cushion beneath if appropriate  Chair positioning: Chair cushion (#945414) OR SPS   If patient has a buttock pressure injury, or high risk for PI use chair cushion or SPS.  Moisture Management: Perineal cleansing /protection: Follow Incontinence Protocol and Avoid brief in bed  Under Devices: Inspect skin under all medical devices during skin inspection , Ensure tubes are stabilized without tension and Ensure patient is not lying on medical devices or equipment when repositioned  Ask provider to discontinue device when no longer needed.    Orders: Updated    RECOMMEND PRIMARY TEAM ORDER: None, at this time  Education provided: plan of care, Infection prevention  and Off-loading pressure  Discussed plan of care with: Patient, Family, Nurse and Physician  [facility] nurse follow-up plan: weekly  Notify [facility] if wound(s) deteriorate.  Nursing to notify the Provider(s) and re-consult the [facility] Nurse if new skin concern.    DATA:     Current support surface: Standard  Standard gel/foam mattress (IsoFlex, Atmos air, etc)  Containment of urine/stool: Continent of bladder and Continent of bowel  BMI: Body mass index is 19.48 kg/m .   Active diet order: Orders Placed This Encounter      High Consistent Carb (75 g CHO per Meal) Diet     Output: I/O last 3 completed shifts:  In: 1799 [P.O.:150; I.V.:1649]  Out: 950 [Urine:950]     Labs:   Recent Labs   Lab 05/09/23  0645   HGB 8.3*   WBC 17.9*     Pressure injury risk assessment:   Sensory Perception: 3-->slightly limited  Moisture: 4-->rarely moist  Activity: 1-->bedfast  Mobility: 3-->slightly limited  Nutrition: 3-->adequate  Friction and Shear: 2-->potential problem  Kai Score: 16    Laureen King RN, CWOCN  [employer]: [facility] Nurse  Dept. Office Number: [phone]

## 2023-05-10 NOTE — PROGRESS NOTES
[facility] Medicine Progress Note  Date of Service: 05/10/2023    Assessment & Plan   Roddy Sidhu is a 77 year old male currently in TCU following hospitalization for fall with multiple fractures who presented on 5/4/2023 after being referred by TCU provider for evaluation of worsening left diabetic heel infection.  He had sepsis which resolved in the emergency department with goal-directed therapy.       Infected diabetic pressure ulcer of left heel associated with type 2 diabetes mellitus/diabetic neuropathy  Additional diabetic ulcer of right heel without infection  S/p debridement of left pressure ulcer 5/9/2023    Pressure injury to heels noted in TCU and seen by wound care 4/17/2023.    The patient recently was diagnosed with osteomyelitis based on an x-ray, but this is not supported by the MRI performed this admission.    ABIs done at podiatry's request and were normal.    CRP  156 on 5/2 -> 129    Piperacillin-tazobactam and vancomycin IV were started in the emergency department    Cultures from wound positive for 2+ Proteus, 3+ Enterococcus, 1+ MSSA    Changed piperacillin-tazobactam to ampicillin-sulbactam 5/8 to cover Proteus and Enterococcus and MSSA    Stopped vancomycin 5/8 (5 days) given no MRSA    Debridement per Dr. Flores on 5/9.    - wound care RN following and discussed with her 5/10, expect will take considerable time and effort to heal   - NWB left    - nutrition consult for better wound healing   - change ampicillin-sulbactam IV to amoxicillin-clavulanate oral 5/10   - recheck CRP in AM    Sepsis with acute organ dysfunction without septic shock, due to unspecified organism, unspecified type (H)    Resolved in the emergency department       Acute on chronic anemia    Hemoglobin last normal 6 months ago, was 8's during recent admission, then 8/2 5/4 and dropped to 6.7-6.9 AM of 5/5.      No black or bloody stools.  No abdominal pain.   No other  apparent source of acute blood loss. Guiac negative.  Iron studies suggestive of anemia of chronic inflammation.     Gave 1u RBCs AM 5/5.     Started on protonix twice daily 5/5 empirically.      Held aspirin - appears to be on this just due to diabetes - recommend reassessing at follow-up with primary care provider if this is really needed.       Stopped celebrex 100  Mg twice daily that had been started on admission, was on naprosyn prn at home.    Hemoglobin overall stable since transfusion   - can stop PPI given no symptoms, no active bleeding, not iron-deficiency anemia    Closed displaced subtrochanteric fracture of right femur 3/29  S/p ORIF right femur fracture with intermedullary nail 3/29     Closed fracture of proximal end of right humerus 3/29   Closed nondisplaced fracture of right clavicle 3/29     Hospitalized [facility] 3/29 - 4/4/2023 for trauma due to ground level fall. Discharged to TCU for further rehab.   -  has tylenol prn and oxycodone if needed while here.  Stopped celebrex as above.        Type 2 diabetes mellitus with diabetic polyneuropathy, with long-term current use of insulin (H)    A1c 8.3 on 5/2 on metformin and lantus 10 U daily at home. Primary care intended to start Trulicity but he has not begun this medication yet    Held home metformin, continued Lantus 10. Poor control. Increased Lantus, increased SSI. BG improved 5/9. Changed Lantus back to 10 units daily 5/9. AM .    - continue Lantus 10U daily   - medium SSI   - continue metformin resumed today 5/10     Hyperlipidemia   - Continue home simvastatin.      DVT Prophylaxis: initially admitted on heparin, stopped 5/5 due to anemia   Polo Catheter: Not present    Lines: has lost multiple IVs including multiple IVs placed under US guidance - with loss of IV again 5/7, ordered midline PICC      Cardiac Monitoring: None  Code Status: Full Code      Diet  Orders Placed This Encounter      High Consistent Carb (75 g CHO  per Meal) Diet        Discussion: Long discussion with Marvin today at bedside.  At baseline, patient was ambulating at home with use of a walker though was limited by peripheral neuropathy.  He had a fall at home and was hospitalized at [facility] for a week for treatment of hip fracture status post ORIF and right proximal humerus fracture and right clavicle fracture.  Went to the TCU on 4/4/2023, and while there developed pressure injury to the heels left worse than right resulting in this current hospitalization.  We discussed that he has poor wound healing and they will take a long time to heal that left foot if it will heal at all.  We discussed that if it does not heal, 1 option would be an amputation.  An amputation with good perfusion may heal much better than the heel will given the degree of open subcutaneous tissue and poor wound healing.  We have no plan for this at this time but I told Marvin to keep this in mind as it may become the best option.  Marvin was a  for many years then worked a number of jobs over the rest of his career.  He lives with his wife and they raised 3 children and he has 7 grandchildren.    Disposition Plan      Expected Discharge Date: 05/11/2023      Destination: home with family;inpatient rehabilitation facility            Medical Decision Making       55 MINUTES SPENT BY ME on the date of service doing chart review, history, exam, documentation & further activities per the note.        Ancelmo Marques MD  Hospital Medicine        Interval History   Pain controlled in his left heel.  No chest pain or shortness of breath.  Appetite good.  Feels the right shoulder is getting a little better.    Physical Exam   Temp:  [97.9  F (36.6  C)-99.5  F (37.5  C)] 98.4  F (36.9  C)  Pulse:  [68-78] 75  Resp:  [14-18] 18  BP: (121-170)/(62-91) 144/73  SpO2:  [97 %-100 %] 97 %    Weights:   Vitals:    05/04/23 1305 05/04/23 2030 05/07/23 0535   Weight: 59 kg (130 lb) 59.9 kg (132  lb 0.9 oz) 60.7 kg (133 lb 13.1 oz)    Body mass index is 19.48 kg/m .    Constitutional: Alert and oriented, no acute distress, nontoxic, appears older than stated age  CV: Regular, no jugular venous distention  Respiratory: CTA bilaterally  GI: Soft, thin, non-tender, bowel sounds normal active  Skin/Musculoskeletal: Feet are currently wrapped and were not directly examined though pictures from today in the media tab were reviewed showing extensive debridement at that left heel and multiple other areas of skin breakdown as documented.  There is no obvious cellulitis.  There is some edema in the right forearm and the patient has decreased mobility of the right shoulder though can have some range of motion without pain.    Data   Recent Labs   Lab 05/10/23  1155 05/10/23  0807 05/10/23  0245 05/09/23  0804 05/09/23  0645 05/08/23  0745 05/08/23  0440 05/07/23  0821 05/07/23  0425   WBC  --   --   --   --  17.9*  --  16.0*  --  17.8*   HGB  --   --   --   --  8.3*  --  7.9*  --  8.2*   MCV  --   --   --   --  82  --  82  --  82   PLT  --   --   --   --  535*  --  473*  --  533*   NA  --   --   --   --  136  --  132*  --  135*   POTASSIUM  --   --   --   --  4.4  --  4.2  --  4.4   CHLORIDE  --   --   --   --  101  --  99  --  102   CO2  --   --   --   --  27  --  27  --  25   BUN  --   --   --   --  13.4  --  15.8  --  15.5   CR  --   --   --   --  1.00  --  1.03  --  1.10   ANIONGAP  --   --   --   --  8  --  6*  --  8   BEST  --   --   --   --  8.3*  --  8.2*  --  8.1*   * 155* 178*   < > 252*   < > 275*   < > 240*    < > = values in this interval not displayed.       Recent Labs   Lab 05/10/23  1155 05/10/23  0807 05/10/23  0245 05/09/23  2157 05/09/23  1638 05/09/23  1501   * 155* 178* 246* 122* 108*        Unresulted Labs Ordered in the Past 30 Days of this Admission     Date and Time Order Name Status Description    5/9/2023  2:57 PM Tissue Aerobic Bacterial Culture Routine Preliminary     5/9/2023   2:57 PM Anaerobic Bacterial Culture Routine In process     5/9/2023  2:56 PM Surgical Pathology Exam In process            Imaging: No results found for this or any previous visit (from the past 24 hour(s)).     I reviewed all new labs and imaging results over the last 24 hours. I personally reviewed no images or EKG's today.    Medications     lactated ringers 100 mL/hr at 05/10/23 0240       ampicillin-sulbactam  3 g Intravenous Q6H     gabapentin  100 mg Oral Pre-Op/Pre-procedure x 1 dose     gabapentin  300 mg Oral TID     insulin aspart  1-7 Units Subcutaneous TID AC     insulin aspart  1-5 Units Subcutaneous At Bedtime     insulin glargine  10 Units Subcutaneous QAM     latanoprost  1 drop Both Eyes At Bedtime     metFORMIN  500 mg Oral BID w/meals     pantoprazole  40 mg Oral BID     simvastatin  40 mg Oral At Bedtime     sodium chloride (PF)  3 mL Intracatheter Q8H     sodium chloride (PF)  3 mL Intracatheter Q8H   Reviewed medications    Ancelmo Marques MD  [facility] Medicine

## 2023-05-11 ENCOUNTER — APPOINTMENT (OUTPATIENT)
Dept: PHYSICAL THERAPY | Facility: CLINIC | Age: 78
DRG: 855 | End: 2023-05-11
Payer: COMMERCIAL

## 2023-05-11 ENCOUNTER — APPOINTMENT (OUTPATIENT)
Dept: ULTRASOUND IMAGING | Facility: CLINIC | Age: 78
DRG: 855 | End: 2023-05-11
Attending: PODIATRIST
Payer: COMMERCIAL

## 2023-05-11 LAB
ANION GAP SERPL CALCULATED.3IONS-SCNC: 8 MMOL/L (ref 7–15)
BASOPHILS # BLD AUTO: 0.1 10E3/UL (ref 0–0.2)
BASOPHILS NFR BLD AUTO: 0 %
BUN SERPL-MCNC: 10.1 MG/DL (ref 8–23)
CALCIUM SERPL-MCNC: 8.4 MG/DL (ref 8.8–10.2)
CHLORIDE SERPL-SCNC: 101 MMOL/L (ref 98–107)
CREAT SERPL-MCNC: 0.95 MG/DL (ref 0.67–1.17)
CRP SERPL-MCNC: 123.6 MG/L
DEPRECATED HCO3 PLAS-SCNC: 27 MMOL/L (ref 22–29)
EOSINOPHIL # BLD AUTO: 0.2 10E3/UL (ref 0–0.7)
EOSINOPHIL NFR BLD AUTO: 1 %
ERYTHROCYTE [DISTWIDTH] IN BLOOD BY AUTOMATED COUNT: 18.6 % (ref 10–15)
GFR SERPL CREATININE-BSD FRML MDRD: 83 ML/MIN/1.73M2
GLUCOSE BLDC GLUCOMTR-MCNC: 144 MG/DL (ref 70–99)
GLUCOSE BLDC GLUCOMTR-MCNC: 148 MG/DL (ref 70–99)
GLUCOSE BLDC GLUCOMTR-MCNC: 191 MG/DL (ref 70–99)
GLUCOSE BLDC GLUCOMTR-MCNC: 212 MG/DL (ref 70–99)
GLUCOSE BLDC GLUCOMTR-MCNC: 220 MG/DL (ref 70–99)
GLUCOSE SERPL-MCNC: 160 MG/DL (ref 70–99)
HCT VFR BLD AUTO: 27 % (ref 40–53)
HGB BLD-MCNC: 8.2 G/DL (ref 13.3–17.7)
IMM GRANULOCYTES # BLD: 0.2 10E3/UL
IMM GRANULOCYTES NFR BLD: 1 %
LYMPHOCYTES # BLD AUTO: 1.7 10E3/UL (ref 0.8–5.3)
LYMPHOCYTES NFR BLD AUTO: 12 %
MCH RBC QN AUTO: 25.1 PG (ref 26.5–33)
MCHC RBC AUTO-ENTMCNC: 30.4 G/DL (ref 31.5–36.5)
MCV RBC AUTO: 83 FL (ref 78–100)
MONOCYTES # BLD AUTO: 0.8 10E3/UL (ref 0–1.3)
MONOCYTES NFR BLD AUTO: 6 %
NEUTROPHILS # BLD AUTO: 11.6 10E3/UL (ref 1.6–8.3)
NEUTROPHILS NFR BLD AUTO: 80 %
NRBC # BLD AUTO: 0 10E3/UL
NRBC BLD AUTO-RTO: 0 /100
PATH REPORT.COMMENTS IMP SPEC: NORMAL
PATH REPORT.COMMENTS IMP SPEC: NORMAL
PATH REPORT.FINAL DX SPEC: NORMAL
PATH REPORT.GROSS SPEC: NORMAL
PATH REPORT.MICROSCOPIC SPEC OTHER STN: NORMAL
PATH REPORT.RELEVANT HX SPEC: NORMAL
PHOTO IMAGE: NORMAL
PLATELET # BLD AUTO: 554 10E3/UL (ref 150–450)
POTASSIUM SERPL-SCNC: 4.3 MMOL/L (ref 3.4–5.3)
RBC # BLD AUTO: 3.27 10E6/UL (ref 4.4–5.9)
SODIUM SERPL-SCNC: 136 MMOL/L (ref 136–145)
WBC # BLD AUTO: 14.6 10E3/UL (ref 4–11)

## 2023-05-11 PROCEDURE — 99233 SBSQ HOSP IP/OBS HIGH 50: CPT | Performed by: PODIATRIST

## 2023-05-11 PROCEDURE — 93926 LOWER EXTREMITY STUDY: CPT | Mod: LT

## 2023-05-11 PROCEDURE — 85004 AUTOMATED DIFF WBC COUNT: CPT | Performed by: INTERNAL MEDICINE

## 2023-05-11 PROCEDURE — 120N000001 HC R&B MED SURG/OB

## 2023-05-11 PROCEDURE — 97530 THERAPEUTIC ACTIVITIES: CPT | Mod: GP

## 2023-05-11 PROCEDURE — 86140 C-REACTIVE PROTEIN: CPT | Performed by: INTERNAL MEDICINE

## 2023-05-11 PROCEDURE — 80048 BASIC METABOLIC PNL TOTAL CA: CPT | Performed by: INTERNAL MEDICINE

## 2023-05-11 PROCEDURE — 250N000013 HC RX MED GY IP 250 OP 250 PS 637: Performed by: PODIATRIST

## 2023-05-11 PROCEDURE — 99232 SBSQ HOSP IP/OBS MODERATE 35: CPT | Performed by: INTERNAL MEDICINE

## 2023-05-11 PROCEDURE — 250N000013 HC RX MED GY IP 250 OP 250 PS 637: Performed by: INTERNAL MEDICINE

## 2023-05-11 RX ORDER — IOPAMIDOL 755 MG/ML
90 INJECTION, SOLUTION INTRAVASCULAR ONCE
Status: DISCONTINUED | OUTPATIENT
Start: 2023-05-11 | End: 2023-05-13 | Stop reason: HOSPADM

## 2023-05-11 RX ADMIN — LATANOPROST 1 DROP: 50 SOLUTION OPHTHALMIC at 21:38

## 2023-05-11 RX ADMIN — GABAPENTIN 300 MG: 300 CAPSULE ORAL at 08:59

## 2023-05-11 RX ADMIN — METFORMIN HYDROCHLORIDE 500 MG: 500 TABLET, FILM COATED ORAL at 17:18

## 2023-05-11 RX ADMIN — ACETAMINOPHEN 650 MG: 325 TABLET ORAL at 09:06

## 2023-05-11 RX ADMIN — AMOXICILLIN AND CLAVULANATE POTASSIUM 1 TABLET: 875; 125 TABLET, FILM COATED ORAL at 08:58

## 2023-05-11 RX ADMIN — GABAPENTIN 300 MG: 300 CAPSULE ORAL at 20:07

## 2023-05-11 RX ADMIN — METFORMIN HYDROCHLORIDE 500 MG: 500 TABLET, FILM COATED ORAL at 08:58

## 2023-05-11 RX ADMIN — GABAPENTIN 300 MG: 300 CAPSULE ORAL at 15:44

## 2023-05-11 RX ADMIN — AMOXICILLIN AND CLAVULANATE POTASSIUM 1 TABLET: 875; 125 TABLET, FILM COATED ORAL at 20:07

## 2023-05-11 RX ADMIN — ACETAMINOPHEN 650 MG: 325 TABLET ORAL at 21:37

## 2023-05-11 RX ADMIN — SIMVASTATIN 40 MG: 40 TABLET, FILM COATED ORAL at 21:37

## 2023-05-11 ASSESSMENT — ACTIVITIES OF DAILY LIVING (ADL)
ADLS_ACUITY_SCORE: 40
ADLS_ACUITY_SCORE: 42
ADLS_ACUITY_SCORE: 40
ADLS_ACUITY_SCORE: 42
ADLS_ACUITY_SCORE: 40
ADLS_ACUITY_SCORE: 42

## 2023-05-11 NOTE — PROGRESS NOTES
Peceived order to discontinue CTA Abd Pelvis Bilat Run OFF from Dr Grissom.  CT updated to cancel as unable to discontinue in epic.

## 2023-05-11 NOTE — PROGRESS NOTES
"Time: Assumed care of patient from [id number]      Reason for Admission: Infected diabetic ulcers of the heels      Activity: Ceiling lift/assist x2      Neuro: alert and oriented, able to make needs known and uses call light if needed      Cardiac: WNL         Resp: WNL         GI/:  Continent of bladder and bowels, uses urinal at bedside      Skin: Left foot is wrapped from WOC yesterday, bilateral feet in boots, right heel is open to air with red/black area on outside of heel      Lines/Drains: PICC patent to left arm        Vitals: /63 (BP Location: Right arm)   Pulse 73   Temp 98.9  F (37.2  C) (Oral)   Resp 16   Ht 1.765 m (5' 9.5\")   Wt 60.7 kg (133 lb 13.1 oz)   SpO2 98%   BMI 19.48 kg/m        Labs/BG: WBC 14.6 (trending down), Hemoglobin 8.2, hematocrit 27, Platelets 554, RBC 3.27, Absolute Neutrophils 11.6, potassium 4.3, calcium 8.4, CRP inflammation 123.60, Glucose 160      Pain: Denies pain      Plan:  TCU for placement    Summer García RN on 5/11/2023 at 4:46 AM      "

## 2023-05-11 NOTE — PROGRESS NOTES
POD# 2 surgical debridement of infected decubitus ulcer, left foot.    The patient was seen at bedside in no apparent distress.  The patient relates a good appetite with no nausea or vomiting.  The patient denies any fever or chills.  The patient relates the pain is under control.  The patient relates being able to go the the bathroom.     The patient's vitals are stable and is affebrile    Operative Cultures:     Gram stain: 2+ Gram negative bacilli  Aerobic: pending  Anaerobic: pending    Labs:   WBC: 14.6   RBC: 3.27   Hgb: 8.2   Hematocrit: 27     CRP: 123.60     Glucose: 148    Exam:   Gangrenous changes to the skin on the left foot.    Assessment: Diabetic foot infection status post 2 days surgical debridement of the left heel decubitus ulcer.    Plan:     1. Wound: per WOC RN recommendations  2. Infection: Cultures and sensitivities pending  3. Disposition: Will order an US lower extremity arterial duplex exam on the left lower extremity to further evaluation circulation to the left foot.  Continue with offloading of both feet.  non weight bearing left foot. Will need TCU placement for wound care.  The patient will follow up at [facility] wound care clinic.    I appreciate the assistance in the care of this patient from the Hospitalist Service.      GEORGE Flores DPM, FACFAS     ARTERIAL DUPLEX ULTRASOUND LEFT LOWER EXTREMITY  5/11/2023 11:43 AM     HISTORY:  77-year-old patient with gangrene of the left foot.     COMPARISON: None.     TECHNIQUE: Color Doppler and spectral waveform analysis performed  throughout the arteries of the left lower extremity.     FINDINGS: The left common femoral artery is 149 cm/sec with the  diameter of 7.3 mm. Profunda femoral artery is 86 cm/sec and diameter  of 3.4 mm.     The proximal SFA is 135/15 cm/sec, 5.6 mm and triphasic. Mid SFA is  103/20 cm/sec, 5 mm, triphasic. Distal SFA is 133/23 cm/sec, 4.5 mm,  and biphasic waveform. Diffuse atherosclerotic  calcification  throughout all arteries, though especially in distal SFA with areas of  shadowing. Velocity elevation in the popliteal artery measuring 320/31  cm/sec with biphasic/triphasic waveform. Unable to visualize the  peroneal artery. Anterior tibial artery is patent with velocity of  156/26 cm/sec, with monophasic waveform. Trickle of blood flow in the  distal posterior tibial artery.                                                                      IMPRESSION:  1. Peripheral arterial disease is confirmed with scattered areas of  atherosclerotic calcification, in some areas obscuring underlying  lumen.  2. Definite focal velocity elevation in the popliteal artery, though  suspect arterial insufficiency localized to the distal SFA/popliteal  arterial distribution. Uncertain if severe stenoses and/or occlusion.  3. Unable to demonstrate blood flow in the peroneal artery.  4. Dominant run off arteries anterior tibial artery, though waveform  is monophasic. Trickle of blood flow in the distal posterior tibial  artery.     DANI TUCKER MD       I communicated with Dr. Birch about transferring the patient to [place] for urgent vascular intervention.  He agreed that the patient will need to be transferred.      GEORGE Flores DPM, FACFAS

## 2023-05-11 NOTE — PROGRESS NOTES
[facility] Progress Note  Date of Service (when I saw the patient): 05/11/2023    REASON FOR ADMISSION / INTERVAL HISTORY:  Roddy Sidhu is a 77 year old male currently in TCU following hospitalization for fall with multiple fractures who presented on 5/4/2023 after being referred by TCU provider for evaluation of worsening left diabetic heel infection  No new complaints       Assessment & Plan      Infected diabetic pressure ulcer of left heel associated with type 2 diabetes mellitus/diabetic neuropathy  Additional diabetic ulcer of right heel without infection  S/p debridement of left pressure ulcer 5/9/2023    Pressure injury to heels noted in TCU and seen by wound care 4/17/2023.    The patient recently was diagnosed with osteomyelitis based on an x-ray, but this is not supported by the MRI performed this admission. ABIs done at podiatry's request and were normal.    Piperacillin-tazobactam and vancomycin IV were started in the emergency department    Cultures from wound positive for 2+ Proteus, 3+ Enterococcus, 1+ MSSA.. Changed piperacillin-tazobactam to ampicillin-sulbactam 5/8 to cover Proteus and Enterococcus and MSSA. Stopped vancomycin 5/8 (5 days) given no MRSA. Debridement per Dr. Flores on 5/9.  Changed  ampicillin-sulbactam IV to amoxicillin-clavulanate oral 5/10     CRP  156 on 5/2 --now 123   -continue augmentin. Continue wound care     Sepsis with acute organ dysfunction without septic shock, due to unspecified organism, unspecified type (H)    Resolved in the emergency department       Acute on chronic anemia    Hemoglobin last normal 6 months ago, was 8's during recent admission, then 8/2 5/4 and dropped to 6.7-6.9 AM of 5/5.    No black or bloody stools.  No abdominal pain.   No other apparent source of acute blood loss. Guiac negative.  Iron studies suggestive of anemia of chronic inflammation.  Gave 1u RBCs AM 5/5.  Started on protonix twice  "daily 5/5 empirically.      Held aspirin - appears to be on this just due to diabetes - recommend reassessing at follow-up with primary care provider if this is really needed.       Stopped celebrex 100  Mg twice daily that had been started on admission, was on naprosyn prn at home.    Hemoglobin overall stable since transfusion. Stopped PPI  5/10 no symptoms, no active bleeding, not iron-deficiency anemia     Closed displaced subtrochanteric fracture of right femur 3/29  S/p ORIF right femur fracture with intermedullary nail 3/29     Closed fracture of proximal end of right humerus 3/29   Closed nondisplaced fracture of right clavicle 3/29     Hospitalized [facility] 3/29 - 4/4/2023 for trauma due to ground level fall. Discharged to TCU for further rehab.   -  has tylenol prn and oxycodone if needed while here.  Stopped celebrex as above.        Type 2 diabetes mellitus with diabetic polyneuropathy, with long-term current use of insulin (H)    A1c 8.3 on 5/2 on metformin and lantus 10 U daily at home. Primary care intended to start Trulicity but he has not begun this medication yet    Held home metformin, continued Lantus 10. Poor control. Increased Lantus, increased SSI. BG improved 5/9. Changed Lantus back to 10 units daily 5/9.   -220    - continue Lantus 10U daily   - medium SSI   - continue metformin resumed today 5/10     Hyperlipidemia   - Continue home simvastatin.        Diet: High Consistent Carb (75 g CHO per Meal) Diet    DVT Prophylaxis: initially admitted on heparin, stopped 5/5 due to anemia   Polo Catheter: Not present  Code Status: Full Code    Lines: VIRGINIA ASKEW MD   Pg [phone]        ROS:  As described in A/P and Exam.  Otherwise ALL are  negative.    PHYSICAL EXAM:  All vitals have been reviewed    Blood pressure 128/74, pulse 69, temperature 98.9  F (37.2  C), temperature source Oral, resp. rate 16, height 1.765 m (5' 9.5\"), weight 60.7 kg (133 lb 13.1 oz), SpO2 99 " %.    No intake/output data recorded.    GENERAL APPEARANCE: healthy, alert and no distress  EYES: conjunctiva clear, eyes grossly normal  HENT: external ears and nose normal   RESP: lungs clear to auscultation - no rales, rhonchi or wheezes  CV: regular rate and rhythm, normal S1 S2, no S3 or S4 and no murmur, click or rub   ABDOMEN: soft, nontender, no HSM or masses and bowel sounds normal  MS: no clubbing, cyanosis; no edema-foot in dressing/ soft boot  SKIN: clear without significant rashes or lesions  NEURO: -non-focal moves all 4 extr    ROUTINE  LABS (Last four results)  CMP  Recent Labs   Lab 05/11/23  1132 05/11/23  0739 05/11/23  0538 05/11/23  0207 05/09/23  0804 05/09/23  0645 05/08/23  0745 05/08/23  0440 05/07/23  0821 05/07/23  0425   NA  --   --  136  --   --  136  --  132*  --  135*   POTASSIUM  --   --  4.3  --   --  4.4  --  4.2  --  4.4   CHLORIDE  --   --  101  --   --  101  --  99  --  102   CO2  --   --  27  --   --  27  --  27  --  25   ANIONGAP  --   --  8  --   --  8  --  6*  --  8   * 144* 160* 148*   < > 252*   < > 275*   < > 240*   BUN  --   --  10.1  --   --  13.4  --  15.8  --  15.5   CR  --   --  0.95  --   --  1.00  --  1.03  --  1.10   GFRESTIMATED  --   --  83  --   --  78  --  76  --  70   BEST  --   --  8.4*  --   --  8.3*  --  8.2*  --  8.1*    < > = values in this interval not displayed.     CBC  Recent Labs   Lab 05/11/23  0538 05/09/23  0645 05/08/23  0440 05/07/23  0425   WBC 14.6* 17.9* 16.0* 17.8*   RBC 3.27* 3.26* 3.09* 3.25*   HGB 8.2* 8.3* 7.9* 8.2*   HCT 27.0* 26.7* 25.2* 26.6*   MCV 83 82 82 82   MCH 25.1* 25.5* 25.6* 25.2*   MCHC 30.4* 31.1* 31.3* 30.8*   RDW 18.6* 18.5* 18.6* 18.6*   * 535* 473* 533*     INRNo lab results found in last 7 days.  Arterial Blood GasNo lab results found in last 7 days.    Recent Results (from the past 24 hour(s))   US Lower Extremity Arterial Duplex Left    Narrative    ARTERIAL DUPLEX ULTRASOUND LEFT LOWER EXTREMITY   5/11/2023 11:43 AM    HISTORY:  77-year-old patient with gangrene of the left foot.    COMPARISON: None.    TECHNIQUE: Color Doppler and spectral waveform analysis performed  throughout the arteries of the left lower extremity.    FINDINGS: The left common femoral artery is 149 cm/sec with the  diameter of 7.3 mm. Profunda femoral artery is 86 cm/sec and diameter  of 3.4 mm.    The proximal SFA is 135/15 cm/sec, 5.6 mm and triphasic. Mid SFA is  103/20 cm/sec, 5 mm, triphasic. Distal SFA is 133/23 cm/sec, 4.5 mm,  and biphasic waveform. Diffuse atherosclerotic calcification  throughout all arteries, though especially in distal SFA with areas of  shadowing. Velocity elevation in the popliteal artery measuring 320/31  cm/sec with biphasic/triphasic waveform. Unable to visualize the  peroneal artery. Anterior tibial artery is patent with velocity of  156/26 cm/sec, with monophasic waveform. Trickle of blood flow in the  distal posterior tibial artery.      Impression    IMPRESSION:  1. Peripheral arterial disease is confirmed with scattered areas of  atherosclerotic calcification, in some areas obscuring underlying  lumen.  2. Definite focal velocity elevation in the popliteal artery, though  suspect arterial insufficiency localized to the distal SFA/popliteal  arterial distribution. Uncertain if severe stenoses and/or occlusion.  3. Unable to demonstrate blood flow in the peroneal artery.  4. Dominant run off arteries anterior tibial artery, though waveform  is monophasic. Trickle of blood flow in the distal posterior tibial  artery.    DANI TUCKER MD         SYSTEM ID:  [id number]

## 2023-05-12 ENCOUNTER — APPOINTMENT (OUTPATIENT)
Dept: ULTRASOUND IMAGING | Facility: CLINIC | Age: 78
DRG: 855 | End: 2023-05-12
Attending: INTERNAL MEDICINE
Payer: COMMERCIAL

## 2023-05-12 VITALS
HEART RATE: 71 BPM | SYSTOLIC BLOOD PRESSURE: 121 MMHG | TEMPERATURE: 98.4 F | RESPIRATION RATE: 18 BRPM | DIASTOLIC BLOOD PRESSURE: 63 MMHG | BODY MASS INDEX: 19.16 KG/M2 | HEIGHT: 70 IN | WEIGHT: 133.82 LBS | OXYGEN SATURATION: 100 %

## 2023-05-12 LAB
ANION GAP SERPL CALCULATED.3IONS-SCNC: 6 MMOL/L (ref 7–15)
BACTERIA TISS BX CULT: ABNORMAL
BACTERIA TISS BX CULT: ABNORMAL
BASOPHILS # BLD AUTO: 0 10E3/UL (ref 0–0.2)
BASOPHILS NFR BLD AUTO: 0 %
BUN SERPL-MCNC: 11.2 MG/DL (ref 8–23)
CALCIUM SERPL-MCNC: 8.7 MG/DL (ref 8.8–10.2)
CHLORIDE SERPL-SCNC: 101 MMOL/L (ref 98–107)
CREAT SERPL-MCNC: 0.85 MG/DL (ref 0.67–1.17)
DEPRECATED HCO3 PLAS-SCNC: 28 MMOL/L (ref 22–29)
EOSINOPHIL # BLD AUTO: 0.3 10E3/UL (ref 0–0.7)
EOSINOPHIL NFR BLD AUTO: 2 %
ERYTHROCYTE [DISTWIDTH] IN BLOOD BY AUTOMATED COUNT: 18.1 % (ref 10–15)
GFR SERPL CREATININE-BSD FRML MDRD: >90 ML/MIN/1.73M2
GLUCOSE BLDC GLUCOMTR-MCNC: 143 MG/DL (ref 70–99)
GLUCOSE BLDC GLUCOMTR-MCNC: 172 MG/DL (ref 70–99)
GLUCOSE BLDC GLUCOMTR-MCNC: 186 MG/DL (ref 70–99)
GLUCOSE BLDC GLUCOMTR-MCNC: 211 MG/DL (ref 70–99)
GLUCOSE BLDC GLUCOMTR-MCNC: 212 MG/DL (ref 70–99)
GLUCOSE SERPL-MCNC: 235 MG/DL (ref 70–99)
HCT VFR BLD AUTO: 27.6 % (ref 40–53)
HGB BLD-MCNC: 8.5 G/DL (ref 13.3–17.7)
IMM GRANULOCYTES # BLD: 0.1 10E3/UL
IMM GRANULOCYTES NFR BLD: 1 %
LYMPHOCYTES # BLD AUTO: 1.5 10E3/UL (ref 0.8–5.3)
LYMPHOCYTES NFR BLD AUTO: 12 %
MCH RBC QN AUTO: 25.1 PG (ref 26.5–33)
MCHC RBC AUTO-ENTMCNC: 30.8 G/DL (ref 31.5–36.5)
MCV RBC AUTO: 82 FL (ref 78–100)
MONOCYTES # BLD AUTO: 0.7 10E3/UL (ref 0–1.3)
MONOCYTES NFR BLD AUTO: 5 %
NEUTROPHILS # BLD AUTO: 9.9 10E3/UL (ref 1.6–8.3)
NEUTROPHILS NFR BLD AUTO: 80 %
NRBC # BLD AUTO: 0 10E3/UL
NRBC BLD AUTO-RTO: 0 /100
PLATELET # BLD AUTO: 559 10E3/UL (ref 150–450)
POTASSIUM SERPL-SCNC: 4.4 MMOL/L (ref 3.4–5.3)
RBC # BLD AUTO: 3.38 10E6/UL (ref 4.4–5.9)
SODIUM SERPL-SCNC: 135 MMOL/L (ref 136–145)
WBC # BLD AUTO: 12.5 10E3/UL (ref 4–11)

## 2023-05-12 PROCEDURE — 99207 PR NO BILLABLE SERVICE THIS VISIT: CPT | Performed by: INTERNAL MEDICINE

## 2023-05-12 PROCEDURE — 250N000013 HC RX MED GY IP 250 OP 250 PS 637: Performed by: INTERNAL MEDICINE

## 2023-05-12 PROCEDURE — 93922 UPR/L XTREMITY ART 2 LEVELS: CPT

## 2023-05-12 PROCEDURE — 99232 SBSQ HOSP IP/OBS MODERATE 35: CPT | Performed by: INTERNAL MEDICINE

## 2023-05-12 PROCEDURE — 250N000013 HC RX MED GY IP 250 OP 250 PS 637: Performed by: PODIATRIST

## 2023-05-12 PROCEDURE — 85025 COMPLETE CBC W/AUTO DIFF WBC: CPT | Performed by: INTERNAL MEDICINE

## 2023-05-12 PROCEDURE — 82310 ASSAY OF CALCIUM: CPT | Performed by: INTERNAL MEDICINE

## 2023-05-12 RX ADMIN — GABAPENTIN 300 MG: 300 CAPSULE ORAL at 20:07

## 2023-05-12 RX ADMIN — ACETAMINOPHEN 650 MG: 325 TABLET ORAL at 20:07

## 2023-05-12 RX ADMIN — SIMVASTATIN 40 MG: 40 TABLET, FILM COATED ORAL at 21:42

## 2023-05-12 RX ADMIN — GABAPENTIN 300 MG: 300 CAPSULE ORAL at 13:10

## 2023-05-12 RX ADMIN — GABAPENTIN 300 MG: 300 CAPSULE ORAL at 07:51

## 2023-05-12 RX ADMIN — LATANOPROST 1 DROP: 50 SOLUTION OPHTHALMIC at 21:46

## 2023-05-12 RX ADMIN — ACETAMINOPHEN 650 MG: 325 TABLET ORAL at 14:04

## 2023-05-12 RX ADMIN — AMOXICILLIN AND CLAVULANATE POTASSIUM 1 TABLET: 875; 125 TABLET, FILM COATED ORAL at 07:51

## 2023-05-12 RX ADMIN — METFORMIN HYDROCHLORIDE 500 MG: 500 TABLET, FILM COATED ORAL at 07:51

## 2023-05-12 RX ADMIN — METFORMIN HYDROCHLORIDE 500 MG: 500 TABLET, FILM COATED ORAL at 17:36

## 2023-05-12 RX ADMIN — AMOXICILLIN AND CLAVULANATE POTASSIUM 1 TABLET: 875; 125 TABLET, FILM COATED ORAL at 20:08

## 2023-05-12 ASSESSMENT — ACTIVITIES OF DAILY LIVING (ADL)
ADLS_ACUITY_SCORE: 42
ADLS_ACUITY_SCORE: 36
ADLS_ACUITY_SCORE: 42
ADLS_ACUITY_SCORE: 36
ADLS_ACUITY_SCORE: 36
ADLS_ACUITY_SCORE: 42
ADLS_ACUITY_SCORE: 36

## 2023-05-12 NOTE — PROGRESS NOTES
I was called earlier by Dr. Morfin.  I was actually scrubbed in the operating room at the time.  I have been able to review the patient's chart as well as noninvasive arterial imaging and pictures of his left heel.  I am really quite concerned about significant arterial insufficiency contributing to the nonhealing wound in the left foot.    It would be in the best interest of the patient that the patient be transferred to [facility] and be evaluated by the vascular surgery team for further treatment including revascularization for limb salvage.    Please call us back [phone] so we can initiate transfer to [facility] for further evaluation and treatment by the vascular surgery team.

## 2023-05-12 NOTE — PROGRESS NOTES
Care Management Follow Up    Length of Stay (days): 8  Expected Discharge Date: 5/14/23   Concerns to be Addressed: discharge planning     Patient plan of care discussed at interdisciplinary rounds: Yes  Anticipated Discharge Disposition: Home, Home Care, Transitional Care  Anticipated Discharge Services: None  Patient/family educated on Medicare website which has current facility and service quality ratings: no  Education Provided on the Discharge Plan: N/A  Patient/Family in Agreement with the Plan: N/A  Private pay costs discussed: Not applicable    Additional Information:    Per the discussion in Interdisciplinary Rounds, Pt will need to transfer for a Vascular Surgery Consult.    Plan:  Transfer      Selam Chowdary RN

## 2023-05-12 NOTE — PROGRESS NOTES
Updated wound care orders r/t PAD in left foot (switched to povidone-iodine soaked gauze). Spoke with Jakob Irizarry RN about updated wound cares to be performed. He voiced understanding, hasn't yet done wound cares today.    Laureen King RN, CWOCN  [phone]

## 2023-05-12 NOTE — PROGRESS NOTES
[facility]    Hospitalist Progress Note    Date of Service (when I saw the patient): 05/12/2023    Assessment & Plan   Roddy Sidhu is a 77 year old male currently in TCU following hospitalization for fall with multiple fractures who presented on 5/4/2023 after being referred by TCU provider for evaluation of worsening left diabetic heel infection    Infected diabetic pressure ulcer of left heel associated with type 2 diabetes mellitus with diabetic neuropathy  Additional diabetic ulcer of right heel without infection  S/p debridement of left pressure ulcer 5/9/2023  Pressure injury to heels noted in TCU and seen by wound care 4/17/2023.  The patient recently was diagnosed with osteomyelitis based on an x-ray, but this is not supported by the MRI performed this admission. ABIs done at podiatry's request and were normal.  -Piperacillin-tazobactam and vancomycin IV were started in the emergency department  -Cultures from wound positive for 2+ Proteus, 3+ Enterococcus, 1+ MSSA.   -Changed piperacillin-tazobactam to ampicillin-sulbactam 5/8 to cover Proteus and Enterococcus and MSSA. Stopped vancomycin 5/8 (5 days) given no MRSA.   -Debridement by Dr. Flores on 5/9.  -Changed ampicillin-sulbactam IV to amoxicillin-clavulanate oral 5/10  -CRP  156 on 5/2 --now 123   -Continue augmentin. Continue wound care  -Discussed MARKY results with vascular surgery, who recommend toe pressures.     Sepsis with acute organ dysfunction without septic shock, due to unspecified organism, unspecified type (H)  -Resolved in the emergency department       Acute on chronic anemia  Hemoglobin was normal 6 months ago, was 8's during recent admission, then 8.2 5/4 and dropped to 6.7-6.9 AM of 5/5.  No black or bloody stools.  No abdominal pain.   No other apparent source of acute blood loss. Guiac negative.  Iron studies suggestive of anemia of chronic inflammation.  Gave 1u RBCs AM 5/5.  Started on protonix twice  daily 5/5 empirically.    -Held aspirin - appears to be on this just due to diabetes - recommend reassessing at follow-up with primary care provider if this is really needed.     -Stopped celebrex 100  Mg twice daily that had been started on admission, was on naprosyn prn at home.  -Hemoglobin overall stable since transfusion. Stopped PPI  5/10 no symptoms, no active bleeding, not iron-deficiency anemia     Closed displaced subtrochanteric fracture of right femur 3/29  S/p ORIF right femur fracture with intermedullary nail 3/29     Closed fracture of proximal end of right humerus 3/29   Closed nondisplaced fracture of right clavicle 3/29   Hospitalized [facility] 3/29 - 4/4/2023 for trauma due to ground level fall. Discharged to TCU for further rehab.   -Has tylenol prn and oxycodone if needed while here.  Stopped celebrex as above.        Type 2 diabetes mellitus with diabetic polyneuropathy, with long-term current use of insulin (H)  HbA1c 8.3 on 5/2 on metformin and lantus 10 U daily at home. Primary care intended to start Trulicity but he has not begun this medication yet  -Held home metformin, continued Lantus 10. Poor control. Increased Lantus, increased SSI. BG improved 5/9. Changed Lantus back to 10 units daily 5/9.   -220    - continue Lantus 10U daily   - medium SSI   - continue metformin resumed today 5/10     Hyperlipidemia   - Continue home simvastatin.      Diet: High Consistent Carb (75 g CHO per Meal) Diet    DVT Prophylaxis: initially admitted on heparin, stopped 5/5 due to anemia   Polo Catheter: Not present  Code Status: Full Code    Lines: PIV    Disposition: Expected discharge .    Vinod Morfin MD    Interval History   Patient resting comfortably in bed.  He has no acute complaints    -Data reviewed today: I reviewed all new labs and imaging results over the last 24 hours. I personally reviewed no images or EKG's today.    Physical Exam   Temp: 98.4  F (36.9  C) Temp src:  Oral BP: 121/63 Pulse: 71   Resp: 18 SpO2: 100 % O2 Device: None (Room air)    Vitals:    05/04/23 1305 05/04/23 2030 05/07/23 0535   Weight: 59 kg (130 lb) 59.9 kg (132 lb 0.9 oz) 60.7 kg (133 lb 13.1 oz)     Vital Signs with Ranges  Temp:  [97.8  F (36.6  C)-98.6  F (37  C)] 98.4  F (36.9  C)  Pulse:  [65-71] 71  Resp:  [14-18] 18  BP: (121-195)/(63-94) 121/63  SpO2:  [99 %-100 %] 100 %  I/O last 3 completed shifts:  In: 300 [P.O.:300]  Out: 2190 [Urine:2190]    Gen: Thin disheveled elderly male, alert and oriented x 3, no acute distressed  HEENT: Atraumatic, normocephalic; sclera non-injected, anicterric; oral mucosa moist, no lesion, no exudate  Lungs: Clear to ausculation, no wheezes, no rhonchi, no rales  Heart: Regular rate, regular rhythm, no gallops, no rubs, no murmurs  GI: Bowel sound normal, no hepatosplenomegaly, no masses, non-tender, non-distended, no guarding, no rebound tenderness  Lymph: No lymphadenopathy, 1+ BLE edema  Skin: BLE chronic venous stasis     Medications       amoxicillin-clavulanate  1 tablet Oral Q12H JESSICA (08/20)     gabapentin  100 mg Oral Pre-Op/Pre-procedure x 1 dose     gabapentin  300 mg Oral TID     insulin aspart  1-7 Units Subcutaneous TID AC     insulin aspart  1-5 Units Subcutaneous At Bedtime     insulin glargine  10 Units Subcutaneous QAM     iopamidol (ISOVUE-370)  90 mL Intravenous Once     latanoprost  1 drop Both Eyes At Bedtime     metFORMIN  500 mg Oral BID w/meals     simvastatin  40 mg Oral At Bedtime     sodium chloride (PF)  3 mL Intracatheter Q8H     sodium chloride 0.9 %  100 mL Intravenous Once       Data   Recent Labs   Lab 05/12/23  1208 05/12/23  0738 05/12/23  0528 05/11/23  0739 05/11/23  0538 05/09/23  0804 05/09/23  0645   WBC  --   --  12.5*  --  14.6*  --  17.9*   HGB  --   --  8.5*  --  8.2*  --  8.3*   MCV  --   --  82  --  83  --  82   PLT  --   --  559*  --  554*  --  535*   NA  --   --  135*  --  136  --  136   POTASSIUM  --   --  4.4  --  4.3   --  4.4   CHLORIDE  --   --  101  --  101  --  101   CO2  --   --  28  --  27  --  27   BUN  --   --  11.2  --  10.1  --  13.4   CR  --   --  0.85  --  0.95  --  1.00   ANIONGAP  --   --  6*  --  8  --  8   BEST  --   --  8.7*  --  8.4*  --  8.3*   * 211* 235*   < > 160*   < > 252*    < > = values in this interval not displayed.       No results found for this or any previous visit (from the past 24 hour(s)).

## 2023-05-12 NOTE — PROGRESS NOTES
Pt has been in bed last 4 hours. Sleeping most of the time. Awaken for meds, did request prn Tylenol for foot pain, appeared asleep a short time later. Told in report will be transferred in the morning for possible surgery. Instructed pt to be npo after midnight until confirmed. Has no other new concerns.

## 2023-05-12 NOTE — PROGRESS NOTES
Pt has been alert and pleasant all of shift. Was clarified that he will not be having a procedure done today. Insulin given and was given a lunch tray. Has not requested any prns thus far. Awaiting a Doppler of lower extremities and the transfer for vascular consult..  Had a watery BM this a.m., New wound care orders received. No other new concerns at this time.

## 2023-05-13 ENCOUNTER — HOSPITAL ENCOUNTER (INPATIENT)
Facility: CLINIC | Age: 78
LOS: 11 days | Discharge: NURSING, REHAB OR LONG-TERM CARE | DRG: 616 | End: 2023-05-24
Attending: INTERNAL MEDICINE | Admitting: INTERNAL MEDICINE
Payer: COMMERCIAL

## 2023-05-13 DIAGNOSIS — I73.9 PERIPHERAL ARTERY DISEASE (H): ICD-10-CM

## 2023-05-13 DIAGNOSIS — E11.621 DIABETIC ULCER OF RIGHT HEEL ASSOCIATED WITH TYPE 2 DIABETES MELLITUS, UNSPECIFIED ULCER STAGE (H): ICD-10-CM

## 2023-05-13 DIAGNOSIS — E11.621 DIABETIC ULCER OF LEFT HEEL ASSOCIATED WITH TYPE 2 DIABETES MELLITUS, WITH NECROSIS OF BONE (H): Primary | ICD-10-CM

## 2023-05-13 DIAGNOSIS — L97.419 DIABETIC ULCER OF RIGHT HEEL ASSOCIATED WITH TYPE 2 DIABETES MELLITUS, UNSPECIFIED ULCER STAGE (H): ICD-10-CM

## 2023-05-13 DIAGNOSIS — L97.424 DIABETIC ULCER OF LEFT HEEL ASSOCIATED WITH TYPE 2 DIABETES MELLITUS, WITH NECROSIS OF BONE (H): Primary | ICD-10-CM

## 2023-05-13 LAB
ANION GAP SERPL CALCULATED.3IONS-SCNC: 11 MMOL/L (ref 7–15)
BUN SERPL-MCNC: 10.4 MG/DL (ref 8–23)
CALCIUM SERPL-MCNC: 8.5 MG/DL (ref 8.8–10.2)
CHLORIDE SERPL-SCNC: 100 MMOL/L (ref 98–107)
CREAT SERPL-MCNC: 0.79 MG/DL (ref 0.67–1.17)
DEPRECATED HCO3 PLAS-SCNC: 26 MMOL/L (ref 22–29)
ERYTHROCYTE [DISTWIDTH] IN BLOOD BY AUTOMATED COUNT: 17.8 % (ref 10–15)
GFR SERPL CREATININE-BSD FRML MDRD: >90 ML/MIN/1.73M2
GLUCOSE BLDC GLUCOMTR-MCNC: 151 MG/DL (ref 70–99)
GLUCOSE BLDC GLUCOMTR-MCNC: 168 MG/DL (ref 70–99)
GLUCOSE BLDC GLUCOMTR-MCNC: 192 MG/DL (ref 70–99)
GLUCOSE BLDC GLUCOMTR-MCNC: 224 MG/DL (ref 70–99)
GLUCOSE BLDC GLUCOMTR-MCNC: 241 MG/DL (ref 70–99)
GLUCOSE SERPL-MCNC: 194 MG/DL (ref 70–99)
HCT VFR BLD AUTO: 27.5 % (ref 40–53)
HGB BLD-MCNC: 8.5 G/DL (ref 13.3–17.7)
MCH RBC QN AUTO: 25 PG (ref 26.5–33)
MCHC RBC AUTO-ENTMCNC: 30.9 G/DL (ref 31.5–36.5)
MCV RBC AUTO: 81 FL (ref 78–100)
PLATELET # BLD AUTO: 613 10E3/UL (ref 150–450)
POTASSIUM SERPL-SCNC: 4.5 MMOL/L (ref 3.4–5.3)
RBC # BLD AUTO: 3.4 10E6/UL (ref 4.4–5.9)
SODIUM SERPL-SCNC: 137 MMOL/L (ref 136–145)
WBC # BLD AUTO: 13.3 10E3/UL (ref 4–11)

## 2023-05-13 PROCEDURE — 120N000001 HC R&B MED SURG/OB

## 2023-05-13 PROCEDURE — 85027 COMPLETE CBC AUTOMATED: CPT | Performed by: INTERNAL MEDICINE

## 2023-05-13 PROCEDURE — 99222 1ST HOSP IP/OBS MODERATE 55: CPT | Performed by: SURGERY

## 2023-05-13 PROCEDURE — 999N000040 HC STATISTIC CONSULT NO CHARGE VASC ACCESS

## 2023-05-13 PROCEDURE — 250N000013 HC RX MED GY IP 250 OP 250 PS 637: Performed by: INTERNAL MEDICINE

## 2023-05-13 PROCEDURE — 250N000012 HC RX MED GY IP 250 OP 636 PS 637: Performed by: INTERNAL MEDICINE

## 2023-05-13 PROCEDURE — 99222 1ST HOSP IP/OBS MODERATE 55: CPT | Performed by: INTERNAL MEDICINE

## 2023-05-13 PROCEDURE — 82310 ASSAY OF CALCIUM: CPT | Performed by: INTERNAL MEDICINE

## 2023-05-13 RX ORDER — DEXTROSE MONOHYDRATE 25 G/50ML
25-50 INJECTION, SOLUTION INTRAVENOUS
Status: DISCONTINUED | OUTPATIENT
Start: 2023-05-13 | End: 2023-05-19

## 2023-05-13 RX ORDER — SIMVASTATIN 40 MG
40 TABLET ORAL AT BEDTIME
Status: DISCONTINUED | OUTPATIENT
Start: 2023-05-13 | End: 2023-05-24 | Stop reason: HOSPADM

## 2023-05-13 RX ORDER — ONDANSETRON 2 MG/ML
4 INJECTION INTRAMUSCULAR; INTRAVENOUS EVERY 6 HOURS PRN
Status: DISCONTINUED | OUTPATIENT
Start: 2023-05-13 | End: 2023-05-24 | Stop reason: HOSPADM

## 2023-05-13 RX ORDER — LATANOPROST 50 UG/ML
1 SOLUTION/ DROPS OPHTHALMIC AT BEDTIME
Status: DISCONTINUED | OUTPATIENT
Start: 2023-05-13 | End: 2023-05-24 | Stop reason: HOSPADM

## 2023-05-13 RX ORDER — GABAPENTIN 300 MG/1
300 CAPSULE ORAL 3 TIMES DAILY
Status: DISCONTINUED | OUTPATIENT
Start: 2023-05-13 | End: 2023-05-24 | Stop reason: HOSPADM

## 2023-05-13 RX ORDER — LIDOCAINE 40 MG/G
CREAM TOPICAL
Status: DISCONTINUED | OUTPATIENT
Start: 2023-05-13 | End: 2023-05-20

## 2023-05-13 RX ORDER — ASPIRIN 325 MG
325 TABLET, DELAYED RELEASE (ENTERIC COATED) ORAL DAILY
Status: DISCONTINUED | OUTPATIENT
Start: 2023-05-14 | End: 2023-05-20

## 2023-05-13 RX ORDER — ONDANSETRON 4 MG/1
4 TABLET, ORALLY DISINTEGRATING ORAL EVERY 6 HOURS PRN
Status: DISCONTINUED | OUTPATIENT
Start: 2023-05-13 | End: 2023-05-24 | Stop reason: HOSPADM

## 2023-05-13 RX ORDER — NICOTINE POLACRILEX 4 MG
15-30 LOZENGE BUCCAL
Status: DISCONTINUED | OUTPATIENT
Start: 2023-05-13 | End: 2023-05-19

## 2023-05-13 RX ORDER — CHOLECALCIFEROL (VITAMIN D3) 50 MCG
50 TABLET ORAL DAILY
Status: DISCONTINUED | OUTPATIENT
Start: 2023-05-13 | End: 2023-05-24 | Stop reason: HOSPADM

## 2023-05-13 RX ADMIN — SIMVASTATIN 40 MG: 40 TABLET, FILM COATED ORAL at 21:11

## 2023-05-13 RX ADMIN — INSULIN GLARGINE 10 UNITS: 100 INJECTION, SOLUTION SUBCUTANEOUS at 11:11

## 2023-05-13 RX ADMIN — LATANOPROST 1 DROP: 50 SOLUTION/ DROPS OPHTHALMIC at 03:54

## 2023-05-13 RX ADMIN — AMOXICILLIN AND CLAVULANATE POTASSIUM 1 TABLET: 875; 125 TABLET, FILM COATED ORAL at 09:50

## 2023-05-13 RX ADMIN — AMOXICILLIN AND CLAVULANATE POTASSIUM 1 TABLET: 875; 125 TABLET, FILM COATED ORAL at 22:18

## 2023-05-13 RX ADMIN — Medication 50 MCG: at 08:31

## 2023-05-13 RX ADMIN — LATANOPROST 1 DROP: 50 SOLUTION/ DROPS OPHTHALMIC at 21:27

## 2023-05-13 RX ADMIN — GABAPENTIN 300 MG: 300 CAPSULE ORAL at 16:19

## 2023-05-13 RX ADMIN — GABAPENTIN 300 MG: 300 CAPSULE ORAL at 08:31

## 2023-05-13 RX ADMIN — GABAPENTIN 300 MG: 300 CAPSULE ORAL at 21:11

## 2023-05-13 ASSESSMENT — ACTIVITIES OF DAILY LIVING (ADL)
ADLS_ACUITY_SCORE: 43
ADLS_ACUITY_SCORE: 35
ADLS_ACUITY_SCORE: 43
ADLS_ACUITY_SCORE: 41
ADLS_ACUITY_SCORE: 43
ADLS_ACUITY_SCORE: 47

## 2023-05-13 NOTE — PLAN OF CARE
Pt here with infected diabetic ulcer of the L heel wound. A&Ox4. [place]. Numbness in all extremities; lower pulses are weak bilaterally; L heel is wrapped. VSS. Constant carb diet. On bed rest with Q2 repositioning. Intermittently incontenent of bowel and bladder; urinal at bedside. Denies pain. Vascular consult today scheduled angiogram for 5/15. WOC consult ordered.

## 2023-05-13 NOTE — CONSULTS
Roddy Sidhu MRN# [id number]   YOB: 1945 Age: 77 year old      Date of Admission:  5/13/2023        Consult for:    Consulting physician/team: [facility]        Assessment:    77 year old male with  PMH of DM II with neuropathy who presents for management of infected L heel diabetic ulcer as well as R heel ulcer.         Plan:        His non invasives show only mild to moderate PAD on L and he likely has patent in line flow to foot via the AT  but given location of his ulcer (heel ulcer which is notoriously difficult to heal and is in a  posterior tibial angiosome) and poor flow in L PT will plan on performing an angiogram to ensure he has optimal circulation to heal it .    Recommend Podiatry consult     Plan for LLE angiogram on 5/13.          History of Present Illness:    Roddy Sidhu is a 77 year old male with  PMH of DM II with neuropathy who presents for management of infected L heel diabetic ulcer as well as R heel ulcer.     Non invasive workup show toe pressures of 111 on R and 99 on left with normal PPG waveforms. Duplex show multiphasic flow in L CFA, SFA an dpop with elevated velocities in L pop and monophasic AT runoff    Denies any issues with contrast in past     Past Medical History:  No past medical history on file.    Past Surgical History:  Past Surgical History:   Procedure Laterality Date     INCISION AND DRAINAGE FOOT, COMBINED Left 5/9/2023    Procedure: Incision and Debidement of Pressure Wound, Left Heel;  Surgeon: Paul Flores DPM;  Location: [facility] OR     SURGICAL HISTORY OF -   1990    removal of steel from left eye       Allergies:   No Known Allergies    Medications:  No current facility-administered medications on file prior to encounter.  aspirin (ASA) 325 MG EC tablet, Take 325 mg by mouth daily  ciprofloxacin (CIPRO) 500 MG tablet, Take 1 tablet (500 mg) by mouth 2 times daily for 10 days  gabapentin (NEURONTIN) 300 MG capsule, Take 1 capsule (300 mg)  by mouth 3 times daily  insulin glargine (LANTUS PEN) 100 UNIT/ML pen, Inject 10 Units Subcutaneous every morning  latanoprost (XALATAN) 0.005 % ophthalmic solution, Place 1 drop into both eyes At Bedtime  metFORMIN (GLUCOPHAGE) 500 MG tablet, Take 1 tablet (500 mg) by mouth 2 times daily (with meals)  naproxen sodium (ANAPROX) 220 MG tablet, Take 220 mg by mouth 2 times daily (with meals)  simvastatin (ZOCOR) 40 MG tablet, TAKE ONE TABLET BY MOUTH EVERY NIGHT AT BEDTIME  vitamin D3 (CHOLECALCIFEROL) 50 mcg (2000 units) tablet, Take 1 tablet by mouth daily  blood glucose (NO BRAND SPECIFIED) lancets standard, Use to test blood sugar 1 times daily or as directed.  blood glucose (NO BRAND SPECIFIED) test strip, Use to test blood sugar 4 times daily or as directed.  blood glucose calibration (NO BRAND SPECIFIED) solution, Use to calibrate blood glucose monitor as needed as directed.  blood glucose monitoring (NO BRAND SPECIFIED) meter device kit, Use to test blood sugar 1 times daily or as directed.  dulaglutide (TRULICITY) 0.75 MG/0.5ML pen, Inject 0.75 mg Subcutaneous every 7 days  insulin pen needle (B-D U/F) 31G X 5 MM miscellaneous, Use 4 pen needles daily or as directed.        Social History:  Social History     Socioeconomic History     Marital status:      Spouse name: Not on file     Number of children: Not on file     Years of education: Not on file     Highest education level: Not on file   Occupational History     Not on file   Tobacco Use     Smoking status: Some Days     Types: Pipe     Smokeless tobacco: Never   Vaping Use     Vaping status: Never Used   Substance and Sexual Activity     Alcohol use: Not Currently     Drug use: No     Sexual activity: Not Currently   Other Topics Concern     Parent/sibling w/ CABG, MI or angioplasty before 65F 55M? Yes   Social History Narrative     Not on file     Social Determinants of Health     Financial Resource Strain: Not on file   Food Insecurity: Not on  file   Transportation Needs: Not on file   Physical Activity: Not on file   Stress: Not on file   Social Connections: Not on file   Intimate Partner Violence: Not on file   Housing Stability: Not on file       Family History:  Family History   Problem Relation Age of Onset     Breast Cancer Mother 60     C.A.D. Brother 58        MI       ROS:  The remainder of the complete ROS was negative unless noted in the HPI.    Exam:  /72 (BP Location: Right arm)   Pulse 65   Temp 97.8  F (36.6  C) (Oral)   Resp 18   SpO2 100%   General:  Alert and oriented with appropriate responses to questions, in NAD.  HEENT: NC/AT, sclera anicteric, PERRL, EOMI, OP clear with MMM  Neck: Supple, no JVD or cervical LAD, full aROM.  Resp: clear to auscultation bilaterally, no crackles or wheezes  Cardiac: regular rate and rhythm, no murmur.  Abdomen: Soft, nontender, nondistended. No rebound or guarding.  Extremities: No LE edema, 5/5 strength, 2+R DP pulse, weaker L DP palpable , L heel ulcer. Palpable bilateral femoral  Pulses.    Skin: Warm and dry, no jaundice or rash  Neuro: Cn II-XII intact, moves all extremities equally    Labs:  Most Recent CBC:   Recent Labs   Lab Test 05/13/23  0618   WBC 13.3*   RBC 3.40*   HGB 8.5*   HCT 27.5*   MCV 81   MCH 25.0*   MCHC 30.9*   RDW 17.8*   *     Most Recent BMP:   Recent Labs   Lab Test 05/13/23  0618      POTASSIUM 4.5   CHLORIDE 100   CO2 26   BUN 10.4   CR 0.79   *         Imaging:  Recent Results (from the past 24 hour(s))   US Lower Extremity PPG    Narrative    US LOWER EXTREMITY PPG 5/12/2023 4:29 PM    HISTORY: 77-year-old patient with request made for evaluation of toe  brachial indices.    COMPARISON: None    FINDINGS: Right brachial pressure is 120, right digital pressure is  111 for an index of 0.93.    Left digital pressure is 99 for an index of 0.83. Both digital  waveforms appear to have intact amplitude.      Impression    IMPRESSION: Normal toe  brachial indices bilaterally.    DANI TUCKER MD         SYSTEM ID:  [id number]       Assessment/ Plan: See above.     Tricia Cherry MD     Fellow  Pager: [phone]    Pt reviewed with Dr. Haile    Vascular surgery attending staff note: I have seen and examined the patient myself.  I agree with the documentation by our fellow, Dr. Cherry.  Patient is a 77-year-old male who recently had a hip fracture and developed a pressure ulcer on his left heel.  There is significant soft tissue necrosis and exposure of the calcaneus bone.  We will proceed with left lower extremity arteriogram for further evaluation of his arterial circulation and determine the salvageability of his left lower extremity.    BETHANY HAILE M.D.

## 2023-05-13 NOTE — H&P
[facility]    History and Physical - Hospitalist Service       Date of Admission:  5/13/2023    Assessment & Plan     Roddy Sidhu is a 77 year old male transferred from [facility] with left heel wound with poor healing and concern for vascular compromise.  He has hx of DM2 with polyneuropathy; recent hospitalization at [facility] (3/29-4/4) for fall with R femur fx, R humerus fx, R clavicle fx s/p R femur ORIF who was d/c'ed to TCU. Presented from TCU to [facility] 5/4 with infected left heel ulcer with sepsis. Treated wtih broad IV abx. Wound debrided 5/9.  Abx changed to PO Augmentin 5/10. However, concern for PAD and LLE MARKY abnormal suggesting signif PAD. Vascular Surgery contacted and reviewed and recommended transfer for further vascular evaluation.      1. Diabetic left heel ulcer - non healing  -- admit to inpatient as transfer from outside hospital for higher level care  -- vascular surgery consultation  -- plan for LLE angiogram  -- continue augmentin as per plan from transferring hospital  -- noted polymicrobial infection proteus, enterococcus and MSSA    2. DM with diabetic neuropathy  -- continue lantus 10 units q AM  -- continue sliding scale insulin and mod CHO diet  -- continue neurontin.   -- hold metformin and trulicity    3. PAD  -- continue asa  -- continue statin  -- await angiogram    4. Recent fall with right humerus, right femur and right clavicle fracture  Hx of IM nailing of proximal humerus  -- was at TCU prior to transfer  -- continue PT/OT  -- likely will need TCU for off loading from feet and wound care    5. HLP  -- continue statin     6. Tobacco dependence  -- daily pipe smokier  -- smoking cessation consultation  -- nicotine patch    Diet: Moderate Consistent Carb (60 g CHO per Meal) Diet    DVT Prophylaxis: Pneumatic Compression Devices  Polo Catheter: Not present  Lines: Present      Cardiac Monitoring: None  Code Status: Full Code      Clinically  Significant Risk Factors Present on Admission                # Drug Induced Platelet Defect: home medication list includes an antiplatelet medication       # DMII: A1C = 8.3 % (Ref range: 0.0 - 5.6 %) within past 6 months             Disposition Plan      Expected Discharge Date: 05/15/2023                  Osvaldo Scanlon MD  Hospitalist Service  [facility]  Securely message with [employer] (more info)  Text page via AMT Paging/Directory     ______________________________________________________________________    Chief Complaint   Non healing left heel wound    History is obtained from the patient and EHR    History of Present Illness     Roddy Sidhu is a 77 year old male [place] [facility] [place] wound with poor healing and concern for vascular compromise.  He has hx of DM2 with polyneuropathy; recent hospitalization at [facility] (3/29-4/4) for fall with R femur fx, R humerus fx, R clavicle fx s/p R femur ORIF who was d/c'ed to TCU. Presented from TCU to [facility] 5/4 with infected left heel ulcer with sepsis. Treated wtih broad IV abx. Wound debrided 5/9.  Abx changed to PO Augmentin 5/10. However, concern for PAD and LLE MARKY abnormal suggesting signif PAD. Vascular Surgery contacted and reviewed and recommended transfer for further vascular evaluation.      Past medical history significant for Recent fall with multiple fractures (Right femur, Right humerus, Right clavicle) s/p right femur ORIF, DM2, Polyneuropathy, Chronic anemia, HLP who was transferred to [facility] to undergo Vascular Surgery assessment due to Infected left heel diabetic pressure ulcer and suspected PAD.       Notably patient had been hospitalized at [facility] from 3/29-4/4 following a fall that resulted in multiple fractures and an ORIF completed on the right femur.  Patient was discharged to a TCU.  During his stay at the TCU he was noted to have pressure injury to both of  his heels on 4/17/2023.  There was concern for osteomyelitis based off of x-ray however this was not supported by follow-up MRI studies.  ABIs were completed per podiatry's request and were normal.     Patient was admitted to [facility] on 5/4/2023 due to worsening left diabetic heel infection.  Since hospitalization patient has been treated with IV Zosyn and IV vancomycin.  Wound cultures were positive for 2+ Proteus, 3+ Enterococcus and 1+ MSSA.  Due to Proteus IV Zosyn had been changed to IV Augmentin on 5/8 and vancomycin was stopped.  Podiatry was consulted and patient underwent debridement on 5/9.  IV Augmentin was transitioned to oral on 5/10.  Inflammatory CRP has been elevated while in the TCU at 156 and when repeated was showing improvement to 123.  Left lower extremity arterial duplex was completed which noted peripheral arterial disease confirmation with scattered areas of atherosclerotic calcification (some areas obscuring underlying lumen) and definite focal velocity elevation in the popliteal artery (though suspect arterial insufficiency localized in the distal SFA/popliteal arterial distribution and unclear/uncertain if severe stenosis and/or occlusion is present), unable to demonstrate blood flow in the peroneal artery and noted dominant runoff arteries of the anterior tibial artery the waveform is monophasic.  Podiatry had been contacted regarding these abnormal results and recommended transfer to [facility] for further vascular evaluation.     Patient was noted to have a drop in HGB 8's to 6's and required 1 unit PRBC transfusion.  Iron studies indicative of anemia of chronic disease.  No active bleeding.  Patient's PTA ASA was held and patient was started on prophylactic PPI BID on 5/5 and then stopped on 5/10.       Past Medical History    Problem Noted Date   Closed fracture of right femur 03/29/2023   Overview:     Formatting of this note might be different from the original.  Added automatically  from request for surgery [id number]   Microalbuminuria due to type 2 diabetes mellitus 11/02/2021   Type 2 diabetes mellitus with diabetic polyneuropathy, with long-term current use of insulin 12/08/2016   Tobacco use disorder 12/08/2016   Hyperlipidemia LDL goal <100 01/03/2013           Past Surgical History      Past Surgical History:   Procedure Laterality Date     INCISION AND DRAINAGE FOOT, COMBINED Left 5/9/2023    Procedure: Incision and Debidement of Pressure Wound, Left Heel;  Surgeon: Paul Florse DPM;  Location: [facility] OR     SURGICAL HISTORY OF -   1990    removal of steel from left eye   - Right humerus IM nailing right diaphyseal femur fx 3/29/23      Social History     Socioeconomic History     Marital status:      Spouse name: Not on file     Number of children: Not on file     Years of education: Not on file     Highest education level: Not on file   Occupational History     Not on file   Tobacco Use     Smoking status: Some Days     Types: Pipe     Smokeless tobacco: Never   Vaping Use     Vaping status: Never Used   Substance and Sexual Activity     Alcohol use: Not Currently     Drug use: No     Sexual activity: Not Currently   Other Topics Concern     Parent/sibling w/ CABG, MI or angioplasty before 65F 55M? Yes   Social History Narrative     Not on file     Social Determinants of Health     Financial Resource Strain: Not on file   Food Insecurity: Not on file   Transportation Needs: Not on file   Physical Activity: Not on file   Stress: Not on file   Social Connections: Not on file   Intimate Partner Violence: Not on file   Housing Stability: Not on file       Prior to Admission Medications   Prior to Admission Medications   Prescriptions Last Dose Informant Patient Reported? Taking?   aspirin (ASA) 325 MG EC tablet  Self Yes No   Sig: Take 325 mg by mouth daily   blood glucose (NO BRAND SPECIFIED) lancets standard  Self No No   Sig: Use to test blood sugar 1 times daily or as  directed.   blood glucose (NO BRAND SPECIFIED) test strip  Self No No   Sig: Use to test blood sugar 4 times daily or as directed.   blood glucose calibration (NO BRAND SPECIFIED) solution  Self No No   Sig: Use to calibrate blood glucose monitor as needed as directed.   blood glucose monitoring (NO BRAND SPECIFIED) meter device kit  Self No No   Sig: Use to test blood sugar 1 times daily or as directed.   ciprofloxacin (CIPRO) 500 MG tablet  Self No No   Sig: Take 1 tablet (500 mg) by mouth 2 times daily for 10 days   dulaglutide (TRULICITY) 0.75 MG/0.5ML pen  Self No No   Sig: Inject 0.75 mg Subcutaneous every 7 days   gabapentin (NEURONTIN) 300 MG capsule  Self No No   Sig: Take 1 capsule (300 mg) by mouth 3 times daily   insulin glargine (LANTUS PEN) 100 UNIT/ML pen  Self No No   Sig: Inject 10 Units Subcutaneous every morning   insulin pen needle (B-D U/F) 31G X 5 MM miscellaneous  Self No No   Sig: Use 4 pen needles daily or as directed.   latanoprost (XALATAN) 0.005 % ophthalmic solution  Self Yes No   Sig: Place 1 drop into both eyes At Bedtime   metFORMIN (GLUCOPHAGE) 500 MG tablet  Self No No   Sig: Take 1 tablet (500 mg) by mouth 2 times daily (with meals)   naproxen sodium (ANAPROX) 220 MG tablet  Self Yes No   Sig: Take 220 mg by mouth 2 times daily (with meals)   simvastatin (ZOCOR) 40 MG tablet  Self No No   Sig: TAKE ONE TABLET BY MOUTH EVERY NIGHT AT BEDTIME   vitamin D3 (CHOLECALCIFEROL) 50 mcg (2000 units) tablet  Self Yes No   Sig: Take 1 tablet by mouth daily      Facility-Administered Medications: None        Review of Systems    The 10 point Review of Systems is negative other than noted in the HPI.    Physical Exam   Vital Signs: Temp: 97.7  F (36.5  C) Temp src: Oral BP: 105/68 Pulse: 63   Resp: 18 SpO2: 99 % O2 Device: None (Room air)    Weight: 0 lbs 0 oz    General Appearance: NAD, non septic, [place]  Respiratory: CTA  Cardiovascular: RRR  GI: soft +BS  Skin: warm and dry  MS: no edema, left  heel diabetic foot ulcer    Medical Decision Making     65 MINUTES SPENT BY ME on the date of service doing chart review, history, exam, documentation & further activities per the note.      Data   Results for orders placed or performed during the hospital encounter of 05/13/23 (from the past 24 hour(s))   Glucose by meter   Result Value Ref Range    GLUCOSE BY METER POCT 151 (H) 70 - 99 mg/dL   Basic metabolic panel   Result Value Ref Range    Sodium 137 136 - 145 mmol/L    Potassium 4.5 3.4 - 5.3 mmol/L    Chloride 100 98 - 107 mmol/L    Carbon Dioxide (CO2) 26 22 - 29 mmol/L    Anion Gap 11 7 - 15 mmol/L    Urea Nitrogen 10.4 8.0 - 23.0 mg/dL    Creatinine 0.79 0.67 - 1.17 mg/dL    Calcium 8.5 (L) 8.8 - 10.2 mg/dL    Glucose 194 (H) 70 - 99 mg/dL    GFR Estimate >90 >60 mL/min/1.73m2   CBC with platelets   Result Value Ref Range    WBC Count 13.3 (H) 4.0 - 11.0 10e3/uL    RBC Count 3.40 (L) 4.40 - 5.90 10e6/uL    Hemoglobin 8.5 (L) 13.3 - 17.7 g/dL    Hematocrit 27.5 (L) 40.0 - 53.0 %    MCV 81 78 - 100 fL    MCH 25.0 (L) 26.5 - 33.0 pg    MCHC 30.9 (L) 31.5 - 36.5 g/dL    RDW 17.8 (H) 10.0 - 15.0 %    Platelet Count 613 (H) 150 - 450 10e3/uL   Glucose by meter   Result Value Ref Range    GLUCOSE BY METER POCT 192 (H) 70 - 99 mg/dL

## 2023-05-13 NOTE — DISCHARGE SUMMARY
[facility]  Hospitalist Discharge Summary       Date of Admission:  5/04/2023  Date of Discharge:  5/12/2023  Discharging Provider: Vinod Morfin MD      Discharge Diagnoses     Infected diabetic pressure ulcer of left heel associated with type 2 diabetes mellitus with diabetic neuropathy  Additional diabetic ulcer of right heel without infection  S/p debridement of left pressure ulcer 5/9/2023  Peripheral arterial disease  Sepsis with acute organ dysfunction without septic shock, due to unspecified organism, unspecified type (H)  Acute on chronic anemia  Closed displaced subtrochanteric fracture of right femur 3/29  S/p ORIF right femur fracture with intermedullary nail 3/29     Closed fracture of proximal end of right humerus 3/29   Closed nondisplaced fracture of right clavicle 3/29   Type 2 diabetes mellitus with diabetic polyneuropathy, with long-term current use of insulin   Hyperlipidemia    Follow-ups Needed After Discharge     Unresulted Labs Ordered in the Past 30 Days of this Admission     Date and Time Order Name Status Description    5/9/2023  2:57 PM Anaerobic Bacterial Culture Routine Preliminary       These results will be followed up by Vinod Morfin or PCP    Discharge Disposition   Discharged to [facility]  Condition at discharge: Serious    Hospital Course   Roddy Sidhu is a 77 year old male currently in TCU following hospitalization for fall with multiple fractures who presented on 5/4/2023 after being referred by TCU provider for evaluation of worsening left diabetic heel infection     Infected diabetic pressure ulcer of left heel associated with type 2 diabetes mellitus with diabetic neuropathy  Additional diabetic ulcer of right heel without infection  S/p debridement of left pressure ulcer 5/9/2023  Peripheral arterial disease  Pressure injury to heels noted in TCU and seen by wound care 4/17/2023.  The patient recently was diagnosed with  osteomyelitis based on an x-ray, but this is not supported by the MRI performed this admission. ABIs done at podiatry's request and were normal.  -Piperacillin-tazobactam and vancomycin IV were started in the emergency department  -Cultures from wound positive for 2+ Proteus, 3+ Enterococcus, 1+ MSSA.   -Changed piperacillin-tazobactam to ampicillin-sulbactam 5/8 to cover Proteus and Enterococcus and MSSA. Stopped vancomycin 5/8 (5 days) given no MRSA.   -Debridement by Dr. Flores on 5/9.  -Changed ampicillin-sulbactam IV to amoxicillin-clavulanate oral 5/10  -CRP  156 on 5/2 --now 123   -Continue augmentin. Continue wound care  -Discussed MARKY results with vascular surgery, who recommend toe pressures, which revealed normal toe brachial indices bilaterally.  -Following Vascular Surgery review of chart; however, recommended transfer of patient to [facility] for evaluation.     Sepsis with acute organ dysfunction without septic shock, due to unspecified organism, unspecified type (H)  -Resolved in the emergency department       Acute on chronic anemia  Hemoglobin was normal 6 months ago, was 8's during recent admission, then 8.2 5/4 and dropped to 6.7-6.9 AM of 5/5.  No black or bloody stools.  No abdominal pain.   No other apparent source of acute blood loss. Guiac negative.  Iron studies suggestive of anemia of chronic inflammation.  Gave 1u RBCs AM 5/5.  Started on protonix twice daily 5/5 empirically.    -Held aspirin - appears to be on this just due to diabetes - recommend reassessing at follow-up with primary care provider if this is really needed.     -Stopped celebrex 100  Mg twice daily that had been started on admission, was on naprosyn prn at home.  -Hemoglobin overall stable since transfusion. Stopped PPI  5/10 no symptoms, no active bleeding, not iron-deficiency anemia     Closed displaced subtrochanteric fracture of right femur 3/29  S/p ORIF right femur fracture with intermedullary  nail 3/29     Closed fracture of proximal end of right humerus 3/29   Closed nondisplaced fracture of right clavicle 3/29   Hospitalized [facility] hospital 3/29 - 4/4/2023 for trauma due to ground level fall. Discharged to TCU for further rehab.   -Has tylenol prn and oxycodone if needed while here.  Stopped celebrex as above.        Type 2 diabetes mellitus with diabetic polyneuropathy, with long-term current use of insulin (H)  HbA1c 8.3 on 5/2 on metformin and lantus 10 U daily at home. Primary care intended to start Trulicity but he has not begun this medication yet  -Held home metformin, continued Lantus 10. Poor control. Increased Lantus, increased SSI. BG improved 5/9. Changed Lantus back to 10 units daily 5/9.   -220    - continue Lantus 10U daily   - medium SSI   - continue metformin resumed today 5/10     Hyperlipidemia   - Continue home simvastatin.     Consultations This Hospital Stay   PHYSICAL THERAPY ADULT IP CONSULT  OCCUPATIONAL THERAPY ADULT IP CONSULT  VASCULAR SURGERY IP CONSULT  WOUND OSTOMY CONTINENCE NURSE  IP CONSULT  VASCULAR SURGERY IP CONSULT    Code Status   Full Code    Time Spent on this Encounter   I, Vinod Morfin MD, personally saw the patient today and spent greater than 30 minutes discharging this patient.       Vinod Morfin MD  [facility]  ______________________________________________________________________    Physical Exam   Vital Signs: Temp: 97.7  F (36.5  C) Temp src: Oral BP: 119/71 Pulse: 76   Resp: 18 SpO2: 98 % O2 Device: None (Room air)    Weight: 0 lbs 0 oz       Gen: Thin disheveled elderly male, alert and oriented x 3, no acute distressed  HEENT: Atraumatic, normocephalic; sclera non-injected, anicterric; oral mucosa moist, no lesion, no exudate  Lungs: Clear to ausculation, no wheezes, no rhonchi, no rales  Heart: Regular rate, regular rhythm, no gallops, no rubs, no murmurs  GI: Bowel sound normal, no hepatosplenomegaly,  no masses, non-tender, non-distended, no guarding, no rebound tenderness  Lymph: No lymphadenopathy, 1+ BLE edema, bilateral foot and ankle bandages CDI  Skin: BLE chronic venous stasis     Primary Care Physician   Sandra Low    Discharge Orders   No discharge procedures on file.    Significant Results and Procedures   Most Recent 3 CBC's:Recent Labs   Lab Test 05/13/23  0618 05/12/23  0528 05/11/23  0538   WBC 13.3* 12.5* 14.6*   HGB 8.5* 8.5* 8.2*   MCV 81 82 83   * 559* 554*     Most Recent 3 BMP's:Recent Labs   Lab Test 05/13/23  1249 05/13/23  0835 05/13/23  0618 05/12/23  0738 05/12/23  0528 05/11/23  0739 05/11/23  0538   NA  --   --  137  --  135*  --  136   POTASSIUM  --   --  4.5  --  4.4  --  4.3   CHLORIDE  --   --  100  --  101  --  101   CO2  --   --  26  --  28  --  27   BUN  --   --  10.4  --  11.2  --  10.1   CR  --   --  0.79  --  0.85  --  0.95   ANIONGAP  --   --  11  --  6*  --  8   BEST  --   --  8.5*  --  8.7*  --  8.4*   * 192* 194*   < > 235*   < > 160*    < > = values in this interval not displayed.   ,   Results for orders placed or performed during the hospital encounter of 05/04/23   MR Foot Left w/o & w Contrast    Narrative    EXAM: MR FOOT LEFT W/O and W CONTRAST  LOCATION: [facility]  DATE/TIME: 5/4/2023 5:58 PM CDT    INDICATION: Concern for osteomyelitis of left calcaneus.  COMPARISON: None.  TECHNIQUE: Routine. Additional postgadolinium T1 sequences were obtained.  IV CONTRAST: 6 mL Gadavist    FINDINGS:     JOINTS AND BONES:   -No evidence for fracture. There is some reactive T2 signal abnormality within the posteroinferior calcaneus but no specific evidence for osteomyelitis. No additional areas worrisome for osteomyelitis are identified. No significant effusion to suggest   septic arthropathy.    TENDONS:   -Mild Achilles tendinopathy without tearing. The peroneal tendons are intact. The flexor tendons are intact. The extensor  tendons are intact.     LIGAMENTS:   -The anterior and posterior talofibular ligaments are intact. The calcaneofibular ligament is intact. The deltoid ligamentous complex is intact.    MUSCLES AND SOFT TISSUES:   -There is an ulceration along the posterior aspect of the foot with some surrounding edema or cellulitis. There is no evidence for abscess.      Impression    IMPRESSION:  1.  No evidence for osteomyelitis, septic arthropathy, or abscess.  2.  Edema or cellulitis along the posterior aspect of the foot. There is apparent soft tissue irregularity and likely ulceration along the posterior aspect of the foot and there is susceptibly artifact suggestive of air tracking into the subcutaneous   soft tissues.  3.  No evidence for fracture.  4.  Mild reactive edema within the posteroinferior calcaneus.  5.  Mild Achilles tendinopathy without tearing.   US MARKY Doppler - No Exercise    Narrative    US MARKY DOPPLER NO EXERCISE, 1-2 LEVELS, BILATERAL   5/4/2023 4:23 PM     HISTORY: Diabetic foot ulceration with concern for osteomyelitis.    COMPARISON: None.    FINDINGS:  Right MARKY:   PT: 1.09.  DP: 1.19    Left MARKY:   PT: 0.78.  DP: 1.1     Waveforms: Biphasic and triphasic in the right posterior tibial, right  dorsalis pedis and left dorsalis pedis. Monophasic in the left  posterior tibial.      Impression    IMPRESSION:  1. Normal ABIs bilaterally.    MARKY CRITERIA:  >1.4 NC  0.95-1.4 Normal  0.90 - 0.94 Mild  0.5 - 0.89 Moderate  0.2 - 0.49 Severe  <0.2 Critical    KAITY TREJO DO         SYSTEM ID:  [id number]   XR Chest Port 1 View    Narrative    EXAM: XR CHEST PORT 1 VIEW  LOCATION: [facility]  DATE/TIME: 5/7/2023 8:31 PM CDT    INDICATION: RN placed PICC   verify tip placement  COMPARISON: None.      Impression    IMPRESSION: Left PICC tip overlies cavoatrial junction, appropriate position. No acute findings. Clear lungs. Normal heart size. Old left clavicle and right proximal  humerus fractures.   US Lower Extremity Arterial Duplex Left    Narrative    ARTERIAL DUPLEX ULTRASOUND LEFT LOWER EXTREMITY  5/11/2023 11:43 AM    HISTORY:  77-year-old patient with gangrene of the left foot.    COMPARISON: None.    TECHNIQUE: Color Doppler and spectral waveform analysis performed  throughout the arteries of the left lower extremity.    FINDINGS: The left common femoral artery is 149 cm/sec with the  diameter of 7.3 mm. Profunda femoral artery is 86 cm/sec and diameter  of 3.4 mm.    The proximal SFA is 135/15 cm/sec, 5.6 mm and triphasic. Mid SFA is  103/20 cm/sec, 5 mm, triphasic. Distal SFA is 133/23 cm/sec, 4.5 mm,  and biphasic waveform. Diffuse atherosclerotic calcification  throughout all arteries, though especially in distal SFA with areas of  shadowing. Velocity elevation in the popliteal artery measuring 320/31  cm/sec with biphasic/triphasic waveform. Unable to visualize the  peroneal artery. Anterior tibial artery is patent with velocity of  156/26 cm/sec, with monophasic waveform. Trickle of blood flow in the  distal posterior tibial artery.      Impression    IMPRESSION:  1. Peripheral arterial disease is confirmed with scattered areas of  atherosclerotic calcification, in some areas obscuring underlying  lumen.  2. Definite focal velocity elevation in the popliteal artery, though  suspect arterial insufficiency localized to the distal SFA/popliteal  arterial distribution. Uncertain if severe stenoses and/or occlusion.  3. Unable to demonstrate blood flow in the peroneal artery.  4. Dominant run off arteries anterior tibial artery, though waveform  is monophasic. Trickle of blood flow in the distal posterior tibial  artery.    DANI TUCKER MD         SYSTEM ID:  [id number]   US Lower Extremity PPG    Narrative    US LOWER EXTREMITY PPG 5/12/2023 4:29 PM    HISTORY: 77-year-old patient with request made for evaluation of toe  brachial indices.    COMPARISON: None    FINDINGS: Right  brachial pressure is 120, right digital pressure is  111 for an index of 0.93.    Left digital pressure is 99 for an index of 0.83. Both digital  waveforms appear to have intact amplitude.      Impression    IMPRESSION: Normal toe brachial indices bilaterally.    DANI TUCKER MD         SYSTEM ID:  [id number]       Discharge Medications   Current Discharge Medication List      CONTINUE these medications which have NOT CHANGED    Details   aspirin (ASA) 325 MG EC tablet Take 325 mg by mouth daily      ciprofloxacin (CIPRO) 500 MG tablet Take 1 tablet (500 mg) by mouth 2 times daily for 10 days  Qty: 20 tablet, Refills: 0    Associated Diagnoses: Diabetic foot infection (H); Pressure injury of skin of heel, unspecified injury stage, unspecified laterality; Subacute osteomyelitis of foot, unspecified laterality (H)      gabapentin (NEURONTIN) 300 MG capsule Take 1 capsule (300 mg) by mouth 3 times daily  Qty: 270 capsule, Refills: 3    Comments: HOLD ON FILE until patient requests or is due for refill.  Associated Diagnoses: Type 2 diabetes mellitus with diabetic polyneuropathy, with long-term current use of insulin (H)      insulin glargine (LANTUS PEN) 100 UNIT/ML pen Inject 10 Units Subcutaneous every morning  Qty: 15 mL, Refills: 1    Comments: If Lantus is not covered by insurance, may substitute Basaglar or Semglee or other insulin glargine product per insurance preference at same dose and frequency.    Associated Diagnoses: Type 2 diabetes mellitus with diabetic polyneuropathy, with long-term current use of insulin (H)      latanoprost (XALATAN) 0.005 % ophthalmic solution Place 1 drop into both eyes At Bedtime      metFORMIN (GLUCOPHAGE) 500 MG tablet Take 1 tablet (500 mg) by mouth 2 times daily (with meals)  Qty: 180 tablet, Refills: 1    Comments: HOLD ON FILE until patient requests or is due for refill.  Associated Diagnoses: Type 2 diabetes mellitus with diabetic polyneuropathy, with long-term current use  of insulin (H)      naproxen sodium (ANAPROX) 220 MG tablet Take 220 mg by mouth 2 times daily (with meals)      simvastatin (ZOCOR) 40 MG tablet TAKE ONE TABLET BY MOUTH EVERY NIGHT AT BEDTIME  Qty: 90 tablet, Refills: 3    Comments: HOLD ON FILE until patient requests or is due for refill.  Associated Diagnoses: Hyperlipidemia LDL goal <100      vitamin D3 (CHOLECALCIFEROL) 50 mcg (2000 units) tablet Take 1 tablet by mouth daily  Qty: 30 capsule      blood glucose (NO BRAND SPECIFIED) lancets standard Use to test blood sugar 1 times daily or as directed.  Qty: 100 each, Refills: 1    Associated Diagnoses: Type 2 diabetes mellitus with diabetic polyneuropathy, with long-term current use of insulin (H)      blood glucose (NO BRAND SPECIFIED) test strip Use to test blood sugar 4 times daily or as directed.  Qty: 100 strip, Refills: 1    Associated Diagnoses: Type 2 diabetes mellitus with diabetic polyneuropathy, with long-term current use of insulin (H)      blood glucose calibration (NO BRAND SPECIFIED) solution Use to calibrate blood glucose monitor as needed as directed.  Qty: 1 each, Refills: 1    Associated Diagnoses: Type 2 diabetes mellitus with diabetic polyneuropathy, with long-term current use of insulin (H)      blood glucose monitoring (NO BRAND SPECIFIED) meter device kit Use to test blood sugar 1 times daily or as directed.  Qty: 1 kit, Refills: 1    Associated Diagnoses: Type 2 diabetes mellitus with diabetic polyneuropathy, with long-term current use of insulin (H)      dulaglutide (TRULICITY) 0.75 MG/0.5ML pen Inject 0.75 mg Subcutaneous every 7 days  Qty: 2 mL, Refills: 1    Comments: Discontinue Humalog  Associated Diagnoses: Type 2 diabetes mellitus with diabetic polyneuropathy, with long-term current use of insulin (H)      insulin pen needle (B-D U/F) 31G X 5 MM miscellaneous Use 4 pen needles daily or as directed.  Qty: 400 each, Refills: 3    Comments: HOLD ON FILE until patient requests or is  due for refill.  Associated Diagnoses: Type 2 diabetes mellitus with diabetic polyneuropathy, with long-term current use of insulin (H)           Allergies   No Known Allergies

## 2023-05-13 NOTE — PLAN OF CARE
Goal Outcome Evaluation:      Plan of Care Reviewed With: patient    Overall Patient Progress: no changeOverall Patient Progress: no change    Received pateint @ 0030. A&Ox4. VSS on RA. Denies pain, N/V. On moderate consistent carb diet. Assist 2/lift. Incontinent BM/urine x2. Wound on left foot, wrapped, dressing CDI. Bilateral foot in boots. Wound on right foot, red/erythema, open to air. Redness/ erythema on buttock/Coccyx, mepilex applied. Discharge pending.

## 2023-05-13 NOTE — PLAN OF CARE
"Reason for Admission:        Activity: non-weight bearing left foot. ceiling lift used  to comply with weight bearing status.         Neuro: alert and orientated x 4           Cardiac: WNL       Respiratory: WNL       GI/: continent of urine and BM       Skin: pressure sores on bilateral feet, see wound care note, dressing changed this shift. and coccyx mepilex dressing changed        Diet: CSC diet       Lines/Drains: L ARM PICC       Vitals: /66 (BP Location: Right arm)   Pulse 71   Temp 98.3  F (36.8  C) (Oral)   Resp 18   Ht 1.765 m (5' 9.5\")   Wt 60.7 kg (133 lb 13.1 oz)   SpO2 98%   BMI 19.48 kg/m         Pain: Pt reports pain on toes at beginning of shift, resolved with Tylenol, and reported pain with L heel dressing change.       Plan: continue with plan of care, awaiting TCU placement, PO ABX  Oneida Lopez RN on 5/11/2023 at 6:48 PM      "
DATE & TIME: 5/8/2023 [id number]   Cognitive Concerns/ Orientation : alert and orientated x4   BEHAVIOR & AGGRESSION TOOL COLOR: green   ABNL VS/O2: temperature 99.6 at 1600 and tylenol given, otherwise stable.  vitals ordered every 8 hours  MOBILITY: assist of one, tbelt and walker  PAIN MANAGMENT: reports no pain most of the time and then some minimal pain when standing, tylenol provided this am.   DIET: diabetic diet ordered for lunch and Dr. Flores made NPO for midnight.  Surgery scheduled tomorrow  BOWEL/BLADDER: continent, had bowel movement today  ABNL LAB/BG: in epic, hemoglobin 7.9 today.    DRAIN/DEVICES: none  TELEMETRY RHYTHM: n/a  SKIN: wound nurse completed wound care to heels mid morning.  He will need surgery tomorrow.  Has ulcer on buttocks, area cleaned and assessed and mepilex placed back on  TESTS/PROCEDURES: none  D/C DATE: to be determined                          
Goal Outcome Evaluation:      Neuro: A&O x4  Cardiac: WL  Respiratory: WNL  GI/: WNL. Stool sample needed  Diet/appetite: NPO  Activity: A1 w/ walker and belt   Pain: Denies  Skin: See flowsheet for wound documentation  LDA's:  IV SL     Other: Blood transfusion given today      Plan: discharge to TCU ~ 2 days    
Goal Outcome Evaluation:      Plan of Care Reviewed With: patient    Overall Patient Progress: no changeOverall Patient Progress: no change     S/B: Pt slept most of the night between cares. Pain in left foot this am oxycodone given x1.     A: A&Ox4. VSS. Afebrile. Denies nausea. High consistent carb diet.Electrolytes WNL.    No intake/output data recorded.    Temp:  [97.9  F (36.6  C)-99.5  F (37.5  C)] 98.8  F (37.1  C)  Pulse:  [65-78] 69  Resp:  [14-17] 17  BP: (116-170)/(62-91) 121/68  SpO2:  [98 %-100 %] 99 %     R: Pt concerned about moving with preop boots and non weight bearing on left leg. Explained pivot to commode if needed. Continue with POC. Notify primary team with changes.       
Goal Outcome Evaluation:    Blood Glucose: Patient is a Type 2 Diabetic, and we will continue to monitor his blood glucose before meals/at hs and as needed.       Repositioning/Float heels: Patient has diabetic foot ulcers on his heels and so I have heel lift boots on patient as well as pillows to keep his heels off the bed. Repositioning every 2 hours as well side to side with pillows        Monitor Vital Signs: Patient came in with Sepsis, so we are giving Zosyn and he received Vanco in the ED. He is being followed by Podiatry and we will continue to monitor for signs of elevated temp, BP,Pulse.    Consults: Writer put in a sticky note for Provider to have a WOC consult as well as continued to be followed by Podiatry         
Goal Outcome Evaluation:    DATE & TIME: 5/10/2023 [id number]    Cognitive Concerns/ Orientation : alert and orientated x 4    BEHAVIOR & AGGRESSION TOOL COLOR: green   ABNL VS/O2: stable, on room air and no fever  MOBILITY: non-weight bearing left foot.  Per PT, this was very difficult and patient will need assistance of two staff or ceiling lift to comply with weight bearing status.  Sat up in chair today  PAIN MANAGMENT: received tylenol this morning for pain on top of the right foot.  Received oxycodone during dressing change to left foot today.  DIET: diabetic, insulins as ordered.  Discussed benefits of increased protein intake.  BOWEL/BLADDER: continent  ABNL LAB/BG: none ordered for today-labs ordered for nursing to collect off PICC on 5/11  DRAIN/DEVICES: PICC left arm  TELEMETRY RHYTHM: n/a  SKIN: pressure sores on bilateral feet and buttocks.  TESTS/PROCEDURES: none  D/C DATE: to be determined  Discharge Barriers: needs transitional care                          
Goal Outcome Evaluation: Progressing toward goals.   Wounds are without foul odor or drainage. Treatment done to bilat heels by a unit RN and per report tolerated well. Heels in boot protectors.  Assisted pt with repositioning and is on flow air mattress.   No attempts to get out of bed overnight. Used urinal  in bed. Denies discomfort.   Alert and oriented, friendly and conversing with staff. Blood sugar remaining in the 200s last night, however Dr Marques has made changes to the insulin orders. Pt reports he would like brfst and coffee, is NPO for procedure today.  PICC infusing and blood drawn without difficulty.                         
Patient chart accessed due to patient being a potential admission to med/surg unit.     
Situation: Diabetic foot ulcer     Plan: Per report, possible transfer to [facility] 5/12 for vascular procedure on LLE per Dr. Flores. Patient has been NPO since midnight.     Subjective/Objective:     Neuro: A/O x 4     Cardiac: WDL     Resp: WDL     GI/: WDL - using urinal; last BM 5/11     MSK: Mobility moderately impaired; non-weight bearing of LLE; bedrest/ceiling lift     Skin: Dressing clean, dry, and intact     LDAs: PICC in L basilic, flushing w/o difficulty, blood return noted, saline locked      
Transfer/Discharge Note  Data:   Reason for Transport:  Higher level of care for vascular surgery consult    Roddy Sidhu was transferred to [facility] via basic life support (BLS) at 2337.  Patient was accompanied by [facility]. Equipment used for transport: None. Family was aware of reason for transport: yes. All belongings sent with Patient.    Action:  Report: given to Asif RUIZ who verbalized understanding of transfer.      Response:  Patient's condition when transferred was stable.    Adelia Seals RN                          
with patient

## 2023-05-14 ENCOUNTER — APPOINTMENT (OUTPATIENT)
Dept: PHYSICAL THERAPY | Facility: CLINIC | Age: 78
DRG: 616 | End: 2023-05-14
Attending: INTERNAL MEDICINE
Payer: COMMERCIAL

## 2023-05-14 LAB
GLUCOSE BLDC GLUCOMTR-MCNC: 197 MG/DL (ref 70–99)
GLUCOSE BLDC GLUCOMTR-MCNC: 208 MG/DL (ref 70–99)
GLUCOSE BLDC GLUCOMTR-MCNC: 223 MG/DL (ref 70–99)
GLUCOSE BLDC GLUCOMTR-MCNC: 337 MG/DL (ref 70–99)

## 2023-05-14 PROCEDURE — 250N000012 HC RX MED GY IP 250 OP 636 PS 637: Performed by: INTERNAL MEDICINE

## 2023-05-14 PROCEDURE — 120N000001 HC R&B MED SURG/OB

## 2023-05-14 PROCEDURE — 99231 SBSQ HOSP IP/OBS SF/LOW 25: CPT | Performed by: INTERNAL MEDICINE

## 2023-05-14 PROCEDURE — 250N000013 HC RX MED GY IP 250 OP 250 PS 637: Performed by: INTERNAL MEDICINE

## 2023-05-14 PROCEDURE — 97161 PT EVAL LOW COMPLEX 20 MIN: CPT | Mod: GP

## 2023-05-14 PROCEDURE — 999N000147 HC STATISTIC PT IP EVAL DEFER

## 2023-05-14 PROCEDURE — 97530 THERAPEUTIC ACTIVITIES: CPT | Mod: GP

## 2023-05-14 PROCEDURE — 97110 THERAPEUTIC EXERCISES: CPT | Mod: GP

## 2023-05-14 RX ORDER — ACETAMINOPHEN 325 MG/1
650 TABLET ORAL EVERY 6 HOURS PRN
Status: DISCONTINUED | OUTPATIENT
Start: 2023-05-14 | End: 2023-05-24 | Stop reason: HOSPADM

## 2023-05-14 RX ADMIN — ASPIRIN 325 MG: 325 TABLET, COATED ORAL at 08:34

## 2023-05-14 RX ADMIN — GABAPENTIN 300 MG: 300 CAPSULE ORAL at 08:34

## 2023-05-14 RX ADMIN — SIMVASTATIN 40 MG: 40 TABLET, FILM COATED ORAL at 22:40

## 2023-05-14 RX ADMIN — AMOXICILLIN AND CLAVULANATE POTASSIUM 1 TABLET: 875; 125 TABLET, FILM COATED ORAL at 09:40

## 2023-05-14 RX ADMIN — INSULIN ASPART 1 UNITS: 100 INJECTION, SOLUTION INTRAVENOUS; SUBCUTANEOUS at 22:47

## 2023-05-14 RX ADMIN — ACETAMINOPHEN 650 MG: 325 TABLET ORAL at 14:43

## 2023-05-14 RX ADMIN — INSULIN GLARGINE 10 UNITS: 100 INJECTION, SOLUTION SUBCUTANEOUS at 08:34

## 2023-05-14 RX ADMIN — GABAPENTIN 300 MG: 300 CAPSULE ORAL at 16:52

## 2023-05-14 RX ADMIN — AMOXICILLIN AND CLAVULANATE POTASSIUM 1 TABLET: 875; 125 TABLET, FILM COATED ORAL at 22:40

## 2023-05-14 RX ADMIN — LATANOPROST 1 DROP: 50 SOLUTION/ DROPS OPHTHALMIC at 22:39

## 2023-05-14 RX ADMIN — Medication 50 MCG: at 08:34

## 2023-05-14 RX ADMIN — GABAPENTIN 300 MG: 300 CAPSULE ORAL at 22:40

## 2023-05-14 ASSESSMENT — ACTIVITIES OF DAILY LIVING (ADL)
ADLS_ACUITY_SCORE: 47

## 2023-05-14 NOTE — PLAN OF CARE
Goal Outcome Evaluation:      Plan of Care Reviewed With: patient    Overall Patient Progress: improvingOverall Patient Progress: improving    A&Ox4. VSS on RA. Denies pain, N/V. On moderate consistent carb diet. Assist 2 with lift, repo Q2. Incontinent B/B. Wound on left foot, wrapped, dressing CDI. Bilateral foot in boots. Numbness in BLE. Wound on right foot, red/erythema, open to air. Redness/ erythema on buttock/Coccyx, mepilex applied. Plans for Angiogram on 5/15.

## 2023-05-14 NOTE — PLAN OF CARE
Pt here with infected diabetic ulcer of the L heel wound. A&Ox4. [place]. Numbness in all extremities; lower pulses are weak bilaterally; L heel is wrapped. VSS. Constant carb diet. On bed rest with Q2 repositioning. Intermittently incontenent of bowel and bladder; urinal at bedside. Reported left foot burning pain; MD ordered tylenol PRN which alleviated pain. PT consulted today and recommended bed rest. NPO at midnight for angiogram tomorrow.

## 2023-05-14 NOTE — PROGRESS NOTES
05/14/23 6275   Appointment Info   Signing Clinician's Name / Credentials (PT) Brittany Dressler, PT   Living Environment   Living Environment Comments Lives in multi level house with spouse. Dtr, VANNESSA, and grandkids live upstairs. All needs met on main level, no steps to enter. Has walk in shower, but has to go up 1 step to get into it. Grab bars in shower   Self-Care   Regular Exercise No   Equipment Currently Used at Home walker, rolling   Fall history within last six months yes   Number of times patient has fallen within last six months 2   Activity/Exercise/Self-Care Comment Wife assists with donning socks/shoes, ind toileting/UE dressing/grooming. Hasn't showered since returning home from TCU due to difficulty with transfer.   General Information   Onset of Illness/Injury or Date of Surgery 05/13/23   Referring Physician Osvaldo Scanlon MD   Patient/Family Therapy Goals Statement (PT) To get better.   Pertinent History of Current Problem (include personal factors and/or comorbidities that impact the POC) Re-admit with non-healing B diabetic heel ulcers, recent R femur s/p ORIF 3-29 / R humeral  fx/ R clavical fx.   Existing Precautions/Restrictions fall   Weight-Bearing Status - LUE weight-bearing as tolerated   Weight-Bearing Status - RUE weight-bearing as tolerated   Weight-Bearing Status - LLE weight-bearing as tolerated   Weight-Bearing Status - RLE weight-bearing as tolerated   General Observations Note: pt reports L LE NWB, plan to continue this until ortho/podiatry confirms. Per RN, hospitalist gave WBAT.   Cognition   Affect/Mental Status (Cognition) WFL   Follows Commands (Cognition) WFL   Posture    Posture Comments Impaired controlled mobility   Range of Motion (ROM)   ROM Comment WFL   Strength (Manual Muscle Testing)   Strength Comments Grossly deconditioned exacerbated by L LE funtional NWB.   Bed Mobility   Comment, (Bed Mobility) Brianna with rail and increased effort.   Transfers   Comment,  (Transfers) Wilder spotting L LE for NWB, set-up with elevated bed, B bedrails, increased effort.   Gait/Stairs (Locomotion)   Distance in Feet Non-amb   Balance   Balance Comments Supervision EOB sit balance, Wilder standing balance with B rails and ensuring L LE NWB.   Coordination   Coordination Comments WFl   Muscle Tone   Muscle Tone Comments WFL   Clinical Impression   Criteria for Skilled Therapeutic Intervention Yes, treatment indicated   PT Diagnosis (PT) Impaired mobility   Influenced by the following impairments Impaired strength, balance, activity tolerance   Functional limitations due to impairments Impaired independent mobility & living   Clinical Presentation (PT Evaluation Complexity) Stable/Uncomplicated   Clinical Decision Making (Complexity) low complexity   Planned Therapy Interventions (PT) balance training;gait training;home exercise program;neuromuscular re-education;patient/family education;postural re-education;stair training;strengthening;transfer training;progressive activity/exercise;risk factor education;home program guidelines   Anticipated Equipment Needs at Discharge (PT) commode chair  (Bushra Perez)   Risk & Benefits of therapy have been explained evaluation/treatment results reviewed;care plan/treatment goals reviewed;risks/benefits reviewed;current/potential barriers reviewed;participants voiced agreement with care plan;participants included;patient   PT Total Evaluation Time   PT Eval, Low Complexity Minutes (12828) 5   Physical Therapy Goals   PT Frequency 5x/week   PT Predicted Duration/Target Date for Goal Attainment 05/19/23   PT: Transfers Supervision/stand-by assist;Sit to/from stand;Bed to/from chair;Assistive device   PT: Gait Minimal assist;Rolling walker;25 feet   PT: Goal 1 pt will demo HEP for LE ther ex mod I with handout for continued LE strengthening   Therapeutic Procedure/Exercise   Ther. Procedure: strength, endurance, ROM, flexibillity Minutes (03891) 8   Symptoms  Noted During/After Treatment fatigue   Treatment Detail/Skilled Intervention SLRs, LAQs, seated hip abd/add - to NM fatigue with SBA technnique cues and ensuring NWB.   Therapeutic Activity   Therapeutic Activities: dynamic activities to improve functional performance Minutes (21969) 12   Symptoms Noted During/After Treatment Fatigue;Shortness of breath   Treatment Detail/Skilled Intervention Mass practice sit-stands with CG-Wilder safety spotting and ensuring L LE NWB, PT managing bed height for optimal pt challenge, to NM & CP fatigue - 2 sets (15 + 12reps), therapeutic setaed break post, SBA sit-supine, finished in bed alarm armed.   PT Discharge Planning   PT Plan PT: Bring LE HEP handout, gait wtih close WC follow as able, functional strengthening.   PT Discharge Recommendation (DC Rec) Transitional Care Facility   PT Rationale for DC Rec Pt appropriate to return to rehab post B heel wounds and R UE & R LE fx post falls to progress mobility towards Brianna baseline.   PT Brief overview of current status SBA bed mob, Wilder stands, non-amb yet. Recommend Bushra Stedy to chair.   Total Session Time   Timed Code Treatment Minutes 20   Total Session Time (sum of timed and untimed services) 25

## 2023-05-14 NOTE — PROGRESS NOTES
[facility]    Medicine Progress Note - Hospitalist Service    Date of Admission:  5/13/2023    Assessment & Plan   Roddy Sidhu is a 77 year old male transferred from [facility] with left heel wound with poor healing and concern for vascular compromise.  He has hx of DM2 with polyneuropathy; recent hospitalization at [facility] (3/29-4/4) for fall with R femur fx, R humerus fx, R clavicle fx s/p R femur ORIF who was d/c'ed to TCU. Presented from TCU to [facility] 5/4 with infected left heel ulcer with sepsis. Treated wtih broad IV abx. Wound debrided 5/9.  Abx changed to PO Augmentin 5/10. However, concern for PAD and LLE MARKY abnormal suggesting signif PAD. Vascular Surgery contacted and reviewed and recommended transfer for further vascular evaluation.      1. Diabetic left heel ulcer - non healing  --Vascular surgery consultation. Appreciate recommendations.  --Podiatry consult. Appreciate recommendations.   --Plan for LLE angiogram on Monday per Vascular surgery.  --Continue augmentin as per plan from transferring hospital  --Noted polymicrobial infection proteus, enterococcus and MSSA.  --Activity: Up ad melinda.      2. DM with diabetic neuropathy  --Continue lantus 10 units q AM  --Continue sliding scale insulin and mod CHO diet  --Continue neurontin.   --Hold metformin and trulicity     3. PAD  --Continue asa  --Continue statin  --Await angiogram on Monday.     4. Recent fall with right humerus, right femur and right clavicle fracture  5. Hx of IM nailing of proximal humerus  --Was at TCU prior to transfer  --Continue PT/OT  --Likely will need TCU for off loading from feet and wound care     6. HLP  --Continue statin      7. Tobacco dependence  --Caily pipe smokier  -- Smoking cessation consultation  -- Nicotine patch       Diet: Moderate Consistent Carb (60 g CHO per Meal) Diet  NPO per Anesthesia Guidelines for Procedure/Surgery Except for: Meds    DVT Prophylaxis: Pneumatic  Compression Devices  Polo Catheter: Not present  Cardiac Monitoring: None  Code Status: Full Code      Disposition Plan  Pending Vascular Surgery evaluation.      Expected Discharge Date: 05/17/2023        Discharge Comments: Angio on monday 5/15/2023          Nancy Zamora MD  Hospitalist Service  [facility]  Securely message with [employer] (more info)  Text page via [employer] Paging/Directory   ______________________________________________________________________    Interval History   No acute events overnight. Pain is well controlled. Denies chest pain or SOB.     Physical Exam   Vital Signs: Temp: 97.7  F (36.5  C) Temp src: Oral BP: 102/69 Pulse: 62   Resp: 20 SpO2: 100 % O2 Device: None (Room air)    Weight: 0 lbs 0 oz    Constitutional: Awake, alert, cooperative.  HEENT: Normocephalic.   Neck: ROM normal.  Pulmonary: No increased work of breathing.  Cardiovascular: RRR, normal S1 and S2.  Abdominal: Soft. ND.  Musculoskletal: Full range of motion noted.  No LE edema. Left extremity covered with surgical dressing.   Skin: Warm.   Neurological: Awake, alert, oriented to name, place and time.     Psych: Appropriate mood and affect.    Data   Recent Labs   Lab 05/14/23  1128 05/14/23  0731 05/13/23  2125 05/13/23  0835 05/13/23  0618 05/12/23  0738 05/12/23  0528 05/11/23  0739 05/11/23  0538   WBC  --   --   --   --  13.3*  --  12.5*  --  14.6*   HGB  --   --   --   --  8.5*  --  8.5*  --  8.2*   MCV  --   --   --   --  81  --  82  --  83   PLT  --   --   --   --  613*  --  559*  --  554*   NA  --   --   --   --  137  --  135*  --  136   POTASSIUM  --   --   --   --  4.5  --  4.4  --  4.3   CHLORIDE  --   --   --   --  100  --  101  --  101   CO2  --   --   --   --  26  --  28  --  27   BUN  --   --   --   --  10.4  --  11.2  --  10.1   CR  --   --   --   --  0.79  --  0.85  --  0.95   ANIONGAP  --   --   --   --  11  --  6*  --  8   BEST  --   --   --   --  8.5*  --  8.7*  --  8.4*    * 197* 168*   < > 194*   < > 235*   < > 160*    < > = values in this interval not displayed.

## 2023-05-14 NOTE — PROGRESS NOTES
05/14/23 0800   Appointment Info   Appointment Canceled Reason (PT) Hold (see Cancel Comments row)   Appointment Cancel Comments (PT) Awaiting WB orders for B LEs & right upper extremity: B offloading shoes to BR noted, at prior hospital was NWB on L LE; recent R femur s/p ORIF 3-29 / R humeral  fx/ R clavical fx. Per chart, pt was last seen in PT at [facility] and was Wilder for stands, unable to maintain WB precautions due to weakness. Per chart, pt has 1 TWIN home where he lives with his wife who is likely to only provide supervision given pt report of her probably should be using an AD based on how she's getting around. Evident that home is not a safe DC per chart review and return to TCU is warranted. Following for formal assessment.

## 2023-05-15 ENCOUNTER — APPOINTMENT (OUTPATIENT)
Dept: PHYSICAL THERAPY | Facility: CLINIC | Age: 78
DRG: 616 | End: 2023-05-15
Attending: INTERNAL MEDICINE
Payer: COMMERCIAL

## 2023-05-15 LAB
ABO/RH(D): NORMAL
ANION GAP SERPL CALCULATED.3IONS-SCNC: 12 MMOL/L (ref 7–15)
ANTIBODY SCREEN: NEGATIVE
BUN SERPL-MCNC: 12.6 MG/DL (ref 8–23)
CALCIUM SERPL-MCNC: 8.9 MG/DL (ref 8.8–10.2)
CHLORIDE SERPL-SCNC: 101 MMOL/L (ref 98–107)
CREAT SERPL-MCNC: 0.83 MG/DL (ref 0.67–1.17)
CRP SERPL-MCNC: 25.6 MG/L
DEPRECATED HCO3 PLAS-SCNC: 24 MMOL/L (ref 22–29)
ERYTHROCYTE [DISTWIDTH] IN BLOOD BY AUTOMATED COUNT: 17.8 % (ref 10–15)
GFR SERPL CREATININE-BSD FRML MDRD: >90 ML/MIN/1.73M2
GLUCOSE BLDC GLUCOMTR-MCNC: 176 MG/DL (ref 70–99)
GLUCOSE BLDC GLUCOMTR-MCNC: 205 MG/DL (ref 70–99)
GLUCOSE BLDC GLUCOMTR-MCNC: 213 MG/DL (ref 70–99)
GLUCOSE BLDC GLUCOMTR-MCNC: 216 MG/DL (ref 70–99)
GLUCOSE BLDC GLUCOMTR-MCNC: 242 MG/DL (ref 70–99)
GLUCOSE BLDC GLUCOMTR-MCNC: 310 MG/DL (ref 70–99)
GLUCOSE SERPL-MCNC: 229 MG/DL (ref 70–99)
HCT VFR BLD AUTO: 28.8 % (ref 40–53)
HGB BLD-MCNC: 8.9 G/DL (ref 13.3–17.7)
MCH RBC QN AUTO: 24.9 PG (ref 26.5–33)
MCHC RBC AUTO-ENTMCNC: 30.9 G/DL (ref 31.5–36.5)
MCV RBC AUTO: 80 FL (ref 78–100)
PLATELET # BLD AUTO: 589 10E3/UL (ref 150–450)
POTASSIUM SERPL-SCNC: 4.8 MMOL/L (ref 3.4–5.3)
RBC # BLD AUTO: 3.58 10E6/UL (ref 4.4–5.9)
SODIUM SERPL-SCNC: 137 MMOL/L (ref 136–145)
SPECIMEN EXPIRATION DATE: NORMAL
WBC # BLD AUTO: 10.5 10E3/UL (ref 4–11)

## 2023-05-15 PROCEDURE — 120N000001 HC R&B MED SURG/OB

## 2023-05-15 PROCEDURE — 250N000013 HC RX MED GY IP 250 OP 250 PS 637: Performed by: INTERNAL MEDICINE

## 2023-05-15 PROCEDURE — 250N000012 HC RX MED GY IP 250 OP 636 PS 637: Performed by: INTERNAL MEDICINE

## 2023-05-15 PROCEDURE — 80048 BASIC METABOLIC PNL TOTAL CA: CPT | Performed by: STUDENT IN AN ORGANIZED HEALTH CARE EDUCATION/TRAINING PROGRAM

## 2023-05-15 PROCEDURE — 97530 THERAPEUTIC ACTIVITIES: CPT | Mod: GP

## 2023-05-15 PROCEDURE — 86140 C-REACTIVE PROTEIN: CPT | Performed by: HOSPITALIST

## 2023-05-15 PROCEDURE — G0463 HOSPITAL OUTPT CLINIC VISIT: HCPCS

## 2023-05-15 PROCEDURE — 85027 COMPLETE CBC AUTOMATED: CPT | Performed by: STUDENT IN AN ORGANIZED HEALTH CARE EDUCATION/TRAINING PROGRAM

## 2023-05-15 PROCEDURE — 258N000003 HC RX IP 258 OP 636: Performed by: STUDENT IN AN ORGANIZED HEALTH CARE EDUCATION/TRAINING PROGRAM

## 2023-05-15 PROCEDURE — 99232 SBSQ HOSP IP/OBS MODERATE 35: CPT | Performed by: HOSPITALIST

## 2023-05-15 PROCEDURE — 250N000011 HC RX IP 250 OP 636: Performed by: HOSPITALIST

## 2023-05-15 PROCEDURE — 99222 1ST HOSP IP/OBS MODERATE 55: CPT | Performed by: PODIATRIST

## 2023-05-15 PROCEDURE — 97602 WOUND(S) CARE NON-SELECTIVE: CPT

## 2023-05-15 PROCEDURE — 86850 RBC ANTIBODY SCREEN: CPT | Performed by: STUDENT IN AN ORGANIZED HEALTH CARE EDUCATION/TRAINING PROGRAM

## 2023-05-15 RX ORDER — HEPARIN SODIUM 200 [USP'U]/100ML
1 INJECTION, SOLUTION INTRAVENOUS CONTINUOUS PRN
Status: DISCONTINUED | OUTPATIENT
Start: 2023-05-15 | End: 2023-05-15

## 2023-05-15 RX ORDER — NALOXONE HYDROCHLORIDE 0.4 MG/ML
0.4 INJECTION, SOLUTION INTRAMUSCULAR; INTRAVENOUS; SUBCUTANEOUS
Status: DISCONTINUED | OUTPATIENT
Start: 2023-05-15 | End: 2023-05-15

## 2023-05-15 RX ORDER — LIDOCAINE 40 MG/G
CREAM TOPICAL
Status: DISCONTINUED | OUTPATIENT
Start: 2023-05-15 | End: 2023-05-18

## 2023-05-15 RX ORDER — FENTANYL CITRATE 50 UG/ML
25-50 INJECTION, SOLUTION INTRAMUSCULAR; INTRAVENOUS EVERY 5 MIN PRN
Status: DISCONTINUED | OUTPATIENT
Start: 2023-05-15 | End: 2023-05-15

## 2023-05-15 RX ORDER — CEFAZOLIN SODIUM 1 G/3ML
1 INJECTION, POWDER, FOR SOLUTION INTRAMUSCULAR; INTRAVENOUS EVERY 8 HOURS
Status: DISCONTINUED | OUTPATIENT
Start: 2023-05-15 | End: 2023-05-19

## 2023-05-15 RX ORDER — SODIUM CHLORIDE 9 MG/ML
INJECTION, SOLUTION INTRAVENOUS CONTINUOUS
Status: DISCONTINUED | OUTPATIENT
Start: 2023-05-15 | End: 2023-05-17

## 2023-05-15 RX ORDER — DEXTROSE MONOHYDRATE 25 G/50ML
25-50 INJECTION, SOLUTION INTRAVENOUS
Status: DISCONTINUED | OUTPATIENT
Start: 2023-05-15 | End: 2023-05-18

## 2023-05-15 RX ORDER — NALOXONE HYDROCHLORIDE 0.4 MG/ML
0.2 INJECTION, SOLUTION INTRAMUSCULAR; INTRAVENOUS; SUBCUTANEOUS
Status: DISCONTINUED | OUTPATIENT
Start: 2023-05-15 | End: 2023-05-15

## 2023-05-15 RX ORDER — NICOTINE POLACRILEX 4 MG
15-30 LOZENGE BUCCAL
Status: DISCONTINUED | OUTPATIENT
Start: 2023-05-15 | End: 2023-05-18

## 2023-05-15 RX ORDER — FLUMAZENIL 0.1 MG/ML
0.2 INJECTION, SOLUTION INTRAVENOUS
Status: DISCONTINUED | OUTPATIENT
Start: 2023-05-15 | End: 2023-05-15

## 2023-05-15 RX ADMIN — GABAPENTIN 300 MG: 300 CAPSULE ORAL at 22:08

## 2023-05-15 RX ADMIN — ACETAMINOPHEN 650 MG: 325 TABLET ORAL at 15:52

## 2023-05-15 RX ADMIN — GABAPENTIN 300 MG: 300 CAPSULE ORAL at 08:07

## 2023-05-15 RX ADMIN — LATANOPROST 1 DROP: 50 SOLUTION/ DROPS OPHTHALMIC at 22:09

## 2023-05-15 RX ADMIN — ASPIRIN 325 MG: 325 TABLET, COATED ORAL at 08:07

## 2023-05-15 RX ADMIN — SODIUM CHLORIDE: 9 INJECTION, SOLUTION INTRAVENOUS at 06:51

## 2023-05-15 RX ADMIN — CEFAZOLIN 1 G: 1 INJECTION, POWDER, FOR SOLUTION INTRAMUSCULAR; INTRAVENOUS at 14:19

## 2023-05-15 RX ADMIN — CEFAZOLIN 1 G: 1 INJECTION, POWDER, FOR SOLUTION INTRAMUSCULAR; INTRAVENOUS at 22:08

## 2023-05-15 RX ADMIN — INSULIN GLARGINE 10 UNITS: 100 INJECTION, SOLUTION SUBCUTANEOUS at 11:27

## 2023-05-15 RX ADMIN — GABAPENTIN 300 MG: 300 CAPSULE ORAL at 15:48

## 2023-05-15 RX ADMIN — SIMVASTATIN 40 MG: 40 TABLET, FILM COATED ORAL at 22:08

## 2023-05-15 RX ADMIN — SODIUM CHLORIDE: 9 INJECTION, SOLUTION INTRAVENOUS at 17:55

## 2023-05-15 RX ADMIN — Medication 50 MCG: at 08:07

## 2023-05-15 RX ADMIN — AMOXICILLIN AND CLAVULANATE POTASSIUM 1 TABLET: 875; 125 TABLET, FILM COATED ORAL at 10:01

## 2023-05-15 ASSESSMENT — ACTIVITIES OF DAILY LIVING (ADL)
ADLS_ACUITY_SCORE: 47

## 2023-05-15 NOTE — PRE-PROCEDURE
Interventional Radiology Pre-Procedure Sedation Assessment   Time of Assessment: 11:59 AM    Expected Level: Moderate Sedation    Indication: Sedation is required for the following type of Procedure: Arterial    Sedation and procedural consent: Risks, benefits and alternatives were discussed with Patient    PO Intake: Appropriately NPO for procedure    ASA Class: Class 2 - MILD SYSTEMIC DISEASE, NO ACUTE PROBLEMS, NO FUNCTIONAL LIMITATIONS.    Mallampati: Grade 1:  Soft palate, uvula, tonsillar pillars, and posterior pharyngeal wall visible    Lungs: Lungs Clear with good breath sounds bilaterally    Heart: Normal heart sounds and rate    History and physical reviewed and no updates needed. I have reviewed the lab findings, diagnostic data, medications, and the plan for sedation. I have determined this patient to be an appropriate candidate for the planned sedation and procedure and have reassessed the patient IMMEDIATELY PRIOR to sedation and procedure.    Exam  R palpable DP  L weakly palpable DP     Tricia Cherry MD

## 2023-05-15 NOTE — CONSULTS
[facility]  CONSULTATION NOTE    CHIEF COMPLAINT:      I was asked by Nancy Zamora to evaluate this patient for left heel     PATIENT HISTORY:  Roddy Sidhu is a 77 year old male  with a past medical history significant for what's listed below. He was a transfer from [facility] for his left heel ulcer and vascular evaluation. He was recently hospitalized for several right sided fractures and was discharged to TCU. He presented from the TCU to [facility] on 5/4 and had an ulcer debridement on 5/9 by Dr. Flores.   -At bedside, states left heel is slightly sore. States the doesn't know when the ulcer developed, but it's assumed to have been while at [facility] was spending significant time in bed, due to fractures. Denies N/F/V/C/D         Review of Systems:  A 10 point review of systems was performed and is positive for that noted above in the patient history.  All other areas are negative.     PAST MEDICAL HISTORY: No past medical history on file.     PAST SURGICAL HISTORY:   Past Surgical History:   Procedure Laterality Date     INCISION AND DRAINAGE FOOT, COMBINED Left 5/9/2023    Procedure: Incision and Debidement of Pressure Wound, Left Heel;  Surgeon: Paul Flores DPM;  Location: [facility] OR     SURGICAL HISTORY OF -   1990    removal of steel from left eye        MEDICATIONS:  Reviewed in Epic. Current.     ALLERGIES:  No Known Allergies     SOCIAL HISTORY:   Social History     Socioeconomic History     Marital status:      Spouse name: Not on file     Number of children: Not on file     Years of education: Not on file     Highest education level: Not on file   Occupational History     Not on file   Tobacco Use     Smoking status: Some Days     Types: Pipe     Smokeless tobacco: Never   Vaping Use     Vaping status: Never Used   Substance and Sexual Activity     Alcohol use: Not Currently     Drug use: No     Sexual activity: Not Currently   Other Topics Concern      Parent/sibling w/ CABG, MI or angioplasty before 65F 55M? Yes   Social History Narrative     Not on file     Social Determinants of Health     Financial Resource Strain: Not on file   Food Insecurity: Not on file   Transportation Needs: Not on file   Physical Activity: Not on file   Stress: Not on file   Social Connections: Not on file   Intimate Partner Violence: Not on file   Housing Stability: Not on file        FAMILY HISTORY:   Family History   Problem Relation Age of Onset     Breast Cancer Mother 60     C.A.D. Brother 58        MI        EXAM:Vitals: /63 (BP Location: Right arm)   Pulse 57   Temp 98.2  F (36.8  C) (Oral)   Resp 16   Wt 59.4 kg (130 lb 15.3 oz)   SpO2 98%   BMI 19.06 kg/m    BMI= Body mass index is 19.06 kg/m .    LABS:   .a1c  Last Comprehensive Metabolic Panel:  Sodium   Date Value Ref Range Status   05/15/2023 137 136 - 145 mmol/L Final   05/14/2021 136 133 - 144 mmol/L Final     Potassium   Date Value Ref Range Status   05/15/2023 4.8 3.4 - 5.3 mmol/L Final   05/17/2022 5.3 3.4 - 5.3 mmol/L Final   05/14/2021 5.2 3.4 - 5.3 mmol/L Final     Chloride   Date Value Ref Range Status   05/15/2023 101 98 - 107 mmol/L Final   05/17/2022 108 94 - 109 mmol/L Final   05/14/2021 107 94 - 109 mmol/L Final     Carbon Dioxide   Date Value Ref Range Status   05/14/2021 27 20 - 32 mmol/L Final     Carbon Dioxide (CO2)   Date Value Ref Range Status   05/15/2023 24 22 - 29 mmol/L Final   05/17/2022 23 20 - 32 mmol/L Final     Anion Gap   Date Value Ref Range Status   05/15/2023 12 7 - 15 mmol/L Final   05/17/2022 8 3 - 14 mmol/L Final   05/14/2021 2 (L) 3 - 14 mmol/L Final     Glucose   Date Value Ref Range Status   05/15/2023 229 (H) 70 - 99 mg/dL Final   05/17/2022 109 (H) 70 - 99 mg/dL Final   05/14/2021 126 (H) 70 - 99 mg/dL Final     Comment:     Fasting specimen     GLUCOSE BY METER POCT   Date Value Ref Range Status   05/15/2023 216 (H) 70 - 99 mg/dL Final     Urea Nitrogen   Date Value  Ref Range Status   05/15/2023 12.6 8.0 - 23.0 mg/dL Final   05/17/2022 27 7 - 30 mg/dL Final   05/14/2021 21 7 - 30 mg/dL Final     Creatinine   Date Value Ref Range Status   05/15/2023 0.83 0.67 - 1.17 mg/dL Final   05/14/2021 0.94 0.66 - 1.25 mg/dL Final     GFR Estimate   Date Value Ref Range Status   05/15/2023 >90 >60 mL/min/1.73m2 Final     Comment:     eGFR calculated using 2021 CKD-EPI equation.   05/14/2021 79 >60 mL/min/[1.73_m2] Final     Comment:     Non  GFR Calc  Starting 12/18/2018, serum creatinine based estimated GFR (eGFR) will be   calculated using the Chronic Kidney Disease Epidemiology Collaboration   (CKD-EPI) equation.       Calcium   Date Value Ref Range Status   05/15/2023 8.9 8.8 - 10.2 mg/dL Final   05/14/2021 8.8 8.5 - 10.1 mg/dL Final     Lab Results   Component Value Date    WBC 10.5 05/15/2023    WBC 13.9 12/27/2012     Lab Results   Component Value Date    RBC 3.58 05/15/2023    RBC 5.15 12/27/2012     Lab Results   Component Value Date    HGB 8.9 05/15/2023    HGB 15.3 12/27/2012     Lab Results   Component Value Date    HCT 28.8 05/15/2023    HCT 43.2 12/27/2012     Lab Results   Component Value Date     05/15/2023     12/27/2012      No results found for: INR     General appearance: Patient is alert and fully cooperative with history & exam.  No sign of distress is noted during the visit.     Respiratory: Breathing is regular & unlabored while sitting.     HEENT: Hearing is intact to spoken word.  Speech is clear.  No gross evidence of visual impairment that would impact ambulation.      Dermatologic: Left heel ulcer evaluated: site necrotic with large amount of calcaneal tuberosity exposed that is also devascularized. Site dry and no mani purulence or cellulitis.       Vascular: Dorsalis pedis and posterior tibial pulses are weakly palpable to LLE.      Neurologic: Lower extremity sensation is diminished, bilateral foot, to light touch.  No evidence  of neurological-based weakness or contracture in the lower extremities.       Musculoskeletal: Patient is ambulatory without an assistive device or brace.  No gross foot or ankle deformity noted.       Psychiatric: Affect is pleasant & appropriate.      All cultures:  Recent Labs   Lab 05/09/23  1457   CULTURE 2+ Proteus mirabilis*  1+ Enterococcus faecalis*  No anaerobic organisms isolated after 2 days        IMAGING:   I personally reviewed the images and agree with the reports as stated.  -No signs of definitive osteomyelitis     XR  LEFT CALCANEUS: There is suspected soft tissue ulceration over the  left calcaneus posteriorly. There is some lucency within the superior,  posterior aspect of the left calcaneus on the lateral view suspicious  for osteomyelitis. This can be confirmed with MRI.     RIGHT CALCANEUS: No osseous erosion or periosteal reaction to suggest  Osteomyelitis.    MRI  IMPRESSION:  1.  No evidence for osteomyelitis, septic arthropathy, or abscess.  2.  Edema or cellulitis along the posterior aspect of the foot. There is apparent soft tissue irregularity and likely ulceration along the posterior aspect of the foot and there is susceptibly artifact suggestive of air tracking into the subcutaneous   soft tissues.  3.  No evidence for fracture.  4.  Mild reactive edema within the posteroinferior calcaneus.  5.  Mild Achilles tendinopathy without tearing.    VASCULAR STUDIES  FINDINGS:  Right MARKY:   PT: 1.09.  DP: 1.19     Left MARKY:   PT: 0.78.  DP: 1.1      Waveforms: Biphasic and triphasic in the right posterior tibial, right  dorsalis pedis and left dorsalis pedis. Monophasic in the left  posterior tibial.     IMPRESSION:  1. Peripheral arterial disease is confirmed with scattered areas of  atherosclerotic calcification, in some areas obscuring underlying  lumen.  2. Definite focal velocity elevation in the popliteal artery, though  suspect arterial insufficiency localized to the distal  SFA/popliteal  arterial distribution. Uncertain if severe stenoses and/or occlusion.  3. Unable to demonstrate blood flow in the peroneal artery.  4. Dominant run off arteries anterior tibial artery, though waveform  is monophasic. Trickle of blood flow in the distal posterior tibial  artery.                                                                   IMPRESSION:  1. Normal ABIs bilaterally.          ASSESSMENT:  Left heel ulcer, exposed posterior calcaneus s/p debridement on 5/9   Diabetes Mellitus --> Hba1c: 8.3  Peripheral Arterial Disease      MEDICAL DECISION MAKING:   -Discussed all findings with patient. Chart and imaging reviewed.   -Patient admitted with large necrotic ulcer that was debrided to viable tissue on 5/9. Unfortunately, site now necrotic once again with large amount of calcaneal tuberosity exposed. Imaging doesn't show mani osteomyelitis, but discussed with patient that this ulcer will be extremely difficult to heal as it'll require significant resection of the calcaneus, making the foot nonfunctional for ambulation.   -Discussed with him consideration for below knee amputation. He agrees that this may be best. Will also discuss findings with vascular.   -Cont offloading at all times and to keep clean and dry     Thank you for the consultation request and the opportunity to participate in the care of Roddy Marin DPM   [facility] of Podiatry/Foot & Ankle Surgery  [phone]

## 2023-05-15 NOTE — PLAN OF CARE
Shift [id number]  Patient AOX4. VSS on RA. Pt Assist of 2 bedrest/lift repo Q2. Patient denies pain. Diabetic ulcer left heel non healing wrapped CDI. Pulses in LE weak,numbness in all extremities. Incontinent B/B.Voiding in urinal. Diet mod carb. PICC Single lumen SL. Plan for angiogram today 5/15. WOC consulted.

## 2023-05-15 NOTE — PROGRESS NOTES
Vascular Surgery Note    Re-evaluated by Podiatry and it was felt he will be better off with a below knee amputation. On duplex he has adeqaute flow to heal BKA . We will still plan for LLE angiogram tomorrow (5/16) to ensure he can heal a BKA . NPO after Morris Cherry MD  Fellow

## 2023-05-15 NOTE — PROGRESS NOTES
[facility]  Hospitalist Progress Note   05/15/2023          Assessment and Plan:       Roddy Sidhu is a 77 year old male transferred from [facility] with left heel wound with poor healing and concern for vascular compromise.  He has hx of DM2 with polyneuropathy; recent hospitalization at [facility] (3/29-4/4) for fall with R femur fx, R humerus fx, R clavicle fx s/p R femur ORIF who was d/c'ed to TCU. Presented from TCU to [facility] 5/4 with infected left heel ulcer with sepsis. Treated wtih broad IV abx. Wound debrided 5/9.  Abx changed to PO Augmentin 5/10. However, concern for PAD and LLE MARKY abnormal suggesting signif PAD. Vascular Surgery contacted and reviewed and recommended transfer for further vascular evaluation on 5/13/2023.     Diabetic left heel ulcer - non healing with concern for superimposed infection  Diabetic ulcer right heel.  S/p debridement of left pressure ulcer 5/9/2023  --Pressure injury to heels noted in TCU and seen by wound care 4/17/2023.  The patient recently was diagnosed with osteomyelitis based on an x-ray, but this is not supported by the MRI performed this admission. ABIs done at podiatry's request and were normal.  -Cultures from wound positive for 2+ Proteus, 3+ Enterococcus, 1+ MSSA.   -Changed piperacillin-tazobactam to ampicillin-sulbactam 5/8 to cover Proteus and Enterococcus and MSSA. Stopped vancomycin 5/8 (5 days) given no MRSA. Underwent Debridement on 5/9. Changed ampicillin-sulbactam IV to amoxicillin-clavulanate oral 5/10.     --Vascular surgery following.  Plan for left lower extremity angiogram on Tuesday.N.p.o. after midnight.  Podiatry following.  Appreciate comanagement.  PTA on Augmentin, switch to IV Ancef [5/15]  Noted polymicrobial infection proteus, enterococcus and MSSA.  Rehab evaluation.     Hyperglycemia from uncontrolled diabetes  Uncontrolled diabetes mellitus with a hemoglobin A1c of 8.3.  Diabetic neuropathy.  Noted blood sugar  of 310 this afternoon.  Insulin aspart 5 units x 1.  Monitor blood sugars and 1 hour again  Increase insulin Lantus to 10 units twice daily.  Continue sliding scale insulin.  Switch to low-carb diet  Continue PTA med Neurontin.  Hold PTA metformin and Trulicity.    PAD  Hyperlipidemia.  Continue PTA aspirin and statin.    Physical deconditioning in the setting of ongoing medical illness, senile frailty.  Status post recent fall.  Closed displaced subtrochanteric fracture of right femur 3/29  S/p ORIF right femur fracture with intermedullary nail 3/29     Closed fracture of proximal end of right humerus 3/29   Closed nondisplaced fracture of right clavicle 3/29   Was at TCU prior to transfer  Continue PT/OT  Likely will need TCU for off loading from feet and wound care    Acute on chronic anemia no active bleeding  Hemoglobin was normal 6 months ago, was 8's during recent admission, then 8.2 5/4 and dropped to 6.7-6.9 AM of 5/5.  No black or bloody stools.  No abdominal pain.   No other apparent source of acute blood loss. Guiac negative.  Iron studies suggestive of anemia of chronic inflammation.  Gave 1u RBCs AM 5/5.  Started on protonix twice daily 5/5 empirically.    -Held aspirin - appears to be on this just due to diabetes - recommend reassessing at follow-up with primary care provider if this is really needed.     -Stopped celebrex 100  Mg twice daily that had been started on admission, was on naprosyn prn at home.  -Hemoglobin overall stable since transfusion. Stopped PPI  5/10 no symptoms, no active bleeding, not iron-deficiency anemia  Monitor hemoglobin levels closely.  Next     Tobacco dependence  Caily pipe smokier  Smoking cessation consultation  Nicotine patch     Orders Placed This Encounter      Low Consistent Carb (45 g CHO per Meal) Diet      NPO per Anesthesia Guidelines for Procedure/Surgery Except for: Meds      NPO for Medical/Clinical Reasons Except for: Meds      DVT Prophylaxis: SCDs,  ambulate.  Code Status: Full Code  Disposition: Expected discharge in 2 days pending clinical improvement    Discussed with patient, bedside RN  >35 minutes spent by me on the date of service doing chart review, history, exam, documentation & further activities per the note.      Woodrow Petersen MD        Interval History:      Patient lying in bed.  Denies any chest pain or palpitations.  Denies any headache or dizziness.  No nausea vomiting.  blood sugar elevated greater than 200s.  Denies any pain in the leg.  Per report assistance of 2 with lift.  Tolerating oral diet.  Per report plan for angiogram tomorrow.           Physical Exam:        Physical Exam   Temp:  [97.4  F (36.3  C)-98.2  F (36.8  C)] 98.2  F (36.8  C)  Pulse:  [55-62] 57  Resp:  [14-18] 16  BP: (101-126)/(63-74) 113/63  SpO2:  [97 %-100 %] 98 %    Intake/Output Summary (Last 24 hours) at 5/15/2023 1439  Last data filed at 5/15/2023 1352  Gross per 24 hour   Intake 1760 ml   Output 1250 ml   Net 510 ml       Admission Weight: 59.4 kg (130 lb 15.3 oz)  Current Weight: 59.4 kg (130 lb 15.3 oz)    PHYSICAL EXAM  GENERAL: Patient is in no distress. Alert and oriented.  HEART: Regular rate and rhythm. S1S2. No murmurs  LUNGS: Clear to auscultation bilaterally. No expiratory wheeze.  Respirations unlabored  ABDOMEN: Soft, no abdominal tenderness, bowel sounds heard   NEURO: Moving all extremities.  EXTREMITIES: Pulses in lower extremity weak.    SKIN: Warm, dry.   PSYCHIATRY Cooperative       Medications:          aspirin  325 mg Oral Daily     ceFAZolin  1 g Intravenous Q8H     gabapentin  300 mg Oral TID     insulin aspart  1-7 Units Subcutaneous TID AC     insulin aspart  1-5 Units Subcutaneous At Bedtime     insulin glargine  10 Units Subcutaneous QAM     latanoprost  1 drop Both Eyes At Bedtime     simvastatin  40 mg Oral At Bedtime     sodium chloride (PF)  10-40 mL Intracatheter Q7 Days     sodium chloride (PF)  3 mL Intracatheter Q8H      sodium chloride (PF)  3 mL Intracatheter Q8H     vitamin D3  50 mcg Oral Daily     acetaminophen, glucose **OR** dextrose **OR** glucagon, glucose **OR** dextrose **OR** glucagon, fentaNYL, flumazenil, heparin, heparin, lidocaine 4%, lidocaine 4%, lidocaine (buffered or not buffered), lidocaine (buffered or not buffered), lidocaine (buffered or not buffered), melatonin, midazolam, naloxone **OR** naloxone **OR** naloxone **OR** naloxone, ondansetron **OR** ondansetron, sodium chloride (PF), sodium chloride (PF), sodium chloride (PF), sodium chloride (PF)         Data:      All new lab and imaging data was reviewed.

## 2023-05-16 ENCOUNTER — APPOINTMENT (OUTPATIENT)
Dept: PHYSICAL THERAPY | Facility: CLINIC | Age: 78
DRG: 616 | End: 2023-05-16
Attending: INTERNAL MEDICINE
Payer: COMMERCIAL

## 2023-05-16 ENCOUNTER — APPOINTMENT (OUTPATIENT)
Dept: INTERVENTIONAL RADIOLOGY/VASCULAR | Facility: CLINIC | Age: 78
DRG: 616 | End: 2023-05-16
Attending: INTERNAL MEDICINE
Payer: COMMERCIAL

## 2023-05-16 LAB
GLUCOSE BLDC GLUCOMTR-MCNC: 175 MG/DL (ref 70–99)
GLUCOSE BLDC GLUCOMTR-MCNC: 175 MG/DL (ref 70–99)
GLUCOSE BLDC GLUCOMTR-MCNC: 181 MG/DL (ref 70–99)
GLUCOSE BLDC GLUCOMTR-MCNC: 210 MG/DL (ref 70–99)
GLUCOSE BLDC GLUCOMTR-MCNC: 249 MG/DL (ref 70–99)
GLUCOSE BLDC GLUCOMTR-MCNC: 256 MG/DL (ref 70–99)

## 2023-05-16 PROCEDURE — 99232 SBSQ HOSP IP/OBS MODERATE 35: CPT | Performed by: HOSPITALIST

## 2023-05-16 PROCEDURE — 99152 MOD SED SAME PHYS/QHP 5/>YRS: CPT | Performed by: SURGERY

## 2023-05-16 PROCEDURE — 272N000567 HC SHEATH CR4

## 2023-05-16 PROCEDURE — C1760 CLOSURE DEV, VASC: HCPCS

## 2023-05-16 PROCEDURE — 97530 THERAPEUTIC ACTIVITIES: CPT | Mod: GP | Performed by: PHYSICAL THERAPIST

## 2023-05-16 PROCEDURE — 97110 THERAPEUTIC EXERCISES: CPT | Mod: GP | Performed by: PHYSICAL THERAPIST

## 2023-05-16 PROCEDURE — 272N000116 HC CATH CR1

## 2023-05-16 PROCEDURE — 250N000009 HC RX 250: Performed by: STUDENT IN AN ORGANIZED HEALTH CARE EDUCATION/TRAINING PROGRAM

## 2023-05-16 PROCEDURE — 75710 ARTERY X-RAYS ARM/LEG: CPT | Mod: LT

## 2023-05-16 PROCEDURE — 258N000003 HC RX IP 258 OP 636: Performed by: STUDENT IN AN ORGANIZED HEALTH CARE EDUCATION/TRAINING PROGRAM

## 2023-05-16 PROCEDURE — 120N000001 HC R&B MED SURG/OB

## 2023-05-16 PROCEDURE — 36247 INS CATH ABD/L-EXT ART 3RD: CPT | Performed by: SURGERY

## 2023-05-16 PROCEDURE — 250N000011 HC RX IP 250 OP 636: Performed by: STUDENT IN AN ORGANIZED HEALTH CARE EDUCATION/TRAINING PROGRAM

## 2023-05-16 PROCEDURE — 36247 INS CATH ABD/L-EXT ART 3RD: CPT

## 2023-05-16 PROCEDURE — B41D1ZZ FLUOROSCOPY OF AORTA AND BILATERAL LOWER EXTREMITY ARTERIES USING LOW OSMOLAR CONTRAST: ICD-10-PCS | Performed by: SURGERY

## 2023-05-16 PROCEDURE — C1769 GUIDE WIRE: HCPCS

## 2023-05-16 PROCEDURE — 255N000002 HC RX 255 OP 636: Performed by: SURGERY

## 2023-05-16 PROCEDURE — 250N000011 HC RX IP 250 OP 636: Performed by: HOSPITALIST

## 2023-05-16 PROCEDURE — 250N000013 HC RX MED GY IP 250 OP 250 PS 637: Performed by: INTERNAL MEDICINE

## 2023-05-16 PROCEDURE — 272N000124 HC CATH CR11

## 2023-05-16 PROCEDURE — 272N000196 HC ACCESSORY CR5

## 2023-05-16 PROCEDURE — 76937 US GUIDE VASCULAR ACCESS: CPT | Mod: 26 | Performed by: SURGERY

## 2023-05-16 PROCEDURE — 75710 ARTERY X-RAYS ARM/LEG: CPT | Mod: 26 | Performed by: SURGERY

## 2023-05-16 RX ORDER — NALOXONE HYDROCHLORIDE 0.4 MG/ML
0.2 INJECTION, SOLUTION INTRAMUSCULAR; INTRAVENOUS; SUBCUTANEOUS
Status: DISCONTINUED | OUTPATIENT
Start: 2023-05-16 | End: 2023-05-16

## 2023-05-16 RX ORDER — NALOXONE HYDROCHLORIDE 0.4 MG/ML
0.4 INJECTION, SOLUTION INTRAMUSCULAR; INTRAVENOUS; SUBCUTANEOUS
Status: DISCONTINUED | OUTPATIENT
Start: 2023-05-16 | End: 2023-05-16

## 2023-05-16 RX ORDER — FLUMAZENIL 0.1 MG/ML
0.2 INJECTION, SOLUTION INTRAVENOUS
Status: DISCONTINUED | OUTPATIENT
Start: 2023-05-16 | End: 2023-05-16

## 2023-05-16 RX ORDER — HEPARIN SODIUM 200 [USP'U]/100ML
1 INJECTION, SOLUTION INTRAVENOUS CONTINUOUS PRN
Status: DISCONTINUED | OUTPATIENT
Start: 2023-05-16 | End: 2023-05-16

## 2023-05-16 RX ORDER — FENTANYL CITRATE 50 UG/ML
25-50 INJECTION, SOLUTION INTRAMUSCULAR; INTRAVENOUS EVERY 5 MIN PRN
Status: DISCONTINUED | OUTPATIENT
Start: 2023-05-16 | End: 2023-05-16

## 2023-05-16 RX ORDER — IODIXANOL 320 MG/ML
100 INJECTION, SOLUTION INTRAVASCULAR ONCE
Status: COMPLETED | OUTPATIENT
Start: 2023-05-16 | End: 2023-05-16

## 2023-05-16 RX ADMIN — ASPIRIN 325 MG: 325 TABLET, COATED ORAL at 09:10

## 2023-05-16 RX ADMIN — INSULIN ASPART 1 UNITS: 100 INJECTION, SOLUTION INTRAVENOUS; SUBCUTANEOUS at 22:14

## 2023-05-16 RX ADMIN — MIDAZOLAM 1 MG: 1 INJECTION INTRAMUSCULAR; INTRAVENOUS at 08:12

## 2023-05-16 RX ADMIN — CEFAZOLIN 1 G: 1 INJECTION, POWDER, FOR SOLUTION INTRAMUSCULAR; INTRAVENOUS at 14:19

## 2023-05-16 RX ADMIN — HEPARIN SODIUM 3 BAG: 200 INJECTION, SOLUTION INTRAVENOUS at 07:46

## 2023-05-16 RX ADMIN — LIDOCAINE HYDROCHLORIDE 10 ML: 10 INJECTION, SOLUTION INFILTRATION; PERINEURAL at 08:15

## 2023-05-16 RX ADMIN — SIMVASTATIN 40 MG: 40 TABLET, FILM COATED ORAL at 22:02

## 2023-05-16 RX ADMIN — SODIUM CHLORIDE: 9 INJECTION, SOLUTION INTRAVENOUS at 13:41

## 2023-05-16 RX ADMIN — FENTANYL CITRATE 50 MCG: 50 INJECTION, SOLUTION INTRAMUSCULAR; INTRAVENOUS at 08:12

## 2023-05-16 RX ADMIN — IODIXANOL 100 ML: 320 INJECTION, SOLUTION INTRAVASCULAR at 08:47

## 2023-05-16 RX ADMIN — LATANOPROST 1 DROP: 50 SOLUTION/ DROPS OPHTHALMIC at 22:06

## 2023-05-16 RX ADMIN — GABAPENTIN 300 MG: 300 CAPSULE ORAL at 09:10

## 2023-05-16 RX ADMIN — GABAPENTIN 300 MG: 300 CAPSULE ORAL at 16:06

## 2023-05-16 RX ADMIN — GABAPENTIN 300 MG: 300 CAPSULE ORAL at 22:01

## 2023-05-16 RX ADMIN — CEFAZOLIN 1 G: 1 INJECTION, POWDER, FOR SOLUTION INTRAMUSCULAR; INTRAVENOUS at 05:42

## 2023-05-16 RX ADMIN — CEFAZOLIN 1 G: 1 INJECTION, POWDER, FOR SOLUTION INTRAMUSCULAR; INTRAVENOUS at 22:03

## 2023-05-16 RX ADMIN — Medication 50 MCG: at 09:10

## 2023-05-16 ASSESSMENT — ACTIVITIES OF DAILY LIVING (ADL)
ADLS_ACUITY_SCORE: 47
ADLS_ACUITY_SCORE: 43

## 2023-05-16 NOTE — IR NOTE
Patient Name: Roddy Sidhu  Medical Record Number: [id number]  Today's Date: 5/16/2023    Procedure: Left Lower Extremity Angiogram  Proceduralist: Dr. Weinstein and Dr. Cherry  Pathology present: no    Procedure Start: 0812  Procedure end: 0837  Sedation medications administered: Last Dose: 0812, Fentanyl 50 mcg, Versed 1 mg, Lidocaine 10 ml's.    Report given to: SARA Donovan  : no    Other Notes: Pt will require 2 bedrest post procedure. Pt arrived to IR room 1 from 2209. Consent reviewed. Pt denies any questions or concerns regarding procedure. Pt positioned supine and monitored per protocol. Pt tolerated procedure without any noted complications.

## 2023-05-16 NOTE — PLAN OF CARE
Goal Outcome Evaluation:      Plan of Care Reviewed With: patient    Overall Patient Progress: no changeOverall Patient Progress: no change    A&OX4, VSS, takes tylenol for mild foot pain.  Up to chair with PT and bilateral boots.  Otherwise soft boots in bed to offload heals.  Bilateral heels with wounds, blackened/dry.  Both changed by WOC nurse today, see WOC note.  Doppler present to bilateral PT/DP, pt denies numbness/tingling.  [place] with small reddened, blanchable area ?open/moist.  See WOC note.  Mepilex applied.  Pulsate mattress ordered an applied. Tolerating diet, voiding well.

## 2023-05-16 NOTE — PLAN OF CARE
Goal Outcome Evaluation:    A&Ox4. VSS on RA. Denies pain. R angiogram today, R groin site WNL. BLE pulses w/ doppler. Voiding in urinal, incontinent at times. L PICC infusing NS @100ml/hr, int abx. Mod CHO diet, BG checks & sliding scale coverage given. WOC RN following for L heel wound, dressing CDI. Worked with PT today. Vascular following, plan for BKA sometime this week. Patient emotional regarding BKA, wife updated & spiritual services came to speak with patient.

## 2023-05-16 NOTE — PROGRESS NOTES
"SPIRITUAL HEALTH SERVICES Progress Note  [facility] General Surgery    Saw pt Roddy Sidhu per [facility] consult for emotional support. Pt is [facility]. I provided support through reflective listening that affirmed emotions, experiences, and meaning     Patient/Family Understanding of Illness and Goals of Care - Pt shared that \"it's going to take a day to process\" the news he received today. He spoke about needing to learn a new way of getting around after surgery.     Distress and Loss - Pt expressed feeling \"lonesome\" and \"empty\" without visits from his wife. He shared that they have been together for \"52 years\" and it's hard to be apart. He noted barriers for his wife and daughter to visit.      Strengths, Coping, and Resources - Pt's wife, daughter, and granddaughter are sources of support and \"always there for me.\" Pt expressed many affirmational phrases: \"tomorrow will be better\",  \"it will be ok\", and \"I'm going to take it day by day.\"     Meaning, Beliefs, and Spirituality - Pt shared that he does not find prayer to be meaningful.     Plan of Care - I plan to continue to visit the pt while he is on General Surgery, as he finds visits from Spiritual Health Services to be meaningful.     DHEERAJ DiazDiv  Chaplain Resident   [phone]   "

## 2023-05-16 NOTE — CONSULTS
[facility]  WOC Nurse Inpatient Wound Assessment     Reason for consultation: Evaluate and treat Rt lateral t calcaneous CAPI and  Coccyx CAPI.  Lt calcaneous: DPM managing, rec Lt BKA    Treatment Plan  Wound care: Rt lateral calcaneous and Coccyx  1. Dressing changes on odd days and prn  2. Swab Lt heel with betadine. Let dry Cover Mepilex border  3. Coccyx: mepilex border   4. Rt heel: DPM managing, rec dry gauze to wound until tx Plan determined   5. PIP:      -frequent repositioning in bed      -Pulsate      -Mobility as medically indicated. Pt can transfer with assist x 1.and belt      -incontinence protocol      -Kai risk      -dietician consult      -HOB below 30 degrees when not eating       -Heel suspension @ all times in bed    Orders In Epic  Recommended provider order: NA  WOC Nurse follow-up plan: weekly  Nursing to notify the Provider(s) and re-consult the WOC Nurse if wound(s) deteriorates or new skin concern.    Patient History  According to provider note(s):    Roddy Sidhu is a 77 year old male transferred from [facility] with left heel wound with poor healing and concern for vascular compromise.  He has hx of DM2 with polyneuropathy; recent hospitalization at [facility] (3/29-4/4) for fall with R femur fx, R humerus fx, R clavicle fx s/p R femur ORIF who was d/c'ed to TCU. Presented from TCU to [facility] 5/4 with infected left heel ulcer with sepsis. Treated wtih broad IV abx. Wound debrided 5/9.  Abx changed to PO Augmentin 5/10. However, concern for PAD and LLE MARKY abnormal suggesting signif PAD. Vascular Surgery contacted and reviewed and recommended transfer for further vascular evaluation on 5/13/2023.      Diabetic left heel ulcer - non healing with concern for superimposed infection  Diabetic ulcer right heel.  S/p debridement of left pressure ulcer 5/9/2023  --Pressure injury to heels noted in TCU and seen by wound care 4/17/2023.  The patient recently  was diagnosed with osteomyelitis based on an x-ray, but this is not supported by the MRI performed this admission. ABIs done at podiatry's request and were normal.  -Cultures from wound positive for 2+ Proteus, 3+ Enterococcus, 1+ MSSA.   -Changed piperacillin-tazobactam to ampicillin-sulbactam 5/8 to cover Proteus and Enterococcus and MSSA. Stopped vancomycin 5/8 (5 days) given no MRSA. Underwent Debridement on 5/9. Changed ampicillin-sulbactam IV to amoxicillin-clavulanate oral 5/10.      --Vascular surgery following.  Plan for left lower extremity angiogram on Tuesday.N.p.o. after midnight.  Podiatry following.  Appreciate comanagement.  PTA on Augmentin, switch to IV Ancef [5/15]  Noted polymicrobial infection proteus, enterococcus and MSSA.  Rehab evaluation.     Hyperglycemia from uncontrolled diabetes  Uncontrolled diabetes mellitus with a hemoglobin A1c of 8.3.  Diabetic neuropathy.  Objective Data  Containment of urine/stool: brief with incontinence protocol    Active Diet Order:  Orders Placed This Encounter      NPO per Anesthesia Guidelines for Procedure/Surgery Except for: Meds    Output:   I/O last 3 completed shifts:  In: 3460 [P.O.:1860; I.V.:1600]  Out: 1850 [Urine:1850]    Risk Assessment:   Sensory Perception: 3-->slightly limited  Moisture: 4-->rarely moist  Activity: 2-->chairfast  Mobility: 3-->slightly limited  Nutrition: 3-->adequate  Friction and Shear: 2-->potential problem  Kai Score: 17                          Labs: Recent Labs   Lab 05/15/23  0645   HGB 8.9*   WBC 10.5       Physical Exam  Skin inspection: coccyx and Rt lateral calcaneous    #1Wound Location: Coccyx  Date of las WOCt photo: 5-15-23      Measurements (length x width x depth, in cm):  1.0cm  x 1.0cm x..2cm with 100%  Pink dermis  Tunneling: NA  Undermining: NA  Palpation of the wound bed: firm   Periwound skin: faint ecchymosis but all blanchable  Temperature: warm  Drainage: none  Odor: none  Pain: denies    #2: Wound  Care:  Rt lateral calcaneous  [facility] photo:  5-15-23      Measurements (length x width x depth, in cm): 4.0cm x 3.5cm  x unable to determine with 100% dried reddish/purplish scab   Tunneling/Undermining:unable to determine  Palpation of the wound bed: firm to hard   Periwound skin:intact no erythema}  Temperature: warm  Drainage:none  Odor: none  Pain: denies, neuropathy    Interventions  Current support surface: pulsate  Visual inspection of wound(s) completed  Wound Care: completed per POC  Supplies: floor supply room and pt room  Education provided: discussed with patient  Discussed plan of care with Nursing    Sharri Pepper RN, CWOCN

## 2023-05-16 NOTE — PLAN OF CARE
Goal Outcome Evaluation:    A&O X4, VSS on RA, [place]. Denies pain. PAD, Wounds on Left and right heels, covered with dressing. Foam boots on. NPO, for possible procedure. Angio due today. PICC on left arm, NS infusing at 100. Voiding with assist of urinal. Up with assist 1 W/GB. Plan to continue cares.

## 2023-05-16 NOTE — PROGRESS NOTES
[facility]  Hospitalist Progress Note   05/16/2023          Assessment and Plan:       Roddy Sidhu is a 77 year old male transferred from [facility] with left heel wound with poor healing and concern for vascular compromise.  He has hx of DM2 with polyneuropathy; recent hospitalization at [facility] (3/29-4/4) for fall with R femur fx, R humerus fx, R clavicle fx s/p R femur ORIF who was discharged to TCU.   Presented from TCU to [facility] 5/4 with infected left heel ulcer with sepsis.  Transferred to [facility] for vascular surgery evaluation on 5/13.     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  S/p debridement of left pressure ulcer 5/9/2023  Peripheral arterial disease.  --Pressure injury to heels noted in TCU and seen by wound care 4/17/2023.  Recent ABIs were normal. WBC 17.1.  .60.  Cultures from wound positive for 2+ Proteus, 3+ Enterococcus, 1+ MSSA.    --Was on IV vancomycin 5/4 to 5/8.    Was on intravenous Zosyn (5/4), switched to Unasyn (5/8) to cover Proteus, Enterococcus and MSSA. Switched to oral Augmentin [5/10- 5/14]  Continue IV Ancef [5/15]  --Vascular surgery following.  Underwent lower extremity angiogram 5/16,  Left patent iliac, SFA and AK pop. 50 % at knee pop stenosis, single vessel runoff via VERN.  -- Podiatry following.  Plan for left BKA in the next few days pending OR availability.  Appreciate multidisciplinary team comanagement.  As needed Tylenol, oxycodone as needed for pain.  Continue aspirin 325 mg oral daily, simvastatin 40 mg oral daily.  Needs risk factor modification.  Rehab evaluation.  Trend WBC count, fever curve.    Hyperglycemia from uncontrolled diabetes  Uncontrolled diabetes mellitus with a hemoglobin A1c of 8.3.  Diabetic neuropathy.  Noted blood sugar of 200s this afternoon.    Continue insulin Lantus to 12 units twice daily.  Continue sliding scale insulin.  Carb controlled  diet  Continue PTA med Neurontin.  Hold PTA metformin and Trulicity.    Hyperlipidemia.  Continue PTA  Statin    Physical deconditioning in the setting of ongoing medical illness, senile frailty.  Status post recent fall.  Closed displaced subtrochanteric fracture of right femur 3/29  S/p ORIF right femur fracture with intermedullary nail 3/29     Closed fracture of proximal end of right humerus 3/29   Closed nondisplaced fracture of right clavicle 3/29   Was at TCU prior to transfer  Continue PT/OT, likely TCU on discharge    Acute on chronic anemia no active bleeding  Status post PRBC transfusion 5/5.  Hemoglobin was normal 6 months ago, was 8's during recent admission, then 8.2 on 5/4 and dropped to 6.7-6.9 on 5/5.  No black or bloody stools.  No abdominal pain.   No other apparent source of acute blood loss.   Guiac negative. Iron studies suggestive of anemia of chronic inflammation.   -Received 1 unit blood transfusion on 5/5  -Aspirin was on hold, restarted 5/14.  Discontinued Celebrex [started this hospitalization], naproxen [PTA meds]  --Hemoglobin stable over the last few days.  Discontinued Protonix 5/10.  No active bleeding at this time.  Monitor hemoglobin levels periodically.    Thrombocytosis likely reactive.  Uptrending platelet count of 589.  Monitor periodically.     Tobacco dependence  Caily pipe smokier  Smoking cessation consultation  Nicotine patch     Orders Placed This Encounter      Combination Diet Moderate Consistent Carb (60 g CHO per Meal) Diet      DVT Prophylaxis: SCDs, ambulate.  Code Status: Full Code  Disposition: Expected discharge in 2 days pending clinical improvement    Discussed with patient, floor RN  >35 minutes spent by me on the date of service doing chart review, history, exam, documentation & further activities per the note.      Woodrow Petresen MD        Interval History:        Patient lying in bed.  Denies any chest pain or palpitations.  Denies any headache or  dizziness.  No nausea vomiting.  blood sugar elevated 150 - 200s  Denies any pain in the leg.  Per report assistance of 1 with GB  Tolerating oral diet.  Underwent angiogram this morning           Physical Exam:        Physical Exam   Temp:  [97.6  F (36.4  C)-98  F (36.7  C)] 97.6  F (36.4  C)  Pulse:  [60-70] 61  Resp:  [11-18] 18  BP: (113-199)/(63-87) 131/78  SpO2:  [92 %-100 %] 100 %    Intake/Output Summary (Last 24 hours) at 5/15/2023 1439  Last data filed at 5/15/2023 1352  Gross per 24 hour   Intake 1760 ml   Output 1250 ml   Net 510 ml       Admission Weight: 59.4 kg (130 lb 15.3 oz)  Current Weight: 59.4 kg (130 lb 15.3 oz)    PHYSICAL EXAM  GENERAL: Patient is in no distress. Alert and oriented.  HEART: Regular rate and rhythm. S1S2. No murmurs  LUNGS:Respirations unlabored  NEURO: Moving all extremities.  EXTREMITIES: Pulses in lower extremity weak.  Dressing +  SKIN: Warm, dry.   PSYCHIATRY Cooperative       Medications:          aspirin  325 mg Oral Daily     ceFAZolin  1 g Intravenous Q8H     gabapentin  300 mg Oral TID     insulin aspart  1-7 Units Subcutaneous TID AC     insulin aspart  1-5 Units Subcutaneous At Bedtime     insulin glargine  10 Units Subcutaneous BID     latanoprost  1 drop Both Eyes At Bedtime     simvastatin  40 mg Oral At Bedtime     sodium chloride (PF)  10-40 mL Intracatheter Q7 Days     sodium chloride (PF)  3 mL Intracatheter Q8H     sodium chloride (PF)  3 mL Intracatheter Q8H     vitamin D3  50 mcg Oral Daily     acetaminophen, glucose **OR** dextrose **OR** glucagon, glucose **OR** dextrose **OR** glucagon, fentaNYL, flumazenil, heparin, lidocaine 4%, lidocaine 4%, lidocaine (buffered or not buffered), lidocaine (buffered or not buffered), melatonin, midazolam, naloxone **OR** naloxone **OR** naloxone **OR** naloxone, ondansetron **OR** ondansetron, sodium chloride (PF), sodium chloride (PF), sodium chloride (PF), sodium chloride (PF)         Data:      All new lab and  imaging data was reviewed.

## 2023-05-16 NOTE — BRIEF OP NOTE
Interventional Radiology Brief Post Procedure Note    Procedure:      1. Left lower extremity angiogram with second order catheterization   2. RIght iliofemoral angiogram   3. Angioseal closure of R femoral arteriotomy   4. Completion R groin duplex     Proceduralist: Js    Assistant: Dilip     Time Out: Prior to the start of the procedure and with procedural staff participation, I verbally confirmed the patient s identity using two indicators, relevant allergies, that the procedure was appropriate and matched the consent or emergent situation, and that the correct equipment/implants were available. Immediately prior to starting the procedure I conducted the Time Out with the procedural staff and re-confirmed the patient s name, procedure, and site/side. (The Joint Commission universal protocol was followed.)  Yes        Sedation: IR Nurse Monitored Care   Post Procedure Summary:  Prior to the start of the procedure and with procedural staff participation, I verbally confirmed the patient s identity using two indicators, relevant allergies, that the procedure was appropriate and matched the consent or emergent situation, and that the correct equipment/implants were available. Immediately prior to starting the procedure I conducted the Time Out with the procedural staff and re-confirmed the patient s name, procedure, and site/side. (The Joint Mind[employer] universal protocol was followed.)  Yes       Sedatives: Fentanyl and Midazolam (Versed)    Vital signs, airway and pulse oximetry were monitored and remained stable throughout the procedure and sedation was maintained until the procedure was complete.  The patient was monitored by staff until sedation discharge criteria were met.    Patient tolerance: Patient tolerated the procedure well with no immediate complications.    Time of sedation in minutes: 30 Minutes minutes from beginning to end of physician one to one monitoring.          Findings: L patetn iliac,  SFA and AK pop. 50 % at knee pop stenosis, single vessel runoff via VERN. No PT reconstitution, no plantar reconstiution. No bleeding or PSA after Angioseal. Palpable R DP       Estimated Blood Loss: Less than 10 ml    Fluoroscopy Time:  minute(s)    SPECIMENS: None    Complications: 1. None     Condition: Stable    Plan:     2hrs bed rest  L BKA in next few days pending OR availability     Comments: See dictated procedure note for full details.    Tricia Cherry MD

## 2023-05-16 NOTE — PLAN OF CARE
"Occupational Therapy: Orders received. Chart reviewed and discussed with care team, including IP PT. Per chart \"Tentatively plan for L BKA on either Thursday or Friday this week.\" All current therapy needs are being met with IP PT. Will complete OT orders. Please reorder OT after procedure.      "

## 2023-05-16 NOTE — PROGRESS NOTES
Vascular Surgery Note    Discussed findings of angiogram with patient . Recommend left below knee amputation. Discussed that he will probably be ready for discharge after amputation in about a week. SW consult to help decide if he would be able to go home afterwards or need rehab     Tentatively plan for L BKA on either Thursday or Friday this week    Ok to eat today    R groin puncture site C/D, no flank pain or hematom a, R palpable DP    Tricia Cherry MD  Fellow

## 2023-05-17 LAB
ALBUMIN SERPL BCG-MCNC: 2.6 G/DL (ref 3.5–5.2)
ALP SERPL-CCNC: 74 U/L (ref 40–129)
ALT SERPL W P-5'-P-CCNC: 8 U/L (ref 10–50)
ANION GAP SERPL CALCULATED.3IONS-SCNC: 10 MMOL/L (ref 7–15)
AST SERPL W P-5'-P-CCNC: 11 U/L (ref 10–50)
BACTERIA TISS BX CULT: NORMAL
BILIRUB DIRECT SERPL-MCNC: <0.2 MG/DL (ref 0–0.3)
BILIRUB SERPL-MCNC: 0.2 MG/DL
BUN SERPL-MCNC: 11.3 MG/DL (ref 8–23)
CALCIUM SERPL-MCNC: 8.4 MG/DL (ref 8.8–10.2)
CHLORIDE SERPL-SCNC: 106 MMOL/L (ref 98–107)
CREAT SERPL-MCNC: 0.76 MG/DL (ref 0.67–1.17)
DEPRECATED HCO3 PLAS-SCNC: 22 MMOL/L (ref 22–29)
ERYTHROCYTE [DISTWIDTH] IN BLOOD BY AUTOMATED COUNT: 18.2 % (ref 10–15)
GFR SERPL CREATININE-BSD FRML MDRD: >90 ML/MIN/1.73M2
GLUCOSE BLDC GLUCOMTR-MCNC: 194 MG/DL (ref 70–99)
GLUCOSE BLDC GLUCOMTR-MCNC: 201 MG/DL (ref 70–99)
GLUCOSE BLDC GLUCOMTR-MCNC: 273 MG/DL (ref 70–99)
GLUCOSE BLDC GLUCOMTR-MCNC: 287 MG/DL (ref 70–99)
GLUCOSE SERPL-MCNC: 198 MG/DL (ref 70–99)
HCT VFR BLD AUTO: 24.7 % (ref 40–53)
HGB BLD-MCNC: 7.6 G/DL (ref 13.3–17.7)
MCH RBC QN AUTO: 25.2 PG (ref 26.5–33)
MCHC RBC AUTO-ENTMCNC: 30.8 G/DL (ref 31.5–36.5)
MCV RBC AUTO: 82 FL (ref 78–100)
PLATELET # BLD AUTO: 574 10E3/UL (ref 150–450)
POTASSIUM SERPL-SCNC: 4.4 MMOL/L (ref 3.4–5.3)
PROT SERPL-MCNC: 5.7 G/DL (ref 6.4–8.3)
RBC # BLD AUTO: 3.02 10E6/UL (ref 4.4–5.9)
SODIUM SERPL-SCNC: 138 MMOL/L (ref 136–145)
WBC # BLD AUTO: 9.3 10E3/UL (ref 4–11)

## 2023-05-17 PROCEDURE — 250N000011 HC RX IP 250 OP 636: Performed by: HOSPITALIST

## 2023-05-17 PROCEDURE — 258N000003 HC RX IP 258 OP 636: Performed by: HOSPITALIST

## 2023-05-17 PROCEDURE — 250N000013 HC RX MED GY IP 250 OP 250 PS 637: Performed by: INTERNAL MEDICINE

## 2023-05-17 PROCEDURE — 80053 COMPREHEN METABOLIC PANEL: CPT | Performed by: HOSPITALIST

## 2023-05-17 PROCEDURE — 120N000001 HC R&B MED SURG/OB

## 2023-05-17 PROCEDURE — 99232 SBSQ HOSP IP/OBS MODERATE 35: CPT | Performed by: HOSPITALIST

## 2023-05-17 PROCEDURE — 85014 HEMATOCRIT: CPT | Performed by: HOSPITALIST

## 2023-05-17 PROCEDURE — 82310 ASSAY OF CALCIUM: CPT | Performed by: HOSPITALIST

## 2023-05-17 PROCEDURE — 82248 BILIRUBIN DIRECT: CPT | Performed by: HOSPITALIST

## 2023-05-17 PROCEDURE — 258N000003 HC RX IP 258 OP 636: Performed by: STUDENT IN AN ORGANIZED HEALTH CARE EDUCATION/TRAINING PROGRAM

## 2023-05-17 RX ADMIN — CEFAZOLIN 1 G: 1 INJECTION, POWDER, FOR SOLUTION INTRAMUSCULAR; INTRAVENOUS at 15:06

## 2023-05-17 RX ADMIN — SIMVASTATIN 40 MG: 40 TABLET, FILM COATED ORAL at 22:13

## 2023-05-17 RX ADMIN — GABAPENTIN 300 MG: 300 CAPSULE ORAL at 08:48

## 2023-05-17 RX ADMIN — Medication 50 MCG: at 08:48

## 2023-05-17 RX ADMIN — CEFAZOLIN 1 G: 1 INJECTION, POWDER, FOR SOLUTION INTRAMUSCULAR; INTRAVENOUS at 22:00

## 2023-05-17 RX ADMIN — CEFAZOLIN 1 G: 1 INJECTION, POWDER, FOR SOLUTION INTRAMUSCULAR; INTRAVENOUS at 05:44

## 2023-05-17 RX ADMIN — LATANOPROST 1 DROP: 50 SOLUTION/ DROPS OPHTHALMIC at 22:15

## 2023-05-17 RX ADMIN — IRON SUCROSE 300 MG: 20 INJECTION, SOLUTION INTRAVENOUS at 13:23

## 2023-05-17 RX ADMIN — ACETAMINOPHEN 650 MG: 325 TABLET ORAL at 14:31

## 2023-05-17 RX ADMIN — ASPIRIN 325 MG: 325 TABLET, COATED ORAL at 08:48

## 2023-05-17 RX ADMIN — GABAPENTIN 300 MG: 300 CAPSULE ORAL at 16:55

## 2023-05-17 RX ADMIN — SODIUM CHLORIDE: 9 INJECTION, SOLUTION INTRAVENOUS at 00:25

## 2023-05-17 RX ADMIN — GABAPENTIN 300 MG: 300 CAPSULE ORAL at 22:13

## 2023-05-17 RX ADMIN — SODIUM CHLORIDE: 9 INJECTION, SOLUTION INTRAVENOUS at 11:09

## 2023-05-17 ASSESSMENT — ACTIVITIES OF DAILY LIVING (ADL)
ADLS_ACUITY_SCORE: 43
ADLS_ACUITY_SCORE: 47
ADLS_ACUITY_SCORE: 43
ADLS_ACUITY_SCORE: 43
ADLS_ACUITY_SCORE: 47

## 2023-05-17 NOTE — PROGRESS NOTES
PVD w/o Tear OD - RD precautions. [facility]  Hospitalist Progress Note   05/17/2023          Assessment and Plan:       Roddy Sidhu is a 77 year old male history of diabetes mellitus with polyneuropathy transferred from [facility] on 5/13/2023 with poor healing left heel wound and concern for vascular compromise-requiring vascular surgery evaluation for    Patient admitted at [facility] (3/29-4/4) for fall with R femur fx, R humerus fx, R clavicle fx s/p R femur ORIF and discharged to TCU.     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  S/p debridement of left pressure ulcer 5/9/2023  Peripheral arterial disease.  --Pressure injury to heels noted in TCU and seen by wound care 4/17/2023.  Recent ABIs were normal. WBC 17.1.  .60.  Wound cultures from 5/5 positive for 2+ Proteus, 3+ Enterococcus, 1+ MSSA.    --Was on IV vancomycin 5/4 to 5/8.    Was on intravenous Zosyn (5/4), switched to Unasyn (5/8) to cover Proteus, Enterococcus and MSSA. Then switched to oral Augmentin [5/10- 5/14]  Continue IV Ancef [5/15]  --Vascular surgery following.  Underwent lower extremity angiogram 5/16,  Left patent iliac, SFA and AK pop. 50 % at knee pop stenosis, single vessel runoff via VERN.  -- Podiatry following.  Plan for left BKA  Likely Friday   -- Consult infectious disease for antibiotic recommendation  Appreciate multidisciplinary team comanagement.  As needed Tylenol, oxycodone as needed for pain.  Continue aspirin 325 mg oral daily, simvastatin 40 mg oral daily.  Needs risk factor modification.  Rehab evaluation.  Trend WBC count, fever curve.    Hyperglycemia from uncontrolled diabetes  Uncontrolled diabetes mellitus with a hemoglobin A1c of 8.3.  Diabetic neuropathy.  Noted blood sugar of 200s this afternoon.    Increase insulin Lantus to 14 units twice daily  Continue sliding scale insulin.  Carb controlled diet  Continue PTA med Neurontin.  Hold PTA metformin and  Trulicity.    Hyperlipidemia.  Continue PTA  Statin    Physical deconditioning in the setting of ongoing medical illness, senile frailty.  Status post recent fall.  Closed displaced subtrochanteric fracture of right femur 3/29  S/p ORIF right femur fracture with intermedullary nail 3/29     Closed fracture of proximal end of right humerus 3/29   Closed nondisplaced fracture of right clavicle 3/29   Was at TCU prior to transfer  Continue PT/OT, likely TCU on discharge    Acute on chronic anemia no active bleeding  Status post PRBC transfusion 5/5.  Hemoglobin was normal 6 months ago, was 8's during recent admission, then 8.2 on 5/4 and dropped to 6.7-6.9 on 5/5.  No black or bloody stools.  No abdominal pain.   No other apparent source of acute blood loss.   Guiac negative.   Iron sat index 5, Will order IV Venofer for 2 doses.  -Received 1 unit blood transfusion on 5/5  -Aspirin was on hold, restarted 5/14 and noticed that hemoglobin drifting downwards to 7.5.  Discontinued Celebrex [started this hospitalization], naproxen [PTA meds]  No active bleeding at this time.    T[place]une for hemoglobin less than 7 or symptomatic.  Monitor periodically.    Thrombocytosis likely reactive in the setting of infection.  Uptrending platelet count of 589.  Monitor periodically.     Tobacco dependence  Caily pipe smokier  Smoking cessation consultation  Nicotine patch     Orders Placed This Encounter      Combination Diet Moderate Consistent Carb (60 g CHO per Meal) Diet      DVT Prophylaxis: SCDs, ambulate.  Code Status: Full Code  Disposition: Expected discharge in 2 days pending clinical improvement    Discussed with patient, floor RN  >35 minutes spent by me on the date of service doing chart review, history, exam, documentation & further activities per the note.      Woodrow Petersen MD        Interval History:        Patient lying in bed.  Denies any chest pain or palpitations.  Denies any headache or dizziness.  No nausea  vomiting.  Blood sugars in 200s.  Tolerating oral diet  Denies any pain in the leg.  Per report assistance of 1 with GB  Expressing some frustration with delay in procedure.  Afebrile.  No blood in the stools or blood in the urine or active bleeding at this time.         Physical Exam:        Physical Exam   Temp:  [97.5  F (36.4  C)-98  F (36.7  C)] 97.5  F (36.4  C)  Pulse:  [58-65] 60  Resp:  [16-18] 16  BP: (101-140)/(65-78) 126/69  SpO2:  [98 %-100 %] 100 %    Intake/Output Summary (Last 24 hours) at 5/15/2023 1439  Last data filed at 5/15/2023 1352  Gross per 24 hour   Intake 1760 ml   Output 1250 ml   Net 510 ml       Admission Weight: 59.4 kg (130 lb 15.3 oz)  Current Weight: 59.4 kg (130 lb 15.3 oz)    PHYSICAL EXAM  GENERAL: Patient is in no distress. Alert and oriented.  HEART: Regular rate and rhythm. S1S2. No murmurs  LUNGS:Respirations unlabored  NEURO: Moving all extremities.  EXTREMITIES: Pulses in lower extremity weak.  Dressing +  SKIN: Warm, dry.   PSYCHIATRY Cooperative       Medications:          aspirin  325 mg Oral Daily     ceFAZolin  1 g Intravenous Q8H     gabapentin  300 mg Oral TID     insulin aspart  1-7 Units Subcutaneous TID AC     insulin aspart  1-5 Units Subcutaneous At Bedtime     insulin glargine  12 Units Subcutaneous BID     latanoprost  1 drop Both Eyes At Bedtime     simvastatin  40 mg Oral At Bedtime     sodium chloride (PF)  10-40 mL Intracatheter Q7 Days     sodium chloride (PF)  3 mL Intracatheter Q8H     sodium chloride (PF)  3 mL Intracatheter Q8H     vitamin D3  50 mcg Oral Daily     acetaminophen, glucose **OR** dextrose **OR** glucagon, glucose **OR** dextrose **OR** glucagon, lidocaine 4%, lidocaine 4%, lidocaine (buffered or not buffered), lidocaine (buffered or not buffered), melatonin, ondansetron **OR** ondansetron, sodium chloride (PF), sodium chloride (PF), sodium chloride (PF), sodium chloride (PF)         Data:      All new lab and imaging data was reviewed.

## 2023-05-17 NOTE — PLAN OF CARE
Goal Outcome Evaluation:    A&Ox4, tearful at times & emotional support provided. [place]. VSS on RA. C/o mild bilateral toe pain, controlled with tylenol. Up A1 w/ gb/w, steady. Wounds to bilateral heels, dressings completed by overnight RN & CDI. [place] boots on when in bed. R groin site WNL. BLE pulses with doppler. L PICC w/ int abx & IV iron given x1. Voiding in urinal at bedside. BKA planned for Friday, family updated.

## 2023-05-17 NOTE — PLAN OF CARE
Goal Outcome Evaluation:      Plan of Care Reviewed With: patient    Overall Patient Progress: no changeOverall Patient Progress: no change    A&Ox4. VSS on RA. On Mod CHO diet. Denies pain. R groin site WNL. BLE pulses present w/ doppler. LE wound dressing intact, CDI. RE wound dressing intact, CDI. PICC infusing NS at 100ml/hr.

## 2023-05-17 NOTE — PLAN OF CARE
Goal Outcome Evaluation:    A&O X4, VSS on RA, [place]. Denies pain. PAD, Wounds on Left and right heels, covered with dressing. Foam boots on. Right groin WNL, pulse with doppler. PICC on left arm, NS infusing at 100 mL/hr.Wound care done L/R heel, Coccyx, covered with Mepilex. Voiding with assist of urinal. Normoactive bowels, BM X1. Up with assist 1 W/GB. BKA planned for  Thursday of Friday

## 2023-05-18 LAB
GLUCOSE BLDC GLUCOMTR-MCNC: 100 MG/DL (ref 70–99)
GLUCOSE BLDC GLUCOMTR-MCNC: 160 MG/DL (ref 70–99)
GLUCOSE BLDC GLUCOMTR-MCNC: 94 MG/DL (ref 70–99)
GLUCOSE BLDC GLUCOMTR-MCNC: 95 MG/DL (ref 70–99)
HGB BLD-MCNC: 7.3 G/DL (ref 13.3–17.7)

## 2023-05-18 PROCEDURE — 250N000011 HC RX IP 250 OP 636: Performed by: HOSPITALIST

## 2023-05-18 PROCEDURE — 99232 SBSQ HOSP IP/OBS MODERATE 35: CPT | Performed by: HOSPITALIST

## 2023-05-18 PROCEDURE — 250N000013 HC RX MED GY IP 250 OP 250 PS 637: Performed by: INTERNAL MEDICINE

## 2023-05-18 PROCEDURE — 99222 1ST HOSP IP/OBS MODERATE 55: CPT | Performed by: INTERNAL MEDICINE

## 2023-05-18 PROCEDURE — 258N000003 HC RX IP 258 OP 636: Performed by: HOSPITALIST

## 2023-05-18 PROCEDURE — 120N000001 HC R&B MED SURG/OB

## 2023-05-18 PROCEDURE — 85018 HEMOGLOBIN: CPT | Performed by: HOSPITALIST

## 2023-05-18 RX ADMIN — IRON SUCROSE 300 MG: 20 INJECTION, SOLUTION INTRAVENOUS at 08:59

## 2023-05-18 RX ADMIN — GABAPENTIN 300 MG: 300 CAPSULE ORAL at 22:08

## 2023-05-18 RX ADMIN — CEFAZOLIN 1 G: 1 INJECTION, POWDER, FOR SOLUTION INTRAMUSCULAR; INTRAVENOUS at 22:07

## 2023-05-18 RX ADMIN — ASPIRIN 325 MG: 325 TABLET, COATED ORAL at 08:58

## 2023-05-18 RX ADMIN — LATANOPROST 1 DROP: 50 SOLUTION/ DROPS OPHTHALMIC at 22:08

## 2023-05-18 RX ADMIN — Medication 50 MCG: at 08:58

## 2023-05-18 RX ADMIN — CEFAZOLIN 1 G: 1 INJECTION, POWDER, FOR SOLUTION INTRAMUSCULAR; INTRAVENOUS at 13:13

## 2023-05-18 RX ADMIN — SIMVASTATIN 40 MG: 40 TABLET, FILM COATED ORAL at 22:08

## 2023-05-18 RX ADMIN — CEFAZOLIN 1 G: 1 INJECTION, POWDER, FOR SOLUTION INTRAMUSCULAR; INTRAVENOUS at 06:12

## 2023-05-18 RX ADMIN — GABAPENTIN 300 MG: 300 CAPSULE ORAL at 16:00

## 2023-05-18 RX ADMIN — GABAPENTIN 300 MG: 300 CAPSULE ORAL at 08:58

## 2023-05-18 ASSESSMENT — ACTIVITIES OF DAILY LIVING (ADL)
DRESSING/BATHING_DIFFICULTY: YES
WEAR_GLASSES_OR_BLIND: YES
DRESS: 1-->ASSISTANCE (EQUIPMENT/PERSON) NEEDED
DESCRIBE_HEARING_LOSS: BILATERAL HEARING LOSS
CONCENTRATING,_REMEMBERING_OR_MAKING_DECISIONS_DIFFICULTY: NO
DRESSING/BATHING: BATHING DIFFICULTY, ASSISTANCE 1 PERSON
THE_FOLLOWING_AIDS_WERE_PROVIDED;: POCKET TALKER
ADLS_ACUITY_SCORE: 41
PATIENT'S_PREFERRED_MEANS_OF_COMMUNICATION: ENGLISH SPEAKER WITH HEARING LOSS, NO SPEECH PROBLEMS.
NUMBER_OF_TIMES_PATIENT_HAS_FALLEN_WITHIN_LAST_SIX_MONTHS: 2
WALKING_OR_CLIMBING_STAIRS_DIFFICULTY: YES
ADLS_ACUITY_SCORE: 43
TRANSFERRING: 1-->ASSISTANCE (EQUIPMENT/PERSON) NEEDED (NOT DEVELOPMENTALLY APPROPRIATE)
ADLS_ACUITY_SCORE: 41
EQUIPMENT_CURRENTLY_USED_AT_HOME: WALKER, ROLLING
WALKING_OR_CLIMBING_STAIRS: AMBULATION DIFFICULTY, ASSISTANCE 1 PERSON
DOING_ERRANDS_INDEPENDENTLY_DIFFICULTY: NO
ADLS_ACUITY_SCORE: 43
HEARING_DIFFICULTY_OR_DEAF: YES
DIFFICULTY_EATING/SWALLOWING: NO
BATHING: 1-->ASSISTANCE NEEDED
ADLS_ACUITY_SCORE: 43
CHANGE_IN_FUNCTIONAL_STATUS_SINCE_ONSET_OF_CURRENT_ILLNESS/INJURY: NO
TOILETING_ISSUES: NO
WERE_AUXILIARY_AIDS_OFFERED?: YES
DRESS: 0-->ASSISTANCE NEEDED (DEVELOPMENTALLY APPROPRIATE)
FALL_HISTORY_WITHIN_LAST_SIX_MONTHS: YES
DIFFICULTY_COMMUNICATING: NO
VISION_MANAGEMENT: GLASSES
TRANSFERRING: 1-->ASSISTANCE (EQUIPMENT/PERSON) NEEDED
ADLS_ACUITY_SCORE: 41
ADLS_ACUITY_SCORE: 41

## 2023-05-18 NOTE — PLAN OF CARE
Goal Outcome Evaluation:     Pt is alert and oriented, tolerates diet, picc line in place for antibiotics, wound care is ordered for odd days, will be done tomorrow, denies pain, up in room with assist of one, pulses to BLE with doppler, will be NPO after MN for surgery friday

## 2023-05-18 NOTE — PLAN OF CARE
Goal Outcome Evaluation:      Plan of Care Reviewed With: patient    Overall Patient Progress: no changeOverall Patient Progress: no change    A&OX4. VSS on RA. PICC in LA, SL. Rep Q2 overnight. Denies pain. On Mod CHO diet. R groin site WNL. BLE pluses present w/doppler. BLE wound dressing, CDI. Root boots on bilateral overnight. Use urinal at bedside, void adequately. Left BKA planned for Friday.

## 2023-05-18 NOTE — CONSULTS
Care Management Initial Consult    General Information  Assessment completed with: Patient, Marvin  Type of CM/SW Visit: Initial Assessment    Primary Care Provider verified and updated as needed: Yes   Readmission within the last 30 days: previous discharge plan unsuccessful      Reason for Consult: discharge planning, care coordination/care conference  Advance Care Planning: Advance Care Planning Reviewed: present on chart          Communication Assessment  Patient's communication style: spoken language (English or Bilingual)    Hearing Difficulty or Deaf: yes   Wear Glasses or Blind: yes    Cognitive  Cognitive/Neuro/Behavioral: WDL                      Living Environment:   People in home: spouse, child(tomasa), adult, grandchild(tomasa)     Current living Arrangements: house      Able to return to prior arrangements:         Family/Social Support:  Care provided by: self  Provides care for: no one  Marital Status:   Wife, Children          Description of Support System: Involved, Supportive    Support Assessment: Adequate family and caregiver support    Current Resources:   Patient receiving home care services: Yes  Skilled Home Care Services: Home Health Aid (Had home care set up prior to admission)  Community Resources: None  Equipment currently used at home: walker, rolling  Supplies currently used at home: Other (2WW, wheelchair, straight cane, grab bars by toilet and shower)    Employment/Financial:  Employment Status: retired     Employment/ Comments: No  Financial Concerns:     Referral to Financial Worker: No       Does the patient's insurance plan have a 3 day qualifying hospital stay waiver?  No    Lifestyle & Psychosocial Needs:  Social Determinants of Health     Tobacco Use: High Risk (5/16/2023)    Patient History      Smoking Tobacco Use: Some Days      Smokeless Tobacco Use: Never      Passive Exposure: Not on file   Alcohol Use: Not on file   Financial Resource Strain: Not on file   Food  Insecurity: Not on file   Transportation Needs: Not on file   Physical Activity: Not on file   Stress: Not on file   Social Connections: Not on file   Intimate Partner Violence: Not on file   Depression: Not at risk (5/2/2023)    PHQ-2      PHQ-2 Score: 0   Housing Stability: Not on file       Functional Status:  Prior to admission patient needed assistance:              Mental Health Status:          Chemical Dependency Status:                Values/Beliefs:  Spiritual, Cultural Beliefs, Jehovah's witness Practices, Values that affect care: no               Additional Information:  Per Care Transitions consult for discharge planning. Patient admitted to [facility] on 5/13/23 with peripheral artery disease . The tentative date of discharge is 5/22/23. Reviewed chart and spoke with patient regarding discharge planning and therapy recommendations for TCU. Patient lives with his wife, daughter, son-in-law, and their children in a single level home. TCU options were given and patient gave choices in order of preference; [facility] and any other facility in the [place] [facility]. Initial SNF referrals sent via [facility]. Pt will need MH transport at d\c. SW did not discuss transport cost with pt. Patient is in agreement with this plan.       Cynthia Rivera, MSW, LSW     [facility]

## 2023-05-18 NOTE — PROGRESS NOTES
[facility]  Hospitalist Progress Note   05/18/2023          Assessment and Plan:       Roddy Sidhu is a 77 year old male history of diabetes mellitus with polyneuropathy transferred from [facility] on 5/13/2023 with poor healing left heel wound and concern for vascular compromise-requiring vascular surgery evaluation for    Patient admitted at [facility] (3/29-4/4) for fall with R femur fx, R humerus fx, R clavicle fx s/p R femur ORIF and discharged to TCU.     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  S/p debridement of left pressure ulcer 5/9/2023  Peripheral arterial disease.  --Pressure injury to heels noted in TCU and seen by wound care 4/17/2023.  Recent ABIs were normal. WBC 17.1.  .60.  Wound cultures from 5/5 positive for 2+ Proteus, 3+ Enterococcus, 1+ MSSA.    --Was on IV vancomycin 5/4 to 5/8.    Was on intravenous Zosyn (5/4), switched to Unasyn (5/8) to cover Proteus, Enterococcus and MSSA. Then switched to oral Augmentin [5/10- 5/14]  Continue IV Ancef [5/15].  CRP improving from 123.60  >25.6   --Vascular surgery following.  Underwent lower extremity angiogram 5/16,  Left patent iliac, SFA and AK pop. 50 % at knee pop stenosis, single vessel runoff via VERN.  -- Podiatry following.  Plan for left BKA  Likely Friday   --Infectious disease following.  Recommend discontinue antibiotics after amputation.  Appreciate input.  Appreciate multidisciplinary team comanagement.  As needed Tylenol, oxycodone as needed for pain.  Continue aspirin 325 mg oral daily, simvastatin 40 mg oral daily.  Needs risk factor modification.  Rehab evaluation.  Trend WBC count, fever curve.    Hyperglycemia from uncontrolled diabetes  Uncontrolled diabetes mellitus with a hemoglobin A1c of 8.3.  Diabetic neuropathy.  Noted blood sugar of 200s this afternoon.    Continue insulin Lantus to 14 units twice daily  Continue sliding scale  insulin.  Carb controlled diet  Continue PTA med Neurontin.  Hold PTA metformin and Trulicity.    Hyperlipidemia.  Continue PTA  Statin    Physical deconditioning in the setting of ongoing medical illness, senile frailty.  Status post recent fall.  Closed displaced subtrochanteric fracture of right femur 3/29  S/p ORIF right femur fracture with intermedullary nail 3/29     Closed fracture of proximal end of right humerus 3/29   Closed nondisplaced fracture of right clavicle 3/29   Was at TCU prior to transfer  Continue PT/OT, likely TCU on discharge    Acute on chronic anemia no active bleeding  Status post PRBC transfusion 5/5.  Hemoglobin was normal 6 months ago, was 8's during recent admission, then 8.2 on 5/4 and dropped to 6.7-6.9 on 5/5.  No black or bloody stools.  No abdominal pain.   No other apparent source of acute blood loss.   Guiac negative.   Iron sat index 5, Will order IV Venofer for 2 doses.  -Received 1 unit blood transfusion on 5/5  -Aspirin was on hold, restarted 5/14 and noticed that hemoglobin drifting downwards to 7.3.  Discontinued Celebrex [started this hospitalization], naproxen [PTA meds]  No active bleeding at this time.    Transfuse for hemoglobin less than 7 or symptomatic.  Monitor periodically.    Hypoalbuminemia likely dilutional, acute illness.  Serum albumin 2.6.  Follow proBNP.    Dietary supplements with Ensure.  Monitor periodically.    Thrombocytosis likely reactive in the setting of infection.  Platelet count 613 > 559   Monitor periodically.     Tobacco dependence  Caily pipe smokier, Smoking cessation emphasized  Nicotine patch     Orders Placed This Encounter      Combination Diet Moderate Consistent Carb (60 g CHO per Meal) Diet      DVT Prophylaxis: SCDs, ambulate.  Code Status: Full Code  Disposition: Expected discharge in 2 days pending clinical improvement    Discussed with patient, floor RN  >35 minutes spent by me on the date of service doing chart review, history,  exam, documentation & further activities per the note.      Woodrow Petersen MD        Interval History:        Patient lying in bed.  Denies any chest pain or palpitations.  Denies any headache or dizziness.  No nausea vomiting.  Blood sugars in 100- 200s.   Denies any pain in the leg.  Per report assistance of 1 with GB  Afebrile.  No blood in the stools or blood in the urine or active bleeding at this time.         Physical Exam:        Physical Exam   Temp:  [97.5  F (36.4  C)-98  F (36.7  C)] 98  F (36.7  C)  Pulse:  [51-62] 60  Resp:  [16-18] 16  BP: (104-140)/(57-82) 135/82  SpO2:  [98 %-100 %] 100 %    Intake/Output Summary (Last 24 hours) at 5/15/2023 1439  Last data filed at 5/15/2023 1352  Gross per 24 hour   Intake 1760 ml   Output 1250 ml   Net 510 ml       Admission Weight: 59.4 kg (130 lb 15.3 oz)  Current Weight: 59.4 kg (130 lb 15.3 oz)    PHYSICAL EXAM  GENERAL: Patient is in no distress. Alert and oriented.  HEART: Regular rate and rhythm. S1S2. No murmurs  LUNGS:Respirations unlabored  NEURO: Moving all extremities.  EXTREMITIES: Pulses in lower extremity weak.  Dressing +  SKIN: Warm, dry.   PSYCHIATRY Cooperative       Medications:          aspirin  325 mg Oral Daily     ceFAZolin  1 g Intravenous Q8H     gabapentin  300 mg Oral TID     insulin aspart  1-7 Units Subcutaneous TID AC     insulin aspart  1-5 Units Subcutaneous At Bedtime     insulin glargine  14 Units Subcutaneous BID     iron sucrose  300 mg Intravenous Daily     latanoprost  1 drop Both Eyes At Bedtime     simvastatin  40 mg Oral At Bedtime     sodium chloride (PF)  10-40 mL Intracatheter Q7 Days     sodium chloride (PF)  3 mL Intracatheter Q8H     sodium chloride (PF)  3 mL Intracatheter Q8H     vitamin D3  50 mcg Oral Daily     acetaminophen, glucose **OR** dextrose **OR** glucagon, glucose **OR** dextrose **OR** glucagon, lidocaine 4%, lidocaine 4%, lidocaine (buffered or not buffered), lidocaine (buffered or not buffered),  melatonin, ondansetron **OR** ondansetron, sodium chloride (PF), sodium chloride (PF), sodium chloride (PF), sodium chloride (PF)         Data:      All new lab and imaging data was reviewed.

## 2023-05-18 NOTE — CONSULTS
[facility]    Infectious Disease Consultation     Date of Admission:  5/13/2023  Date of Consult (When I saw the patient): 05/18/23    Assessment & Plan   Roddy Sidhu is a 77 year old male who was admitted on 5/13/2023.     Impression: 1 77-year-old male with acute on chronic worsening left heel wound, underlying diabetes and peripheral vascular disease, found to be nonviable and BKA now planned.  No major clinical sepsis, culture of the wound with multiple organisms bacteremic and not really ongoing sepsis  2 minor right heel wound no signs of deep infection, vascular procedure performed on its  3 diabetes mellitus  4 recent multiple fractures from a fall  5 peripheral vascular disease nonviable left lower extremity right revascularization with vascular procedure    REC 1 currently getting Ancef not really covering the microbiology from his foot but the foot looks stable not septic was not bacteremic, BKA planned for tomorrow which will eliminate the infection issue immediately, antibiotics can be discontinued after dosing on Friday no need for long-term antibiotics here        John Flor MD    Reason for Consult   Reason for consult: I was asked to evaluate this patient for left diabetic foot.    Primary Care Physician   Sandra Low    Chief Complaint   Left foot infection    History is obtained from the patient and medical records    History of Present Illness   Roddy Sidhu is a 77 year old male who presents with history of poorly controlled diabetes and some heel ulcers, recent multiple fractures and appears to be either pressure related heel ulcers evolving from that or from infection.  Has been on extensive antibiotics in the hospital initially in [place] then here, multiple organisms in the cultures from debridement of the foot.  Has had a vascular procedure on the right but the left distal foot is nonviable and plan for is for a BKA tomorrow.  Has not been  bacteremic throughout and no major clinical sepsis although initial leukocytosis and some illness.  All of that is now resolved.  Currently getting Ancef covering the Staph aureus in his wound but not the other pathogens.    Past Medical History   I have reviewed this patient's medical history and updated it with pertinent information if needed.   History reviewed. No pertinent past medical history.    Past Surgical History   I have reviewed this patient's surgical history and updated it with pertinent information if needed.  Past Surgical History:   Procedure Laterality Date     INCISION AND DRAINAGE FOOT, COMBINED Left 5/9/2023    Procedure: Incision and Debidement of Pressure Wound, Left Heel;  Surgeon: Paul Flores DPM;  Location: [facility] OR     IR LOWER EXTREMITY ANGIOGRAM LEFT  5/16/2023     SURGICAL HISTORY OF -   1990    removal of steel from left eye       Prior to Admission Medications   Prior to Admission Medications   Prescriptions Last Dose Informant Patient Reported? Taking?   aspirin (ASA) 325 MG EC tablet 5/4/2023 Self Yes Yes   Sig: Take 325 mg by mouth daily   blood glucose (NO BRAND SPECIFIED) lancets standard  Self No No   Sig: Use to test blood sugar 1 times daily or as directed.   blood glucose (NO BRAND SPECIFIED) test strip  Self No No   Sig: Use to test blood sugar 4 times daily or as directed.   blood glucose calibration (NO BRAND SPECIFIED) solution  Self No No   Sig: Use to calibrate blood glucose monitor as needed as directed.   blood glucose monitoring (NO BRAND SPECIFIED) meter device kit  Self No No   Sig: Use to test blood sugar 1 times daily or as directed.   ciprofloxacin (CIPRO) 500 MG tablet 5/4/2023 Self No Yes   Sig: Take 1 tablet (500 mg) by mouth 2 times daily for 10 days   dulaglutide (TRULICITY) 0.75 MG/0.5ML pen Note yet started (New) Self No No   Sig: Inject 0.75 mg Subcutaneous every 7 days   gabapentin (NEURONTIN) 300 MG capsule 5/12/2023 Self No Yes   Sig: Take 1  capsule (300 mg) by mouth 3 times daily   insulin glargine (LANTUS PEN) 100 UNIT/ML pen 5/12/2023 Self No Yes   Sig: Inject 10 Units Subcutaneous every morning   insulin pen needle (B-D U/F) 31G X 5 MM miscellaneous  Self No No   Sig: Use 4 pen needles daily or as directed.   latanoprost (XALATAN) 0.005 % ophthalmic solution 5/12/2023 Self Yes Yes   Sig: Place 1 drop into both eyes At Bedtime   metFORMIN (GLUCOPHAGE) 500 MG tablet 5/12/2023 Self No Yes   Sig: Take 1 tablet (500 mg) by mouth 2 times daily (with meals)   naproxen sodium (ANAPROX) 220 MG tablet 5/4/2023 Self Yes Yes   Sig: Take 220 mg by mouth 2 times daily (with meals)   simvastatin (ZOCOR) 40 MG tablet 5/12/2023 Self No Yes   Sig: TAKE ONE TABLET BY MOUTH EVERY NIGHT AT BEDTIME   vitamin D3 (CHOLECALCIFEROL) 50 mcg (2000 units) tablet 5/4/2023 Self Yes Yes   Sig: Take 1 tablet by mouth daily      Facility-Administered Medications: None     Allergies   No Known Allergies    Immunization History   Immunization History   Administered Date(s) Administered     Pneumococcal 20 valent Conjugate (Prevnar 20) 05/02/2023     TDAP Vaccine (Adacel) 12/27/2012       Social History   I have reviewed this patient's social history and updated it with pertinent information if needed. oRddy Sidhu  reports that he has been smoking pipe. He has never used smokeless tobacco. He reports that he does not currently use alcohol. He reports that he does not use drugs.    Family History   I have reviewed this patient's family history and updated it with pertinent information if needed.   Family History   Problem Relation Age of Onset     Breast Cancer Mother 60     C.A.D. Brother 58        MI       Review of Systems   The 10 point Review of Systems is negative other than mostly negative, no real pain in the foot feels relatively okay not having fevers chills sweats or illness type symptoms    Physical Exam   Temp: 98  F (36.7  C) Temp src: Oral BP: 135/82 Pulse: 60    Resp: 16 SpO2: 100 % O2 Device: None (Room air)    Vital Signs with Ranges  Temp:  [97.5  F (36.4  C)-98  F (36.7  C)] 98  F (36.7  C)  Pulse:  [51-62] 60  Resp:  [16-18] 16  BP: (104-140)/(57-82) 135/82  SpO2:  [98 %-100 %] 100 %  132 lbs 4.42 oz  Body mass index is 19.25 kg/m .    GENERAL APPEARANCE:  awake  EYES: Eyes grossly normal to inspection  NECK: no adenopathy  RESP: lungs clear   CV: regular rates and rhythm  LYMPHATICS: normal ant/post cervical and supraclavicular nodes  ABDOMEN: soft, nontender  MS: extremities normal  SKIN: no suspicious lesions or rashes right foot wound not infected looking no major cellulitis left leg is wrapped pictures seen, BKA planned anyway and no major proximal cellulitis        Data   All laboratory and imaging data in the past 24 hours reviewed  No results for input(s): CULT in the last 168 hours.  No lab results found.    Invalid input(s): UC       All cultures:  No results for input(s): CULTURE in the last 168 hours.   Blood culture:  Results for orders placed or performed during the hospital encounter of 05/04/23   Blood Culture Peripheral Blood    Specimen: Peripheral Blood   Result Value Ref Range    Culture No Growth    Blood Culture Peripheral Blood    Specimen: Peripheral Blood   Result Value Ref Range    Culture No Growth       Urine culture:  No results found for this or any previous visit.

## 2023-05-19 ENCOUNTER — ANESTHESIA EVENT (OUTPATIENT)
Dept: SURGERY | Facility: CLINIC | Age: 78
DRG: 616 | End: 2023-05-19
Payer: COMMERCIAL

## 2023-05-19 ENCOUNTER — ANESTHESIA (OUTPATIENT)
Dept: SURGERY | Facility: CLINIC | Age: 78
DRG: 616 | End: 2023-05-19
Payer: COMMERCIAL

## 2023-05-19 LAB
ABO/RH(D): NORMAL
ANION GAP SERPL CALCULATED.3IONS-SCNC: 8 MMOL/L (ref 7–15)
ANTIBODY SCREEN: NEGATIVE
BLD PROD TYP BPU: NORMAL
BLOOD COMPONENT TYPE: NORMAL
BUN SERPL-MCNC: 10.5 MG/DL (ref 8–23)
CALCIUM SERPL-MCNC: 8.6 MG/DL (ref 8.8–10.2)
CHLORIDE SERPL-SCNC: 103 MMOL/L (ref 98–107)
CODING SYSTEM: NORMAL
CREAT SERPL-MCNC: 0.74 MG/DL (ref 0.67–1.17)
CROSSMATCH: NORMAL
DEPRECATED HCO3 PLAS-SCNC: 27 MMOL/L (ref 22–29)
ERYTHROCYTE [DISTWIDTH] IN BLOOD BY AUTOMATED COUNT: 19 % (ref 10–15)
GFR SERPL CREATININE-BSD FRML MDRD: >90 ML/MIN/1.73M2
GLUCOSE BLDC GLUCOMTR-MCNC: 104 MG/DL (ref 70–99)
GLUCOSE BLDC GLUCOMTR-MCNC: 147 MG/DL (ref 70–99)
GLUCOSE BLDC GLUCOMTR-MCNC: 152 MG/DL (ref 70–99)
GLUCOSE BLDC GLUCOMTR-MCNC: 64 MG/DL (ref 70–99)
GLUCOSE BLDC GLUCOMTR-MCNC: 68 MG/DL (ref 70–99)
GLUCOSE BLDC GLUCOMTR-MCNC: 75 MG/DL (ref 70–99)
GLUCOSE BLDC GLUCOMTR-MCNC: 76 MG/DL (ref 70–99)
GLUCOSE BLDC GLUCOMTR-MCNC: 76 MG/DL (ref 70–99)
GLUCOSE BLDC GLUCOMTR-MCNC: 79 MG/DL (ref 70–99)
GLUCOSE SERPL-MCNC: 84 MG/DL (ref 70–99)
HCT VFR BLD AUTO: 25.6 % (ref 40–53)
HGB BLD-MCNC: 7.8 G/DL (ref 13.3–17.7)
ISSUE DATE AND TIME: NORMAL
MCH RBC QN AUTO: 24.9 PG (ref 26.5–33)
MCHC RBC AUTO-ENTMCNC: 30.5 G/DL (ref 31.5–36.5)
MCV RBC AUTO: 82 FL (ref 78–100)
PLATELET # BLD AUTO: 553 10E3/UL (ref 150–450)
POTASSIUM SERPL-SCNC: 4.5 MMOL/L (ref 3.4–5.3)
RBC # BLD AUTO: 3.13 10E6/UL (ref 4.4–5.9)
SODIUM SERPL-SCNC: 138 MMOL/L (ref 136–145)
SPECIMEN EXPIRATION DATE: NORMAL
UNIT ABO/RH: NORMAL
UNIT NUMBER: NORMAL
UNIT STATUS: NORMAL
UNIT TYPE ISBT: 1700
WBC # BLD AUTO: 9.9 10E3/UL (ref 4–11)

## 2023-05-19 PROCEDURE — 86923 COMPATIBILITY TEST ELECTRIC: CPT | Performed by: INTERNAL MEDICINE

## 2023-05-19 PROCEDURE — 86923 COMPATIBILITY TEST ELECTRIC: CPT | Performed by: SURGERY

## 2023-05-19 PROCEDURE — 250N000025 HC SEVOFLURANE, PER MIN: Performed by: SURGERY

## 2023-05-19 PROCEDURE — 88307 TISSUE EXAM BY PATHOLOGIST: CPT | Mod: 26 | Performed by: STUDENT IN AN ORGANIZED HEALTH CARE EDUCATION/TRAINING PROGRAM

## 2023-05-19 PROCEDURE — 250N000013 HC RX MED GY IP 250 OP 250 PS 637: Performed by: STUDENT IN AN ORGANIZED HEALTH CARE EDUCATION/TRAINING PROGRAM

## 2023-05-19 PROCEDURE — 250N000011 HC RX IP 250 OP 636: Performed by: SURGERY

## 2023-05-19 PROCEDURE — 120N000001 HC R&B MED SURG/OB

## 2023-05-19 PROCEDURE — 27880 AMPUTATION OF LOWER LEG: CPT | Mod: LT | Performed by: SURGERY

## 2023-05-19 PROCEDURE — 258N000003 HC RX IP 258 OP 636: Performed by: ANESTHESIOLOGY

## 2023-05-19 PROCEDURE — 250N000011 HC RX IP 250 OP 636: Performed by: HOSPITALIST

## 2023-05-19 PROCEDURE — 250N000009 HC RX 250: Performed by: SURGERY

## 2023-05-19 PROCEDURE — 999N000141 HC STATISTIC PRE-PROCEDURE NURSING ASSESSMENT: Performed by: SURGERY

## 2023-05-19 PROCEDURE — 258N000001 HC RX 258: Performed by: INTERNAL MEDICINE

## 2023-05-19 PROCEDURE — 370N000017 HC ANESTHESIA TECHNICAL FEE, PER MIN: Performed by: SURGERY

## 2023-05-19 PROCEDURE — 258N000003 HC RX IP 258 OP 636: Performed by: STUDENT IN AN ORGANIZED HEALTH CARE EDUCATION/TRAINING PROGRAM

## 2023-05-19 PROCEDURE — 250N000013 HC RX MED GY IP 250 OP 250 PS 637: Performed by: INTERNAL MEDICINE

## 2023-05-19 PROCEDURE — 86850 RBC ANTIBODY SCREEN: CPT | Performed by: ANESTHESIOLOGY

## 2023-05-19 PROCEDURE — 250N000011 HC RX IP 250 OP 636: Performed by: ANESTHESIOLOGY

## 2023-05-19 PROCEDURE — 710N000009 HC RECOVERY PHASE 1, LEVEL 1, PER MIN: Performed by: SURGERY

## 2023-05-19 PROCEDURE — 250N000009 HC RX 250: Performed by: NURSE ANESTHETIST, CERTIFIED REGISTERED

## 2023-05-19 PROCEDURE — 258N000001 HC RX 258: Performed by: ANESTHESIOLOGY

## 2023-05-19 PROCEDURE — 250N000009 HC RX 250: Performed by: ANESTHESIOLOGY

## 2023-05-19 PROCEDURE — 360N000076 HC SURGERY LEVEL 3, PER MIN: Performed by: SURGERY

## 2023-05-19 PROCEDURE — 99232 SBSQ HOSP IP/OBS MODERATE 35: CPT | Performed by: INTERNAL MEDICINE

## 2023-05-19 PROCEDURE — P9016 RBC LEUKOCYTES REDUCED: HCPCS | Performed by: SURGERY

## 2023-05-19 PROCEDURE — 88307 TISSUE EXAM BY PATHOLOGIST: CPT | Mod: TC | Performed by: SURGERY

## 2023-05-19 PROCEDURE — 0Y6J0Z1 DETACHMENT AT LEFT LOWER LEG, HIGH, OPEN APPROACH: ICD-10-PCS | Performed by: SURGERY

## 2023-05-19 PROCEDURE — 250N000011 HC RX IP 250 OP 636: Performed by: NURSE ANESTHETIST, CERTIFIED REGISTERED

## 2023-05-19 PROCEDURE — 258N000003 HC RX IP 258 OP 636: Performed by: NURSE ANESTHETIST, CERTIFIED REGISTERED

## 2023-05-19 PROCEDURE — 258N000003 HC RX IP 258 OP 636: Performed by: HOSPITALIST

## 2023-05-19 PROCEDURE — 99232 SBSQ HOSP IP/OBS MODERATE 35: CPT | Performed by: HOSPITALIST

## 2023-05-19 PROCEDURE — 250N000011 HC RX IP 250 OP 636: Performed by: STUDENT IN AN ORGANIZED HEALTH CARE EDUCATION/TRAINING PROGRAM

## 2023-05-19 PROCEDURE — 80048 BASIC METABOLIC PNL TOTAL CA: CPT | Performed by: HOSPITALIST

## 2023-05-19 PROCEDURE — 272N000001 HC OR GENERAL SUPPLY STERILE: Performed by: SURGERY

## 2023-05-19 PROCEDURE — 85027 COMPLETE CBC AUTOMATED: CPT | Performed by: HOSPITALIST

## 2023-05-19 RX ORDER — OXYCODONE HYDROCHLORIDE 5 MG/1
5 TABLET ORAL EVERY 4 HOURS PRN
Status: DISCONTINUED | OUTPATIENT
Start: 2023-05-19 | End: 2023-05-24 | Stop reason: HOSPADM

## 2023-05-19 RX ORDER — BISACODYL 10 MG
10 SUPPOSITORY, RECTAL RECTAL DAILY PRN
Status: DISCONTINUED | OUTPATIENT
Start: 2023-05-19 | End: 2023-05-24 | Stop reason: HOSPADM

## 2023-05-19 RX ORDER — NALOXONE HYDROCHLORIDE 0.4 MG/ML
0.2 INJECTION, SOLUTION INTRAMUSCULAR; INTRAVENOUS; SUBCUTANEOUS
Status: DISCONTINUED | OUTPATIENT
Start: 2023-05-19 | End: 2023-05-24 | Stop reason: HOSPADM

## 2023-05-19 RX ORDER — DEXTROSE MONOHYDRATE 25 G/50ML
12.5 INJECTION, SOLUTION INTRAVENOUS ONCE
Status: COMPLETED | OUTPATIENT
Start: 2023-05-19 | End: 2023-05-19

## 2023-05-19 RX ORDER — SODIUM CHLORIDE, SODIUM LACTATE, POTASSIUM CHLORIDE, CALCIUM CHLORIDE 600; 310; 30; 20 MG/100ML; MG/100ML; MG/100ML; MG/100ML
INJECTION, SOLUTION INTRAVENOUS CONTINUOUS
Status: DISCONTINUED | OUTPATIENT
Start: 2023-05-19 | End: 2023-05-20

## 2023-05-19 RX ORDER — GLYCOPYRROLATE 0.2 MG/ML
INJECTION, SOLUTION INTRAMUSCULAR; INTRAVENOUS PRN
Status: DISCONTINUED | OUTPATIENT
Start: 2023-05-19 | End: 2023-05-19

## 2023-05-19 RX ORDER — NALOXONE HYDROCHLORIDE 0.4 MG/ML
0.4 INJECTION, SOLUTION INTRAMUSCULAR; INTRAVENOUS; SUBCUTANEOUS
Status: DISCONTINUED | OUTPATIENT
Start: 2023-05-19 | End: 2023-05-24 | Stop reason: HOSPADM

## 2023-05-19 RX ORDER — ONDANSETRON 4 MG/1
4 TABLET, ORALLY DISINTEGRATING ORAL EVERY 6 HOURS PRN
Status: DISCONTINUED | OUTPATIENT
Start: 2023-05-19 | End: 2023-05-19

## 2023-05-19 RX ORDER — HYDROMORPHONE HCL IN WATER/PF 6 MG/30 ML
0.4 PATIENT CONTROLLED ANALGESIA SYRINGE INTRAVENOUS
Status: DISCONTINUED | OUTPATIENT
Start: 2023-05-19 | End: 2023-05-24 | Stop reason: HOSPADM

## 2023-05-19 RX ORDER — NICOTINE POLACRILEX 4 MG
15-30 LOZENGE BUCCAL
Status: DISCONTINUED | OUTPATIENT
Start: 2023-05-19 | End: 2023-05-24 | Stop reason: HOSPADM

## 2023-05-19 RX ORDER — AMOXICILLIN 250 MG
1 CAPSULE ORAL 2 TIMES DAILY
Status: DISCONTINUED | OUTPATIENT
Start: 2023-05-19 | End: 2023-05-24 | Stop reason: HOSPADM

## 2023-05-19 RX ORDER — HEPARIN SODIUM 5000 [USP'U]/.5ML
5000 INJECTION, SOLUTION INTRAVENOUS; SUBCUTANEOUS EVERY 8 HOURS
Status: DISCONTINUED | OUTPATIENT
Start: 2023-05-20 | End: 2023-05-24 | Stop reason: HOSPADM

## 2023-05-19 RX ORDER — FENTANYL CITRATE 50 UG/ML
50 INJECTION, SOLUTION INTRAMUSCULAR; INTRAVENOUS ONCE
Status: COMPLETED | OUTPATIENT
Start: 2023-05-19 | End: 2023-05-19

## 2023-05-19 RX ORDER — ACETAMINOPHEN 325 MG/1
975 TABLET ORAL EVERY 8 HOURS
Status: COMPLETED | OUTPATIENT
Start: 2023-05-19 | End: 2023-05-22

## 2023-05-19 RX ORDER — DEXTROSE MONOHYDRATE 25 G/50ML
25-50 INJECTION, SOLUTION INTRAVENOUS
Status: DISCONTINUED | OUTPATIENT
Start: 2023-05-19 | End: 2023-05-24 | Stop reason: HOSPADM

## 2023-05-19 RX ORDER — FERROUS GLUCONATE 324(38)MG
324 TABLET ORAL
Status: DISCONTINUED | OUTPATIENT
Start: 2023-05-20 | End: 2023-05-24 | Stop reason: HOSPADM

## 2023-05-19 RX ORDER — SODIUM CHLORIDE, SODIUM LACTATE, POTASSIUM CHLORIDE, CALCIUM CHLORIDE 600; 310; 30; 20 MG/100ML; MG/100ML; MG/100ML; MG/100ML
INJECTION, SOLUTION INTRAVENOUS CONTINUOUS
Status: DISCONTINUED | OUTPATIENT
Start: 2023-05-19 | End: 2023-05-24 | Stop reason: HOSPADM

## 2023-05-19 RX ORDER — MULTIPLE VITAMINS W/ MINERALS TAB 9MG-400MCG
1 TAB ORAL DAILY
Status: DISCONTINUED | OUTPATIENT
Start: 2023-05-19 | End: 2023-05-24 | Stop reason: HOSPADM

## 2023-05-19 RX ORDER — CEFAZOLIN SODIUM 1 G/3ML
1 INJECTION, POWDER, FOR SOLUTION INTRAMUSCULAR; INTRAVENOUS EVERY 8 HOURS
Status: COMPLETED | OUTPATIENT
Start: 2023-05-19 | End: 2023-05-19

## 2023-05-19 RX ORDER — HYDROMORPHONE HCL IN WATER/PF 6 MG/30 ML
0.2 PATIENT CONTROLLED ANALGESIA SYRINGE INTRAVENOUS
Status: DISCONTINUED | OUTPATIENT
Start: 2023-05-19 | End: 2023-05-24 | Stop reason: HOSPADM

## 2023-05-19 RX ORDER — POLYETHYLENE GLYCOL 3350 17 G/17G
17 POWDER, FOR SOLUTION ORAL DAILY
Status: DISCONTINUED | OUTPATIENT
Start: 2023-05-20 | End: 2023-05-24 | Stop reason: HOSPADM

## 2023-05-19 RX ORDER — ONDANSETRON 2 MG/ML
INJECTION INTRAMUSCULAR; INTRAVENOUS PRN
Status: DISCONTINUED | OUTPATIENT
Start: 2023-05-19 | End: 2023-05-19

## 2023-05-19 RX ORDER — PROCHLORPERAZINE MALEATE 5 MG
5 TABLET ORAL EVERY 6 HOURS PRN
Status: DISCONTINUED | OUTPATIENT
Start: 2023-05-19 | End: 2023-05-24 | Stop reason: HOSPADM

## 2023-05-19 RX ORDER — FENTANYL CITRATE 50 UG/ML
INJECTION, SOLUTION INTRAMUSCULAR; INTRAVENOUS PRN
Status: DISCONTINUED | OUTPATIENT
Start: 2023-05-19 | End: 2023-05-19

## 2023-05-19 RX ORDER — PROPOFOL 10 MG/ML
INJECTION, EMULSION INTRAVENOUS PRN
Status: DISCONTINUED | OUTPATIENT
Start: 2023-05-19 | End: 2023-05-19

## 2023-05-19 RX ORDER — ONDANSETRON 2 MG/ML
4 INJECTION INTRAMUSCULAR; INTRAVENOUS EVERY 6 HOURS PRN
Status: DISCONTINUED | OUTPATIENT
Start: 2023-05-19 | End: 2023-05-19

## 2023-05-19 RX ORDER — LIDOCAINE 40 MG/G
CREAM TOPICAL
Status: DISCONTINUED | OUTPATIENT
Start: 2023-05-19 | End: 2023-05-24 | Stop reason: HOSPADM

## 2023-05-19 RX ORDER — FAMOTIDINE 20 MG/1
20 TABLET, FILM COATED ORAL 2 TIMES DAILY
Status: DISCONTINUED | OUTPATIENT
Start: 2023-05-19 | End: 2023-05-24 | Stop reason: HOSPADM

## 2023-05-19 RX ORDER — LIDOCAINE HYDROCHLORIDE 20 MG/ML
INJECTION, SOLUTION INFILTRATION; PERINEURAL PRN
Status: DISCONTINUED | OUTPATIENT
Start: 2023-05-19 | End: 2023-05-19

## 2023-05-19 RX ORDER — OXYCODONE HYDROCHLORIDE 5 MG/1
10 TABLET ORAL EVERY 4 HOURS PRN
Status: DISCONTINUED | OUTPATIENT
Start: 2023-05-19 | End: 2023-05-24 | Stop reason: HOSPADM

## 2023-05-19 RX ADMIN — ONDANSETRON 4 MG: 2 INJECTION INTRAMUSCULAR; INTRAVENOUS at 16:53

## 2023-05-19 RX ADMIN — FENTANYL CITRATE 50 MCG: 50 INJECTION, SOLUTION INTRAMUSCULAR; INTRAVENOUS at 16:15

## 2023-05-19 RX ADMIN — ACETAMINOPHEN 975 MG: 325 TABLET ORAL at 21:29

## 2023-05-19 RX ADMIN — DEXTROSE MONOHYDRATE 5 ML: 25 INJECTION, SOLUTION INTRAVENOUS at 15:47

## 2023-05-19 RX ADMIN — FENTANYL CITRATE 50 MCG: 50 INJECTION, SOLUTION INTRAMUSCULAR; INTRAVENOUS at 15:40

## 2023-05-19 RX ADMIN — Medication 50 MCG: at 08:31

## 2023-05-19 RX ADMIN — FAMOTIDINE 20 MG: 20 TABLET ORAL at 20:01

## 2023-05-19 RX ADMIN — MIDAZOLAM 1 MG: 1 INJECTION INTRAMUSCULAR; INTRAVENOUS at 16:07

## 2023-05-19 RX ADMIN — PHENYLEPHRINE HYDROCHLORIDE 100 MCG: 10 INJECTION INTRAVENOUS at 16:19

## 2023-05-19 RX ADMIN — GLYCOPYRROLATE 0.2 MG: 0.2 INJECTION, SOLUTION INTRAMUSCULAR; INTRAVENOUS at 16:30

## 2023-05-19 RX ADMIN — PHENYLEPHRINE HYDROCHLORIDE 50 MCG: 10 INJECTION INTRAVENOUS at 17:12

## 2023-05-19 RX ADMIN — PHENYLEPHRINE HYDROCHLORIDE 50 MCG: 10 INJECTION INTRAVENOUS at 16:42

## 2023-05-19 RX ADMIN — MIDAZOLAM 1 MG: 1 INJECTION INTRAMUSCULAR; INTRAVENOUS at 15:48

## 2023-05-19 RX ADMIN — SUCCINYLCHOLINE CHLORIDE 35 MG: 20 INJECTION, SOLUTION INTRAMUSCULAR; INTRAVENOUS; PARENTERAL at 16:16

## 2023-05-19 RX ADMIN — PHENYLEPHRINE HYDROCHLORIDE 50 MCG: 10 INJECTION INTRAVENOUS at 17:21

## 2023-05-19 RX ADMIN — PROPOFOL 70 MG: 10 INJECTION, EMULSION INTRAVENOUS at 16:15

## 2023-05-19 RX ADMIN — LATANOPROST 1 DROP: 50 SOLUTION/ DROPS OPHTHALMIC at 22:57

## 2023-05-19 RX ADMIN — CEFAZOLIN 1 G: 1 INJECTION, POWDER, FOR SOLUTION INTRAMUSCULAR; INTRAVENOUS at 21:29

## 2023-05-19 RX ADMIN — PHENYLEPHRINE HYDROCHLORIDE 50 MCG: 10 INJECTION INTRAVENOUS at 17:00

## 2023-05-19 RX ADMIN — PHENYLEPHRINE HYDROCHLORIDE 100 MCG: 10 INJECTION INTRAVENOUS at 16:55

## 2023-05-19 RX ADMIN — CEFAZOLIN 1 G: 1 INJECTION, POWDER, FOR SOLUTION INTRAMUSCULAR; INTRAVENOUS at 06:30

## 2023-05-19 RX ADMIN — PHENYLEPHRINE HYDROCHLORIDE 50 MCG: 10 INJECTION INTRAVENOUS at 16:30

## 2023-05-19 RX ADMIN — GABAPENTIN 300 MG: 300 CAPSULE ORAL at 08:31

## 2023-05-19 RX ADMIN — Medication 20 ML: at 15:58

## 2023-05-19 RX ADMIN — DEXTROSE AND SODIUM CHLORIDE: 5; 900 INJECTION, SOLUTION INTRAVENOUS at 12:32

## 2023-05-19 RX ADMIN — CEFAZOLIN 1 G: 1 INJECTION, POWDER, FOR SOLUTION INTRAMUSCULAR; INTRAVENOUS at 14:49

## 2023-05-19 RX ADMIN — FENTANYL CITRATE 25 MCG: 50 INJECTION, SOLUTION INTRAMUSCULAR; INTRAVENOUS at 16:36

## 2023-05-19 RX ADMIN — GABAPENTIN 300 MG: 300 CAPSULE ORAL at 21:29

## 2023-05-19 RX ADMIN — SODIUM CHLORIDE, POTASSIUM CHLORIDE, SODIUM LACTATE AND CALCIUM CHLORIDE: 600; 310; 30; 20 INJECTION, SOLUTION INTRAVENOUS at 21:30

## 2023-05-19 RX ADMIN — GABAPENTIN 300 MG: 300 CAPSULE ORAL at 14:49

## 2023-05-19 RX ADMIN — PHENYLEPHRINE HYDROCHLORIDE 100 MCG: 10 INJECTION INTRAVENOUS at 17:24

## 2023-05-19 RX ADMIN — SIMVASTATIN 40 MG: 40 TABLET, FILM COATED ORAL at 21:29

## 2023-05-19 RX ADMIN — SODIUM CHLORIDE, POTASSIUM CHLORIDE, SODIUM LACTATE AND CALCIUM CHLORIDE: 600; 310; 30; 20 INJECTION, SOLUTION INTRAVENOUS at 16:07

## 2023-05-19 RX ADMIN — DEXTROSE MONOHYDRATE 50 ML: 25 INJECTION, SOLUTION INTRAVENOUS at 08:31

## 2023-05-19 RX ADMIN — MULTIPLE VITAMINS W/ MINERALS TAB 1 TABLET: TAB at 20:01

## 2023-05-19 RX ADMIN — SENNOSIDES AND DOCUSATE SODIUM 1 TABLET: 50; 8.6 TABLET ORAL at 20:01

## 2023-05-19 RX ADMIN — PHENYLEPHRINE HYDROCHLORIDE 50 MCG: 10 INJECTION INTRAVENOUS at 17:07

## 2023-05-19 ASSESSMENT — ACTIVITIES OF DAILY LIVING (ADL)
ADLS_ACUITY_SCORE: 41

## 2023-05-19 ASSESSMENT — LIFESTYLE VARIABLES: TOBACCO_USE: 1

## 2023-05-19 ASSESSMENT — ENCOUNTER SYMPTOMS
SEIZURES: 0
DYSRHYTHMIAS: 0

## 2023-05-19 NOTE — PLAN OF CARE
Goal Outcome Evaluation:      Plan of Care Reviewed With: patient    Overall Patient Progress: no changeOverall Patient Progress: no change     A&Ox4- is [place]. VSS on RA. Denies pain. L PICC SL with int abx. Is up Ax1 gait belt & walker. Voiding adequately using bedside urinal. BS audible. Rooke boots worn on bilateral extremities while in bed. Wounds to bilateral heels- CDI. BLE pulses with doppler. BG checks: 95 & 152. NPO since midnight. Plan for BKA surgery today at 1545. Continue to monitor.

## 2023-05-19 NOTE — ANESTHESIA PROCEDURE NOTES
Airway       Patient location during procedure: OR       Procedure Start/Stop Times: 5/19/2023 4:17 PM  Staff -        Anesthesiologist:  Laurence Harrell MD       CRNA: Ruthy Macias APRN CRNA       Performed By: CRNA and anesthesiologist  Consent for Airway        Urgency: elective  Indications and Patient Condition       Indications for airway management: janet-procedural       Induction type:RSI       Mask difficulty assessment: 2 - vent by mask + OA or adjuvant +/- NMBA    Final Airway Details       Final airway type: endotracheal airway       Successful airway: ETT - single  Endotracheal Airway Details        ETT size (mm): 8.0       Cuffed: yes       Successful intubation technique: direct laryngoscopy       DL Blade Type: MAC 3       Grade View of Cords: 1       Adjucts: stylet       Position: Right       Measured from: lips       Secured at (cm): 23       Bite block used: None    Post intubation assessment        Placement verified by: capnometry, equal breath sounds and chest rise        Number of attempts at approach: 1       Number of other approaches attempted: 0       Secured with: pink tape       Ease of procedure: easy       Dentition: Intact and Unchanged    Medication(s) Administered   Medication Administration Time: 5/19/2023 4:17 PM

## 2023-05-19 NOTE — PROGRESS NOTES
MD Notification    Notified Person: MD    Notified Person Name:  Dilip    Notification Date/Time: 5/19/23 0932    Notification Interaction: amcom     Purpose of Notification: Hi, just want to clarify to give aspirin & IV abx? wondering because of ID note & amputation later. Thanks!    Orders Received: pending    Comments:

## 2023-05-19 NOTE — ANESTHESIA PROCEDURE NOTES
"Femoral Procedure Note    Pre-Procedure   Staff -        Anesthesiologist:  Laurence Harrell MD       Performed By: anesthesiologist       Location: pre-op       Pre-Anesthestic Checklist: patient identified, IV checked, site marked, risks and benefits discussed, informed consent, monitors and equipment checked, pre-op evaluation, at physician/surgeon's request and post-op pain management  Timeout:       Correct Patient: Yes        Correct Procedure: Yes        Correct Site: Yes        Correct Position: Yes        Correct Laterality: Yes        Site Marked: Yes  Procedure Documentation  Procedure: Femoral       Diagnosis: PVD       Laterality: left       Patient Position: supine       Patient Prep/Sterile Barriers: sterile gloves, mask       Skin prep: Chloraprep       Local skin infiltrated with 3 mL of 1% lidocaine.        Needle Type: insulated       Needle Gauge: 21.        Needle Length (Inches): 3.13        Ultrasound guided       1. Ultrasound was used to identify targeted nerve, plexus, vascular marker, or fascial plane and place a needle adjacent to it in real-time.       2. Ultrasound was used to visualize the spread of anesthetic in close proximity to the above referenced structure.       3. A permanent image is entered into the patient's record.       4. The visualized anatomic structures appeared normal.       5. There were no apparent abnormal pathologic findings.    Assessment/Narrative         The placement was negative for: blood aspirated, painful injection and site bleeding       Paresthesias: No.       Insertion/Infusion Method: Single Shot       Complications: none    Medication(s) Administered   Bupivacaine 0.5% w/ 1:400K Epi (Injection) - Injection   20 mL - 5/19/2023 4:02:00 PM    FOR [facility] ([facility] ONLY:   Pain Team Contact information: please page the Pain Team Via [employer]. Search \"Pain\". During daytime hours, please page the attending first. At night please page the resident " first.

## 2023-05-19 NOTE — ANESTHESIA PREPROCEDURE EVALUATION
Anesthesia Pre-Procedure Evaluation    Patient: Roddy Sidhu   MRN: [id number] : 1945        Procedure : Procedure(s):  LEFT BELOW KNEE AMPUTATION          History reviewed. No pertinent past medical history.   Past Surgical History:   Procedure Laterality Date     INCISION AND DRAINAGE FOOT, COMBINED Left 2023    Procedure: Incision and Debidement of Pressure Wound, Left Heel;  Surgeon: Paul Flores DPM;  Location: [facility] OR     IR LOWER EXTREMITY ANGIOGRAM LEFT  2023     SURGICAL HISTORY OF -       removal of steel from left eye      No Known Allergies   Social History     Tobacco Use     Smoking status: Some Days     Types: Pipe     Smokeless tobacco: Never   Vaping Use     Vaping status: Never Used   Substance Use Topics     Alcohol use: Not Currently      Wt Readings from Last 1 Encounters:   23 55.5 kg (122 lb 5.7 oz)        Anesthesia Evaluation            ROS/MED HX  ENT/Pulmonary:     (+) tobacco use, Current use,  (-) recent URI   Neurologic:    (-) no seizures, no CVA and no TIA   Cardiovascular:     (+) Dyslipidemia -Peripheral Vascular Disease---- (-) arrhythmias   METS/Exercise Tolerance: 1 - Eating, dressing    Hematologic: Comments: Thrombocytosis likely reactive in the setting of infection    (+) anemia (Acute on chronic anemia no active bleeding  Status post PRBC transfusion ),     Musculoskeletal:       GI/Hepatic:    (-) GERD and liver disease   Renal/Genitourinary:       Endo:     (+) type II DM (Acute on chronic worsening left heel wound), Diabetic complications: neuropathy.     Psychiatric/Substance Use:       Infectious Disease:    (-) Recent Fever   Malignancy:       Other:            Physical Exam    Airway      Comment: Full chatterjee and mustache    Mallampati: I   TM distance: > 3 FB   Neck ROM: full   Mouth opening: > 3 cm    Respiratory Devices and Support         Dental     Comment: 4 teeth remaining        Cardiovascular          Rhythm and rate:  regular and normal     Pulmonary           breath sounds clear to auscultation           OUTSIDE LABS:  CBC:   Lab Results   Component Value Date    WBC 9.9 05/19/2023    WBC 9.3 05/17/2023    HGB 7.8 (L) 05/19/2023    HGB 7.3 (L) 05/18/2023    HCT 25.6 (L) 05/19/2023    HCT 24.7 (L) 05/17/2023     (H) 05/19/2023     (H) 05/17/2023     BMP:   Lab Results   Component Value Date     05/19/2023     05/17/2023    POTASSIUM 4.5 05/19/2023    POTASSIUM 4.4 05/17/2023    CHLORIDE 103 05/19/2023    CHLORIDE 106 05/17/2023    CO2 27 05/19/2023    CO2 22 05/17/2023    BUN 10.5 05/19/2023    BUN 11.3 05/17/2023    CR 0.74 05/19/2023    CR 0.76 05/17/2023    GLC 79 05/19/2023     (H) 05/19/2023     COAGS: No results found for: PTT, INR, FIBR  POC: No results found for: BGM, HCG, HCGS  HEPATIC:   Lab Results   Component Value Date    ALBUMIN 2.6 (L) 05/17/2023    PROTTOTAL 5.7 (L) 05/17/2023    ALT 8 (L) 05/17/2023    AST 11 05/17/2023    ALKPHOS 74 05/17/2023    BILITOTAL 0.2 05/17/2023     OTHER:   Lab Results   Component Value Date    LACT 1.1 05/04/2023    A1C 8.3 (H) 05/02/2023    BEST 8.6 (L) 05/19/2023    TSH 4.35 (H) 06/08/2018    T4 1.17 06/17/2016       Anesthesia Plan    ASA Status:  4   NPO Status:  NPO Appropriate    Anesthesia Type: General.     - Airway: ETT   Induction: Intravenous, Propofol.   Maintenance: Balanced.        Consents    Anesthesia Plan(s) and associated risks, benefits, and realistic alternatives discussed. Questions answered and patient/representative(s) expressed understanding.     - Discussed: Risks, Benefits and Alternatives for BOTH SEDATION and the PROCEDURE were discussed     - Discussed with:  Patient         Postoperative Care    Pain management: Multi-modal analgesia, Peripheral nerve block (Single Shot), IV analgesics, Oral pain medications.   PONV prophylaxis: Background Propofol Infusion, Ondansetron (or other 5HT-3)     Comments:                Laurence  AG Harrell MD

## 2023-05-19 NOTE — ANESTHESIA PROCEDURE NOTES
"Sciatic Procedure Note    Pre-Procedure   Staff -        Anesthesiologist:  Laurence Harrell MD       Performed By: anesthesiologist       Location: pre-op       Pre-Anesthestic Checklist: patient identified, IV checked, site marked, risks and benefits discussed, informed consent, monitors and equipment checked, pre-op evaluation, at physician/surgeon's request and post-op pain management  Timeout:       Correct Patient: Yes        Correct Procedure: Yes        Correct Site: Yes        Correct Position: Yes        Correct Laterality: Yes        Site Marked: Yes  Procedure Documentation  Procedure: Sciatic       Diagnosis: PERIPHERAL VASCULAR DISEASE       Laterality: left       Patient Position: supine       Patient Prep/Sterile Barriers: sterile gloves, mask       Skin prep: Chloraprep       Local skin infiltrated with 3 mL of 1% lidocaine.  (popliteal approach).       Needle Type: insulated       Needle Gauge: 21.        Needle Length (Inches): 3.13        Ultrasound guided       1. Ultrasound was used to identify targeted nerve, plexus, vascular marker, or fascial plane and place a needle adjacent to it in real-time.       2. Ultrasound was used to visualize the spread of anesthetic in close proximity to the above referenced structure.       3. A permanent image is entered into the patient's record.       4. The visualized anatomic structures appeared normal.       5. There were no apparent abnormal pathologic findings.    Assessment/Narrative         The placement was negative for: blood aspirated, painful injection and site bleeding       Paresthesias: No.       Insertion/Infusion Method: Single Shot       Complications: none    Medication(s) Administered   Bupivacaine 0.5% w/ 1:400K Epi (Injection) - Injection   20 mL - 5/19/2023 3:58:00 PM    FOR [facility] ([facility] ONLY:   Pain Team Contact information: please page the Pain Team Via [employer]. Search \"Pain\". During daytime hours, please page the attending " first. At night please page the resident first.

## 2023-05-19 NOTE — PROGRESS NOTES
CLINICAL NUTRITION SERVICES  -  ASSESSMENT NOTE    Recommendations Ordered by Registered Dietitian (RD):   IRVIN -- Recommend HIGH carb diet to help pt meet his increased needs  Glucerna TID w/ meals - chocolate  Thera vit M   Malnutrition:   % Weight Loss:  > 5% in 1 month (severe malnutrition)  % Intake:  </= 75% for >/= 1 month (severe malnutrition)  Subcutaneous Fat Loss:  Orbital region severe depletion, Upper arm region moderate-severe depletion and Thoracic region moderate-severe depletion  Muscle Loss:  Temporal region moderate depletion, Clavicle bone region moderate depletion, Acromion bone region moderate depletion and Dorsal hand region moderate depletion  Fluid Retention:  None noted    Malnutrition Diagnosis: Severe malnutrition  In Context of:  Acute illness or injury     REASON FOR ASSESSMENT  Roddy Sidhu is a 77 year old male seen by Registered Dietitian for LOS    NUTRITION HISTORY  - Information obtained from chart review, patient, and his family at bedside.   - Admitted since 5/4 due to worsening foot infection. Pt had been in a TCU after hospitalization for a fall w/ fractures.   - H/o DM2, diabetic neuropathy, PAD    - Pt has been hospitalized or at TCU for much of the past couple months. He was home for about 1 week between TCU and his recent admission to [facility].   - He has been eating poorly. He liked the food at the TCU and did eat well there. In the hospital his diet is restricted, and he is struggling to order the foods he wants.   - Pt feels he has lost about 10# in the past month.     - Pt is familiar with high protein foods, and typically chooses whole milk. He likes yogurt, cottage cheese, low sugar protein supplement drinks. His wife has made a huge point of looking at labels and finding lower sugar; higher protein foods.     CURRENT NUTRITION ORDERS  Diet Order:     NPO for surgery  Previously low or medium consistent carb     Current Intake/Tolerance:  Pt is documented to eat  "100%, however meals ordered are small and his wife admits for him that he tends to overestimated how much he actually eats when reporting to staff. He complains that the restrictions are to strict, he can't even order oatmeal.     5/18 - 1120 kcal, 62 g protein  5/17 - 1150 kcal, 56 g protein  5/16 - 547 kcal, 30 g protein     NUTRITION FOCUSED PHYSICAL ASSESSMENT FOR DIAGNOSING MALNUTRITION)  Yes             Observed:    Muscle wasting (refer to documentation in Malnutrition section) and Subcutaneous fat loss (refer to documentation in Malnutrition section)    Obtained from Chart/Interdisciplinary Team:  5/15 WOCN Assess for Coccyx & Right lateral calcaneous  5/19 L BKA planned     ANTHROPOMETRICS  Height: 5' 9.5\"   Weight: 122 lbs 5.68 oz (55.5 kg)   Body mass index is 17.81 kg/m .  Weight Status:  Underweight BMI <18.5  IBW: 74 kg   % IBW: 75%  Weight History: Pt states he's 10# down from 1 month ago (7.6%).   Wt Readings from Last 10 Encounters:   05/19/23 55.5 kg (122 lb 5.7 oz)   05/07/23 60.7 kg (133 lb 13.1 oz)   05/02/23 59.1 kg (130 lb 3.2 oz)   04/18/23 58.2 kg (128 lb 6.4 oz)   04/13/23 58.2 kg (128 lb 6.4 oz)   04/12/23 58.2 kg (128 lb 6.4 oz)   04/10/23 58.2 kg (128 lb 6.4 oz)   04/06/23 59.1 kg (130 lb 4.8 oz)   04/05/23 58.7 kg (129 lb 8 oz)   05/17/22 62.2 kg (137 lb 3.2 oz)       LABS  Labs reviewed    MEDICATIONS  Medications reviewed  Iron 65 mg elemental   Medium sliding scale insulin + Lantus 14 units   Vitamin D3 50 mcg  D5 + NaCl IVF @ 50 mL/hr     ASSESSED NUTRITION NEEDS PER APPROVED PRACTICE GUIDELINES:  Dosing Weight 56 kg   Estimated Energy Needs: 6843-4938+ kcals (30-35+ Kcal/Kg)  Justification: post-op and underweight  Estimated Protein Needs:  grams protein (1.5-2 g pro/Kg)  Justification: preservation of lean body mass  Estimated Fluid Needs: 1 mL/kcal   Justification: maintenance    MALNUTRITION:  % Weight Loss:  > 5% in 1 month (severe malnutrition)  % Intake:  </= 75% for " >/= 1 month (severe malnutrition)  Subcutaneous Fat Loss:  Orbital region severe depletion, Upper arm region moderate-severe depletion and Thoracic region moderate-severe depletion  Muscle Loss:  Temporal region moderate depletion, Clavicle bone region moderate depletion, Acromion bone region moderate depletion and Dorsal hand region moderate depletion  Fluid Retention:  None noted    Malnutrition Diagnosis: Severe malnutrition  In Context of:  Acute illness or injury    NUTRITION DIAGNOSIS:  Inadequate oral intake related to reduced appetite, restrictive diet, increased needs as evidenced by planned BKA, fat and muscle wasting.     NUTRITION INTERVENTIONS  Recommendations / Nutrition Prescription  ADAT -- Recommend HIGH carb diet to help pt meet his increased needs  Glucerna TID w/ meals - chocolate  Thera vit M    Implementation  Nutrition education: Provided education on protein, fat, sugars. Encouraged proteins and high fat foods.  Medical Food Supplement and Multivitamin/Mineral: as above    Nutrition Goals  Intake of at least 75% nutritionally adequate meals TID including supplements.     MONITORING AND EVALUATION:  Progress towards goals will be monitored and evaluated per protocol and Practice Guidelines    Serena Rogel RD, LD  [facility], Ortho, [facility]  Pager: [phone]  Weekend Pager: [phone]

## 2023-05-19 NOTE — ANESTHESIA CARE TRANSFER NOTE
Patient: Roddy Sidhu    Procedure: Procedure(s):  LEFT BELOW KNEE AMPUTATION       Diagnosis: Diabetic ulcer of left heel associated with type 2 diabetes mellitus, with necrosis of bone (H) [E11.621, L97.424]  Diagnosis Additional Information: No value filed.    Anesthesia Type:   General     Note:    Oropharynx: oropharynx clear of all foreign objects and spontaneously breathing  Level of Consciousness: awake  Oxygen Supplementation: face mask  Level of Supplemental Oxygen (L/min / FiO2): 10  Independent Airway: airway patency satisfactory and stable  Dentition: dentition unchanged  Vital Signs Stable: post-procedure vital signs reviewed and stable  Report to RN Given: handoff report given  Patient transferred to: PACU  Comments: Transferred to PACU, spontaneous respirations, 10L oxygen via facemask.  All monitors and alarms on and functioning, VSS.  Patient awake, comfortable.  Report to PACU RN.  Handoff Report: Identifed the Patient, Identified the Reponsible Provider, Reviewed the pertinent medical history, Discussed the surgical course, Reviewed Intra-OP anesthesia mangement and issues during anesthesia, Set expectations for post-procedure period and Allowed opportunity for questions and acknowledgement of understanding      Vitals:  Vitals Value Taken Time   BP     Temp     Pulse 56 05/19/23 1745   Resp 12 05/19/23 1745   SpO2 100 % 05/19/23 1745   Vitals shown include unvalidated device data.    Electronically Signed By: JOSH Rawls CRNA  May 19, 2023  5:46 PM

## 2023-05-19 NOTE — BRIEF OP NOTE
[facility]    Brief Operative Note    Pre-operative diagnosis: Diabetic ulcer of left heel associated with type 2 diabetes mellitus, with necrosis of bone (H) [E11.621, L97.424]  Post-operative diagnosis Same as pre-operative diagnosis    Procedure: Procedure(s):  LEFT BELOW KNEE AMPUTATION  Surgeon: Surgeon(s) and Role:     * Raimundo Weinstein MD - Primary     Dilip: Fellow  Citizen of [place]: Resident     Anesthesia: General with Block   Estimated Blood Loss: 50 ml     Drains: None  Specimens:   ID Type Source Tests Collected by Time Destination   1 : LEFT BELOW KNEE AMPUTATION Amputation, Non-Traumatic Leg, Below Knee, Left SURGICAL PATHOLOGY EXAM Raimundo Weinstein MD 5/19/2023  4:44 PM      Findings:   Viable gastrocnemius.  Complications: None.  Implants: * No implants in log *      Leave dressing on for 48 hrs   DVT ppx tomorrow

## 2023-05-19 NOTE — PROGRESS NOTES
[facility]  Infectious Disease Progress Note          Assessment and Plan:   Assessment & Plan  Roddy Sidhu is a 77 year old male who was admitted on 5/13/2023.      Impression: 1 77-year-old male with acute on chronic worsening left heel wound, underlying diabetes and peripheral vascular disease, found to be nonviable and BKA now planned.  No major clinical sepsis, culture of the wound with multiple organisms bacteremic and not really ongoing sepsis  2 minor right heel wound no signs of deep infection, vascular procedure performed on its  3 diabetes mellitus  4 recent multiple fractures from a fall  5 peripheral vascular disease nonviable left lower extremity right revascularization with vascular procedure     REC 1 continue Ancef not really covering all the microbiology from his foot,  but the foot looks stable, not septic was not bacteremic, BKA planned for today which will eliminate the infection issue immediately, antibiotics can be discontinued after dosing on Friday no need for long-term antibiotics here  Call if issues this WE            Interval History:   no new complaints and doing well; no cp, sob, n/v/d, or abd pain. No fever              Medications:       aspirin  325 mg Oral Daily     ceFAZolin  1 g Intravenous Q8H     gabapentin  300 mg Oral TID     insulin aspart  1-7 Units Subcutaneous TID AC     insulin aspart  1-5 Units Subcutaneous At Bedtime     insulin glargine  14 Units Subcutaneous BID     latanoprost  1 drop Both Eyes At Bedtime     simvastatin  40 mg Oral At Bedtime     sodium chloride (PF)  10-40 mL Intracatheter Q7 Days     sodium chloride (PF)  3 mL Intracatheter Q8H     sodium chloride (PF)  3 mL Intracatheter Q8H     vitamin D3  50 mcg Oral Daily                  Physical Exam:   Blood pressure 124/76, pulse 60, temperature 97.8  F (36.6  C), temperature source Oral, resp. rate 18, weight 55.5 kg (122 lb 5.7 oz), SpO2 99 %.  Wt Readings from Last 2  Encounters:   05/19/23 55.5 kg (122 lb 5.7 oz)   05/07/23 60.7 kg (133 lb 13.1 oz)     Vital Signs with Ranges  Temp:  [97.7  F (36.5  C)-97.9  F (36.6  C)] 97.8  F (36.6  C)  Pulse:  [58-60] 60  Resp:  [16-18] 18  BP: (103-124)/(70-76) 124/76  SpO2:  [99 %] 99 %    Constitutional: Awake, alert, cooperative, no apparent distress   Lungs: Clear to auscultation bilaterally, no crackles or wheezing   Cardiovascular: Regular rate and rhythm, normal S1 and S2, and no murmur noted   Abdomen: Normal bowel sounds, soft, non-distended, non-tender   Skin: No rashes, no cyanosis, no edema   Other:           Data:   All microbiology laboratory data reviewed.  Recent Labs   Lab Test 05/19/23  0624 05/18/23  0616 05/17/23  0557 05/15/23  0645   WBC 9.9  --  9.3 10.5   HGB 7.8* 7.3* 7.6* 8.9*   HCT 25.6*  --  24.7* 28.8*   MCV 82  --  82 80   *  --  574* 589*     Recent Labs   Lab Test 05/19/23  0624 05/17/23  0557 05/15/23  0645   CR 0.74 0.76 0.83     No lab results found.  No lab results found.    Invalid input(s): RYLEE

## 2023-05-19 NOTE — PROGRESS NOTES
Care Management Follow Up    Length of Stay (days): 6    Expected Discharge Date: 05/22/2023     Concerns to be Addressed:       Patient plan of care discussed at interdisciplinary rounds: Yes    Anticipated Discharge Disposition: Transitional Care  Disposition Comments: TCU  Anticipated Discharge Services: Transportation Services  Anticipated Discharge DME: None    Patient/family educated on Medicare website which has current facility and service quality ratings:    Education Provided on the Discharge Plan: Yes  Patient/Family in Agreement with the Plan: yes    Referrals Placed by CM/SW:    Private pay costs discussed: Not applicable    Additional Information:  Anticipate patient may be ready on Monday for TCU. Referral sent to [facility] for TCU.     DAVIE Tam, LGSW   Social Work   [facility]

## 2023-05-19 NOTE — PLAN OF CARE
Goal Outcome Evaluation:      Plan of Care Reviewed With: patient    Overall Patient Progress: no changeOverall Patient Progress: no change    [id number]    Vitals/Tele-VSS on room air, lungs clear diminished. Denies pain.    IV Access/drains-SL PICC in LUE.    Diet-Mod carb, good appetite-glucose 94. NPO after midnight.    Mobility-Up with assist of 1, GB/walker. NWB LLE. Will have BKA tomorrow.    GI/-Voids in urinal adequate-reports had BM 5/17.    Wound/Skin- Ulcers on bilateral heels and left foot, reddened coccyx.    Consults-ID, hospitalist, vascular surgery, PT.    Discharge Plan-pending progress.      See Flow sheets for assessment

## 2023-05-19 NOTE — PROGRESS NOTES
[facility]  Hospitalist Progress Note   05/19/2023          Assessment and Plan:       Roddy Sidhu is a 77 year old male history of diabetes mellitus with polyneuropathy transferred from [facility] [place]13/[place] with poor healing left heel wound and concern for vascular compromise-requiring vascular surgery evaluation for    Patient admitted at [facility] (3/29-4/4) for fall with R femur fx, R humerus fx, R clavicle fx s/p R femur ORIF and discharged to TCU.     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  S/p debridement of left pressure ulcer 5/9/2023  Peripheral arterial disease.  --Pressure injury to heels noted in TCU and seen by wound care 4/17/2023.  Recent ABIs were normal. WBC 17.1.  .60.  Wound cultures from 5/5 positive for 2+ Proteus, 3+ Enterococcus, 1+ MSSA.    --Was on IV vancomycin 5/4 to 5/8.    Was on intravenous Zosyn (5/4), switched to Unasyn (5/8) to cover Proteus, Enterococcus and MSSA. Then switched to oral Augmentin [5/10- 5/14]  Continue IV Ancef [5/15].  CRP improving from 123.60  >25.6   --Vascular surgery following.  Underwent lower extremity angiogram 5/16,  Left patent iliac, SFA and AK pop. 50 % at knee pop stenosis, single vessel runoff via VERN.  -- Podiatry following, plan for left BKA 5/19.  --Infectious disease following.  Recommend discontinue antibiotics after amputation.  Appreciate input.  Appreciate multidisciplinary team comanagement.  As needed Tylenol, oxycodone as needed for pain.  Was on aspirin 325 mg oral daily, hold on 5/19 for surgery.  Restart postoperatively as able to.  Continue simvastatin 40 mg oral daily.  Needs risk factor modification.  Rehab evaluation.  Trend WBC count, fever curve.    Acute on chronic anemia no active bleeding  Status post PRBC transfusion 5/5.  Hemoglobin was normal 6 months ago, was 8's during recent admission, then 8.2 on 5/4 and dropped to 6.7-6.9 on  5/5, received 1 unit blood transfusion on 5/5.  --No black or bloody stools.  No abdominal pain.   No other apparent source of acute blood loss.   --Guiac negative. Iron sat index 5.  Received 2 doses of IV Venofer, started on oral iron supplements.  -Aspirin was on hold, restarted 5/14 -5/18.  Held 5/19 for amputation surgery, restart postoperatively pending hemoglobin in AM.    Discontinued Celebrex [started this hospitalization], naproxen [PTA meds]  No active bleeding at this time.    Transfuse for hemoglobin less than 7 or symptomatic.  Monitor hemoglobin levels in AM.    Hypoglycemia 5/19 while NPO  Hyperglycemia from uncontrolled diabetes  Uncontrolled diabetes mellitus with a hemoglobin A1c of 8.3.  Diabetic neuropathy.  PTA on 10 units insulin Lantus every morning.  This hospital stay noted elevated blood sugars, optimize to insulin Lantus 14 units twice daily.  Insulin Lantus on hold on 5/19 AM for surgery while NPO.  Noted hypoglycemia with blood sugars of 64 on 5/19.  Hypoglycemia protocol.  Will start on NS with D5 at 50 mL/h for 5 hours, surgery scheduled for 3 PM.  Continue insulin Lantus to 14 units twice daily after surgery  Continue sliding scale insulin.  Continue PTA med Neurontin.  Hold PTA metformin and Trulicity.    Hyperlipidemia.  Continue PTA simvastatin.    Physical deconditioning in the setting of ongoing medical illness, senile frailty.  Status post recent fall.  Closed displaced subtrochanteric fracture of right femur 3/29  S/p ORIF right femur fracture with intermedullary nail 3/29     Closed fracture of proximal end of right humerus 3/29   Closed nondisplaced fracture of right clavicle 3/29   Was at TCU prior to transfer  Continue PT/OT, likely TCU on discharge.  Patient and family in agreement with TCU.  Care Management assistance with transition.    Hypoalbuminemia likely dilutional, acute illness.  Serum albumin 2.6.   Dietary supplements with Ensure.  Monitor  periodically.    Thrombocytosis likely reactive in the setting of infection.  Platelet count 613 > 553  Monitor periodically.     Tobacco dependence  Caily pipe smokier, Smoking cessation emphasized  Nicotine patch     Orders Placed This Encounter      NPO for Medical/Clinical Reasons Except for: Ice Chips, Meds      DVT Prophylaxis: SCDs, ambulate.  Code Status: Full Code  Disposition: Expected discharge in 2 days pending clinical improvement    Discussed with patient, floor RN.  Updated patient's daughter by the bedside 5/19.  >35 minutes spent by me on the date of service doing chart review, history, exam, documentation & further activities per the note.      Woodrow Petersen MD        Interval History:        Patient lying in bed.  Denies any chest pain or palpitations.  Denies any headache or dizziness.  No nausea vomiting.  This morning blood sugar 64, n.p.o. for upcoming amputation surgery  Denies any pain in the leg.  Per report assistance of 1 with GB  Afebrile.  No blood in the stools or blood in the urine or active bleeding at this time.         Physical Exam:        Physical Exam   Temp:  [97.7  F (36.5  C)-97.9  F (36.6  C)] 97.8  F (36.6  C)  Pulse:  [58-60] 60  Resp:  [16-18] 18  BP: (103-124)/(70-76) 124/76  SpO2:  [99 %] 99 %    Intake/Output Summary (Last 24 hours) at 5/15/2023 1439  Last data filed at 5/15/2023 1352  Gross per 24 hour   Intake 1760 ml   Output 1250 ml   Net 510 ml       Admission Weight: 59.4 kg (130 lb 15.3 oz)  Current Weight: 59.4 kg (130 lb 15.3 oz)    PHYSICAL EXAM  GENERAL: Patient is in no distress. Alert and oriented.  HEART: Regular rate and rhythm. S1S2. No murmurs  LUNGS:Respirations unlabored  NEURO: Moving all extremities.  EXTREMITIES:   Dressing +  SKIN: Warm, dry.   PSYCHIATRY Cooperative       Medications:          aspirin  325 mg Oral Daily     ceFAZolin  1 g Intravenous Q8H     gabapentin  300 mg Oral TID     insulin aspart  1-7 Units Subcutaneous TID AC      insulin aspart  1-5 Units Subcutaneous At Bedtime     insulin glargine  14 Units Subcutaneous BID     latanoprost  1 drop Both Eyes At Bedtime     simvastatin  40 mg Oral At Bedtime     sodium chloride (PF)  10-40 mL Intracatheter Q7 Days     sodium chloride (PF)  3 mL Intracatheter Q8H     sodium chloride (PF)  3 mL Intracatheter Q8H     vitamin D3  50 mcg Oral Daily     acetaminophen, glucose **OR** dextrose **OR** glucagon, lidocaine 4%, lidocaine (buffered or not buffered), melatonin, ondansetron **OR** ondansetron, sodium chloride (PF), sodium chloride (PF), sodium chloride (PF), sodium chloride (PF)         Data:      All new lab and imaging data was reviewed.

## 2023-05-19 NOTE — PROGRESS NOTES
MD Notification    Notified Person: MD    Notified Person Name: Yisel Cherry     Notification Date/Time: 5/19/23 1434    Notification Interaction: amcom     Purpose of Notification: He does not have blood consent. Will send blank consent with pt to pre-op.     Orders Received: pending      Comments:

## 2023-05-20 ENCOUNTER — APPOINTMENT (OUTPATIENT)
Dept: PHYSICAL THERAPY | Facility: CLINIC | Age: 78
DRG: 616 | End: 2023-05-20
Attending: INTERNAL MEDICINE
Payer: COMMERCIAL

## 2023-05-20 LAB
ALBUMIN SERPL BCG-MCNC: 2.9 G/DL (ref 3.5–5.2)
ANION GAP SERPL CALCULATED.3IONS-SCNC: 8 MMOL/L (ref 7–15)
BUN SERPL-MCNC: 11.1 MG/DL (ref 8–23)
CALCIUM SERPL-MCNC: 8.3 MG/DL (ref 8.8–10.2)
CHLORIDE SERPL-SCNC: 101 MMOL/L (ref 98–107)
CREAT SERPL-MCNC: 0.74 MG/DL (ref 0.67–1.17)
DEPRECATED HCO3 PLAS-SCNC: 27 MMOL/L (ref 22–29)
ERYTHROCYTE [DISTWIDTH] IN BLOOD BY AUTOMATED COUNT: 19.4 % (ref 10–15)
GFR SERPL CREATININE-BSD FRML MDRD: >90 ML/MIN/1.73M2
GLUCOSE BLDC GLUCOMTR-MCNC: 192 MG/DL (ref 70–99)
GLUCOSE BLDC GLUCOMTR-MCNC: 211 MG/DL (ref 70–99)
GLUCOSE BLDC GLUCOMTR-MCNC: 253 MG/DL (ref 70–99)
GLUCOSE BLDC GLUCOMTR-MCNC: 368 MG/DL (ref 70–99)
GLUCOSE BLDC GLUCOMTR-MCNC: 433 MG/DL (ref 70–99)
GLUCOSE SERPL-MCNC: 213 MG/DL (ref 70–99)
HCT VFR BLD AUTO: 25.7 % (ref 40–53)
HGB BLD-MCNC: 8 G/DL (ref 13.3–17.7)
MCH RBC QN AUTO: 25.7 PG (ref 26.5–33)
MCHC RBC AUTO-ENTMCNC: 31.1 G/DL (ref 31.5–36.5)
MCV RBC AUTO: 83 FL (ref 78–100)
PLATELET # BLD AUTO: 520 10E3/UL (ref 150–450)
POTASSIUM SERPL-SCNC: 4.8 MMOL/L (ref 3.4–5.3)
RBC # BLD AUTO: 3.11 10E6/UL (ref 4.4–5.9)
SODIUM SERPL-SCNC: 136 MMOL/L (ref 136–145)
WBC # BLD AUTO: 15.3 10E3/UL (ref 4–11)

## 2023-05-20 PROCEDURE — 250N000011 HC RX IP 250 OP 636: Performed by: STUDENT IN AN ORGANIZED HEALTH CARE EDUCATION/TRAINING PROGRAM

## 2023-05-20 PROCEDURE — 250N000013 HC RX MED GY IP 250 OP 250 PS 637: Performed by: STUDENT IN AN ORGANIZED HEALTH CARE EDUCATION/TRAINING PROGRAM

## 2023-05-20 PROCEDURE — 80048 BASIC METABOLIC PNL TOTAL CA: CPT | Performed by: STUDENT IN AN ORGANIZED HEALTH CARE EDUCATION/TRAINING PROGRAM

## 2023-05-20 PROCEDURE — 97530 THERAPEUTIC ACTIVITIES: CPT | Mod: GP

## 2023-05-20 PROCEDURE — 120N000001 HC R&B MED SURG/OB

## 2023-05-20 PROCEDURE — 99233 SBSQ HOSP IP/OBS HIGH 50: CPT | Performed by: HOSPITALIST

## 2023-05-20 PROCEDURE — 250N000013 HC RX MED GY IP 250 OP 250 PS 637: Performed by: HOSPITALIST

## 2023-05-20 PROCEDURE — 97164 PT RE-EVAL EST PLAN CARE: CPT | Mod: GP

## 2023-05-20 PROCEDURE — 85014 HEMATOCRIT: CPT | Performed by: STUDENT IN AN ORGANIZED HEALTH CARE EDUCATION/TRAINING PROGRAM

## 2023-05-20 PROCEDURE — 999N000111 HC STATISTIC OT IP EVAL DEFER: Performed by: OCCUPATIONAL THERAPIST

## 2023-05-20 PROCEDURE — 258N000003 HC RX IP 258 OP 636: Performed by: STUDENT IN AN ORGANIZED HEALTH CARE EDUCATION/TRAINING PROGRAM

## 2023-05-20 PROCEDURE — 82040 ASSAY OF SERUM ALBUMIN: CPT | Performed by: STUDENT IN AN ORGANIZED HEALTH CARE EDUCATION/TRAINING PROGRAM

## 2023-05-20 RX ORDER — ASPIRIN 325 MG
325 TABLET, DELAYED RELEASE (ENTERIC COATED) ORAL DAILY
Status: DISCONTINUED | OUTPATIENT
Start: 2023-05-20 | End: 2023-05-24 | Stop reason: HOSPADM

## 2023-05-20 RX ADMIN — FERROUS GLUCONATE 324 MG: 324 TABLET ORAL at 08:31

## 2023-05-20 RX ADMIN — OXYCODONE HYDROCHLORIDE 5 MG: 5 TABLET ORAL at 20:34

## 2023-05-20 RX ADMIN — SENNOSIDES AND DOCUSATE SODIUM 1 TABLET: 50; 8.6 TABLET ORAL at 08:31

## 2023-05-20 RX ADMIN — SIMVASTATIN 40 MG: 40 TABLET, FILM COATED ORAL at 21:42

## 2023-05-20 RX ADMIN — INSULIN ASPART 2 UNITS: 100 INJECTION, SOLUTION INTRAVENOUS; SUBCUTANEOUS at 21:33

## 2023-05-20 RX ADMIN — GABAPENTIN 300 MG: 300 CAPSULE ORAL at 08:31

## 2023-05-20 RX ADMIN — ACETAMINOPHEN 975 MG: 325 TABLET ORAL at 06:03

## 2023-05-20 RX ADMIN — SODIUM CHLORIDE, POTASSIUM CHLORIDE, SODIUM LACTATE AND CALCIUM CHLORIDE: 600; 310; 30; 20 INJECTION, SOLUTION INTRAVENOUS at 08:41

## 2023-05-20 RX ADMIN — MULTIPLE VITAMINS W/ MINERALS TAB 1 TABLET: TAB at 08:31

## 2023-05-20 RX ADMIN — POLYETHYLENE GLYCOL 3350 17 G: 17 POWDER, FOR SOLUTION ORAL at 08:31

## 2023-05-20 RX ADMIN — HEPARIN SODIUM 5000 UNITS: 5000 INJECTION, SOLUTION INTRAVENOUS; SUBCUTANEOUS at 16:00

## 2023-05-20 RX ADMIN — ACETAMINOPHEN 650 MG: 325 TABLET ORAL at 12:15

## 2023-05-20 RX ADMIN — ACETAMINOPHEN 650 MG: 325 TABLET ORAL at 18:57

## 2023-05-20 RX ADMIN — ASPIRIN 325 MG: 325 TABLET, COATED ORAL at 16:00

## 2023-05-20 RX ADMIN — FAMOTIDINE 20 MG: 20 TABLET ORAL at 08:31

## 2023-05-20 RX ADMIN — GABAPENTIN 300 MG: 300 CAPSULE ORAL at 21:42

## 2023-05-20 RX ADMIN — SENNOSIDES AND DOCUSATE SODIUM 1 TABLET: 50; 8.6 TABLET ORAL at 20:34

## 2023-05-20 RX ADMIN — LATANOPROST 1 DROP: 50 SOLUTION/ DROPS OPHTHALMIC at 21:40

## 2023-05-20 RX ADMIN — GABAPENTIN 300 MG: 300 CAPSULE ORAL at 16:00

## 2023-05-20 RX ADMIN — ACETAMINOPHEN 975 MG: 325 TABLET ORAL at 21:41

## 2023-05-20 RX ADMIN — FAMOTIDINE 20 MG: 20 TABLET ORAL at 20:33

## 2023-05-20 RX ADMIN — HEPARIN SODIUM 5000 UNITS: 5000 INJECTION, SOLUTION INTRAVENOUS; SUBCUTANEOUS at 21:37

## 2023-05-20 RX ADMIN — Medication 50 MCG: at 08:31

## 2023-05-20 ASSESSMENT — ACTIVITIES OF DAILY LIVING (ADL)
ADLS_ACUITY_SCORE: 41

## 2023-05-20 NOTE — PLAN OF CARE
Occupational Therapy: Orders received. Chart reviewed and discussed with care team, including IP PT. Chart indicates plans for TCU, potentially Monday. Pt with limited endurance for 2 therapies at this time, will allow pt time to work with PT and progress transfers prior to initiation of OT. All therapy needs are being met with IP PT. Will defer therapy recommendations to IP PT. Will complete orders.

## 2023-05-20 NOTE — PROGRESS NOTES
VASCULAR SURGERY PROGRESS NOTE    Mr. Sidhu is a 78yo man with a left heel ulcer now POD1 s/p left below knee amputation.    NAEON. Denies pain. Has minimal left lower extremity sensation since the block.    Vitals reviewed.    On exam, resting comfortably. Knee immobilizer in place. Visible dressings c/d/i.    Labs reviewed. Hgb 8 from 7.8.     Mr. Sidhu is a 78yo man with a left heel ulcer now POD1 s/p left below knee amputation, recovering  - out of bed, PT/OT  - keep knee immobilizer in place, okay to remove for brief periods for comfort  - okay to resume aspirin, dvt ppx today  - will plan for dressing change Monday    Isabel Brizuela MD  05/20/23  8:44 AM

## 2023-05-20 NOTE — PLAN OF CARE
POD 1 from L BKA. A&Ox4, VSS on RA. Denies pain, N/V over shift. L PICC line infusing with LR @ 100ml/hr. Voiding adequately using urinal. Rooke boots worn on R lower extremity while in bed. On regular diet and tolerating. Turn and repo every 2 hours. Mepilex over coccyx and R heel.

## 2023-05-20 NOTE — PROGRESS NOTES
[facility]  Hospitalist Progress Note   05/20/2023          Assessment and Plan:       Roddy Sidhu is a 77 year old male with history of diabetes mellitus with polyneuropathy transferred from [facility] on 5/13/2023 with poor healing left heel wound and concern for vascular compromise-requiring vascular surgery evaluation.    Patient admitted at [facility] (3/29-4/4) for fall with R femur fx, R humerus fx, R clavicle fx s/p R femur ORIF and discharged to TCU.     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  S/p debridement of left pressure ulcer 5/9/2023  Peripheral arterial disease.  --Pressure injury to heels noted in TCU and seen by wound care 4/17/2023.  Recent ABIs were normal. WBC 17.1.  .60.  Wound cultures from 5/5 positive for 2+ Proteus, 3+ Enterococcus, 1+ MSSA.    --Was on IV vancomycin 5/4 to 5/8.    Was on intravenous Zosyn (5/4), switched to Unasyn (5/8) to cover Proteus, Enterococcus and MSSA. Then switched to oral Augmentin [5/10- 5/14]  Continue IV Ancef [5/15].  CRP improving from 123.60  >25.6   --Vascular surgery following.  Underwent lower extremity angiogram 5/16,  Left patent iliac, SFA and AK pop. 50 % at knee pop stenosis, single vessel runoff via VERN.  -- Podiatry following, plan for left BKA 5/19.  Patient underwent surgery as planned on 5/19.  --Infectious disease following.  Antibiotics discontinued following BKA per ID recommendations.  As needed Tylenol, oxycodone as needed for pain.  Was on aspirin 325 mg oral daily, hold on 5/19 for surgery.  Restarted postoperatively .  Continue simvastatin 40 mg oral daily.  -PT/OT    Acute on chronic anemia no active bleeding  S/p PRBC transfusion 5/5.  Hemoglobin was normal 6 months ago, was 8's during recent admission, then 8.2 on 5/4 and dropped to 6.7-6.9 on 5/5, received 1 unit blood transfusion on 5/5.  --No black or bloody  stools.  No abdominal pain.   No other apparent source of acute blood loss.   --Guiac negative. Iron sat index 5.  Received 2 doses of IV Venofer, started on oral iron supplements.  -Aspirin was on hold, restarted 5/14 -5/18.  Held 5/19 for amputation surgery, restarted postoperatively.  Hb 8 on 5/20.  Discontinued Celebrex [started this hospitalization], naproxen [PTA meds]  No active bleeding at this time.      Hypoglycemia 5/19 while NPO  Hyperglycemia from uncontrolled diabetes  Uncontrolled diabetes mellitus with a hemoglobin A1c of 8.3.  Diabetic neuropathy.  PTA on 10 units insulin Lantus every morning.  This hospital stay noted elevated blood sugars, optimized to insulin Lantus 14 units twice daily.  Insulin Lantus on hold on 5/19 AM for surgery while NPO.  Noted hypoglycemia with blood sugars of 64 on 5/19.  Hypoglycemia protocol.  Also received IVF with dextrose for this.  Continue insulin Lantus to 14 units twice daily after surgery.  Titrate insulin as needed.  Also added prandial aspart 1 unit per 8 g carbs 3 times daily AC.  Continue sliding scale insulin.  Continue PTA med Neurontin.  Hold PTA metformin and Trulicity.  -Change diet to moderate consistent carbohydrate diet    Hyperlipidemia.  Continue PTA simvastatin.    Physical deconditioning in the setting of ongoing medical illness, senile frailty.  Status post recent fall.  Closed displaced subtrochanteric fracture of right femur 3/29  S/p ORIF right femur fracture with intermedullary nail 3/29     Closed fracture of proximal end of right humerus 3/29   Closed nondisplaced fracture of right clavicle 3/29   Was at TCU prior to transfer  Continue PT/OT, likely TCU on discharge.  Patient and family in agreement with TCU.  Care Management assistance with transition.    Hypoalbuminemia likely dilutional, acute illness.  Serum albumin 2.6.   Dietary supplements with Ensure.  Monitor periodically.    Thrombocytosis likely reactive in the setting of  infection.  Platelet count 613 > 553  Monitor periodically.     Tobacco dependence  Caily pipe smokier, Smoking cessation emphasized  Nicotine patch     Orders Placed This Encounter      Regular Diet Adult      DVT Prophylaxis: SCDs, ambulate.  Code Status: Full Code  Disposition: Expected discharge in 2 days pending clinical improvement    >35 minutes spent by me on the date of service doing chart review, history, exam, documentation & further activities per the note.      Theresa Carlson MD        Interval History:        Stable overnight.  Blood sugars appear to be quite erratic.  Climbing through the day today.  Patient notes that he ate a large meal earlier today.  He otherwise feels okay.  Denies any significant pain.         Physical Exam:        Physical Exam   Temp:  [97.4  F (36.3  C)-98.1  F (36.7  C)] 98  F (36.7  C)  Pulse:  [50-71] 71  Resp:  [10-17] 17  BP: ()/(51-66) 115/66  SpO2:  [98 %-100 %] 98 %    Intake/Output Summary (Last 24 hours) at 5/15/2023 1439  Last data filed at 5/15/2023 1352  Gross per 24 hour   Intake 1760 ml   Output 1250 ml   Net 510 ml       Admission Weight: 59.4 kg (130 lb 15.3 oz)  Current Weight: 59.4 kg (130 lb 15.3 oz)    PHYSICAL EXAM  GENERAL: Patient is in no distress. Alert and oriented.  HEART: Regular rate and rhythm. S1S2. No murmurs  LUNGS:Respirations unlabored  NEURO: Moving all extremities.  EXTREMITIES: Noted left BKA stump with dressing  PSYCHIATRY Cooperative       Medications:          acetaminophen  975 mg Oral Q8H     aspirin  325 mg Oral Daily     famotidine  20 mg Oral BID    Or     famotidine  20 mg Intravenous BID     ferrous gluconate  324 mg Oral Daily with breakfast     gabapentin  300 mg Oral TID     heparin ANTICOAGULANT  5,000 Units Subcutaneous Q8H     insulin aspart  1-7 Units Subcutaneous TID AC     insulin aspart  1-5 Units Subcutaneous At Bedtime     insulin glargine  14 Units Subcutaneous BID     latanoprost  1 drop Both Eyes At  Bedtime     multivitamin w/minerals  1 tablet Oral Daily     polyethylene glycol  17 g Oral Daily     senna-docusate  1 tablet Oral BID     simvastatin  40 mg Oral At Bedtime     sodium chloride (PF)  10-40 mL Intracatheter Q7 Days     sodium chloride (PF)  3 mL Intracatheter Q8H     vitamin D3  50 mcg Oral Daily     acetaminophen, bisacodyl, glucose **OR** dextrose **OR** glucagon, HYDROmorphone **OR** HYDROmorphone, lidocaine 4%, lidocaine (buffered or not buffered), magnesium hydroxide, melatonin, naloxone **OR** naloxone **OR** naloxone **OR** naloxone, ondansetron **OR** ondansetron, oxyCODONE **OR** oxyCODONE, prochlorperazine **OR** prochlorperazine, sodium chloride (PF), sodium chloride (PF), sodium chloride (PF), sodium chloride (PF)         Data:      All new lab and imaging data was reviewed.

## 2023-05-20 NOTE — PLAN OF CARE
Goal Outcome Evaluation:     Summary:    Care Plan Summary Note: LEFT BELOW KNEE AMPUTATION  Orientation: A&Ox4  Activity Level: A-2 with Bushra Todd per PT  Fall Risk: Yes  Behavior & Aggression Tool Color: Green  Pain Management: Minimal Pain, PRN tylenol given  ABNL VS/O2: VSS on RA  ABNL Lab/B, 368, 433 insulin given per sliding scale; MD notified  Diet: Regular diet  Bowel/Bladder: Continent, using bedside urinal; adequate output.  Drains/Devices: Left PICC line infusing LR 10 ml/ hr  Tests/Procedures for next shift: None  Anticipated DC date: Probably Monday, May 22 th  Other Important Info: LLE immobilizer device removed and put back; Rooke boots worn on R lower extremity while in bed, R toe nail deformation. Turn and repo every 2 hours. Mepilex over coccyx and R heel. Will continue to monitor.

## 2023-05-20 NOTE — PLAN OF CARE
Goal Outcome Evaluation:    Anupama GONZALEZ Ovian regarding BG is 433. 6 units of insulin given per sliding scale.

## 2023-05-20 NOTE — ANESTHESIA POSTPROCEDURE EVALUATION
Patient: Roddy Sidhu    Procedure: Procedure(s):  LEFT BELOW KNEE AMPUTATION       Anesthesia Type:  General    Note:  Disposition: Admission   Postop Pain Control: Uneventful            Sign Out: Well controlled pain   PONV: No   Neuro/Psych: Uneventful            Sign Out: Acceptable/Baseline neuro status   Airway/Respiratory: Uneventful            Sign Out: Acceptable/Baseline resp. status   CV/Hemodynamics: Uneventful            Sign Out: Acceptable CV status; No obvious hypovolemia; No obvious fluid overload   Other NRE: NONE   DID A NON-ROUTINE EVENT OCCUR? No           Last vitals:  Vitals Value Taken Time   /52 05/19/23 1840   Temp 36.7  C (98.1  F) 05/19/23 1840   Pulse 60 05/19/23 1850   Resp 16 05/19/23 1850   SpO2 100 % 05/19/23 1852   Vitals shown include unvalidated device data.    Electronically Signed By: Laurence Harrell MD  May 19, 2023  8:35 PM

## 2023-05-20 NOTE — PROGRESS NOTES
05/20/23 1052   Appointment Info   Signing Clinician's Name / Credentials (PT) Todd Odonnell DPT   Rehab Comments (PT) POD #1 s/p L BKA, re-eval   Living Environment   People in Home spouse;child(tomasa), adult   Current Living Arrangements house   Home Accessibility stairs to enter home   Number of Stairs, Main Entrance 1   Transportation Anticipated family or friend will provide   Living Environment Comments Pt admitted from home, was previously at a TCU following a hospitalization resulting in multiple fractures including R clavicle fx, R femur fx, R humerus fx. Was only home for ~1 week. Per eval from 5/14, pt lives in multi level house with spouse. Dtr, VANNESSA, and grandkids live upstairs. All needs met on main level, no steps to enter. Has walk in shower, but has to go up 1 step to get into it. Grab bars in shower.   Self-Care   Usual Activity Tolerance moderate   Current Activity Tolerance fair   Equipment Currently Used at Home walker, rolling   Fall history within last six months yes   Number of times patient has fallen within last six months 2   Activity/Exercise/Self-Care Comment Pt was home ~1 week and was able to ambulate short household distances w/ FWW, but was also using w/c at home. Pt stating that his daughter obtained a power w/c for him.   General Information   Onset of Illness/Injury or Date of Surgery 05/19/23   Referring Physician Tricia Cherry MD   Patient/Family Therapy Goals Statement (PT) To get better.   Pertinent History of Current Problem (include personal factors and/or comorbidities that impact the POC) Re-admitted 5/13 with non-healing B diabetic heel ulcers, recent R femur s/p ORIF 3-29 / R humeral  fx/ R clavical fx. Now, pt is POD #1 s/p L BKA for non-heeling L heel ulcer.   Existing Precautions/Restrictions fall   Weight-Bearing Status - LLE nonweight-bearing  (with knee immobilizer)   Weight-Bearing Status - RLE weight-bearing as tolerated   General Observations Knee  immobilizer to be work on LLE at all times, except for short periods can be taken off for comfort   Cognition   Affect/Mental Status (Cognition) WFL   Orientation Status (Cognition) oriented x 4   Integumentary/Edema   Integumentary/Edema Comments R heel ulcer covered in meplex and CDI. L residual limb covered in post-op bandages with KI donned upon arrival. CDI with no signs of fresh bleeding.   Posture    Posture Forward head position;Protracted shoulders   Range of Motion (ROM)   Range of Motion ROM deficits secondary to surgical procedure   ROM Comment bilateral LE AROM mildly limited 2/2 weakness, LLE AROM limited 2/2 L BKA and KI donned   Strength (Manual Muscle Testing)   Strength (Manual Muscle Testing) Deficits observed during functional mobility   Strength Comments moderate funtional strength deficits noted   Bed Mobility   Comment, (Bed Mobility) CGA supine<>sit   Transfers   Comment, (Transfers) sit>stand w/ min A x2   Gait/Stairs (Locomotion)   Comment, (Gait/Stairs) unable to ambulate   Balance   Balance Comments impaired dynamic balance 2/2 needing FWW for standing balance   Sensory Examination   Sensory Perception patient reports no sensory changes   Clinical Impression   Criteria for Skilled Therapeutic Intervention Yes, treatment indicated   PT Diagnosis (PT) impaired functional mobility   Influenced by the following impairments moderate functional strength impairment, impaired activity tolerance, impaired dynamic balance   Functional limitations due to impairments limited IND with functional mobility   Clinical Presentation (PT Evaluation Complexity) Evolving/Changing   Clinical Presentation Rationale clinical judgemnet, new L BKTALAT   Clinical Decision Making (Complexity) moderate complexity   Planned Therapy Interventions (PT) balance training;gait training;home exercise program;neuromuscular re-education;patient/family education;postural re-education;stair training;strengthening;transfer  training;progressive activity/exercise;risk factor education;home program guidelines;bed mobility training   Anticipated Equipment Needs at Discharge (PT) walker, rolling;wheelchair;shower chair;raised toilet seat   Risk & Benefits of therapy have been explained evaluation/treatment results reviewed;care plan/treatment goals reviewed;risks/benefits reviewed;current/potential barriers reviewed;participants voiced agreement with care plan;participants included;patient   PT Total Evaluation Time   PT Eval, Re-eval Minutes (17420) 8   Plan of Care Review   Plan of Care Reviewed With patient   Physical Therapy Goals   PT Frequency 5x/week   PT Predicted Duration/Target Date for Goal Attainment 05/27/23   PT Goals Bed Mobility;Transfers;Gait;Stairs   PT: Bed Mobility Independent;Supine to/from sit;Rolling;Within precautions   PT: Transfers Supervision/stand-by assist;Sit to/from stand;Bed to/from chair;Assistive device   PT: Gait Minimal assist;Rolling walker;10 feet;Within precautions   PT: Stairs Minimal assist;1 stair;Within precautions   Therapeutic Activity   Therapeutic Activities: dynamic activities to improve functional performance Minutes (32847) 24   Symptoms Noted During/After Treatment Fatigue   Treatment Detail/Skilled Intervention Greeted pt upon arrival to room. Pt agreeable to working with PT. Rehab aide present to assist with mobility. Surgical shoe donned on RLE. Education provided on importance of use of knee immobilizer following L BKA and positioning with pillows when not using KI. Demonstrating good understanding. Supine>sit w/ CGA. Sit>stand w/ FWW and mod A x2. Cueing for safe and efficient sit<>stand procedure including scooting to the front edge of the chair, to lean forward with nose over toes, and hand and foot placement. Stand>sit w/ FWW and CGA. Visual demonstration provided for pt to place RLE closer to the middle of the FWW to improve balance with recent L BKA. Stand pivot transfer x2 from  bed<>recliner w/ mod A x2. Cueing for attempting hopping technique, but pt unable. Pt able to scoot foot while transferring, but unable to scoot backwards, so recliner and bed both needing to be pulled up behind pt during transfer. Sit>supine w/ CGA. Pt left with all needs met and call light within reach. RN updated.   PT Discharge Planning   PT Plan continue transfer training, stand pivot w/ FWW vs squat pivot without FWW (?), supine and seated exercises, L BKA education, initiate short distance ambulation as able.   PT Discharge Recommendation (DC Rec) Transitional Care Facility   PT Rationale for DC Rec Pt is below baseline mobility level at this time. Following L BKA on 5/19, unable to safely transfer w/ FWW and A x2. Recommend discharge to TCU to continue progressing mobility and improving safety with functional mobility. Pt expressing he would like to be near [place], [place] to be near his family.   PT Brief overview of current status CGA bed mobility, A x2 sit>stand w/ FWW, SS for transfers with nursing   Total Session Time   Timed Code Treatment Minutes 24   Total Session Time (sum of timed and untimed services) 32

## 2023-05-20 NOTE — DISCHARGE SUMMARY
[facility]  Hospitalist Discharge Summary       Date of Admission:  5/4/2023  Date of Discharge:  5/12/2023 11:37 PM  Discharging Provider: Vinod Morfin MD      Discharge Diagnoses     Infected diabetic pressure ulcer of left heel associated with type 2 diabetes mellitus with diabetic neuropathy  Additional diabetic ulcer of right heel without infection  S/p debridement of left pressure ulcer 5/9/2023  Peripheral arterial disease  Sepsis with acute organ dysfunction without septic shock, due to unspecified organism, unspecified type (H)  Acute on chronic anemia  Closed displaced subtrochanteric fracture of right femur 3/29  S/p ORIF right femur fracture with intermedullary nail 3/29     Closed fracture of proximal end of right humerus 3/29   Closed nondisplaced fracture of right clavicle 3/29   Type 2 diabetes mellitus with diabetic polyneuropathy, with long-term current use of insulin   Hyperlipidemia    Follow-ups Needed After Discharge   Follow-up Appointments     Follow Up and recommended labs and tests      Follow up as per discharging physician at accepting facility.             Discharge Disposition   Discharged to [facility]  Condition at discharge: Serious    Hospital Course   Roddy Sidhu is a 77 year old male currently in TCU following hospitalization for fall with multiple fractures who presented on 5/4/2023 after being referred by TCU provider for evaluation of worsening left diabetic heel infection     Infected diabetic pressure ulcer of left heel associated with type 2 diabetes mellitus with diabetic neuropathy  Additional diabetic ulcer of right heel without infection  S/p debridement of left pressure ulcer 5/9/2023  Peripheral arterial disease  Pressure injury to heels noted in TCU and seen by wound care 4/17/2023.  The patient recently was diagnosed with osteomyelitis based on an x-ray, but this is not supported by the MRI performed this admission.  ABIs done at podiatry's request and were normal.  -Piperacillin-tazobactam and vancomycin IV were started in the emergency department  -Cultures from wound positive for 2+ Proteus, 3+ Enterococcus, 1+ MSSA.   -Changed piperacillin-tazobactam to ampicillin-sulbactam 5/8 to cover Proteus and Enterococcus and MSSA. Stopped vancomycin 5/8 (5 days) given no MRSA.   -Debridement by Dr. Flores on 5/9.  -Changed ampicillin-sulbactam IV to amoxicillin-clavulanate oral 5/10  -CRP  156 on 5/2 --now 123   -Continue augmentin. Continue wound care  -Discussed MARKY results with vascular surgery, who recommend toe pressures, which revealed normal toe brachial indices bilaterally.  -Following Vascular Surgery review of chart; however, recommended transfer of patient to [facility] for evaluation.     Sepsis with acute organ dysfunction without septic shock, due to unspecified organism, unspecified type (H)  -Resolved in the emergency department       Acute on chronic anemia  Hemoglobin was normal 6 months ago, was 8's during recent admission, then 8.2 5/4 and dropped to 6.7-6.9 AM of 5/5.  No black or bloody stools.  No abdominal pain.   No other apparent source of acute blood loss. Guiac negative.  Iron studies suggestive of anemia of chronic inflammation.  Gave 1u RBCs AM 5/5.  Started on protonix twice daily 5/5 empirically.    -Held aspirin - appears to be on this just due to diabetes - recommend reassessing at follow-up with primary care provider if this is really needed.     -Stopped celebrex 100  Mg twice daily that had been started on admission, was on naprosyn prn at home.  -Hemoglobin overall stable since transfusion. Stopped PPI  5/10 no symptoms, no active bleeding, not iron-deficiency anemia     Closed displaced subtrochanteric fracture of right femur 3/29  S/p ORIF right femur fracture with intermedullary nail 3/29     Closed fracture of proximal end of right humerus 3/29   Closed nondisplaced fracture of right  clavicle 3/29   Hospitalized [facility] 3/29 - 4/4/2023 for trauma due to ground level fall. Discharged to TCU for further rehab.   -Has tylenol prn and oxycodone if needed while here.  Stopped celebrex as above.        Type 2 diabetes mellitus with diabetic polyneuropathy, with long-term current use of insulin (H)  HbA1c 8.3 on 5/2 on metformin and lantus 10 U daily at home. Primary care intended to start Trulicity but he has not begun this medication yet  -Held home metformin, continued Lantus 10. Poor control. Increased Lantus, increased SSI. BG improved 5/9. Changed Lantus back to 10 units daily 5/9.   -220    - continue Lantus 10U daily   - medium SSI   - continue metformin resumed today 5/10     Hyperlipidemia   - Continue home simvastatin.      Consultations This Hospital Stay   PHARMACY TO DOSE VANCO  PODIATRY IP CONSULT  WOUND OSTOMY CONTINENCE NURSE  IP CONSULT  PHYSICAL THERAPY ADULT IP CONSULT  OCCUPATIONAL THERAPY ADULT IP CONSULT  CARE MANAGEMENT / SOCIAL WORK IP CONSULT  VASCULAR ACCESS ADULT IP CONSULT  PODIATRY IP CONSULT  WOUND OSTOMY CONTINENCE NURSE  IP CONSULT  NUTRITION SERVICES ADULT IP CONSULT    Code Status   Full Code    Time Spent on this Encounter   I, Vinod Morfin MD, personally saw the patient today and spent greater than 30 minutes discharging this patient.       Vinod Morfin MD  [facility]  ______________________________________________________________________    Physical Exam   Vital Signs:                    Weight: 133 lbs 13.11 oz       Gen: Thin disheveled elderly male, alert and oriented x 3, no acute distressed  HEENT: Atraumatic, normocephalic; sclera non-injected, anicterric; oral mucosa moist, no lesion, no exudate  Lungs: Clear to ausculation, no wheezes, no rhonchi, no rales  Heart: Regular rate, regular rhythm, no gallops, no rubs, no murmurs  GI: Bowel sound normal, no hepatosplenomegaly, no masses, non-tender,  non-distended, no guarding, no rebound tenderness  Lymph: No lymphadenopathy, 1+ BLE edema, bilateral foot and ankle bandages CDI  Skin: BLE chronic venous stasis     Primary Care Physician   Sandra Low    Discharge Orders      Reason for your hospital stay    This is a 77-year-old male admitted with a nonhealing left heel ulcer.     Follow Up and recommended labs and tests    Follow up as per discharging physician at accepting facility.     Glucose monitor nursing POCT    Before meals and at bedtime     Activity - Up with nursing assistance     Diet    Follow this diet upon discharge: Orders Placed This Encounter      Snacks/Supplements Adult: Glucerna; Between Meals      High Consistent Carb (75 g CHO per Meal) Diet       Significant Results and Procedures   Most Recent 3 CBC's:Recent Labs   Lab Test 05/20/23  0547 05/19/23  0624 05/18/23  0616 05/17/23  0557   WBC 15.3* 9.9  --  9.3   HGB 8.0* 7.8* 7.3* 7.6*   MCV 83 82  --  82   * 553*  --  574*     Most Recent 3 BMP's:Recent Labs   Lab Test 05/20/23  1207 05/20/23  0547 05/20/23  0545 05/19/23  0805 05/19/23  0624 05/17/23  0720 05/17/23  0557   NA  --  136  --   --  138  --  138   POTASSIUM  --  4.8  --   --  4.5  --  4.4   CHLORIDE  --  101  --   --  103  --  106   CO2  --  27  --   --  27  --  22   BUN  --  11.1  --   --  10.5  --  11.3   CR  --  0.74  --   --  0.74  --  0.76   ANIONGAP  --  8  --   --  8  --  10   BEST  --  8.3*  --   --  8.6*  --  8.4*   * 213* 192*   < > 84   < > 198*    < > = values in this interval not displayed.   ,   Results for orders placed or performed during the hospital encounter of 05/04/23   MR Foot Left w/o & w Contrast    Narrative    EXAM: MR FOOT LEFT W/O and W CONTRAST  LOCATION: [facility]  DATE/TIME: 5/4/2023 5:58 PM CDT    INDICATION: Concern for osteomyelitis of left calcaneus.  COMPARISON: None.  TECHNIQUE: Routine. Additional postgadolinium T1 sequences were  obtained.  IV CONTRAST: 6 mL Gadavist    FINDINGS:     JOINTS AND BONES:   -No evidence for fracture. There is some reactive T2 signal abnormality within the posteroinferior calcaneus but no specific evidence for osteomyelitis. No additional areas worrisome for osteomyelitis are identified. No significant effusion to suggest   septic arthropathy.    TENDONS:   -Mild Achilles tendinopathy without tearing. The peroneal tendons are intact. The flexor tendons are intact. The extensor tendons are intact.     LIGAMENTS:   -The anterior and posterior talofibular ligaments are intact. The calcaneofibular ligament is intact. The deltoid ligamentous complex is intact.    MUSCLES AND SOFT TISSUES:   -There is an ulceration along the posterior aspect of the foot with some surrounding edema or cellulitis. There is no evidence for abscess.      Impression    IMPRESSION:  1.  No evidence for osteomyelitis, septic arthropathy, or abscess.  2.  Edema or cellulitis along the posterior aspect of the foot. There is apparent soft tissue irregularity and likely ulceration along the posterior aspect of the foot and there is susceptibly artifact suggestive of air tracking into the subcutaneous   soft tissues.  3.  No evidence for fracture.  4.  Mild reactive edema within the posteroinferior calcaneus.  5.  Mild Achilles tendinopathy without tearing.   US MARKY Doppler - No Exercise    Narrative    US MARKY DOPPLER NO EXERCISE, 1-2 LEVELS, BILATERAL   5/4/2023 4:23 PM     HISTORY: Diabetic foot ulceration with concern for osteomyelitis.    COMPARISON: None.    FINDINGS:  Right MARKY:   PT: 1.09.  DP: 1.19    Left MARKY:   PT: 0.78.  DP: 1.1     Waveforms: Biphasic and triphasic in the right posterior tibial, right  dorsalis pedis and left dorsalis pedis. Monophasic in the left  posterior tibial.      Impression    IMPRESSION:  1. Normal ABIs bilaterally.    MARKY CRITERIA:  >1.4 NC  0.95-1.4 Normal  0.90 - 0.94 Mild  0.5 - 0.89 Moderate  0.2 - 0.49  Severe  <0.2 Critical    KAITY DO NEIL         SYSTEM ID:  [id number]   XR Chest Port 1 View    Narrative    EXAM: XR CHEST PORT 1 VIEW  LOCATION: [facility]  DATE/TIME: 5/7/2023 8:31 PM CDT    INDICATION: RN placed PICC   verify tip placement  COMPARISON: None.      Impression    IMPRESSION: Left PICC tip overlies cavoatrial junction, appropriate position. No acute findings. Clear lungs. Normal heart size. Old left clavicle and right proximal humerus fractures.   US Lower Extremity Arterial Duplex Left    Narrative    ARTERIAL DUPLEX ULTRASOUND LEFT LOWER EXTREMITY  5/11/2023 11:43 AM    HISTORY:  77-year-old patient with gangrene of the left foot.    COMPARISON: None.    TECHNIQUE: Color Doppler and spectral waveform analysis performed  throughout the arteries of the left lower extremity.    FINDINGS: The left common femoral artery is 149 cm/sec with the  diameter of 7.3 mm. Profunda femoral artery is 86 cm/sec and diameter  of 3.4 mm.    The proximal SFA is 135/15 cm/sec, 5.6 mm and triphasic. Mid SFA is  103/20 cm/sec, 5 mm, triphasic. Distal SFA is 133/23 cm/sec, 4.5 mm,  and biphasic waveform. Diffuse atherosclerotic calcification  throughout all arteries, though especially in distal SFA with areas of  shadowing. Velocity elevation in the popliteal artery measuring 320/31  cm/sec with biphasic/triphasic waveform. Unable to visualize the  peroneal artery. Anterior tibial artery is patent with velocity of  156/26 cm/sec, with monophasic waveform. Trickle of blood flow in the  distal posterior tibial artery.      Impression    IMPRESSION:  1. Peripheral arterial disease is confirmed with scattered areas of  atherosclerotic calcification, in some areas obscuring underlying  lumen.  2. Definite focal velocity elevation in the popliteal artery, though  suspect arterial insufficiency localized to the distal SFA/popliteal  arterial distribution. Uncertain if severe stenoses and/or  occlusion.  3. Unable to demonstrate blood flow in the peroneal artery.  4. Dominant run off arteries anterior tibial artery, though waveform  is monophasic. Trickle of blood flow in the distal posterior tibial  artery.    DANI TUCKER MD         SYSTEM ID:  [id number]   US Lower Extremity PPG    Narrative    US LOWER EXTREMITY PPG 5/12/2023 4:29 PM    HISTORY: 77-year-old patient with request made for evaluation of toe  brachial indices.    COMPARISON: None    FINDINGS: Right brachial pressure is 120, right digital pressure is  111 for an index of 0.93.    Left digital pressure is 99 for an index of 0.83. Both digital  waveforms appear to have intact amplitude.      Impression    IMPRESSION: Normal toe brachial indices bilaterally.    DANI TUCKER MD         SYSTEM ID:  [id number]       Discharge Medications   Discharge Medication List as of 5/12/2023  7:09 PM      CONTINUE these medications which have NOT CHANGED    Details   aspirin (ASA) 325 MG EC tablet Take 325 mg by mouth daily, Historical      blood glucose (NO BRAND SPECIFIED) lancets standard Use to test blood sugar 1 times daily or as directed.Disp-100 each, [id number]      blood glucose (NO BRAND SPECIFIED) test strip Use to test blood sugar 4 times daily or as directed., Disp-100 strip, R-1, E-Prescribe      blood glucose calibration (NO BRAND SPECIFIED) solution Use to calibrate blood glucose monitor as needed as directed.Disp-1 each, [id number]      blood glucose monitoring (NO BRAND SPECIFIED) meter device kit Use to test blood sugar 1 times daily or as directed.Disp-1 kit, [id number]      ciprofloxacin (CIPRO) 500 MG tablet Take 1 tablet (500 mg) by mouth 2 times daily for 10 days, Disp-20 tablet, R-0, E-Prescribe      dulaglutide (TRULICITY) 0.75 MG/0.5ML pen Inject 0.75 mg Subcutaneous every 7 days, Disp-2 mL, R-1, E-PrescribeDiscontinue Humalog      gabapentin (NEURONTIN) 300 MG capsule Take 1 capsule (300 mg) by mouth 3 times  daily, Disp-270 capsule, R-3, E-PrescribeHOLD ON FILE until patient requests or is due for refill.      insulin glargine (LANTUS PEN) 100 UNIT/ML pen Inject 10 Units Subcutaneous every morning, Disp-15 mL, R-1, E-PrescribeIf Lantus is not covered by insurance, may substitute Basaglar or Semglee or other insulin glargine product per insurance preference at same dose and frequency.        insulin pen needle (B-D U/F) 31G X 5 MM miscellaneous Use 4 pen needles daily or as directed.Disp-400 each, R-3E-PrescribeHOLD ON FILE until patient requests or is due for refill.      latanoprost (XALATAN) 0.005 % ophthalmic solution Place 1 drop into both eyes At Bedtime, Historical      metFORMIN (GLUCOPHAGE) 500 MG tablet Take 1 tablet (500 mg) by mouth 2 times daily (with meals), Disp-180 tablet, R-1, E-PrescribeHOLD ON FILE until patient requests or is due for refill.      naproxen sodium (ANAPROX) 220 MG tablet Take 220 mg by mouth 2 times daily (with meals), Historical      simvastatin (ZOCOR) 40 MG tablet TAKE ONE TABLET BY MOUTH EVERY NIGHT AT BEDTIME, Disp-90 tablet, R-3, E-PrescribeHOLD ON FILE until patient requests or is due for refill.      vitamin D3 (CHOLECALCIFEROL) 50 mcg (2000 units) tablet Take 1 tablet by mouth daily, Disp-30 capsule, Historical           Allergies   No Known Allergies

## 2023-05-21 LAB
BLD PROD TYP BPU: NORMAL
BLOOD COMPONENT TYPE: NORMAL
CODING SYSTEM: NORMAL
CROSSMATCH: NORMAL
GLUCOSE BLDC GLUCOMTR-MCNC: 148 MG/DL (ref 70–99)
GLUCOSE BLDC GLUCOMTR-MCNC: 150 MG/DL (ref 70–99)
GLUCOSE BLDC GLUCOMTR-MCNC: 184 MG/DL (ref 70–99)
GLUCOSE BLDC GLUCOMTR-MCNC: 197 MG/DL (ref 70–99)
GLUCOSE BLDC GLUCOMTR-MCNC: 80 MG/DL (ref 70–99)
HGB BLD-MCNC: 6.6 G/DL (ref 13.3–17.7)
ISSUE DATE AND TIME: NORMAL
UNIT ABO/RH: NORMAL
UNIT NUMBER: NORMAL
UNIT STATUS: NORMAL
UNIT TYPE ISBT: 7300

## 2023-05-21 PROCEDURE — 120N000001 HC R&B MED SURG/OB

## 2023-05-21 PROCEDURE — P9016 RBC LEUKOCYTES REDUCED: HCPCS | Performed by: INTERNAL MEDICINE

## 2023-05-21 PROCEDURE — 85018 HEMOGLOBIN: CPT | Performed by: HOSPITALIST

## 2023-05-21 PROCEDURE — 250N000013 HC RX MED GY IP 250 OP 250 PS 637: Performed by: STUDENT IN AN ORGANIZED HEALTH CARE EDUCATION/TRAINING PROGRAM

## 2023-05-21 PROCEDURE — 250N000013 HC RX MED GY IP 250 OP 250 PS 637: Performed by: HOSPITALIST

## 2023-05-21 PROCEDURE — 250N000011 HC RX IP 250 OP 636: Performed by: STUDENT IN AN ORGANIZED HEALTH CARE EDUCATION/TRAINING PROGRAM

## 2023-05-21 PROCEDURE — 99232 SBSQ HOSP IP/OBS MODERATE 35: CPT | Performed by: HOSPITALIST

## 2023-05-21 RX ADMIN — ASPIRIN 325 MG: 325 TABLET, COATED ORAL at 08:02

## 2023-05-21 RX ADMIN — GABAPENTIN 300 MG: 300 CAPSULE ORAL at 08:02

## 2023-05-21 RX ADMIN — SENNOSIDES AND DOCUSATE SODIUM 1 TABLET: 50; 8.6 TABLET ORAL at 20:32

## 2023-05-21 RX ADMIN — HEPARIN SODIUM 5000 UNITS: 5000 INJECTION, SOLUTION INTRAVENOUS; SUBCUTANEOUS at 05:32

## 2023-05-21 RX ADMIN — ACETAMINOPHEN 975 MG: 325 TABLET ORAL at 15:35

## 2023-05-21 RX ADMIN — FAMOTIDINE 20 MG: 20 TABLET ORAL at 08:02

## 2023-05-21 RX ADMIN — GABAPENTIN 300 MG: 300 CAPSULE ORAL at 21:54

## 2023-05-21 RX ADMIN — SENNOSIDES AND DOCUSATE SODIUM 1 TABLET: 50; 8.6 TABLET ORAL at 08:02

## 2023-05-21 RX ADMIN — SIMVASTATIN 40 MG: 40 TABLET, FILM COATED ORAL at 21:54

## 2023-05-21 RX ADMIN — HEPARIN SODIUM 5000 UNITS: 5000 INJECTION, SOLUTION INTRAVENOUS; SUBCUTANEOUS at 14:25

## 2023-05-21 RX ADMIN — FERROUS GLUCONATE 324 MG: 324 TABLET ORAL at 08:02

## 2023-05-21 RX ADMIN — OXYCODONE HYDROCHLORIDE 5 MG: 5 TABLET ORAL at 12:20

## 2023-05-21 RX ADMIN — MULTIPLE VITAMINS W/ MINERALS TAB 1 TABLET: TAB at 08:02

## 2023-05-21 RX ADMIN — Medication 50 MCG: at 08:02

## 2023-05-21 RX ADMIN — OXYCODONE HYDROCHLORIDE 5 MG: 5 TABLET ORAL at 19:03

## 2023-05-21 RX ADMIN — FAMOTIDINE 20 MG: 20 TABLET ORAL at 20:32

## 2023-05-21 RX ADMIN — POLYETHYLENE GLYCOL 3350 17 G: 17 POWDER, FOR SOLUTION ORAL at 08:02

## 2023-05-21 RX ADMIN — ACETAMINOPHEN 975 MG: 325 TABLET ORAL at 08:02

## 2023-05-21 RX ADMIN — GABAPENTIN 300 MG: 300 CAPSULE ORAL at 15:36

## 2023-05-21 RX ADMIN — LATANOPROST 1 DROP: 50 SOLUTION/ DROPS OPHTHALMIC at 22:05

## 2023-05-21 RX ADMIN — HEPARIN SODIUM 5000 UNITS: 5000 INJECTION, SOLUTION INTRAVENOUS; SUBCUTANEOUS at 21:54

## 2023-05-21 ASSESSMENT — ACTIVITIES OF DAILY LIVING (ADL)
ADLS_ACUITY_SCORE: 41

## 2023-05-21 NOTE — PLAN OF CARE
[id number]    AOx4. A2 with janiya steady, L BKA.  Q2h turns.  Immobilizer on L leg.  VSS on RA.  , 2 units novolog given along with lantus. Reg diet. Continent, bedside urinal, voiding. L PICC infusing LR at 10mL/hr.  Mepi on R heel and coccyx.  Discharge possible in 2 days pending.

## 2023-05-21 NOTE — PROVIDER NOTIFICATION
MD Notification    Notified Person: MD    Notified Person Name: Dr. Weller    Notification Date/Time: 5/21/23 0620     Notification Interaction: Textpage    Purpose of Notification: Pt POD2 of L BKA, hgb down to 6.6 this AM. Do you want to transfuse? Type/Screen up to date. Thx     Orders Received:    Comments:

## 2023-05-21 NOTE — PROGRESS NOTES
VASCULAR SURGERY PROGRESS NOTE    Mr. Sidhu is a 76yo man with a left heel ulcer now POD2 s/p left below knee amputation.    NAEON. Pain well controlled even after the block wore off yesterday. Transfused this morning for hgb <7.    Vitals reviewed.    On exam, resting comfortably. Knee immobilizer in place. Visible dressings c/d/i.    Labs reviewed. Hgb 6.6.     Mr. Sidhu is a 76yo man with a left heel ulcer now POD2 s/p left below knee amputation, recovering  - out of bed, PT/OT  - keep knee immobilizer in place, okay to remove for brief periods for comfort  - continue aspirin, sqh  - will plan for dressing change Monday    Isabel Brizuela MD  05/21/23  8:09 AM

## 2023-05-21 NOTE — PLAN OF CARE
POD 2 from ADELITA BKA. A&Ox4, VSS on RA. Denies pain, N/V over shift. L PICC line infusing with LR @ 10ml/hr. Voiding adequately, using urinal. Rooke boot worn on R lower extremity while in bed. On mod carb diet and tolerating. Turn and repo every 2 hours. Mepilex over coccyx and R heel. Assist 2 with janiya phipps. Hemoglobin was 6.6 this AM. Unit of blood started.

## 2023-05-21 NOTE — OP NOTE
Preoperative diagnosis: Nonsalvageable left lower extremity due to nonhealing ulcers stable peripheral arterial disease with open calcaneus wound.    Postoperative diagnosis: Same.    Procedure: Left below-knee amputation.    Surgeon: Raimundo Weinstein M.D.   Assistant: Tricia Cherry M.D.  Assistant: Nam Pfeiffer M.D.     Anesthesia: Regional plus general.  Estimated blood loss: About 100 mL.  Specimens: Left lower extremity.    Indication for procedure: This is a 77-year-old male who had developed a left heel ulcer due to immobilization.  He has large soft tissue defect.  Arteriogram shows that there is no inline flow with no unreconstructable disease to the posterior tibial artery.  Below-knee amputation is advised.  Patient received left lower extremity regional anesthesia in the preop holding area.    Procedure details: Patient was identified and then taken to the operating room and placed in supine position.  General anesthesia was induced.  He had been receiving preoperative intravenous antibiotics and therapeutic doses.  Prophylactic doses were therefore not repeated.  Left lower extremity was prepped and a sterile surgical field was created.    Preprocedure timeout was conducted.  10 cm below the tibial tuberosity a Bakari circumferential anterior incision was made with a generous posterior flap.  Incision was deepened with knife.  Anterior tibial arterial and venous bundle were suture-ligated.  Periosteum was lifted from the tibia and the tibia was divided with a Gigli saw.  Similarly the fibula was also divided.  Amputation was completed with an amputation knife.  The posterior flap was debulked to the leave the gastrocnemius muscle and fascia.  The tibioperoneal arterial bundle was suture-ligated.  Tibial nerve was pulled down and ligated and sharply divided and allowed to retract away.  Similarly the sural nerve was also pulled down and divided and allowed to retract away.  Bones were filed with a  rasp.  Irrigation was performed to remove the bone chips. Adequate hemostasis was confirmed.   Posterior flap was closed incorporating the gastrocnemius fascia and the fascia jose with 2-0 Vicryl sutures.  Skin was approximated with vertical mattress 3-0 chromic sutures.  Xeroform and sterile compressive dressing was applied.    Consult instruments, sponges and needles were noted to be correct.      Patient was taken to the recovery room in stable condition.    I went outside and spoke to the family and updated them on our progress.

## 2023-05-21 NOTE — PROGRESS NOTES
Xcoverage: Paged by RN regarding hemoglobin 6.6, down from 8 yesterday; he is POD #2 after left BKA ; transfuse 1 unit of PRBCs.    Jesenia Weller, Hospitalist.

## 2023-05-21 NOTE — PLAN OF CARE
[id number]  Summary: POD 2 BKA  Orientation: A&Ox4   Diet: Mod carb  Activity: A2 janiya steady  Vitals: VSS on RA  PIV: LR TKO  GI/: cont. Using urinal   Respiratory: WDL  Skin: L BKA wrapped with knee immobilizer   Abnormal labs: , Hgb 6.6 - had 1 unit PRBC today. Recheck in AM.   Procedure or tests: Dressing change tomorrow   Plan:   Other details:  Oxy available, on pulsate, encourage repositioning, rooke boot on R

## 2023-05-21 NOTE — PLAN OF CARE
POD#2 L BKA  A&Ox4. VSS on RA. Mod Carb diet. Up with 2 and janiya phipps. PICC infusing at 10ml/hr. PRN Oxy and Tylenol for LLE pain. BG 80, 197. Urinal at bedside. Mepilex to Coccyx, R heel. 1 Unit of RBC infused this AM. Vascular following.

## 2023-05-21 NOTE — PROGRESS NOTES
[facility]  Hospitalist Progress Note   05/21/2023          Assessment and Plan:       Roddy Sidhu is a 77 year old male with history of diabetes mellitus with polyneuropathy transferred from [facility] on 5/13/2023 with poor healing left heel wound and concern for vascular compromise-requiring vascular surgery evaluation.    Patient admitted at [facility] (3/29-4/4) for fall with R femur fx, R humerus fx, R clavicle fx s/p R femur ORIF and discharged to TCU.     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  S/p debridement of left pressure ulcer 5/9/2023  Peripheral arterial disease.  --Pressure injury to heels noted in TCU and seen by wound care 4/17/2023.  Recent ABIs were normal. WBC 17.1.  .60.  Wound cultures from 5/5 positive for 2+ Proteus, 3+ Enterococcus, 1+ MSSA.    --Was on IV vancomycin 5/4 to 5/8.    Was on intravenous Zosyn (5/4), switched to Unasyn (5/8) to cover Proteus, Enterococcus and MSSA. Then switched to oral Augmentin [5/10- 5/14]  Continue IV Ancef [5/15].  CRP improving from 123.60  >25.6   --Vascular surgery following.  Underwent lower extremity angiogram 5/16,  Left patent iliac, SFA and AK pop. 50 % at knee pop stenosis, single vessel runoff via VERN.  -- Podiatry following, plan for left BKA 5/19.  Patient underwent surgery as planned on 5/19.  Vascular surgery following for postoperative cares.  Plan for dressing change on Monday.  --Infectious disease following.  Antibiotics discontinued following BKA per ID recommendations.  As needed Tylenol, oxycodone as needed for pain.  Was on aspirin 325 mg oral daily, hold on 5/19 for surgery.  Restarted postoperatively .  Continue simvastatin 40 mg oral daily.  -PT/OT.  Keep knee immobilizer in place, okay to remove for brief.  For comfort.    Acute on chronic anemia no active bleeding  S/p PRBC transfusion 5/5,  5/21.  Hemoglobin was normal 6 months ago, was 8's during recent admission, then 8.2 on 5/4 and dropped to 6.7-6.9 on 5/5, received 1 unit blood transfusion on 5/5.  --No black or bloody stools.  No abdominal pain.   No other apparent source of acute blood loss.   --Guiac negative. Iron sat index 5.  Received 2 doses of IV Venofer, started on oral iron supplements.  -Aspirin was on hold, restarted 5/14 -5/18.  Held 5/19 for amputation surgery, restarted postoperatively.  Hb 8 on 5/20. Down to 6.6 on 5/21, transfused additional PRBC.  Discontinued Celebrex [started this hospitalization], naproxen [PTA meds]  No active bleeding at this time.    -Currently on famotidine for GI prophylaxis in setting of acute anemia and being on ASA    Hypoglycemia 5/19 while NPO  Hyperglycemia from uncontrolled diabetes  Uncontrolled diabetes mellitus with a hemoglobin A1c of 8.3.  Diabetic neuropathy.  PTA on 10 units insulin Lantus every morning.  This hospital stay noted elevated blood sugars, optimized to insulin Lantus 14 units twice daily.  Insulin Lantus on hold on 5/19 AM for surgery while NPO.  Noted hypoglycemia with blood sugars of 64 on 5/19.  Hypoglycemia protocol.  Also received IVF with dextrose for this.  Continue insulin Lantus to 14 units twice daily after surgery.  Titrate insulin as needed.  Also added prandial aspart 1 unit per 10 g carbs 3 times daily AC.  Continue sliding scale insulin.  Continue PTA med Neurontin.  Hold PTA metformin and Trulicity.  -Changed diet to moderate consistent carbohydrate diet    Hyperlipidemia.  Continue PTA simvastatin.    Physical deconditioning in the setting of ongoing medical illness, senile frailty.  Status post recent fall.  Closed displaced subtrochanteric fracture of right femur 3/29  S/p ORIF right femur fracture with intermedullary nail 3/29     Closed fracture of proximal end of right humerus 3/29   Closed nondisplaced fracture of right clavicle 3/29   Was at [facility] prior to  transfer  Continue PT/OT, likely TCU on discharge.  Patient and family in agreement with TCU.  Care Management assistance with transition.  Patient tells me that he plans on discharging home if TCU not available soon.  He lives with his daughter and wife.    Hypoalbuminemia likely dilutional, acute illness.  Serum albumin 2.6.   Dietary supplements with Ensure.  Monitor periodically.    Thrombocytosis likely reactive in the setting of infection.  Platelet count 613 > 520  Monitor periodically.     Tobacco dependence  Daily pipe smokier, Smoking cessation emphasized  Nicotine patch     Orders Placed This Encounter      Moderate Consistent Carb (60 g CHO per Meal) Diet      DVT Prophylaxis: SCDs, ambulate, sc heparin.  Code Status: Full Code  Disposition: Expected discharge in 2 days pending clinical improvement    >35 minutes spent by me on the date of service doing chart review, history, exam, documentation & further activities per the note.      Theresa Carlson MD        Interval History:        Stable overnight.  Noted low hemoglobin this morning and patient has already received 1 unit PRBC transfusion.  Tells me he feels okay.  Tells me that he wants to go home soon and does not want to wait around for TCU for a long time.  Lives with wife and daughter.         Physical Exam:        Physical Exam   Temp:  [97.3  F (36.3  C)-98.7  F (37.1  C)] 97.5  F (36.4  C)  Pulse:  [51-71] 63  Resp:  [16-18] 18  BP: ()/(46-67) 111/65  SpO2:  [98 %-100 %] 99 %    Intake/Output Summary (Last 24 hours) at 5/15/2023 1439  Last data filed at 5/15/2023 1352  Gross per 24 hour   Intake 1760 ml   Output 1250 ml   Net 510 ml       Admission Weight: 59.4 kg (130 lb 15.3 oz)  Current Weight: 59.4 kg (130 lb 15.3 oz)    PHYSICAL EXAM  GENERAL: Patient is in no distress. Alert and oriented.  HEART: Regular rate and rhythm. S1S2. No murmurs  LUNGS:Respirations unlabored  NEURO: Moving all extremities.  EXTREMITIES: Noted left BKA  stump with dressing  PSYCHIATRY Cooperative       Medications:          acetaminophen  975 mg Oral Q8H     aspirin  325 mg Oral Daily     famotidine  20 mg Oral BID    Or     famotidine  20 mg Intravenous BID     ferrous gluconate  324 mg Oral Daily with breakfast     gabapentin  300 mg Oral TID     heparin ANTICOAGULANT  5,000 Units Subcutaneous Q8H     insulin aspart   Subcutaneous TID AC     insulin aspart  1-7 Units Subcutaneous TID AC     insulin aspart  1-5 Units Subcutaneous At Bedtime     insulin glargine  14 Units Subcutaneous BID     latanoprost  1 drop Both Eyes At Bedtime     multivitamin w/minerals  1 tablet Oral Daily     polyethylene glycol  17 g Oral Daily     senna-docusate  1 tablet Oral BID     simvastatin  40 mg Oral At Bedtime     sodium chloride (PF)  10-40 mL Intracatheter Q7 Days     sodium chloride (PF)  3 mL Intracatheter Q8H     vitamin D3  50 mcg Oral Daily     acetaminophen, bisacodyl, glucose **OR** dextrose **OR** glucagon, HYDROmorphone **OR** HYDROmorphone, lidocaine 4%, lidocaine (buffered or not buffered), magnesium hydroxide, melatonin, naloxone **OR** naloxone **OR** naloxone **OR** naloxone, ondansetron **OR** ondansetron, oxyCODONE **OR** oxyCODONE, prochlorperazine **OR** prochlorperazine, sodium chloride (PF), sodium chloride (PF), sodium chloride (PF), sodium chloride (PF)         Data:      All new lab and imaging data was reviewed.

## 2023-05-22 ENCOUNTER — APPOINTMENT (OUTPATIENT)
Dept: PHYSICAL THERAPY | Facility: CLINIC | Age: 78
DRG: 616 | End: 2023-05-22
Attending: INTERNAL MEDICINE
Payer: COMMERCIAL

## 2023-05-22 LAB
ERYTHROCYTE [DISTWIDTH] IN BLOOD BY AUTOMATED COUNT: 19.3 % (ref 10–15)
GLUCOSE BLDC GLUCOMTR-MCNC: 106 MG/DL (ref 70–99)
GLUCOSE BLDC GLUCOMTR-MCNC: 111 MG/DL (ref 70–99)
GLUCOSE BLDC GLUCOMTR-MCNC: 200 MG/DL (ref 70–99)
GLUCOSE BLDC GLUCOMTR-MCNC: 60 MG/DL (ref 70–99)
GLUCOSE BLDC GLUCOMTR-MCNC: 71 MG/DL (ref 70–99)
HCT VFR BLD AUTO: 27.3 % (ref 40–53)
HGB BLD-MCNC: 8.5 G/DL (ref 13.3–17.7)
MCH RBC QN AUTO: 26.1 PG (ref 26.5–33)
MCHC RBC AUTO-ENTMCNC: 31.1 G/DL (ref 31.5–36.5)
MCV RBC AUTO: 84 FL (ref 78–100)
PLATELET # BLD AUTO: 505 10E3/UL (ref 150–450)
RBC # BLD AUTO: 3.26 10E6/UL (ref 4.4–5.9)
WBC # BLD AUTO: 14.6 10E3/UL (ref 4–11)

## 2023-05-22 PROCEDURE — 85027 COMPLETE CBC AUTOMATED: CPT | Performed by: HOSPITALIST

## 2023-05-22 PROCEDURE — 97530 THERAPEUTIC ACTIVITIES: CPT | Mod: GP

## 2023-05-22 PROCEDURE — 250N000013 HC RX MED GY IP 250 OP 250 PS 637: Performed by: STUDENT IN AN ORGANIZED HEALTH CARE EDUCATION/TRAINING PROGRAM

## 2023-05-22 PROCEDURE — 99024 POSTOP FOLLOW-UP VISIT: CPT

## 2023-05-22 PROCEDURE — 250N000011 HC RX IP 250 OP 636: Performed by: STUDENT IN AN ORGANIZED HEALTH CARE EDUCATION/TRAINING PROGRAM

## 2023-05-22 PROCEDURE — 97542 WHEELCHAIR MNGMENT TRAINING: CPT | Mod: GP

## 2023-05-22 PROCEDURE — 250N000013 HC RX MED GY IP 250 OP 250 PS 637: Performed by: HOSPITALIST

## 2023-05-22 PROCEDURE — 120N000001 HC R&B MED SURG/OB

## 2023-05-22 PROCEDURE — 99232 SBSQ HOSP IP/OBS MODERATE 35: CPT | Performed by: INTERNAL MEDICINE

## 2023-05-22 RX ADMIN — MULTIPLE VITAMINS W/ MINERALS TAB 1 TABLET: TAB at 08:30

## 2023-05-22 RX ADMIN — OXYCODONE HYDROCHLORIDE 5 MG: 5 TABLET ORAL at 07:56

## 2023-05-22 RX ADMIN — ACETAMINOPHEN 975 MG: 325 TABLET ORAL at 15:35

## 2023-05-22 RX ADMIN — ACETAMINOPHEN 975 MG: 325 TABLET ORAL at 08:30

## 2023-05-22 RX ADMIN — Medication 50 MCG: at 08:30

## 2023-05-22 RX ADMIN — GABAPENTIN 300 MG: 300 CAPSULE ORAL at 15:35

## 2023-05-22 RX ADMIN — ASPIRIN 325 MG: 325 TABLET, COATED ORAL at 08:30

## 2023-05-22 RX ADMIN — SENNOSIDES AND DOCUSATE SODIUM 1 TABLET: 50; 8.6 TABLET ORAL at 08:31

## 2023-05-22 RX ADMIN — ACETAMINOPHEN 975 MG: 325 TABLET ORAL at 00:04

## 2023-05-22 RX ADMIN — SENNOSIDES AND DOCUSATE SODIUM 1 TABLET: 50; 8.6 TABLET ORAL at 19:38

## 2023-05-22 RX ADMIN — FAMOTIDINE 20 MG: 10 INJECTION INTRAVENOUS at 19:39

## 2023-05-22 RX ADMIN — HEPARIN SODIUM 5000 UNITS: 5000 INJECTION, SOLUTION INTRAVENOUS; SUBCUTANEOUS at 21:30

## 2023-05-22 RX ADMIN — SIMVASTATIN 40 MG: 40 TABLET, FILM COATED ORAL at 21:28

## 2023-05-22 RX ADMIN — FAMOTIDINE 20 MG: 20 TABLET ORAL at 08:31

## 2023-05-22 RX ADMIN — HEPARIN SODIUM 5000 UNITS: 5000 INJECTION, SOLUTION INTRAVENOUS; SUBCUTANEOUS at 13:49

## 2023-05-22 RX ADMIN — OXYCODONE HYDROCHLORIDE 5 MG: 5 TABLET ORAL at 13:49

## 2023-05-22 RX ADMIN — HEPARIN SODIUM 5000 UNITS: 5000 INJECTION, SOLUTION INTRAVENOUS; SUBCUTANEOUS at 06:10

## 2023-05-22 RX ADMIN — OXYCODONE HYDROCHLORIDE 5 MG: 5 TABLET ORAL at 19:38

## 2023-05-22 RX ADMIN — GABAPENTIN 300 MG: 300 CAPSULE ORAL at 08:30

## 2023-05-22 RX ADMIN — GABAPENTIN 300 MG: 300 CAPSULE ORAL at 21:29

## 2023-05-22 RX ADMIN — LATANOPROST 1 DROP: 50 SOLUTION/ DROPS OPHTHALMIC at 21:27

## 2023-05-22 RX ADMIN — FERROUS GLUCONATE 324 MG: 324 TABLET ORAL at 08:30

## 2023-05-22 ASSESSMENT — ACTIVITIES OF DAILY LIVING (ADL)
ADLS_ACUITY_SCORE: 41

## 2023-05-22 NOTE — PLAN OF CARE
Goal Outcome Evaluation:    Summary: POD#2 LBKA  Orientation: A&Ox4   Diet: Mod carb  Activity: A-2 janiya steady; did not out of bed  Vitals: VSS on RA  PIV: LR TKO  GI/: cont. Using urinal   Respiratory: WDL  Skin: L BKA wrapped with knee immobilizer   Abnormal labs: , Hgb 6.6-1 unit PRBC prior shift. Recheck in AM.   Procedure or tests: None  Plan: Other details: Denies pain, N/V; on pulsate, encourage repositioning, rooke boot on R foot.

## 2023-05-22 NOTE — PLAN OF CARE
POD 3 from a L BKA. A&O. CMS - pale BLE and baseline neuropathy to all extremities. Bowel sounds +x4, + flatus, tolerating mod CHO diet. Medium soft BM. VSS. Dressing changed by NP. R heel wound scabbed, dressing changed per orders. Up with A2 & janiya steady. C/o mild to moderate pain, decreased with oxycodone & tylenol. Blanchable red coccyx - PIP education provided, patient turns himself per report, writer reminded patient throughout the day, compliant. Discharge to TCU pending.

## 2023-05-22 NOTE — PLAN OF CARE
POD 3 from L BKA. A&Ox4, VSS on RA. Denies pain, N/V over shift. L PICC line infusing with LR @ 10ml/hr. Voiding adequately, using urinal. Passing flatus and no BM over shift. Rooke boot worn on R lower extremity while in bed. Knee immobilizer in place for BKA. On mod carb diet and tolerating. Mepilex over coccyx and R heel. Assist 2 with janiya phipps.

## 2023-05-22 NOTE — PROGRESS NOTES
VASCULAR SURGERY PROGRESS NOTE    Subjective:  Resting comfortably in bed. No acute issues overnight. Good appetite-regular diet. Pain well controlled with PRN, reports of general tenderness of the BKA stump.  Objective:  Intake/Output Summary (Last 24 hours) at 5/22/2023 0852  Last data filed at 5/22/2023 0841  Gross per 24 hour   Intake 2665 ml   Output 2200 ml   Net 465 ml     PHYSICAL EXAM:  /65 (BP Location: Right arm)   Pulse 64   Temp 97.3  F (36.3  C) (Oral)   Resp 16   Wt 54.4 kg (119 lb 14.9 oz)   SpO2 100%   BMI 17.46 kg/m    Alert and oriented x4, no acute distress  BKA has minimal swelling, healing well, no drainage noted. Minimal pain.  5/5 strength                ASSESSMENT:  Mr. Marvin Sidhu is a pleasant 77 year old male who underwent a left below knee amputation on 5/19, recovering well    PLAN:  -changed dressing at bedside  -oob, physical therapy  -keep knee immobilizer in place, okay to remove for brief periods for comfort  -continue aspirin, sqh  -will plan for dressing changes on Wednesday, can be done by nursing staff  -HGB 8.5  -from Vascular Surgery standpoint, ok to discharge to [facility]    Cheryl Carl NP  VASCULAR SURGERY

## 2023-05-22 NOTE — PROGRESS NOTES
[facility]  Hospitalist Progress Note   05/22/2023          Assessment and Plan:       Roddy Sidhu is a 77 year old male with history of diabetes mellitus with polyneuropathy transferred from [facility] on 5/13/2023 with poor healing left heel wound and concern for vascular compromise-requiring vascular surgery evaluation.    Patient admitted at [facility] (3/29-4/4) for fall with R femur fx, R humerus fx, R clavicle fx s/p R femur ORIF and discharged to TCU.     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  S/p debridement of left pressure ulcer 5/9/2023  Peripheral arterial disease.  --Pressure injury to heels noted in TCU and seen by wound care 4/17/2023.  Recent ABIs were normal. WBC 17.1.  .60.  Wound cultures from 5/5 positive for 2+ Proteus, 3+ Enterococcus, 1+ MSSA.    --Was on IV vancomycin 5/4 to 5/8.    Was on intravenous Zosyn (5/4), switched to Unasyn (5/8) to cover Proteus, Enterococcus and MSSA. Then switched to oral Augmentin [5/10- 5/14]  Continue IV Ancef [5/15].  CRP improving from 123.60  >25.6   --Vascular surgery following.  Underwent lower extremity angiogram 5/16,  Left patent iliac, SFA and AK pop. 50 % at knee pop stenosis, single vessel runoff via VERN.  -- Podiatry following, plan for left BKA 5/19.  Patient underwent surgery as planned on 5/19.  Vascular surgery following for postoperative cares.  Plan for dressing change on Monday.  --Infectious disease following.  Antibiotics discontinued following BKA per ID recommendations.  As needed Tylenol, oxycodone as needed for pain.  Was on aspirin 325 mg oral daily, hold on 5/19 for surgery.  Restarted postoperatively .  Continue simvastatin 40 mg oral daily.  -PT/OT.  Keep knee immobilizer in place, okay to remove for brief.  For comfort.    Acute on chronic anemia no active bleeding  S/p PRBC transfusion 5/5,  5/21.  Hemoglobin was normal 6 months ago, was 8's during recent admission, then 8.2 on 5/4 and dropped to 6.7-6.9 on 5/5, received 1 unit blood transfusion on 5/5.  --No black or bloody stools.  No abdominal pain.   No other apparent source of acute blood loss.   --Guiac negative. Iron sat index 5.  Received 2 doses of IV Venofer, started on oral iron supplements.  -Aspirin was on hold, restarted 5/14 -5/18.  Held 5/19 for amputation surgery, restarted postoperatively.  Hb 8 on 5/20. Down to 6.6 on 5/21, transfused additional PRBC.  Discontinued Celebrex [started this hospitalization], naproxen [PTA meds]  No active bleeding at this time.    -Currently on famotidine for GI prophylaxis in setting of acute anemia and being on ASA  Hemoglobin stable currently at 8.5    Hypoglycemia 5/19 while NPO  Hyperglycemia from uncontrolled diabetes  Uncontrolled diabetes mellitus with a hemoglobin A1c of 8.3.  Diabetic neuropathy.  PTA on 10 units insulin Lantus every morning.  This hospital stay noted elevated blood sugars, optimized to insulin Lantus 14 units twice daily.  Insulin Lantus on hold on 5/19 AM for surgery while NPO.  Noted hypoglycemia with blood sugars of 64 on 5/19.  Hypoglycemia protocol.  Also received IVF with dextrose for this.  Continue insulin Lantus to 14 units twice daily after surgery.  Titrate insulin as needed.  Also added prandial aspart 1 unit per 10 g carbs 3 times daily AC.  Continue sliding scale insulin.  Continue PTA med Neurontin.  Hold PTA metformin and Trulicity.  -Changed diet to moderate consistent carbohydrate diet  Blood sugar at 60 today so reduced bedtime dose of Lantus from 14units to 8 units subcu    Severe malnutrition:  Nutrition services following     Hyperlipidemia.  Continue PTA simvastatin.    Physical deconditioning in the setting of ongoing medical illness, senile frailty.  Status post recent fall.  Closed displaced subtrochanteric fracture of right femur 3/29  S/p ORIF right  femur fracture with intermedullary nail 3/29     Closed fracture of proximal end of right humerus 3/29   Closed nondisplaced fracture of right clavicle 3/29   Was at TCU prior to transfer  Continue PT/OT, likely TCU on discharge.  Patient and family in agreement with TCU.  Care Management assistance with transition.  Patient tells me that he plans on discharging home if TCU not available soon.  He lives with his daughter and wife.    Hypoalbuminemia likely dilutional, acute illness.  Serum albumin 2.6.   Dietary supplements with Ensure.  Monitor periodically.    Thrombocytosis likely reactive in the setting of infection.  Platelet count 613 > 520  Monitor periodically.     Tobacco dependence  Daily pipe smokier, Smoking cessation emphasized  Nicotine patch     Orders Placed This Encounter      Moderate Consistent Carb (60 g CHO per Meal) Diet      DVT Prophylaxis: SCDs, ambulate, sc heparin.  Code Status: Full Code  Disposition: Expected discharge in 1- 2 days pending clinical improvement to TCU when bed is available    Discussed with bedside RN and patient today    >35 minutes spent by me on the date of service doing chart review, history, exam, documentation & further activities per the note.      Bonnie Chan MD        Interval History:        Stable overnight.  Resting comfortably in bed.  Denies any pain currently.  Hemoglobin stable today at 8.5.  Denies any new complaints today.  No other acute events in the last 24 hours         Physical Exam:        Physical Exam   Temp:  [97.3  F (36.3  C)-98.1  F (36.7  C)] 97.3  F (36.3  C)  Pulse:  [53-64] 64  Resp:  [16-18] 16  BP: (104-120)/(55-65) 111/65  SpO2:  [98 %-100 %] 100 %    Intake/Output Summary (Last 24 hours) at 5/15/2023 1439  Last data filed at 5/15/2023 1352  Gross per 24 hour   Intake 1760 ml   Output 1250 ml   Net 510 ml       Admission Weight: 59.4 kg (130 lb 15.3 oz)  Current Weight: 59.4 kg (130 lb 15.3 oz)    PHYSICAL EXAM  GENERAL: Patient is  in no distress. Alert and oriented.  HEART: Regular rate and rhythm. S1S2. No murmurs  LUNGS:Respirations unlabored  NEURO: Moving all extremities.  EXTREMITIES: Noted left BKA stump with dressing.  Right heel wound with dressing in place.  Rookie boots in place  PSYCHIATRY Cooperative       Medications:          acetaminophen  975 mg Oral Q8H     aspirin  325 mg Oral Daily     famotidine  20 mg Oral BID    Or     famotidine  20 mg Intravenous BID     ferrous gluconate  324 mg Oral Daily with breakfast     gabapentin  300 mg Oral TID     heparin ANTICOAGULANT  5,000 Units Subcutaneous Q8H     insulin aspart   Subcutaneous TID AC     insulin aspart  1-7 Units Subcutaneous TID AC     insulin aspart  1-5 Units Subcutaneous At Bedtime     [START ON 5/23/2023] insulin glargine  14 Units Subcutaneous QAM AC     insulin glargine  8 Units Subcutaneous QPM     latanoprost  1 drop Both Eyes At Bedtime     multivitamin w/minerals  1 tablet Oral Daily     polyethylene glycol  17 g Oral Daily     senna-docusate  1 tablet Oral BID     simvastatin  40 mg Oral At Bedtime     sodium chloride (PF)  10-40 mL Intracatheter Q7 Days     sodium chloride (PF)  3 mL Intracatheter Q8H     vitamin D3  50 mcg Oral Daily     acetaminophen, bisacodyl, glucose **OR** dextrose **OR** glucagon, HYDROmorphone **OR** HYDROmorphone, lidocaine 4%, lidocaine (buffered or not buffered), magnesium hydroxide, melatonin, naloxone **OR** naloxone **OR** naloxone **OR** naloxone, ondansetron **OR** ondansetron, oxyCODONE **OR** oxyCODONE, prochlorperazine **OR** prochlorperazine, sodium chloride (PF), sodium chloride (PF), sodium chloride (PF), sodium chloride (PF)         Data:      All new lab and imaging data was reviewed.

## 2023-05-23 ENCOUNTER — APPOINTMENT (OUTPATIENT)
Dept: PHYSICAL THERAPY | Facility: CLINIC | Age: 78
DRG: 616 | End: 2023-05-23
Attending: INTERNAL MEDICINE
Payer: COMMERCIAL

## 2023-05-23 LAB
GLUCOSE BLDC GLUCOMTR-MCNC: 126 MG/DL (ref 70–99)
GLUCOSE BLDC GLUCOMTR-MCNC: 138 MG/DL (ref 70–99)
GLUCOSE BLDC GLUCOMTR-MCNC: 153 MG/DL (ref 70–99)
GLUCOSE BLDC GLUCOMTR-MCNC: 156 MG/DL (ref 70–99)
GLUCOSE BLDC GLUCOMTR-MCNC: 159 MG/DL (ref 70–99)

## 2023-05-23 PROCEDURE — 250N000013 HC RX MED GY IP 250 OP 250 PS 637: Performed by: STUDENT IN AN ORGANIZED HEALTH CARE EDUCATION/TRAINING PROGRAM

## 2023-05-23 PROCEDURE — 11720 DEBRIDE NAIL 1-5: CPT | Mod: 51 | Performed by: PODIATRIST

## 2023-05-23 PROCEDURE — 120N000001 HC R&B MED SURG/OB

## 2023-05-23 PROCEDURE — 97116 GAIT TRAINING THERAPY: CPT | Mod: GP

## 2023-05-23 PROCEDURE — 97542 WHEELCHAIR MNGMENT TRAINING: CPT | Mod: GP

## 2023-05-23 PROCEDURE — G0463 HOSPITAL OUTPT CLINIC VISIT: HCPCS

## 2023-05-23 PROCEDURE — 250N000011 HC RX IP 250 OP 636: Performed by: STUDENT IN AN ORGANIZED HEALTH CARE EDUCATION/TRAINING PROGRAM

## 2023-05-23 PROCEDURE — 99232 SBSQ HOSP IP/OBS MODERATE 35: CPT | Mod: GC | Performed by: PODIATRIST

## 2023-05-23 PROCEDURE — 11042 DBRDMT SUBQ TIS 1ST 20SQCM/<: CPT | Performed by: PODIATRIST

## 2023-05-23 PROCEDURE — 99232 SBSQ HOSP IP/OBS MODERATE 35: CPT | Performed by: INTERNAL MEDICINE

## 2023-05-23 PROCEDURE — 250N000013 HC RX MED GY IP 250 OP 250 PS 637: Performed by: HOSPITALIST

## 2023-05-23 PROCEDURE — 0HBRXZZ EXCISION OF TOE NAIL, EXTERNAL APPROACH: ICD-10-PCS | Performed by: PODIATRIST

## 2023-05-23 PROCEDURE — 0JBQ0ZZ EXCISION OF RIGHT FOOT SUBCUTANEOUS TISSUE AND FASCIA, OPEN APPROACH: ICD-10-PCS | Performed by: PODIATRIST

## 2023-05-23 PROCEDURE — 97530 THERAPEUTIC ACTIVITIES: CPT | Mod: GP

## 2023-05-23 RX ADMIN — ACETAMINOPHEN 650 MG: 325 TABLET ORAL at 19:23

## 2023-05-23 RX ADMIN — OXYCODONE HYDROCHLORIDE 5 MG: 5 TABLET ORAL at 08:30

## 2023-05-23 RX ADMIN — OXYCODONE HYDROCHLORIDE 5 MG: 5 TABLET ORAL at 19:23

## 2023-05-23 RX ADMIN — OXYCODONE HYDROCHLORIDE 5 MG: 5 TABLET ORAL at 14:43

## 2023-05-23 RX ADMIN — HEPARIN SODIUM 5000 UNITS: 5000 INJECTION, SOLUTION INTRAVENOUS; SUBCUTANEOUS at 21:31

## 2023-05-23 RX ADMIN — Medication 50 MCG: at 08:19

## 2023-05-23 RX ADMIN — ACETAMINOPHEN 650 MG: 325 TABLET ORAL at 12:40

## 2023-05-23 RX ADMIN — OXYCODONE HYDROCHLORIDE 5 MG: 5 TABLET ORAL at 01:17

## 2023-05-23 RX ADMIN — GABAPENTIN 300 MG: 300 CAPSULE ORAL at 08:19

## 2023-05-23 RX ADMIN — SENNOSIDES AND DOCUSATE SODIUM 1 TABLET: 50; 8.6 TABLET ORAL at 21:30

## 2023-05-23 RX ADMIN — FAMOTIDINE 20 MG: 20 TABLET ORAL at 08:19

## 2023-05-23 RX ADMIN — HEPARIN SODIUM 5000 UNITS: 5000 INJECTION, SOLUTION INTRAVENOUS; SUBCUTANEOUS at 14:00

## 2023-05-23 RX ADMIN — GABAPENTIN 300 MG: 300 CAPSULE ORAL at 15:47

## 2023-05-23 RX ADMIN — FERROUS GLUCONATE 324 MG: 324 TABLET ORAL at 08:19

## 2023-05-23 RX ADMIN — LATANOPROST 1 DROP: 50 SOLUTION/ DROPS OPHTHALMIC at 21:31

## 2023-05-23 RX ADMIN — HEPARIN SODIUM 5000 UNITS: 5000 INJECTION, SOLUTION INTRAVENOUS; SUBCUTANEOUS at 05:34

## 2023-05-23 RX ADMIN — MULTIPLE VITAMINS W/ MINERALS TAB 1 TABLET: TAB at 08:19

## 2023-05-23 RX ADMIN — FAMOTIDINE 20 MG: 20 TABLET ORAL at 21:31

## 2023-05-23 RX ADMIN — GABAPENTIN 300 MG: 300 CAPSULE ORAL at 21:30

## 2023-05-23 RX ADMIN — ASPIRIN 325 MG: 325 TABLET, COATED ORAL at 08:19

## 2023-05-23 RX ADMIN — SIMVASTATIN 40 MG: 40 TABLET, FILM COATED ORAL at 21:31

## 2023-05-23 RX ADMIN — SENNOSIDES AND DOCUSATE SODIUM 1 TABLET: 50; 8.6 TABLET ORAL at 08:19

## 2023-05-23 ASSESSMENT — ACTIVITIES OF DAILY LIVING (ADL)
ADLS_ACUITY_SCORE: 41
ADLS_ACUITY_SCORE: 42
ADLS_ACUITY_SCORE: 46
ADLS_ACUITY_SCORE: 41
ADLS_ACUITY_SCORE: 42
ADLS_ACUITY_SCORE: 41
ADLS_ACUITY_SCORE: 46
ADLS_ACUITY_SCORE: 46
ADLS_ACUITY_SCORE: 41
ADLS_ACUITY_SCORE: 45
ADLS_ACUITY_SCORE: 46
ADLS_ACUITY_SCORE: 42

## 2023-05-23 NOTE — PROGRESS NOTES
Care Management Follow Up    Length of Stay (days): 10    Expected Discharge Date: 05/26/2023     Concerns to be Addressed:       Patient plan of care discussed at interdisciplinary rounds: Yes    Anticipated Discharge Disposition: Transitional Care  Disposition Comments: TCU  Anticipated Discharge Services: Transportation Services  Anticipated Discharge DME: None    Patient/family educated on Medicare website which has current facility and service quality ratings:    Education Provided on the Discharge Plan: Yes  Patient/Family in Agreement with the Plan: yes    Referrals Placed by CM/SW:    Private pay costs discussed: Not applicable    Additional Information:  MIHAELA left a VM with Basilia, facility liaison for [facility], to inquire about placement. MIHAELA also updated Basilia regarding medical readiness for discharge.       GORDON Rodriguez

## 2023-05-23 NOTE — PLAN OF CARE
[id number]: POD 4 from a L BKA. A&O. CMS - pale BLE and baseline neuropathy to all extremities. Bowel sounds +x4, + flatus, tolerating mod CHO diet. VSS. Dressing CDI. R heel wound dressing CDI. Up with A2, GB, W pivot - in bed this shift. C/o mild to moderate pain, decreased with oxycodone & tylenol. Blanchable red coccyx - PIP education provided, patient turns himself per report. Discharge to TCU pending.

## 2023-05-23 NOTE — PROGRESS NOTES
[id number]  Patient vital signs are at baseline: Yes  Patient able to ambulate as they were prior to admission or with assist devices provided by therapies during their stay:  No,  not out of bed this shift  Patient MUST void prior to discharge: Yes  Patient able to tolerate oral intake: Yes  Pain has adequate pain control using Oral analgesics:  Yes  Does patient have an identified : Yes  Has goal D/C date and time been discussed with patient:  TBD     Pt is VSS on RA, alert and oriented x 4,  diabetic, mod carb diet, BG for this shift was 71 and 105. Pt also has a left PICC line, R heel ulcer and a left kneel mobilizer. discharge pending TCU placement.     transfers, func mob, bed mob  Assistance / Modification: MI for t/f & mobility, SB A for LB ADLs  Patient Education: Safe transfers, doff/donning KI  Barriers to Learning: impulsive  REQUIRES OT FOLLOW UP: Yes  Activity Tolerance  Activity Tolerance: Patient Tolerated treatment well  Activity Tolerance: anxious, impulsive, safety concerns using RW for household t/f & fxl mobility, no LOB but tends to plop down when in a hurry  Safety Devices  Safety Devices in place: Yes  Type of devices: Gait belt;Call light within reach; Left in chair;Chair alarm in place       PM session: Pt met in room, resting in bed; denied pain, reported she only ever has \"tingling\" in her L knee. Pt completed simple meal prep using microwave. After demonstration of how to approach counters and retrieve items w/ RW, pt was able to return demonstrate w/o VCs. Pt retrieved oatmeal, a bowl, and a spoon using appropriate, safe RW technique to move items down the counter. Pt read directions on oatmeal packet to determine correct amount of water. Pt placed bowl in microwave and cooked the oatmeal w/ mod I. Pt demonstrated how to reach items in refrigerator and in lower cabinets w/ mod I using RW; educated on using the counter for support. Pt given lime theraband and UE strengthening HEP program; completed 7 exercises x 15 each arm. Cont w/ POC until 7/4/2019 when pt is d/c to ALU. Patient Diagnosis(es): The encounter diagnosis was Knee laceration, left, initial encounter. has a past medical history of Arthritis, Hyperlipidemia, and Hypertension. has a past surgical history that includes Cholecystectomy; Inguinal hernia repair (Right, 06/09/2016); and incision and drainage (Left, 6/25/2019).     Restrictions  Restrictions/Precautions  Restrictions/Precautions: Fall Risk  Position Activity Restriction  Other position/activity restrictions: WBAT - knee immobilizer in place AATs  Subjective   General  Chart Reviewed: Yes  Patient assessed for rehabilitation services?: Yes  Additional Pertinent Hx: 74-year-old white female who is still fairly active, sustained a fall directly over her left knee with a large deeplaceration measuring about 14 to 15 cm. The patient was admitted and taken to surgery on 6/25 by Dr. Giselle Sevilla for deep wound exploration with drainage, debridement, and removal of foreign body with closure. Postop the patient was started in therapies, but needs assist it is not safe to return to independent living. She is agreeable to rehab unit treatment, and has been cleared today for admission to our rehab unit to improve her transfers, gait, balance, and safety. Patient has a knee immobilizer in place, which hinders her progress in therapy. (copied per note Dr Shaina Kent 6/27/19)  Family / Caregiver Present: No  Referring Practitioner: Nikkie Lopez  Diagnosis: left knee open, deep laceration  Subjective  Subjective: pt met in room, she was resting in bed quietly; denies pain;  plans to return home on Thurs 7/4/19  General Comment  Comments: offered shower but only wanted to sponge bathe      Orientation  Orientation  Overall Orientation Status: Within Functional Limits  Objective    ADL  Feeding: Modified independent (dentures)  Grooming: Modified independent (alternated standing & sitting to perform oral & hair care; applied make up in seated)  UE Bathing: Stand by assistance;Setup(sponge bathed while seated in bed, applied deodorant at sink)  LE Bathing: Stand by assistance;Setup;Verbal cueing(declined shower, willing to sponge bathe LEs in bed while KI doffed; stood at sink to sponge bathe allegra areas, set up & VCs provided)  UE Dressing: Independent(gathered clothes from closet, transported to bath; able to don bra & shirt while seated)  LE Dressing: Stand by assistance;Verbal cueing(pt required set up & VCs to doff/don KI, able to bend over to doff/don socks w/ extra time; SAFETY concerns when attempting to don L shoe, tries to stand up & place Comment: MON conf    Goals  Short term goals  Time Frame for Short term goals: 1 week pt will.   Short term goal 1: bathe indep with AD - shower chair and grab bar, possibly long sponge--not met, requires SBA, set up + VCs, OT wraps L LE prior to shower, issued LH sponge  Short term goal 2: dress indep with AD if needed--goal met w/ except of KI--she required VCs to doff/don KI; shown A/E however declined to use, safety concerns when donning L shoe (stands to don)  Short term goal 3: toilet indep with AD--goal met using GB  Short term goal 4: transfers indep with AD--goal met for MI using RW, SAFETY CONCERNS  Short term goal 5: functional mobility and simple meal prep indep with AD - uses microwave at home  Long term goals  Time Frame for Long term goals : same as stg  Patient Goals   Patient goals : \"I want to be able to get my own shoes and socks on, get around by myself\"       Therapy Time   Individual Concurrent Group PM-treatment   Time In 0915      1300   Time Out 1015      1345   Minutes 60      45   Timed Code Treatment Minutes: 14 Lorraine Johnson, S/OT  Javon Mariee, OTR/L #[id number] provided direct supervision to student and concur with PN

## 2023-05-23 NOTE — PLAN OF CARE
POD day 4 L BKA.   A&O X4. [place]. VSS on RA. PRN oxy x2 and tylenol x1 for LLE pain. A2-GB/W pivot, encouraging to get up for meals. Uses urinal at bedside. No BM this shift. Mod carb diet, good appetite. BS-153, 156. LLE BKA- ace wrap, knee immobilizer in place. R heel wound cares completed-CDI; offloading in prevalon boot. Sacral mepilex changed-CDI. LUE PICC-SL. Baseline neuropathy upper/lower extremities. Discharge pending TCU placement- SW following. PT/WOC/vascular following. Podiatry signed off.

## 2023-05-23 NOTE — PLAN OF CARE
Goal Outcome Evaluation:    A&O X4, VSS on RA. POD day 4 L BKA. C/O pain in LLE, Oxycodone 5 mg given X1. Wound care due on even days due 5/24/23. Immobilizer in place, stump, ACE wrapped CDI. Right heel wound covered with Mepilex, CDI. Sacral protection with Mepilex. PICC in Left upper arm, flushed, blood return noted,  SL, CDI. Tolerating moderate carb diet, voiding using urinal, incontinent of bowel, last BM 5/22/23. Discharge pending TCU placement.

## 2023-05-23 NOTE — PROGRESS NOTES
[facility]  Hospitalist Progress Note   05/23/2023          Assessment and Plan:       Roddy Sidhu is a 77 year old male with history of diabetes mellitus with polyneuropathy transferred from [facility] on 5/13/2023 with poor healing left heel wound and concern for vascular compromise-requiring vascular surgery evaluation.    Patient admitted at [facility] (3/29-4/4) for fall with R femur fx, R humerus fx, R clavicle fx s/p R femur ORIF and discharged to TCU.     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  S/p debridement of left pressure ulcer 5/9/2023  Peripheral arterial disease.  --Pressure injury to heels noted in TCU and seen by wound care 4/17/2023.  Recent ABIs were normal. WBC 17.1.  .60.  Wound cultures from 5/5 positive for 2+ Proteus, 3+ Enterococcus, 1+ MSSA.    --Was on IV vancomycin 5/4 to 5/8.    Was on intravenous Zosyn (5/4), switched to Unasyn (5/8) to cover Proteus, Enterococcus and MSSA. Then switched to oral Augmentin [5/10- 5/14]  Continue IV Ancef [5/15].  CRP improving from 123.60  >25.6   --Vascular surgery following.  Underwent lower extremity angiogram 5/16,  Left patent iliac, SFA and AK pop. 50 % at knee pop stenosis, single vessel runoff via VERN.  -- Podiatry following, plan for left BKA 5/19.  Patient underwent surgery as planned on 5/19.  Vascular surgery following for postoperative cares.  Plan for dressing change on Monday.  --Infectious disease following.  Antibiotics discontinued following BKA per ID recommendations.  As needed Tylenol, oxycodone as needed for pain.  Was on aspirin 325 mg oral daily, hold on 5/19 for surgery.  Restarted postoperatively .  Continue simvastatin 40 mg oral daily.  -PT/OT.  Keep knee immobilizer in place, okay to remove for brief.  For comfort.    Acute on chronic anemia no active bleeding  S/p PRBC transfusion 5/5,  5/21.  Hemoglobin was normal 6 months ago, was 8's during recent admission, then 8.2 on 5/4 and dropped to 6.7-6.9 on 5/5, received 1 unit blood transfusion on 5/5.  --No black or bloody stools.  No abdominal pain.   No other apparent source of acute blood loss.   --Guiac negative. Iron sat index 5.  Received 2 doses of IV Venofer, started on oral iron supplements.  -Aspirin was on hold, restarted 5/14 -5/18.  Held 5/19 for amputation surgery, restarted postoperatively.  Hb 8 on 5/20. Down to 6.6 on 5/21, transfused additional PRBC.  Discontinued Celebrex [started this hospitalization], naproxen [PTA meds]  No active bleeding at this time.    -Currently on famotidine for GI prophylaxis in setting of acute anemia and being on ASA  Hemoglobin stable currently at 8.5    Hypoglycemia 5/19 while NPO  Hyperglycemia from uncontrolled diabetes  Uncontrolled diabetes mellitus with a hemoglobin A1c of 8.3.  Diabetic neuropathy.  PTA on 10 units insulin Lantus every morning.  This hospital stay noted elevated blood sugars, optimized to insulin Lantus 14 units twice daily.  Insulin Lantus on hold on 5/19 AM for surgery while NPO.  Noted hypoglycemia with blood sugars of 64 on 5/19.  Hypoglycemia protocol.  Also received IVF with dextrose for this.  Continue insulin Lantus to 14 units twice daily after surgery.  Titrate insulin as needed.  Also added prandial aspart 1 unit per 10 g carbs 3 times daily AC.  Continue sliding scale insulin.  Continue PTA med Neurontin.  Hold PTA metformin and Trulicity.  -Changed diet to moderate consistent carbohydrate diet  Blood sugar at 60 today so reduced bedtime dose of Lantus from 14units to 8 units subcu    Severe malnutrition:  Nutrition services following     Hyperlipidemia.  Continue PTA simvastatin.    Physical deconditioning in the setting of ongoing medical illness, senile frailty.  Status post recent fall.  Closed displaced subtrochanteric fracture of right femur 3/29  S/p ORIF right  femur fracture with intermedullary nail 3/29     Closed fracture of proximal end of right humerus 3/29   Closed nondisplaced fracture of right clavicle 3/29   Was at TCU prior to transfer  Continue PT/OT, likely TCU on discharge.  Patient and family in agreement with TCU.  Care Management assistance with transition.  Patient tells me that he plans on discharging home if TCU not available soon.  He lives with his daughter and wife.    Hypoalbuminemia likely dilutional, acute illness.  Serum albumin 2.6.   Dietary supplements with Ensure.  Monitor periodically.    Thrombocytosis likely reactive in the setting of infection.  Platelet count 613 > 520  Monitor periodically.     Tobacco dependence  Daily pipe smokier, Smoking cessation emphasized  Nicotine patch     Orders Placed This Encounter      Moderate Consistent Carb (60 g CHO per Meal) Diet      DVT Prophylaxis: SCDs, ambulate, sc heparin.  Code Status: Full Code  Disposition: Expected discharge TCU when bed is available    Discussed with bedside RN and patient today    >35 minutes spent by me on the date of service doing chart review, history, exam, documentation & further activities per the note.      Bonnie Chan MD        Interval History:        Stable overnight.  Resting comfortably in bed.  Denies any pain currently.  Denies any new complaints today.  No other acute events in the last 24 hours         Physical Exam:        Physical Exam   Temp:  [97.7  F (36.5  C)-98.1  F (36.7  C)] 97.7  F (36.5  C)  Pulse:  [61-71] 71  Resp:  [16-18] 16  BP: (100-120)/(55-73) 120/71  SpO2:  [99 %] 99 %    Intake/Output Summary (Last 24 hours) at 5/15/2023 1439  Last data filed at 5/15/2023 1352  Gross per 24 hour   Intake 1760 ml   Output 1250 ml   Net 510 ml       Admission Weight: 59.4 kg (130 lb 15.3 oz)  Current Weight: 59.4 kg (130 lb 15.3 oz)    PHYSICAL EXAM  GENERAL: Patient is in no distress. Alert and oriented.  HEART: Regular rate and rhythm. S1S2. No  murmurs  LUNGS:Respirations unlabored  NEURO: Moving all extremities.  EXTREMITIES: Noted left BKA stump with dressing.  Right heel wound with dressing in place.  Rookie boots in place  PSYCHIATRY Cooperative       Medications:          aspirin  325 mg Oral Daily     famotidine  20 mg Oral BID    Or     famotidine  20 mg Intravenous BID     ferrous gluconate  324 mg Oral Daily with breakfast     gabapentin  300 mg Oral TID     heparin ANTICOAGULANT  5,000 Units Subcutaneous Q8H     insulin aspart   Subcutaneous TID AC     insulin aspart  1-7 Units Subcutaneous TID AC     insulin aspart  1-5 Units Subcutaneous At Bedtime     insulin glargine  14 Units Subcutaneous QAM AC     latanoprost  1 drop Both Eyes At Bedtime     multivitamin w/minerals  1 tablet Oral Daily     polyethylene glycol  17 g Oral Daily     senna-docusate  1 tablet Oral BID     simvastatin  40 mg Oral At Bedtime     sodium chloride (PF)  10-40 mL Intracatheter Q7 Days     sodium chloride (PF)  3 mL Intracatheter Q8H     vitamin D3  50 mcg Oral Daily     acetaminophen, bisacodyl, glucose **OR** dextrose **OR** glucagon, HYDROmorphone **OR** HYDROmorphone, lidocaine 4%, lidocaine (buffered or not buffered), magnesium hydroxide, melatonin, naloxone **OR** naloxone **OR** naloxone **OR** naloxone, ondansetron **OR** ondansetron, oxyCODONE **OR** oxyCODONE, prochlorperazine **OR** prochlorperazine, sodium chloride (PF), sodium chloride (PF), sodium chloride (PF), sodium chloride (PF)         Data:      All new lab and imaging data was reviewed.

## 2023-05-23 NOTE — PROGRESS NOTES
"CLINICAL NUTRITION SERVICES - REASSESSMENT NOTE      Recommendations Ordered by Registered Dietitian (RD):   - discontinued Ensure Enlive order, pt already receiving TID Glucerna and drinking them  - continue with TID Glucerna   - RD provided encouragement to continue with TID meals and TID supplements for adequate nutrition for wound healing.    Malnutrition:   % Weight Loss:  > 5% in 1 month (severe malnutrition) (5/19)  % Intake:  </= 75% for >/= 1 month (severe malnutrition) (5/19)  Subcutaneous Fat Loss:  Orbital region severe depletion, Upper arm region moderate-severe depletion and Thoracic region moderate-severe depletion (5/19)  Muscle Loss:  Temporal region moderate depletion, Clavicle bone region moderate depletion, Acromion bone region moderate depletion and Dorsal hand region moderate depletion (5/19)  Fluid Retention:  None noted    Malnutrition Diagnosis: Severe malnutrition in the context of -  Acute illness or injury       EVALUATION OF PROGRESS TOWARD GOALS   Diet: Moderate Consistent Carb (60 g CHO per Meal) Diet    Snacks/Supplements Pediatric: Glucerna; With Meals    Intake/Tolerance:  - visited with pt this morning. He reported to be eating well. TID meals. Stated \"I'm hungry.\" Likes the Glucerna supplements. Not happy with the mod CHO diet, wants a good cheeseburger and pancakes. Missing his wife's home cooking. Understanding though of diet. RD provided encouragement to continue with TID meals and TID supplements for adequate nutrition for wound healing.   - per chart review, pt is tolerating diet   - per nursing flow sheet, 100% intakes documented   - per health touch, pt has been receiving TID meals + TID supplements         ASSESSED NUTRITION NEEDS:  Dosing Weight 56 kg   Estimated Energy Needs: 0572-3877+ kcals (30-35+ Kcal/Kg)  Justification: post-op and underweight  Estimated Protein Needs:  grams protein (1.5-2 g pro/Kg)  Justification: preservation of lean body mass  Estimated " Fluid Needs: 1 mL/kcal   Justification: maintenance       NEW FINDINGS:   General:   - discharge pending TCU placement   - POD day 4 L BKA    Weight: stable wt since prior assessment   Date/Time Weight Weight Method   05/23/23 0646 55.5 kg (122 lb 5.7 oz) Bed scale   05/22/23 0545 54.4 kg (119 lb 14.9 oz) Bed scale   05/19/23 0636 55.5 kg (122 lb 5.7 oz) Bed scale     Labs: BGM >180  Lab 05/23/23  0643 05/23/23  0218 05/22/23  2125 05/22/23 2008 05/22/23  1634 05/22/23  1203   * 159* 106* 71 111* 200*       Medications: reviewed     ferrous gluconate  324 mg Oral Daily with breakfast     insulin aspart   Subcutaneous TID AC     insulin aspart (MSSI)  1-7 Units Subcutaneous TID AC     insulin aspart (MSSI)  1-5 Units Subcutaneous At Bedtime     insulin glargine  14 Units Subcutaneous QAM AC     multivitamin w/minerals  1 tablet Oral Daily     polyethylene glycol  17 g Oral Daily     senna-docusate  1 tablet Oral BID     vitamin D3  50 mcg Oral Daily       lactated ringers 10 mL/hr at 05/20/23 0841     I/O: Net IO Since Admission: -9,208 mL [05/23/23 1039]  - last BM x1 yesterday     Skin: WOC assessed today   Coccyx = healed 5/23  Rt lateral calcaneous      Previous Goals:   Intake of at least 75% nutritionally adequate meals TID including supplements.   Evaluation: Met    Previous Nutrition Diagnosis:   Inadequate oral intake related to reduced appetite, restrictive diet, increased needs as evidenced by planned BKA, fat and muscle wasting.   Evaluation: Improving      MALNUTRITION  % Weight Loss:  > 5% in 1 month (severe malnutrition) (5/19)  % Intake:  </= 75% for >/= 1 month (severe malnutrition) (5/19)  Subcutaneous Fat Loss:  Orbital region severe depletion, Upper arm region moderate-severe depletion and Thoracic region moderate-severe depletion (5/19)  Muscle Loss:  Temporal region moderate depletion, Clavicle bone region moderate depletion, Acromion bone region moderate depletion and Dorsal hand region  moderate depletion (5/19)  Fluid Retention:  None noted    Malnutrition Diagnosis: Severe malnutrition in the context of -  Acute illness or injury      CURRENT NUTRITION DIAGNOSIS  Increased nutrient needs (kcal, protein) related to post-op, BKA site healing, repletion as evidenced by min needs of 30 kcal/kg and 1.5 g/kg protein, day 4 BKA post-op, BMI of 17.81    INTERVENTIONS  Recommendations / Nutrition Prescription  - discontinued Ensure Enlive order, pt already receiving TID Glucerna and drinking them  - continue with TID Glucerna   - RD provided encouragement to continue with TID meals and TID supplements for adequate nutrition for wound healing.       Implementation  Medical Food Supplement  Nutrition Education     Goals  Pt to continue to consume >75% of TID meals and TID supplements       MONITORING AND EVALUATION:  Progress towards goals will be monitored and evaluated per protocol and Practice Guidelines      Marnie Campoverde RD, LD

## 2023-05-23 NOTE — PLAN OF CARE
Problem: Malnutrition  Goal: Improved Nutritional Intake  Outcome: Progressing   Goal Outcome Evaluation:    Eating and drinking well. TID meals, TID supplements. Appetite is good. RD provided encouragement.     Marnie Campoverde RD, LD

## 2023-05-23 NOTE — PROGRESS NOTES
[facility]  PROGRESS NOTE    CHIEF COMPLAINT:      I was asked by Nancy Zamora to evaluate this patient for left heel     PATIENT HISTORY:  Roddy Sidhu is seen in follow up for right foot. He was initially seen for the left heel and found to have significant exposed bone. He's now s/p left sided BKA on 5/19. He states he's doing well and has minimal pain. No complaints to right side.         Review of Systems:  A 10 point review of systems was performed and is positive for that noted above in the patient history.  All other areas are negative.     PAST MEDICAL HISTORY: History reviewed. No pertinent past medical history.     PAST SURGICAL HISTORY:   Past Surgical History:   Procedure Laterality Date     AMPUTATE LEG BELOW KNEE Left 5/19/2023    Procedure: LEFT BELOW KNEE AMPUTATION;  Surgeon: Raimundo Weinstein MD;  Location: [facility] OR     INCISION AND DRAINAGE FOOT, COMBINED Left 5/9/2023    Procedure: Incision and Debidement of Pressure Wound, Left Heel;  Surgeon: Paul Flores DPM;  Location: [facility]     IR LOWER EXTREMITY ANGIOGRAM LEFT  5/16/2023     SURGICAL HISTORY OF -   1990    removal of steel from left eye        MEDICATIONS:  Reviewed in Epic. Current.     ALLERGIES:  No Known Allergies     SOCIAL HISTORY:   Social History     Socioeconomic History     Marital status:      Spouse name: Not on file     Number of children: Not on file     Years of education: Not on file     Highest education level: Not on file   Occupational History     Not on file   Tobacco Use     Smoking status: Some Days     Types: Pipe     Smokeless tobacco: Never   Vaping Use     Vaping status: Never Used   Substance and Sexual Activity     Alcohol use: Not Currently     Drug use: No     Sexual activity: Not Currently   Other Topics Concern     Parent/sibling w/ CABG, MI or angioplasty before 65F 55M? Yes   Social History Narrative     Not on file     Social Determinants of Health      Financial Resource Strain: Not on file   Food Insecurity: Not on file   Transportation Needs: Not on file   Physical Activity: Not on file   Stress: Not on file   Social Connections: Not on file   Intimate Partner Violence: Not on file   Housing Stability: Not on file        FAMILY HISTORY:   Family History   Problem Relation Age of Onset     Breast Cancer Mother 60     C.A.D. Brother 58        MI        EXAM:Vitals: /71 (BP Location: Right arm)   Pulse 71   Temp 97.7  F (36.5  C) (Oral)   Resp 16   Wt 55.5 kg (122 lb 5.7 oz)   SpO2 99%   BMI 17.81 kg/m    BMI= Body mass index is 17.81 kg/m .    LABS:   .a1c  Last Comprehensive Metabolic Panel:  Sodium   Date Value Ref Range Status   05/20/2023 136 136 - 145 mmol/L Final   05/14/2021 136 133 - 144 mmol/L Final     Potassium   Date Value Ref Range Status   05/20/2023 4.8 3.4 - 5.3 mmol/L Final   05/17/2022 5.3 3.4 - 5.3 mmol/L Final   05/14/2021 5.2 3.4 - 5.3 mmol/L Final     Chloride   Date Value Ref Range Status   05/20/2023 101 98 - 107 mmol/L Final   05/17/2022 108 94 - 109 mmol/L Final   05/14/2021 107 94 - 109 mmol/L Final     Carbon Dioxide   Date Value Ref Range Status   05/14/2021 27 20 - 32 mmol/L Final     Carbon Dioxide (CO2)   Date Value Ref Range Status   05/20/2023 27 22 - 29 mmol/L Final   05/17/2022 23 20 - 32 mmol/L Final     Anion Gap   Date Value Ref Range Status   05/20/2023 8 7 - 15 mmol/L Final   05/17/2022 8 3 - 14 mmol/L Final   05/14/2021 2 (L) 3 - 14 mmol/L Final     Glucose   Date Value Ref Range Status   05/17/2022 109 (H) 70 - 99 mg/dL Final   05/14/2021 126 (H) 70 - 99 mg/dL Final     Comment:     Fasting specimen     GLUCOSE BY METER POCT   Date Value Ref Range Status   05/23/2023 153 (H) 70 - 99 mg/dL Final     Urea Nitrogen   Date Value Ref Range Status   05/20/2023 11.1 8.0 - 23.0 mg/dL Final   05/17/2022 27 7 - 30 mg/dL Final   05/14/2021 21 7 - 30 mg/dL Final     Creatinine   Date Value Ref Range Status    05/20/2023 0.74 0.67 - 1.17 mg/dL Final   05/14/2021 0.94 0.66 - 1.25 mg/dL Final     GFR Estimate   Date Value Ref Range Status   05/20/2023 >90 >60 mL/min/1.73m2 Final     Comment:     eGFR calculated using 2021 CKD-EPI equation.   05/14/2021 79 >60 mL/min/[1.73_m2] Final     Comment:     Non  GFR Calc  Starting 12/18/2018, serum creatinine based estimated GFR (eGFR) will be   calculated using the Chronic Kidney Disease Epidemiology Collaboration   (CKD-EPI) equation.       Calcium   Date Value Ref Range Status   05/20/2023 8.3 (L) 8.8 - 10.2 mg/dL Final   05/14/2021 8.8 8.5 - 10.1 mg/dL Final     Lab Results   Component Value Date    WBC 10.5 05/15/2023    WBC 13.9 12/27/2012     Lab Results   Component Value Date    RBC 3.58 05/15/2023    RBC 5.15 12/27/2012     Lab Results   Component Value Date    HGB 8.9 05/15/2023    HGB 15.3 12/27/2012     Lab Results   Component Value Date    HCT 28.8 05/15/2023    HCT 43.2 12/27/2012     Lab Results   Component Value Date     05/15/2023     12/27/2012      No results found for: INR     General appearance: Patient is alert and fully cooperative with history & exam.  No sign of distress is noted during the visit.     Respiratory: Breathing is regular & unlabored while sitting.     HEENT: Hearing is intact to spoken word.  Speech is clear.  No gross evidence of visual impairment that would impact ambulation.      Dermatologic: Right heel: eschar with small blister present. Nondraning. Debrided site. Wound bed somewhat granular. No purulence or signs of infection. No probe to deep tissue. Hallux nail: quite long and dystrophic  Left sided s/p BKA      Vascular: Dorsalis pedis and posterior tibial pulses are weakly palpable to RLE.      Neurologic: Lower extremity sensation is diminished, bilateral foot, to light touch.  No evidence of neurological-based weakness or contracture in the lower extremities.       Musculoskeletal: Patient is  nonambulatory. S/p LLE BKA        Psychiatric: Affect is pleasant & appropriate.      All cultures:  No results for input(s): CULTURE in the last 168 hours.     IMAGING:   I personally reviewed the images and agree with the reports as stated.  -No signs of definitive osteomyelitis     XR  LEFT CALCANEUS: There is suspected soft tissue ulceration over the  left calcaneus posteriorly. There is some lucency within the superior,  posterior aspect of the left calcaneus on the lateral view suspicious  for osteomyelitis. This can be confirmed with MRI.     RIGHT CALCANEUS: No osseous erosion or periosteal reaction to suggest  Osteomyelitis.    MRI  IMPRESSION:  1.  No evidence for osteomyelitis, septic arthropathy, or abscess.  2.  Edema or cellulitis along the posterior aspect of the foot. There is apparent soft tissue irregularity and likely ulceration along the posterior aspect of the foot and there is susceptibly artifact suggestive of air tracking into the subcutaneous   soft tissues.  3.  No evidence for fracture.  4.  Mild reactive edema within the posteroinferior calcaneus.  5.  Mild Achilles tendinopathy without tearing.    VASCULAR STUDIES  FINDINGS:  Right MARKY:   PT: 1.09.  DP: 1.19     Left MARKY:   PT: 0.78.  DP: 1.1      Waveforms: Biphasic and triphasic in the right posterior tibial, right  dorsalis pedis and left dorsalis pedis. Monophasic in the left  posterior tibial.     IMPRESSION:  1. Peripheral arterial disease is confirmed with scattered areas of  atherosclerotic calcification, in some areas obscuring underlying  lumen.  2. Definite focal velocity elevation in the popliteal artery, though  suspect arterial insufficiency localized to the distal SFA/popliteal  arterial distribution. Uncertain if severe stenoses and/or occlusion.  3. Unable to demonstrate blood flow in the peroneal artery.  4. Dominant run off arteries anterior tibial artery, though waveform  is monophasic. Trickle of blood flow in the  distal posterior tibial  artery.                                                                   IMPRESSION:  1. Normal ABIs bilaterally.    PROCEDURE:   Verbal consent was obtained for debridement or right heel A 15 blade was used to excise the nonivable tissue to the ulcer, down to and including subcutaneous tissue. No noted purulence afterwards. Debrided site measures 3 cm x 3 cm x .2 cm. No probe to bone. Well tolerated. Site was cleansed with alcohol.     Debridement was also done of right hallux nail, using a pair of sterile scissors. Procedure well tolerated. Nail remains well adhered. No signs of infection.       ASSESSMENT:  Left heel ulcer, exposed posterior calcaneus s/p debridement on 5/9    Now s/p BKA   Right heel ulcer   Diabetes Mellitus --> Hba1c: 8.3  Peripheral Arterial Disease      MEDICAL DECISION MAKING:   -Discussed all findings with patient. Chart and imaging reviewed.   -Debridement done of plantar heel ulcer and right hallux nail. No signs of infection or rapidly worsening.   -Cont offloading at all times in prevalon boot. Cont wound care per WOC orders.   -Okay to discharge per podiatry's standpoint. Will sign off.       Claribel Marin DPM   [facility]  [phone]

## 2023-05-23 NOTE — PROGRESS NOTES
[facility]  WO Nurse Inpatient Wound Assessment     Reason for consultation: Evaluate and treat Rt lateral  calcaneous CAPI.  Surgery  Managing left BKA. Coccyx CAPI healed    Treatment Plan  Wound care: Rt lateral calcaneous and Coccyx  1. Dressing changes on odd days and prn  2. Coccyx: mepilex border   3. Rt heel: swab with betadine and cover with Mepilex 4x4  4. PIP:      -frequent repositioning in bed      -Pulsate      -Mobility as medically indicated. Pt can transfer with assist x 1.and belt      -incontinence protocol      -Kai risk      -dietician consult      -HOB below 30 degrees when not eating       -Heel suspension @ all times in bed    Orders In Epic  Recommended provider order: NA  WOC Nurse follow-up plan: weekly  Nursing to notify the Provider(s) and re-consult the WO Nurse if wound(s) deteriorates or new skin concern.    Patient History  According to provider note(s):    Roddy Sidhu is a 77 year old male transferred from [facility] with left heel wound with poor healing and concern for vascular compromise.  He has hx of DM2 with polyneuropathy; recent hospitalization at [facility] (3/29-4/4) for fall with R femur fx, R humerus fx, R clavicle fx s/p R femur ORIF who was d/c'ed to TCU. Presented from TCU to [facility] 5/4 with infected left heel ulcer with sepsis. Treated wtih broad IV abx. Wound debrided 5/9.  Abx changed to PO Augmentin 5/10. However, concern for PAD and LLE MARKY abnormal suggesting signif PAD. Vascular Surgery contacted and reviewed and recommended transfer for further vascular evaluation on 5/13/2023.      Diabetic left heel ulcer - non healing with concern for superimposed infection  Diabetic ulcer right heel.  S/p debridement of left pressure ulcer 5/9/2023  --Pressure injury to heels noted in TCU and seen by wound care 4/17/2023.  The patient recently was diagnosed with osteomyelitis based on an x-ray, but this is not supported by the MRI  performed this admission. ABIs done at podiatry's request and were normal.  -Cultures from wound positive for 2+ Proteus, 3+ Enterococcus, 1+ MSSA.   -Changed piperacillin-tazobactam to ampicillin-sulbactam 5/8 to cover Proteus and Enterococcus and MSSA. Stopped vancomycin 5/8 (5 days) given no MRSA. Underwent Debridement on 5/9. Changed ampicillin-sulbactam IV to amoxicillin-clavulanate oral 5/10.      --Vascular surgery following.  Plan for left lower extremity angiogram on Tuesday.N.p.o. after midnight.  Podiatry following.  Appreciate comanagement.  PTA on Augmentin, switch to IV Ancef [5/15]  Noted polymicrobial infection proteus, enterococcus and MSSA.  Rehab evaluation.     Hyperglycemia from uncontrolled diabetes  Uncontrolled diabetes mellitus with a hemoglobin A1c of 8.3.  Diabetic neuropathy.  Objective Data  Containment of urine/stool: brief with incontinence protocol    Active Diet Order:  Orders Placed This Encounter      Moderate Consistent Carb (60 g CHO per Meal) Diet    Output:   I/O last 3 completed shifts:  In: 1820 [P.O.:1820]  Out: 2075 [Urine:2075]    Risk Assessment:   Sensory Perception: 3-->slightly limited  Moisture: 4-->rarely moist  Activity: 1-->bedfast  Mobility: 3-->slightly limited  Nutrition: 3-->adequate  Friction and Shear: 2-->potential problem  Kai Score: 16                          Labs:   Recent Labs   Lab 05/22/23  0613 05/21/23  0536 05/20/23  0547   ALBUMIN  --   --  2.9*   HGB 8.5*   < > 8.0*   WBC 14.6*  --  15.3*    < > = values in this interval not displayed.       Physical Exam  Skin inspection: coccyx and Rt lateral calcaneous    #1Wound Location: Coccyx- healed 5/23  Date of [facility]OC[facility]: 5-23-23          Measurements (length x width x depth, in cm):  Intact epidermis, all pink blanchable  Tunneling: NA  Undermining: NA  Palpation of the wound bed: firm   Periwound skin: faint ecchymosis but all blanchable  Temperature: warm  Drainage: none  Odor: none  Pain:  denies    #2: Wound Care:  Rt lateral calcaneous  [facility] photo:  5-23-23    Wound: CAPI2, present on admission.   Wound description: 30% tan/purple epidermis to border, 70% dermis to center. Podiatry unroofed blister  Measurements (length x width x depth, in cm): 3cm x 2.8cm  x 0.1cm   Tunneling/Undermining: none  Palpation of the wound bed: normal  Periwound skin:intact no erythema  Temperature: warm  Drainage:none  Odor: none  Pain: denies, neuropathy    Interventions  Current support surface: pulsate  Visual inspection of wound(s) completed  Wound Care: completed per POC  Supplies: floor supply room and pt room  Education provided: discussed with patient  Discussed plan of care with Nursing    Sandra Teixeira RN, CWOCN

## 2023-05-24 ENCOUNTER — MEDICAL CORRESPONDENCE (OUTPATIENT)
Dept: HEALTH INFORMATION MANAGEMENT | Facility: CLINIC | Age: 78
End: 2023-05-24

## 2023-05-24 ENCOUNTER — APPOINTMENT (OUTPATIENT)
Dept: PHYSICAL THERAPY | Facility: CLINIC | Age: 78
DRG: 616 | End: 2023-05-24
Attending: INTERNAL MEDICINE
Payer: COMMERCIAL

## 2023-05-24 VITALS
BODY MASS INDEX: 17.36 KG/M2 | RESPIRATION RATE: 18 BRPM | TEMPERATURE: 97.4 F | DIASTOLIC BLOOD PRESSURE: 60 MMHG | WEIGHT: 119.27 LBS | SYSTOLIC BLOOD PRESSURE: 140 MMHG | HEART RATE: 60 BPM | OXYGEN SATURATION: 97 %

## 2023-05-24 LAB
ERYTHROCYTE [DISTWIDTH] IN BLOOD BY AUTOMATED COUNT: 20.1 % (ref 10–15)
GLUCOSE BLDC GLUCOMTR-MCNC: 152 MG/DL (ref 70–99)
GLUCOSE BLDC GLUCOMTR-MCNC: 186 MG/DL (ref 70–99)
GLUCOSE BLDC GLUCOMTR-MCNC: 202 MG/DL (ref 70–99)
HCT VFR BLD AUTO: 25.9 % (ref 40–53)
HGB BLD-MCNC: 8 G/DL (ref 13.3–17.7)
MCH RBC QN AUTO: 26.5 PG (ref 26.5–33)
MCHC RBC AUTO-ENTMCNC: 30.9 G/DL (ref 31.5–36.5)
MCV RBC AUTO: 86 FL (ref 78–100)
PATH REPORT.COMMENTS IMP SPEC: NORMAL
PATH REPORT.COMMENTS IMP SPEC: NORMAL
PATH REPORT.FINAL DX SPEC: NORMAL
PATH REPORT.GROSS SPEC: NORMAL
PATH REPORT.MICROSCOPIC SPEC OTHER STN: NORMAL
PATH REPORT.RELEVANT HX SPEC: NORMAL
PHOTO IMAGE: NORMAL
PLATELET # BLD AUTO: 481 10E3/UL (ref 150–450)
RBC # BLD AUTO: 3.02 10E6/UL (ref 4.4–5.9)
WBC # BLD AUTO: 9.4 10E3/UL (ref 4–11)

## 2023-05-24 PROCEDURE — 99406 BEHAV CHNG SMOKING 3-10 MIN: CPT

## 2023-05-24 PROCEDURE — 97116 GAIT TRAINING THERAPY: CPT | Mod: GP

## 2023-05-24 PROCEDURE — 99239 HOSP IP/OBS DSCHRG MGMT >30: CPT | Performed by: INTERNAL MEDICINE

## 2023-05-24 PROCEDURE — 250N000013 HC RX MED GY IP 250 OP 250 PS 637: Performed by: INTERNAL MEDICINE

## 2023-05-24 PROCEDURE — 250N000011 HC RX IP 250 OP 636: Performed by: STUDENT IN AN ORGANIZED HEALTH CARE EDUCATION/TRAINING PROGRAM

## 2023-05-24 PROCEDURE — 250N000013 HC RX MED GY IP 250 OP 250 PS 637: Performed by: HOSPITALIST

## 2023-05-24 PROCEDURE — 250N000013 HC RX MED GY IP 250 OP 250 PS 637: Performed by: STUDENT IN AN ORGANIZED HEALTH CARE EDUCATION/TRAINING PROGRAM

## 2023-05-24 PROCEDURE — 97530 THERAPEUTIC ACTIVITIES: CPT | Mod: GP

## 2023-05-24 PROCEDURE — 85027 COMPLETE CBC AUTOMATED: CPT | Performed by: INTERNAL MEDICINE

## 2023-05-24 PROCEDURE — 999N000040 HC STATISTIC CONSULT NO CHARGE VASC ACCESS

## 2023-05-24 RX ORDER — ACETAMINOPHEN 325 MG/1
650 TABLET ORAL EVERY 6 HOURS PRN
Qty: 100 TABLET | Refills: 0 | Status: SHIPPED | OUTPATIENT
Start: 2023-05-24 | End: 2023-05-29

## 2023-05-24 RX ORDER — POLYETHYLENE GLYCOL 3350 17 G/17G
17 POWDER, FOR SOLUTION ORAL DAILY
Qty: 510 G | Refills: 0 | Status: SHIPPED | OUTPATIENT
Start: 2023-05-24 | End: 2023-11-21

## 2023-05-24 RX ORDER — OXYCODONE HYDROCHLORIDE 5 MG/1
5 TABLET ORAL EVERY 6 HOURS PRN
Qty: 12 TABLET | Refills: 0 | Status: SHIPPED | OUTPATIENT
Start: 2023-05-24 | End: 2023-05-29

## 2023-05-24 RX ORDER — FERROUS GLUCONATE 324(38)MG
324 TABLET ORAL
Qty: 30 TABLET | Refills: 0 | Status: SHIPPED | OUTPATIENT
Start: 2023-05-24 | End: 2023-11-21

## 2023-05-24 RX ORDER — OXYCODONE HYDROCHLORIDE 5 MG/1
5 TABLET ORAL EVERY 4 HOURS PRN
Qty: 12 TABLET | Refills: 0 | Status: SHIPPED | OUTPATIENT
Start: 2023-05-24 | End: 2023-05-26

## 2023-05-24 RX ADMIN — FAMOTIDINE 20 MG: 20 TABLET ORAL at 07:40

## 2023-05-24 RX ADMIN — OXYCODONE HYDROCHLORIDE 5 MG: 5 TABLET ORAL at 16:21

## 2023-05-24 RX ADMIN — GABAPENTIN 300 MG: 300 CAPSULE ORAL at 15:32

## 2023-05-24 RX ADMIN — Medication 50 MCG: at 07:40

## 2023-05-24 RX ADMIN — METFORMIN HYDROCHLORIDE 500 MG: 500 TABLET ORAL at 12:44

## 2023-05-24 RX ADMIN — ACETAMINOPHEN 650 MG: 325 TABLET ORAL at 16:21

## 2023-05-24 RX ADMIN — ACETAMINOPHEN 650 MG: 325 TABLET ORAL at 07:40

## 2023-05-24 RX ADMIN — MULTIPLE VITAMINS W/ MINERALS TAB 1 TABLET: TAB at 07:40

## 2023-05-24 RX ADMIN — OXYCODONE HYDROCHLORIDE 5 MG: 5 TABLET ORAL at 07:40

## 2023-05-24 RX ADMIN — HEPARIN SODIUM 5000 UNITS: 5000 INJECTION, SOLUTION INTRAVENOUS; SUBCUTANEOUS at 12:47

## 2023-05-24 RX ADMIN — OXYCODONE HYDROCHLORIDE 5 MG: 5 TABLET ORAL at 11:50

## 2023-05-24 RX ADMIN — ASPIRIN 325 MG: 325 TABLET, COATED ORAL at 07:40

## 2023-05-24 RX ADMIN — SENNOSIDES AND DOCUSATE SODIUM 1 TABLET: 50; 8.6 TABLET ORAL at 07:40

## 2023-05-24 RX ADMIN — HEPARIN SODIUM 5000 UNITS: 5000 INJECTION, SOLUTION INTRAVENOUS; SUBCUTANEOUS at 06:04

## 2023-05-24 RX ADMIN — FERROUS GLUCONATE 324 MG: 324 TABLET ORAL at 07:40

## 2023-05-24 RX ADMIN — GABAPENTIN 300 MG: 300 CAPSULE ORAL at 07:39

## 2023-05-24 ASSESSMENT — ACTIVITIES OF DAILY LIVING (ADL)
ADLS_ACUITY_SCORE: 37
ADLS_ACUITY_SCORE: 45
ADLS_ACUITY_SCORE: 37
ADLS_ACUITY_SCORE: 45
ADLS_ACUITY_SCORE: 45
ADLS_ACUITY_SCORE: 37

## 2023-05-24 NOTE — PLAN OF CARE
POD 5 from a L BKA. A&O. CMS - pale BLE and baseline neuropathy to all extremities. Bowel sounds +x4, + flatus, tolerating mod CHO diet. Small soft, formed BM. VSS. Dressing changed per orders, incision WDL with janet incisional ecchymosis. R heel wound dressing CDI. Up with A2, GB, W pivot. C/o mild to moderate pain, decreased with oxycodone & tylenol. Blanchable red coccyx - PIP education provided, patient turns himself per report. Discharged to TCU, report given. Sent via [facility] WC with all belongings.

## 2023-05-24 NOTE — PROGRESS NOTES
[facility]  Hospitalist Progress Note   05/24/2023          Assessment and Plan:       Roddy Sidhu is a 77 year old male with history of diabetes mellitus with polyneuropathy transferred from [facility] on 5/13/2023 with poor healing left heel wound and concern for vascular compromise-requiring vascular surgery evaluation.    Patient admitted at [facility] (3/29-4/4) for fall with R femur fx, R humerus fx, R clavicle fx s/p R femur ORIF and discharged to TCU.     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  S/p debridement of left pressure ulcer 5/9/2023  Peripheral arterial disease.  --Pressure injury to heels noted in TCU and seen by wound care 4/17/2023.  Recent ABIs were normal. WBC 17.1.  .60.  Wound cultures from 5/5 positive for 2+ Proteus, 3+ Enterococcus, 1+ MSSA.    --Was on IV vancomycin 5/4 to 5/8.    Was on intravenous Zosyn (5/4), switched to Unasyn (5/8) to cover Proteus, Enterococcus and MSSA. Then switched to oral Augmentin [5/10- 5/14]  Continue IV Ancef [5/15].  CRP improving from 123.60  >25.6   --Vascular surgery following.  Underwent lower extremity angiogram 5/16,  Left patent iliac, SFA and AK pop. 50 % at knee pop stenosis, single vessel runoff via VERN.  -- Podiatry following, plan for left BKA 5/19.  Patient underwent surgery as planned on 5/19.  Vascular surgery following for postoperative cares.  Plan for dressing change on Monday.  --Infectious disease following.  Antibiotics discontinued following BKA per ID recommendations.  As needed Tylenol, oxycodone as needed for pain.  Was on aspirin 325 mg oral daily, hold on 5/19 for surgery.  Restarted postoperatively .  Continue simvastatin 40 mg oral daily.  -PT/OT.  Keep knee immobilizer in place, okay to remove for brief.  For comfort.    Acute on chronic anemia no active bleeding  S/p PRBC transfusion 5/5,  5/21.  Hemoglobin was normal 6 months ago, was 8's during recent admission, then 8.2 on 5/4 and dropped to 6.7-6.9 on 5/5, received 1 unit blood transfusion on 5/5.  --No black or bloody stools.  No abdominal pain.   No other apparent source of acute blood loss.   --Guiac negative. Iron sat index 5.  Received 2 doses of IV Venofer, started on oral iron supplements.  -Aspirin was on hold, restarted 5/14 -5/18.  Held 5/19 for amputation surgery, restarted postoperatively.  Hb 8 on 5/20. Down to 6.6 on 5/21, transfused additional PRBC.  Discontinued Celebrex [started this hospitalization], naproxen [PTA meds]  No active bleeding at this time.    -Currently on famotidine for GI prophylaxis in setting of acute anemia and being on ASA  Hemoglobin stable currently at 8.0    Hypoglycemia 5/19 while NPO  Hyperglycemia from uncontrolled diabetes  Uncontrolled diabetes mellitus with a hemoglobin A1c of 8.3.  Diabetic neuropathy.  PTA on 10 units insulin Lantus every morning.  This hospital stay noted elevated blood sugars, optimized to insulin Lantus 14 units twice daily.  Insulin Lantus on hold on 5/19 AM for surgery while NPO.  Noted hypoglycemia with blood sugars of 64 on 5/19.  Hypoglycemia protocol.  Also received IVF with dextrose for this.  Continue insulin Lantus to 14 units twice daily after surgery.    Reduced Lantus to 14 units every morning only due to hypoglycemia on twice daily dosing.  Also added prandial aspart 1 unit per 10 g carbs 3 times daily AC.  Continue sliding scale insulin.  Continue PTA med Neurontin.  Hold PTA metformin and Trulicity.  Restart metformin today  -Changed diet to moderate consistent carbohydrate diet      Severe malnutrition:  Nutrition services following   Supplements ordered    Hyperlipidemia.  Continue PTA simvastatin.    Physical deconditioning in the setting of ongoing medical illness, senile frailty.  Status post recent fall.  Closed displaced subtrochanteric fracture of right  femur 3/29  S/p ORIF right femur fracture with intermedullary nail 3/29     Closed fracture of proximal end of right humerus 3/29   Closed nondisplaced fracture of right clavicle 3/29   Was at TCU prior to transfer  Continue PT/OT, likely TCU on discharge.  Patient and family in agreement with TCU.  Care Management assistance with transition.  Patient tells me that he plans on discharging home if TCU not available soon.  He lives with his daughter and wife.    Hypoalbuminemia likely dilutional, acute illness.  Serum albumin 2.6.   Dietary supplements with Ensure.  Monitor periodically.    Thrombocytosis likely reactive in the setting of infection.  Platelet count 613 > 520  Monitor periodically.     Tobacco dependence  Daily pipe smokier, Smoking cessation emphasized  Nicotine patch     Orders Placed This Encounter      Moderate Consistent Carb (60 g CHO per Meal) Diet      DVT Prophylaxis: SCDs, ambulate, sc heparin.  Code Status: Full Code  Disposition: Expected discharge TCU when bed is available    Discussed with bedside RN and patient today    >35 minutes spent by me on the date of service doing chart review, history, exam, documentation & further activities per the note.      Bonnie Chan MD        Interval History:        Stable overnight.  Resting comfortably in bed.  Denies any pain currently.  Denies any new complaints today.  No other acute events in the last 24 hours         Physical Exam:        Physical Exam   Temp:  [97.8  F (36.6  C)-98.2  F (36.8  C)] 97.8  F (36.6  C)  Pulse:  [62-65] 64  Resp:  [16-18] 18  BP: ()/(61-70) 123/70  SpO2:  [98 %-100 %] 98 %    Intake/Output Summary (Last 24 hours) at 5/15/2023 1439  Last data filed at 5/15/2023 1352  Gross per 24 hour   Intake 1760 ml   Output 1250 ml   Net 510 ml       Admission Weight: 59.4 kg (130 lb 15.3 oz)  Current Weight: 59.4 kg (130 lb 15.3 oz)    PHYSICAL EXAM  GENERAL: Patient is in no distress. Alert and oriented.  HEART: Regular  rate and rhythm. S1S2. No murmurs  LUNGS:Respirations unlabored  NEURO: Moving all extremities.  EXTREMITIES: Noted left BKA stump with dressing.  Right heel wound with dressing in place.  Rookie boots in place  PSYCHIATRY Cooperative       Medications:          aspirin  325 mg Oral Daily     famotidine  20 mg Oral BID    Or     famotidine  20 mg Intravenous BID     ferrous gluconate  324 mg Oral Daily with breakfast     gabapentin  300 mg Oral TID     heparin ANTICOAGULANT  5,000 Units Subcutaneous Q8H     insulin aspart   Subcutaneous TID AC     insulin aspart  1-7 Units Subcutaneous TID AC     insulin aspart  1-5 Units Subcutaneous At Bedtime     insulin glargine  14 Units Subcutaneous QAM AC     latanoprost  1 drop Both Eyes At Bedtime     multivitamin w/minerals  1 tablet Oral Daily     polyethylene glycol  17 g Oral Daily     senna-docusate  1 tablet Oral BID     simvastatin  40 mg Oral At Bedtime     sodium chloride (PF)  10-40 mL Intracatheter Q7 Days     sodium chloride (PF)  3 mL Intracatheter Q8H     vitamin D3  50 mcg Oral Daily     acetaminophen, bisacodyl, glucose **OR** dextrose **OR** glucagon, HYDROmorphone **OR** HYDROmorphone, lidocaine 4%, lidocaine (buffered or not buffered), magnesium hydroxide, melatonin, naloxone **OR** naloxone **OR** naloxone **OR** naloxone, ondansetron **OR** ondansetron, oxyCODONE **OR** oxyCODONE, prochlorperazine **OR** prochlorperazine, sodium chloride (PF), sodium chloride (PF), sodium chloride (PF), sodium chloride (PF)         Data:      All new lab and imaging data was reviewed.

## 2023-05-24 NOTE — PLAN OF CARE
[id number]: POD 4/5 from a L BKA. A&O. CMS - pale BLE and baseline neuropathy to all extremities. Bowel sounds +x4, + flatus, tolerating mod CHO diet. VSS. Dressing CDI. R heel wound dressing CDI-Rooke boot worn on R foot. Immobilizer on L-BKA. Up with A2, GB, W pivot - in bed this shift. Denied pain this shift .Blanchable red coccyx - PIP education provided, patient turns himself per report. Discharge to TCU pending.

## 2023-05-24 NOTE — PLAN OF CARE
Physical Therapy Discharge Summary    Reason for therapy discharge:    Discharged to transitional care facility.    Progress towards therapy goal(s). See goals on Care Plan in [facility] electronic health record for goal details.  Goals partially met.  Barriers to achieving goals:   discharge from facility.    Therapy recommendation(s):    Continued therapy is recommended.  Rationale/Recommendations:  To further increase independence and mobility.

## 2023-05-24 NOTE — PROGRESS NOTES
Care Management Follow Up    Length of Stay (days): 11    Expected Discharge Date: 05/26/2023     Concerns to be Addressed:       Patient plan of care discussed at interdisciplinary rounds: Yes    Anticipated Discharge Disposition: Transitional Care  Disposition Comments: TCU  Anticipated Discharge Services: Transportation Services  Anticipated Discharge DME: None    Patient/family educated on Medicare website which has current facility and service quality ratings:    Education Provided on the Discharge Plan: Yes  Patient/Family in Agreement with the Plan: yes    Referrals Placed by CM/SW:    Private pay costs discussed: Not applicable    Additional Information:  SW spoke with Basilia who reports pt's referral is under review with [facility]. SW will wait to hear back from Basilia. Pt will need a prior auth before discharge.       GORDON Rodriguez

## 2023-05-24 NOTE — PROGRESS NOTES
Care Management Discharge Note    Discharge Date: 05/24/2023       Discharge Disposition: Transitional Care    Discharge Services: Transportation Services    Discharge DME: None    Discharge Transportation: family or friend will provide    Private pay costs discussed: transportation costs    Does the patient's insurance plan have a 3 day qualifying hospital stay waiver?  No    PAS Confirmation Code:  [id number]  Patient/family educated on Medicare website which has current facility and service quality ratings:      Education Provided on the Discharge Plan: Yes  Persons Notified of Discharge Plans: pt, HUC, bedside RN, Basilia (facility liaison)  Patient/Family in Agreement with the Plan: yes    Handoff Referral Completed: Yes    Additional Information:  Pt to discharge to [facility] today via  wheelchair transport. Pt has a service window of 4:40-5:25 pm. SW updated pt and faxed over the discharge orders. SW waiting on script. PAS completed, faxed, and placed on chart.     PAS-RR    D: Per DHS regulation, SW completed and submitted PAS-RR to [place] [employer] Direct Connect via the Senior [employer] Line.  PAS-RR confirmation # is : [id number]    I: SW spoke with pt and they are aware a PAS-RR has been submitted.  SW reviewed with pt that they may be contacted for a follow up appointment within 10 days of hospital discharge if their SNF stay is < 30 days.  Contact information for [employer] Line was also provided.    A: pt verbalized understanding.    P: Further questions may be directed to Senior [employer] Line at #[phone], option #4 for PAS-RR staff.          GORDON Rodriguez

## 2023-05-24 NOTE — DISCHARGE INSTRUCTIONS
Wound care: LLE BKA incision - Xeroform, followed by kerlix, followed by Ace wrapping to BKA stump, change every other day on even days or as needed    Sutures are dissolvable - do not need to be removed    Knee immobilizer on at all times    Wound care: Rt lateral calcaneous and Coccyx   1. Dressing changes on odd days and prn   2. Coccyx: mepilex border   3. Rt heel: swab with betadine and cover with Mepilex 4x4   4. PIP:       -frequent repositioning in bed       -Pulsate mattress - low air loss      -Mobility as medically indicated. Pt can transfer with assist x 1.and belt       -incontinence protocol       -Kai risk       -dietician consult       -HOB below 30 degrees when not eating       -Heel suspension @ all times in bed - rook boot

## 2023-05-25 ENCOUNTER — TRANSITIONAL CARE UNIT VISIT (OUTPATIENT)
Dept: GERIATRICS | Facility: CLINIC | Age: 78
End: 2023-05-25
Payer: COMMERCIAL

## 2023-05-25 ENCOUNTER — PATIENT OUTREACH (OUTPATIENT)
Dept: CARE COORDINATION | Facility: CLINIC | Age: 78
End: 2023-05-25

## 2023-05-25 VITALS
RESPIRATION RATE: 18 BRPM | DIASTOLIC BLOOD PRESSURE: 84 MMHG | TEMPERATURE: 97.1 F | SYSTOLIC BLOOD PRESSURE: 154 MMHG | HEART RATE: 66 BPM | BODY MASS INDEX: 19.21 KG/M2 | OXYGEN SATURATION: 99 % | WEIGHT: 132 LBS

## 2023-05-25 DIAGNOSIS — D62 ANEMIA DUE TO BLOOD LOSS, ACUTE: ICD-10-CM

## 2023-05-25 DIAGNOSIS — Z79.4 TYPE 2 DIABETES MELLITUS WITH DIABETIC POLYNEUROPATHY, WITH LONG-TERM CURRENT USE OF INSULIN (H): ICD-10-CM

## 2023-05-25 DIAGNOSIS — E11.42 TYPE 2 DIABETES MELLITUS WITH DIABETIC POLYNEUROPATHY, WITH LONG-TERM CURRENT USE OF INSULIN (H): ICD-10-CM

## 2023-05-25 DIAGNOSIS — I73.9 PERIPHERAL ARTERY DISEASE (H): ICD-10-CM

## 2023-05-25 DIAGNOSIS — Z13.6 SCREENING FOR HYPERTENSION: ICD-10-CM

## 2023-05-25 DIAGNOSIS — Z47.89 AFTERCARE FOLLOWING SURGERY OF THE MUSCULOSKELETAL SYSTEM: ICD-10-CM

## 2023-05-25 DIAGNOSIS — Z89.512 S/P BKA (BELOW KNEE AMPUTATION) UNILATERAL, LEFT (H): Primary | ICD-10-CM

## 2023-05-25 PROCEDURE — 99309 SBSQ NF CARE MODERATE MDM 30: CPT | Performed by: NURSE PRACTITIONER

## 2023-05-25 NOTE — LETTER
5/25/2023        RE: Roddy Sidhu  [address] [address]        [facility] Admission  PCP & CLINIC: JOSH Becker CNP, [address] [facility] [address]  Chief Complaint   Patient presents with     Hospital F/U   [facility]ria MRN: [id number]. Place of Service where encounter took place:  [facility] (TCU) [[id number]] Roddy Sidhu  is a 77 year old  (1945), admitted to the above facility from  [facility]. Hospital stay 5/13 through 5/24. Admitted to this facility for  rehab, medical management, and nursing care. HPI information obtained from: facility chart records, facility staff, patient report, [facility] chart review, and Care Everywhere [facility] chart review.     Brief Summary of Hospital Course: Marvin presented to [facility] on 5/13 w/ worsening left heel infection. He was already s/p debridement from Dr. Flores on 5/9 and was recovering in TCU. He ultimately underwent left BKA on 5/19 and completed abx per ID. He is on post-op Aspirin @ 325mg and was started on PPI ppx.     Updates since admission to transitional care unit: Marvin presented to TCU on 5/24 s/p the above hospitalization (and several other stays via chart review). Today, Marvin says he is feeling pretty good.  He did not get a lot of sleep in the hospital, and has only had 1 night at the facility, so he is hoping that his sleep habits improved.  He denies any significant pain, but says that it can flareup in the left leg from time to time.  It has not hindered his ability to rehab, though he has just started.  He has baseline numbness and tingling.  He denies headaches or dizziness, does have a chronic cough, denies shortness of breath or fever.  He denies chest pain or palpitations.  He thinks his appetite is pretty good, and he has not had any nausea.  He was constipated in the hospital, but says that this is better now.  He says one of his issues is diabetes, and he  wants to make sure that stays under control.  He knows that he had a little bit of blood loss and wants to make sure this is okay too.    CODE STATUS/ADVANCE DIRECTIVES DISCUSSION: CPR/Full code. Patient's living condition: lives with spouse. ALLERGIES: Patient has no known allergies. PAST MEDICAL HISTORY:  has no past medical history on file.. PAST SURGICAL HISTORY:   has a past surgical history that includes surgical history of -  (1990); Incision and drainage foot, combined (Left, 5/9/2023); IR Lower Extremity Angiogram Left (5/16/2023); and Amputate leg below knee (Left, 5/19/2023).. FAMILY HISTORY: family history includes Breast Cancer (age of onset: 60) in his mother; C.A.D. (age of onset: 58) in his brother.. SOCIAL HISTORY:   reports that he has been smoking pipe. He has never used smokeless tobacco. He reports that he does not currently use alcohol. He reports that he does not use drugs.  Post Discharge Medication Reconciliation Status: discharge medications reconciled and changed, per note/orders.  Current Outpatient Medications   Medication Sig Dispense Refill     metFORMIN (GLUCOPHAGE) 500 MG tablet Take 1.5 tablets (750 mg) by mouth 2 times daily (with meals) 180 tablet 1     acetaminophen (TYLENOL) 325 MG tablet Take 2 tablets (650 mg) by mouth every 6 hours as needed for mild pain 100 tablet 0     aspirin (ASA) 325 MG EC tablet Take 325 mg by mouth daily       dulaglutide (TRULICITY) 0.75 MG/0.5ML pen Inject 0.75 mg Subcutaneous every 7 days 2 mL 1     ferrous gluconate (FERGON) 324 (38 Fe) MG tablet Take 1 tablet (324 mg) by mouth daily (with breakfast) 30 tablet 0     gabapentin (NEURONTIN) 300 MG capsule Take 1 capsule (300 mg) by mouth 3 times daily 270 capsule 3     insulin glargine (LANTUS PEN) 100 UNIT/ML pen Inject 10 Units Subcutaneous every morning 15 mL 1     latanoprost (XALATAN) 0.005 % ophthalmic solution Place 1 drop into both eyes At Bedtime       oxyCODONE (ROXICODONE) 5 MG tablet  Take 1 tablet (5 mg) by mouth every 6 hours as needed for pain 12 tablet 0     polyethylene glycol (MIRALAX) 17 GM/Dose powder Take 17 g by mouth daily 510 g 0     simvastatin (ZOCOR) 40 MG tablet TAKE ONE TABLET BY MOUTH EVERY NIGHT AT BEDTIME 90 tablet 3     vitamin D3 (CHOLECALCIFEROL) 50 mcg (2000 units) tablet Take 1 tablet by mouth daily 30 capsule      ROS: 10 point ROS of systems including Constitutional, Eyes, Respiratory, Cardiovascular, Gastroenterology, Genitourinary, Integumentary, Musculoskeletal, Psychiatric were all negative except for pertinent positives noted in my HPI.    Vitals: BP (!) 154/84   Pulse 66   Temp 97.1  F (36.2  C)   Resp 18   Wt 59.9 kg (132 lb)   SpO2 99%   BMI 19.21 kg/m     BGs:    Exam:  GENERAL APPEARANCE: Alert, in no distress, cooperative.   ENT: Mouth/posterior oropharynx intact w/ moist mucous membranes, hearing acuity [place].  EYES: EOM, conjunctivae, lids, pupils and irises normal, PERRL2.   RESP: Respiratory effort good, no respiratory distress, Lung sounds clear/diminished. On RA.   CV: Auscultation of heart reveals S1, S2, rate and rhythm regular, no murmur, no rub or gallop, Edema 0+ BLE (LLE is in immobilizer).  ABDOMEN: Normal bowel sounds, soft, non-tender abdomen, and no masses palpated.  SKIN: Inspection/Palpation of skin and subcutaneous tissue baseline w/ fragility. No wounds/rashes noted, incision is covered.  NEURO: CN II-XII at patient's baseline, sensation baseline PPS.  PSYCH: Insight, judgement, and memory are forgetful at baseline, affect and mood are happy/engaged.    Lab/Diagnostic data: Recent labs in [facility] reviewed by me today.     ASSESSMENT/PLAN:  S/P BKA (below knee amputation) unilateral, left (H)  Type 2 diabetes mellitus with diabetic polyneuropathy, with long-term current use of insulin (H)  Anemia due to blood loss, acute  Peripheral artery disease (H)  Aftercare following surgery of the musculoskeletal system  Screening for  hypertension  Acute on chronic. Complex/Tenuous.     Noting use of Trulicity, Lantus, and metformin.  Metformin is not optimized.  Will increase slightly as noted below, and trend blood glucose readings.  If readings are optimized, may be able to discontinue Lantus in the future.    Unclear reasoning for 4 times daily Accu-Cheks when patient is not on any short acting insulin or medications which would predispose him to hypoglycemia.  We will therefore reduce Accu-Cheks to twice daily.    Noting anemia from blood loss and surgery, loss of limb, and dip in hemoglobin which is to be expected.  Patient also was started on high-dose aspirin.  Will therefore trend both CBC and BMP.  These were recently checked inpatient, and therefore can push out several days.    Patient denies any tobacco cravings, but did smoke up until recently.  Also up until recently, did not have much routine maintenance for health.  Noting some initial hypertension which is variable, and we will monitor this.    Immobilizer in place and patient tolerating status post left BKA.  He is scheduled to follow-up with vascular surgery next month/in 6 weeks on 7/10.  Follow up w/in 1 week or as needed.    Orders:  1. Increase Metformin to 750mg PO BID. Dx: DM II.  2. Decrease accu-checks to BID. Dx: DM II.  3. CBC, BMP x1 on 5/31. Dx: anemia.     Electronically signed by:  JOSH Garcia CNP DNP                          Sincerely,        JOSH Sands CNP

## 2023-05-25 NOTE — DISCHARGE SUMMARY
[facility]  Discharge Summary        Roddy Sidhu MRN# [id number]   YOB: 1945 Age: 77 year old     Date of Admission:  5/13/2023  Date of Discharge:  5/24/2023  Admitting Physician:  Osvaldo Scanlon MD  Discharge Physician: Bonnie Chan MD  Discharging Service: Hospitalist     Primary Provider:Sandra Low  Primary Care Physician Phone Number: [phone]         Discharge Diagnoses/Problem Oriented Hospital Course (Providers):    Roddy Sidhu was admitted on 5/13/2023 by Osvaldo Scanlon MD and I would refer you to their history and physical.  The following problems were addressed during his hospitalization:    Roddy Sidhu is a 77 year old male with history of diabetes mellitus with polyneuropathy transferred from [facility] on 5/13/2023 with poor healing left heel wound and concern for vascular compromise-requiring vascular surgery evaluation.     Patient admitted at [facility] (3/29-4/4) for fall with R femur fx, R humerus fx, R clavicle fx s/p R femur ORIF and discharged to TCU.      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  S/p debridement of left pressure ulcer 5/9/2023  Peripheral arterial disease.  --Pressure injury to heels noted in TCU and seen by wound care 4/17/2023.  Recent ABIs were normal. WBC 17.1.  .60.  Wound cultures from 5/5 positive for 2+ Proteus, 3+ Enterococcus, 1+ MSSA.    --Was on IV vancomycin 5/4 to 5/8.    Was on intravenous Zosyn (5/4), switched to Unasyn (5/8) to cover Proteus, Enterococcus and MSSA. Then switched to oral Augmentin [5/10- 5/14]  Continue IV Ancef [5/15].  CRP improving from 123.60  >25.6   --Vascular surgery following.  Underwent lower extremity angiogram 5/16,  Left patent iliac, SFA and AK pop. 50 % at knee pop stenosis, single vessel runoff via VERN.  -- Podiatry following, plan for left BKA 5/19.   Patient underwent surgery as planned on 5/19.  Vascular surgery following for postoperative cares.  Plan for dressing change on Monday.  --Infectious disease following.  Antibiotics discontinued following BKA per ID recommendations.  As needed Tylenol, oxycodone as needed for pain.  Was on aspirin 325 mg oral daily, hold on 5/19 for surgery.  Restarted postoperatively .  Continue simvastatin 40 mg oral daily.  -PT/OT.  Keep knee immobilizer in place, okay to remove for brief.  For comfort.     Acute on chronic anemia no active bleeding  S/p PRBC transfusion 5/5, 5/21.  Hemoglobin was normal 6 months ago, was 8's during recent admission, then 8.2 on 5/4 and dropped to 6.7-6.9 on 5/5, received 1 unit blood transfusion on 5/5.  --No black or bloody stools.  No abdominal pain.   No other apparent source of acute blood loss.   --Guiac negative. Iron sat index 5.  Received 2 doses of IV Venofer, started on oral iron supplements.  -Aspirin was on hold, restarted 5/14 -5/18.  Held 5/19 for amputation surgery, restarted postoperatively.  Hb 8 on 5/20. Down to 6.6 on 5/21, transfused additional PRBC.  Discontinued Celebrex [started this hospitalization], naproxen [PTA meds]  No active bleeding at this time.    Hemoglobin stable at 8 currently     Hypoglycemia 5/19 while NPO  Hyperglycemia from uncontrolled diabetes  Uncontrolled diabetes mellitus with a hemoglobin A1c of 8.3.  Diabetic neuropathy.  PTA on 10 units insulin Lantus every morning.  This hospital stay noted elevated blood sugars, optimized to insulin Lantus 14 units twice daily.  Insulin Lantus on hold on 5/19 AM for surgery while NPO.  Noted hypoglycemia with blood sugars of 64 on 5/19.  Hypoglycemia protocol.  Also received IVF with dextrose for this.  Continue insulin Lantus to 14 units twice daily after surgery.    Reduced Lantus to 14 units every morning only due to hypoglycemia on twice daily dosing.  Also added prandial aspart 1 unit per 10 g carbs 3 times  daily AC.  Continue sliding scale insulin.  Continue PTA med Neurontin.  Hold PTA metformin and Trulicity.  Restart metformin today  -Changed diet to moderate consistent carbohydrate diet    PTA regimen restarted on discharge        Severe malnutrition:  Nutrition services following   Supplements ordered     Hyperlipidemia.  Continue PTA simvastatin.     Physical deconditioning in the setting of ongoing medical illness, senile frailty.  Status post recent fall.  Closed displaced subtrochanteric fracture of right femur 3/29  S/p ORIF right femur fracture with intermedullary nail 3/29     Closed fracture of proximal end of right humerus 3/29   Closed nondisplaced fracture of right clavicle 3/29   Was at TCU prior to transfer  Continue PT/OT, likely TCU on discharge.  Patient and family in agreement with TCU.  Care Management assistance with transition.  Patient tells me that he plans on discharging home if TCU not available soon.  He lives with his daughter and wife.     Hypoalbuminemia likely dilutional, acute illness.  Serum albumin 2.6.   Dietary supplements with Ensure.  Monitor periodically.     Thrombocytosis likely reactive in the setting of infection.  Platelet count 613 > 520  Monitor periodically.     Tobacco dependence  Daily pipe smokier, Smoking cessation emphasized  Nicotine patch     Orders Placed This Encounter      Moderate Consistent Carb (60 g CHO per Meal) Diet      DVT Prophylaxis: SCDs, ambulate, sc heparin.  Code Status: Full Code  Disposition:  Discharged  to TCU in stable condition     Discussed with bedside RN and patient today     >35 minutes spent by me on the date of service doing chart review, history, exam, documentation & further activities per the note.       Bonnie Chan MD         Code Status:      Full Code         Important Results:      Please see below         Pending Results:        Unresulted Labs Ordered in the Past 30 Days of this Admission     Date and Time Order Name  Status Description    5/19/2023  1:30 PM Prepare red blood cells (unit) Preliminary                Discharge Instructions and Follow-Up:      Follow-up Appointments     Follow Up and recommended labs and tests      F/u NH physician in 1week, recheck CBC, BMP in 1week                  Discharge Disposition:      Discharged to short-term care facility         Discharge Medications:        Discharge Medication List as of 5/24/2023  4:23 PM      START taking these medications    Details   acetaminophen (TYLENOL) 325 MG tablet Take 2 tablets (650 mg) by mouth every 6 hours as needed for mild pain, Disp-100 tablet, R-0, E-Prescribe      ferrous gluconate (FERGON) 324 (38 Fe) MG tablet Take 1 tablet (324 mg) by mouth daily (with breakfast), Disp-30 tablet, R-0, E-Prescribe      !! oxyCODONE (ROXICODONE) 5 MG tablet Take 1 tablet (5 mg) by mouth every 6 hours as needed for pain, Disp-12 tablet, R-0, E-Prescribe      !! oxyCODONE (ROXICODONE) 5 MG tablet Take 1 tablet (5 mg) by mouth every 4 hours as needed for moderate pain, Disp-12 tablet, R-0, Local Print      polyethylene glycol (MIRALAX) 17 GM/Dose powder Take 17 g by mouth daily, Disp-510 g, R-0, E-Prescribe       !! - Potential duplicate medications found. Please discuss with provider.      CONTINUE these medications which have NOT CHANGED    Details   aspirin (ASA) 325 MG EC tablet Take 325 mg by mouth daily, Historical      blood glucose (NO BRAND SPECIFIED) lancets standard Use to test blood sugar 1 times daily or as directed.Disp-100 each, [id number]      blood glucose (NO BRAND SPECIFIED) test strip Use to test blood sugar 4 times daily or as directed., Disp-100 strip, R-1, E-Prescribe      blood glucose calibration (NO BRAND SPECIFIED) solution Use to calibrate blood glucose monitor as needed as directed.Disp-1 each, [id number]      blood glucose monitoring (NO BRAND SPECIFIED) meter device kit Use to test blood sugar 1 times daily or as directed.Disp-1  kit, [id number]      dulaglutide (TRULICITY) 0.75 MG/0.5ML pen Inject 0.75 mg Subcutaneous every 7 days, Disp-2 mL, R-1, E-PrescribeDiscontinue Humalog      gabapentin (NEURONTIN) 300 MG capsule Take 1 capsule (300 mg) by mouth 3 times daily, Disp-270 capsule, R-3, E-PrescribeHOLD ON FILE until patient requests or is due for refill.      insulin glargine (LANTUS PEN) 100 UNIT/ML pen Inject 10 Units Subcutaneous every morning, Disp-15 mL, R-1, E-PrescribeIf Lantus is not covered by insurance, may substitute Basaglar or Semglee or other insulin glargine product per insurance preference at same dose and frequency.        insulin pen needle (B-D U/F) 31G X 5 MM miscellaneous Use 4 pen needles daily or as directed.Disp-400 each, R-3E-PrescribeHOLD ON FILE until patient requests or is due for refill.      latanoprost (XALATAN) 0.005 % ophthalmic solution Place 1 drop into both eyes At Bedtime, Historical      metFORMIN (GLUCOPHAGE) 500 MG tablet Take 1 tablet (500 mg) by mouth 2 times daily (with meals), Disp-180 tablet, R-1, E-PrescribeHOLD ON FILE until patient requests or is due for refill.      simvastatin (ZOCOR) 40 MG tablet TAKE ONE TABLET BY MOUTH EVERY NIGHT AT BEDTIME, Disp-90 tablet, R-3, E-PrescribeHOLD ON FILE until patient requests or is due for refill.      vitamin D3 (CHOLECALCIFEROL) 50 mcg (2000 units) tablet Take 1 tablet by mouth daily, Disp-30 capsule, Historical         STOP taking these medications       ciprofloxacin (CIPRO) 500 MG tablet Comments:   Reason for Stopping:         naproxen sodium (ANAPROX) 220 MG tablet Comments:   Reason for Stopping:                    Allergies:       No Known Allergies         Consultations This Hospital Stay:      Consultation during this admission received from vascular surgery, podiatry, infectious disease, PT OT, care coordinator and          Condition and Physical Exam on Discharge:      Discharge condition: Stable   Discharge vitals: Blood  pressure (!) 140/60, pulse 60, temperature 97.4  F (36.3  C), temperature source Oral, resp. rate 18, weight 54.1 kg (119 lb 4.3 oz), SpO2 97 %.   Stable on discharge             Discharge Orders for Skilled Facility (from Discharge Orders):        After Care Instructions     Activity - Up with nursing assistance      Diet      Follow this diet upon discharge Moderate carb controlled         General info for SNF      Length of Stay Estimate: Short Term Care: Estimated # of Days 31-90  Condition at Discharge: Stable  Level of care:skilled   Rehabilitation Potential: Good  Admission H&P remains valid and up-to-date: Yes  Recent Chemotherapy: N/A  Use Nursing Home Standing Orders: Yes         Glucose monitor nursing POCT      Before meals and at bedtime         Mantoux instructions      Give two-step Mantoux (PPD) Per Facility Policy Yes                    Rehab orders for Skilled Facility (from Discharge Orders):      Referrals     Future Labs/Procedures    Primary Care - Care Coordination Referral     Process Instructions:    Services are provided by a Care Coordinator for people with complex needs   such as: medical, social, or financial troubles.  The Care Coordinator   works with the patient and their Primary Care Provider to determine health   goals, obtain resources, achieve outcomes, and develop care plans that   help coordinate the patient's care.     Comments:         Occupational Therapy Adult Consult     Comments:    Evaluate and treat as clinically indicated.    Reason:  S/p L BKA    Physical Therapy Adult Consult     Comments:    Evaluate and treat as clinically indicated.    Reason:  S/p L BKA             Discharge Time:      Greater than 30 minutes.        Image Results From This Hospital Stay (For Non-EPIC Providers):        Results for orders placed or performed during the hospital encounter of 05/13/23   IR Lower Extremity Angiogram Left    Narrative    Preprocedure diagnosis: Left heel nonhealing  ulcer, Travis grade 6  disease.    Postprocedure diagnosis: Same.    Procedure:  1.  Ultrasound-guided right common femoral artery access placement  with aortoiliac arteriogram.  2.  Selective catheterization of left common iliac, external iliac,  common femoral, superficial femoral and popliteal arteries.  3. Left lower extremity arteriogram with runoff.  4.  Right common femoral arteriogram with right common femoral artery  access site closure with 6 Greenlandic Angio-Seal device.    Surgeon: Raimundo Weinstein M.D.   Assistant: Tricia Cherry M.D.     Sedation: Patient received an 1 mg of intravenous Versed and 50 mcg of  intravenous fentanyl.  Medication was administered by registered nurse  in the department of interventional radiology under my direct  supervision.  Patient was continuously monitored.  Sedation time: 25 minutes.  Fluoroscopy time: 4.9 minutes.  Fluoroscopy dose: 31.97 mGy.   Contrast: 26 mL. Local anesthetic: 10 mL of 1% lidocaine was used.    Indication for procedure: This is a 77-year-old male who had developed  a pressure ulcer in his left heel following immobilization due to hip  fracture.  Arteriogram was advised to evaluate for any potential for  revascularization and to see if the limb is salvageable.    Procedure details: Patient was identified and then taken to the  radiology suite and placed in supine position.  Both the groins were  prepped and a sterile surgical field was created.  Preprocedure  timeout was conducted.  The right common femoral artery was located  anterior to the right femoral head by way of fluoroscopy.  It was  further localized with ultrasonography.  Local anesthetic was  administered.  Images were stored.  The right common femoral artery  was accessed with a micropuncture needle and retrograde fashion  followed by placement of a microwire.  This was then converted to a  0.035 inch wire system and a short 5 Greenlandic sheath was placed.  An  Omni Flush catheter was  advanced into the distal aorta and aortoiliac  arteriogram was performed which showed that the distal aorta, both  common iliac arteries, both hypogastric arteries and both external  iliac arteries were widely patent.  Wire and catheter were then  advanced into the distal left external iliac artery.  Arteriogram  showed that the distal left external iliac artery, common femoral  artery, superficial femoral artery and deep femoral arteries were  patent.  Wire was advanced into the proximal superficial femoral  artery.  We then converted over to a Navicross catheter.  Arteriogram  showed that the superficial femoral artery was patent.  Wire and  catheter were then advanced into the second segment of the popliteal  artery.  This showed that the second segment of popliteal artery had  tandem stenoses over a short segment.  But it was still patent.  The  anterior tibial artery was patent down to the foot.  The tibioperoneal  trunk was a very diseased vessel.  Posterior tibial and peroneal  arteries were occluded without any reconstitution.  There was no  direct inflow or revascularization targets at the level of the  angiosomes of the open wound at the calcaneus.     Procedure was concluded as there were no targets for  revascularization.      Right common femoral arteriogram was performed and the access site was  closed with a 6 Estonian Angio-Seal device.    BETHANY HAILE MD         SYSTEM ID:  [id number]             Most Recent Lab Results In EPIC (For Non-EPIC Providers):    Most Recent 3 CBC's:  Recent Labs   Lab Test 05/24/23  0608 05/22/23  0613 05/21/23  0536 05/20/23  0547   WBC 9.4 14.6*  --  15.3*   HGB 8.0* 8.5* 6.6* 8.0*   MCV 86 84  --  83   * 505*  --  520*      Most Recent 3 BMP's:  Recent Labs   Lab Test 05/24/23  1125 05/24/23  0744 05/24/23  0213 05/20/23  1207 05/20/23  0547 05/19/23  0805 05/19/23  0624 05/17/23  0720 05/17/23  0557   NA  --   --   --   --  136  --  138  --  138   POTASSIUM  --    --   --   --  4.8  --  4.5  --  4.4   CHLORIDE  --   --   --   --  101  --  103  --  106   CO2  --   --   --   --  27  --  27  --  22   BUN  --   --   --   --  11.1  --  10.5  --  11.3   CR  --   --   --   --  0.74  --  0.74  --  0.76   ANIONGAP  --   --   --   --  8  --  8  --  10   BEST  --   --   --   --  8.3*  --  8.6*  --  8.4*   * 186* 152*   < > 213*   < > 84   < > 198*    < > = values in this interval not displayed.     Most Recent 3 Troponin's:No lab results found.    Invalid input(s): TROP, TROPONINIES  Most Recent 3 INR's:No lab results found.  Most Recent 2 LFT's:  Recent Labs   Lab Test 05/17/23  0557 05/02/23  1141   AST 11  --    ALT 8* 12   ALKPHOS 74  --    BILITOTAL 0.2  --      Most Recent Cholesterol Panel:  Recent Labs   Lab Test 05/02/23  1141   CHOL 126   LDL 49   HDL 36*   TRIG 207*     Most Recent 6 Bacteria Isolates From Any Culture (See EPIC Reports for Culture Details):No lab results found.  Most Recent TSH, T4 and HgbA1c:  Recent Labs   Lab Test 05/02/23  1141 11/30/18  1124 06/08/18  0935 09/23/16  0939 06/17/16  0858   TSH  --   --  4.35*  --  4.10*   T4  --   --   --   --  1.17   A1C 8.3*   < > 7.7*   < > 8.1*    < > = values in this interval not displayed.

## 2023-05-25 NOTE — LETTER
To:             Please give to facility    From:   Clementina Delgadillo RN, Care Coordinator   [facility]   Clementina Delgadillo RN, Care Coordinator   [facility]'s   E-mail [email or web address]   [phone]   Patient Name:  Roddy Sidhu  YOB: 1945   Admit date: 5/24/2023      *Information Needed:  Please contact me when the patient will discharge (or if they will move to long term care)- include the discharge date, disposition, and main diagnosis   If the patient is discharged with home care services, please provide the name of the agency    Also- Please inform me if a care conference is being held.   [facility]   Clementina Delgadillo RN, Care Coordinator   [facility]   E-mail [email or web address]   [phone]                             Thank you

## 2023-05-25 NOTE — PROGRESS NOTES
[facility] TCU Admission  PCP & CLINIC: Sandra Low, JOSH CNP, [address] [facility] [address]  Chief Complaint   Patient presents with     Hospital F/U   [place] MRN: [id number]. Place of Service where encounter took place:  [facility] (TCU) [[id number]] Roddy Sidhu  is a 77 year old  (1945), admitted to the above facility from  [facility]. Hospital stay 5/13 through 5/24. Admitted to this facility for  rehab, medical management, and nursing care. HPI information obtained from: facility chart records, facility staff, patient report, [facility] chart review, and Care Everywhere [facility] chart review.     Brief Summary of Hospital Course: Marvin presented to [facility] on 5/13 w/ worsening left heel infection. He was already s/p debridement from Dr. Flores on 5/9 and was recovering in TCU. He ultimately underwent left BKA on 5/19 and completed abx per ID. He is on post-op Aspirin @ 325mg and was started on PPI ppx.     Updates since admission to transitional care unit: Marvin presented to TCU on 5/24 s/p the above hospitalization (and several other stays via chart review). Today, Marvin says he is feeling pretty good.  He did not get a lot of sleep in the hospital, and has only had 1 night at the facility, so he is hoping that his sleep habits improved.  He denies any significant pain, but says that it can flareup in the left leg from time to time.  It has not hindered his ability to rehab, though he has just started.  He has baseline numbness and tingling.  He denies headaches or dizziness, does have a chronic cough, denies shortness of breath or fever.  He denies chest pain or palpitations.  He thinks his appetite is pretty good, and he has not had any nausea.  He was constipated in the hospital, but says that this is better now.  He says one of his issues is diabetes, and he wants to make sure that stays under control.  He knows that he had a little bit of blood  loss and wants to make sure this is okay too.    CODE STATUS/ADVANCE DIRECTIVES DISCUSSION: CPR/Full code. Patient's living condition: lives with spouse. ALLERGIES: Patient has no known allergies. PAST MEDICAL HISTORY:  has no past medical history on file.. PAST SURGICAL HISTORY:   has a past surgical history that includes surgical history of -  (1990); Incision and drainage foot, combined (Left, 5/9/2023); IR Lower Extremity Angiogram Left (5/16/2023); and Amputate leg below knee (Left, 5/19/2023).. FAMILY HISTORY: family history includes Breast Cancer (age of onset: 60) in his mother; C.A.D. (age of onset: 58) in his brother.. SOCIAL HISTORY:   reports that he has been smoking pipe. He has never used smokeless tobacco. He reports that he does not currently use alcohol. He reports that he does not use drugs.  Post Discharge Medication Reconciliation Status: discharge medications reconciled and changed, per note/orders.  Current Outpatient Medications   Medication Sig Dispense Refill     metFORMIN (GLUCOPHAGE) 500 MG tablet Take 1.5 tablets (750 mg) by mouth 2 times daily (with meals) 180 tablet 1     acetaminophen (TYLENOL) 325 MG tablet Take 2 tablets (650 mg) by mouth every 6 hours as needed for mild pain 100 tablet 0     aspirin (ASA) 325 MG EC tablet Take 325 mg by mouth daily       dulaglutide (TRULICITY) 0.75 MG/0.5ML pen Inject 0.75 mg Subcutaneous every 7 days 2 mL 1     ferrous gluconate (FERGON) 324 (38 Fe) MG tablet Take 1 tablet (324 mg) by mouth daily (with breakfast) 30 tablet 0     gabapentin (NEURONTIN) 300 MG capsule Take 1 capsule (300 mg) by mouth 3 times daily 270 capsule 3     insulin glargine (LANTUS PEN) 100 UNIT/ML pen Inject 10 Units Subcutaneous every morning 15 mL 1     latanoprost (XALATAN) 0.005 % ophthalmic solution Place 1 drop into both eyes At Bedtime       oxyCODONE (ROXICODONE) 5 MG tablet Take 1 tablet (5 mg) by mouth every 6 hours as needed for pain 12 tablet 0     polyethylene  glycol (MIRALAX) 17 GM/Dose powder Take 17 g by mouth daily 510 g 0     simvastatin (ZOCOR) 40 MG tablet TAKE ONE TABLET BY MOUTH EVERY NIGHT AT BEDTIME 90 tablet 3     vitamin D3 (CHOLECALCIFEROL) 50 mcg (2000 units) tablet Take 1 tablet by mouth daily 30 capsule      ROS: 10 point ROS of systems including Constitutional, Eyes, Respiratory, Cardiovascular, Gastroenterology, Genitourinary, Integumentary, Musculoskeletal, Psychiatric were all negative except for pertinent positives noted in my HPI.    Vitals: BP (!) 154/84   Pulse 66   Temp 97.1  F (36.2  C)   Resp 18   Wt 59.9 kg (132 lb)   SpO2 99%   BMI 19.21 kg/m     BGs:    Exam:  GENERAL APPEARANCE: Alert, in no distress, cooperative.   ENT: Mouth/posterior oropharynx intact w/ moist mucous membranes, hearing acuity [place].  EYES: EOM, conjunctivae, lids, pupils and irises normal, PERRL2.   RESP: Respiratory effort good, no respiratory distress, Lung sounds clear/diminished. On RA.   CV: Auscultation of heart reveals S1, S2, rate and rhythm regular, no murmur, no rub or gallop, Edema 0+ BLE (LLE is in immobilizer).  ABDOMEN: Normal bowel sounds, soft, non-tender abdomen, and no masses palpated.  SKIN: Inspection/Palpation of skin and subcutaneous tissue baseline w/ fragility. No wounds/rashes noted, incision is covered.  NEURO: CN II-XII at patient's baseline, sensation baseline PPS.  PSYCH: Insight, judgement, and memory are forgetful at baseline, affect and mood are happy/engaged.    Lab/Diagnostic data: Recent labs in [facility] reviewed by me today.     ASSESSMENT/PLAN:  S/P BKA (below knee amputation) unilateral, left (H)  Type 2 diabetes mellitus with diabetic polyneuropathy, with long-term current use of insulin (H)  Anemia due to blood loss, acute  Peripheral artery disease (H)  Aftercare following surgery of the musculoskeletal system  Screening for hypertension  Acute on chronic. Complex/Tenuous.     Noting use of Trulicity, Lantus, and metformin.   Metformin is not optimized.  Will increase slightly as noted below, and trend blood glucose readings.  If readings are optimized, may be able to discontinue Lantus in the future.    Unclear reasoning for 4 times daily Accu-Cheks when patient is not on any short acting insulin or medications which would predispose him to hypoglycemia.  We will therefore reduce Accu-Cheks to twice daily.    Noting anemia from blood loss and surgery, loss of limb, and dip in hemoglobin which is to be expected.  Patient also was started on high-dose aspirin.  Will therefore trend both CBC and BMP.  These were recently checked inpatient, and therefore can push out several days.    Patient denies any tobacco cravings, but did smoke up until recently.  Also up until recently, did not have much routine maintenance for health.  Noting some initial hypertension which is variable, and we will monitor this.    Immobilizer in place and patient tolerating status post left BKA.  He is scheduled to follow-up with vascular surgery next month/in 6 weeks on 7/10.  Follow up w/in 1 week or as needed.    Orders:  1. Increase Metformin to 750mg PO BID. Dx: DM II.  2. Decrease accu-checks to BID. Dx: DM II.  3. CBC, BMP x1 on 5/31. Dx: anemia.     Electronically signed by:  Dr. Aurora Nunes, APRN CNP DNP

## 2023-05-25 NOTE — PROGRESS NOTES
Clinic Care Coordination Contact  Care Coordination Transition Communication    Referral Source: IP Handoff    Clinical Data:   5/13/2023 - 5/24/2023 (11 days)  [facility]  Discharge Diagnoses  Infected diabetic pressure ulcer of left heel associated with type 2 diabetes mellitus with diabetic neuropathy  Additional diabetic ulcer of right heel without infection  S/p debridement of left pressure ulcer 5/9/2023  Peripheral arterial disease  Sepsis with acute organ dysfunction without septic shock, due to unspecified organism, unspecified type (H)  Acute on chronic anemia  Closed displaced subtrochanteric fracture of right femur 3/29  S/p ORIF right femur fracture with intermedullary nail 3/29     Closed fracture of proximal end of right humerus 3/29   Closed nondisplaced fracture of right clavicle 3/29   Type 2 diabetes mellitus with diabetic polyneuropathy, with long-term current use of insulin   Hyperlipidemia  Transition to Facility:              Facility Name: [facility]              Contact name and phone number/fax: CC RN contact information faxed to TCU and instructed to call when the patients discharged     Plan: RN/SW Care Coordinator will await notification from facility staff informing RN/SW Care Coordinator of patient's discharge plans/needs. RN/SW Care Coordinator will review chart and outreach to facility staff every 4 weeks and as needed.     [facility]   Clementina Delgadillo RN, Care Coordinator   [facility]   E-mail [email or web address]   [phone]

## 2023-05-26 PROBLEM — L97.419 DIABETIC ULCER OF RIGHT HEEL ASSOCIATED WITH TYPE 2 DIABETES MELLITUS (H): Status: RESOLVED | Noted: 2023-05-04 | Resolved: 2023-05-26

## 2023-05-26 PROBLEM — L97.429 DIABETIC ULCER OF LEFT HEEL ASSOCIATED WITH TYPE 2 DIABETES MELLITUS (H): Status: RESOLVED | Noted: 2023-05-04 | Resolved: 2023-05-26

## 2023-05-26 PROBLEM — E11.621 DIABETIC ULCER OF RIGHT HEEL ASSOCIATED WITH TYPE 2 DIABETES MELLITUS (H): Status: RESOLVED | Noted: 2023-05-04 | Resolved: 2023-05-26

## 2023-05-26 PROBLEM — E11.621 DIABETIC ULCER OF LEFT HEEL ASSOCIATED WITH TYPE 2 DIABETES MELLITUS (H): Status: RESOLVED | Noted: 2023-05-04 | Resolved: 2023-05-26

## 2023-05-28 VITALS
HEART RATE: 57 BPM | WEIGHT: 132 LBS | OXYGEN SATURATION: 98 % | BODY MASS INDEX: 21.21 KG/M2 | TEMPERATURE: 97.3 F | DIASTOLIC BLOOD PRESSURE: 68 MMHG | SYSTOLIC BLOOD PRESSURE: 140 MMHG | HEIGHT: 66 IN | RESPIRATION RATE: 18 BRPM

## 2023-05-29 ENCOUNTER — TRANSITIONAL CARE UNIT VISIT (OUTPATIENT)
Dept: GERIATRICS | Facility: CLINIC | Age: 78
End: 2023-05-29
Payer: COMMERCIAL

## 2023-05-29 DIAGNOSIS — I73.9 PERIPHERAL ARTERY DISEASE (H): ICD-10-CM

## 2023-05-29 DIAGNOSIS — Z89.512 S/P BKA (BELOW KNEE AMPUTATION) UNILATERAL, LEFT (H): Primary | ICD-10-CM

## 2023-05-29 DIAGNOSIS — E11.42 TYPE 2 DIABETES MELLITUS WITH DIABETIC POLYNEUROPATHY, WITH LONG-TERM CURRENT USE OF INSULIN (H): ICD-10-CM

## 2023-05-29 DIAGNOSIS — Z47.89 AFTERCARE FOLLOWING SURGERY OF THE MUSCULOSKELETAL SYSTEM: ICD-10-CM

## 2023-05-29 DIAGNOSIS — Z79.4 TYPE 2 DIABETES MELLITUS WITH DIABETIC POLYNEUROPATHY, WITH LONG-TERM CURRENT USE OF INSULIN (H): ICD-10-CM

## 2023-05-29 DIAGNOSIS — D62 ANEMIA DUE TO BLOOD LOSS, ACUTE: ICD-10-CM

## 2023-05-29 PROCEDURE — 99309 SBSQ NF CARE MODERATE MDM 30: CPT | Performed by: NURSE PRACTITIONER

## 2023-05-29 RX ORDER — OXYCODONE HYDROCHLORIDE 5 MG/1
5 TABLET ORAL 3 TIMES DAILY PRN
Qty: 10 TABLET | Refills: 0 | Status: SHIPPED | OUTPATIENT
Start: 2023-05-29 | End: 2023-06-13

## 2023-05-29 RX ORDER — ACETAMINOPHEN 500 MG
1000 TABLET ORAL 3 TIMES DAILY
COMMUNITY
End: 2023-08-15

## 2023-05-29 NOTE — PROGRESS NOTES
"[facility] GERIATRIC SERVICE  Episodic/Acute/Follow-Up  [place] MRN: [id number]. Place of Service where encounter took place:  [facility] [place] ([facility]) [4002]   Chief Complaint   Patient presents with     RECHECK    HPI: Roddy Sidhu  is a 77 year old (1945), who is being seen today for an episodic care visit. Today's concern is:    Marvin seen today on routine follow up as he continues to rehab in [facility]. Today, Marvin says he is having some phantom pain.  He says it hurts in his foot, even though he knows he does not have a foot.  He thinks that this is exacerbated by therapy, because at rest he is usually fine, and just feeling more sore after therapy now that he is really \"getting in there.\"  Otherwise, he says his appetite has been really good, and he denies any nausea, vomiting, diarrhea.  He denies chest pain, shortness of breath, or sleep disturbances.    Past Medical and Surgical History reviewed in Epic today.  MEDICATIONS:  Current Outpatient Medications   Medication Sig Dispense Refill     acetaminophen (TYLENOL) 500 MG tablet Take 1,000 mg by mouth 3 times daily +650mg PO Qday PRN       metFORMIN (GLUCOPHAGE) 500 MG tablet Take 2 tablets (1,000 mg) by mouth 2 times daily (with meals) 180 tablet 1     oxyCODONE (ROXICODONE) 5 MG tablet Take 1 tablet (5 mg) by mouth 3 times daily as needed for pain 10 tablet 0     aspirin (ASA) 325 MG EC tablet Take 325 mg by mouth daily       dulaglutide (TRULICITY) 0.75 MG/0.5ML pen Inject 0.75 mg Subcutaneous every 7 days 2 mL 1     ferrous gluconate (FERGON) 324 (38 Fe) MG tablet Take 1 tablet (324 mg) by mouth daily (with breakfast) 30 tablet 0     gabapentin (NEURONTIN) 300 MG capsule Take 1 capsule (300 mg) by mouth 3 times daily 270 capsule 3     insulin glargine (LANTUS PEN) 100 UNIT/ML pen Inject 10 Units Subcutaneous every morning 15 mL 1     latanoprost (XALATAN) 0.005 % ophthalmic solution Place 1 drop into both eyes At Bedtime       polyethylene " "glycol (MIRALAX) 17 GM/Dose powder Take 17 g by mouth daily 510 g 0     simvastatin (ZOCOR) 40 MG tablet TAKE ONE TABLET BY MOUTH EVERY NIGHT AT BEDTIME 90 tablet 3     vitamin D3 (CHOLECALCIFEROL) 50 mcg (2000 units) tablet Take 1 tablet by mouth daily 30 capsule      Objective: BP (!) 140/68   Pulse 57   Temp 97.3  F (36.3  C)   Resp 18   Ht 1.676 m (5' 6\")   Wt 59.9 kg (132 lb)   SpO2 98%   BMI 21.31 kg/m     BGs:    Exam:  GENERAL APPEARANCE: Alert, in no distress, cooperative.   RESP: Respiratory effort good, no respiratory distress, Lung sounds clear/diminished. On RA.   CV: Auscultation of heart reveals S1, S2, rate and rhythm regular, no murmur, no rub or gallop, Edema 0+ BLE.  PSYCH: Insight, judgement, and memory are baseline forgetful, affect and mood are happy/engaged.    Labs: Labs done in facility are in EPIC. Please refer to them using [employer] Everywhere.    ASSESSMENT/PLAN:  S/P BKA (below knee amputation) unilateral, left (H)  Type 2 diabetes mellitus with diabetic polyneuropathy, with long-term current use of insulin (H)  Anemia due to blood loss, acute  Peripheral artery disease (H)  Aftercare following surgery of the musculoskeletal system  Acute on chronic. Ongoing.    Noting ongoing hyperglycemia, but good tolerance of metformin increase last week.  Will increase to max later this week at a timed interval.    We will refill oxycodone now that patient is rehabbing, and change prescription slightly as noted below.  He is very rarely using this, and may augment with Tylenol instead.  Therefore, we will schedule extra strength Tylenol and clarify PRN dosing in order to remain below max.    He already has some blood work pending for this Wednesday for routine follow-up.  Follow up w/in 1 week or as needed.    Orders:  1. On 6/1, increase Metformin to 1000mg PO BID. Dx: DM II.   2. Decrease Oxycodone to 5mg PO TID PRN. Dx: severe pain.    3. Tylenol 1000mg PO TID. Dx: pain.  4. Change PRN " Tylenol to 650mg PO Qday PRN. Dx: pain/fever.     Electronically signed by:  Dr. Aurora Nunes, APRN CNP DNP

## 2023-05-29 NOTE — LETTER
"    5/29/2023        RE: Roddy Sidhu  [address] [address]        [facility] GERIATRIC SERVICE  Episodic/Acute/Follow-Up  [place] MRN: [id number]. Place of Service where encounter took place:  [facility] ([facility]) [4002]   Chief Complaint   Patient presents with     RECHECK    HPI: Roddy Sidhu  is a 77 year old (1945), who is being seen today for an episodic care visit. Today's concern is:    Marvin seen today on routine follow up as he continues to rehab in [facility]. Today, Marvin says he is having some phantom pain.  He says it hurts in his foot, even though he knows he does not have a foot.  He thinks that this is exacerbated by therapy, because at rest he is usually fine, and just feeling more sore after therapy now that he is really \"getting in there.\"  Otherwise, he says his appetite has been really good, and he denies any nausea, vomiting, diarrhea.  He denies chest pain, shortness of breath, or sleep disturbances.    Past Medical and Surgical History reviewed in Epic today.  MEDICATIONS:  Current Outpatient Medications   Medication Sig Dispense Refill     acetaminophen (TYLENOL) 500 MG tablet Take 1,000 mg by mouth 3 times daily +650mg PO Qday PRN       metFORMIN (GLUCOPHAGE) 500 MG tablet Take 2 tablets (1,000 mg) by mouth 2 times daily (with meals) 180 tablet 1     oxyCODONE (ROXICODONE) 5 MG tablet Take 1 tablet (5 mg) by mouth 3 times daily as needed for pain 10 tablet 0     aspirin (ASA) 325 MG EC tablet Take 325 mg by mouth daily       dulaglutide (TRULICITY) 0.75 MG/0.5ML pen Inject 0.75 mg Subcutaneous every 7 days 2 mL 1     ferrous gluconate (FERGON) 324 (38 Fe) MG tablet Take 1 tablet (324 mg) by mouth daily (with breakfast) 30 tablet 0     gabapentin (NEURONTIN) 300 MG capsule Take 1 capsule (300 mg) by mouth 3 times daily 270 capsule 3     insulin glargine (LANTUS PEN) 100 UNIT/ML pen Inject 10 Units Subcutaneous every morning 15 mL 1     latanoprost (XALATAN) " "0.005 % ophthalmic solution Place 1 drop into both eyes At Bedtime       polyethylene glycol (MIRALAX) 17 GM/Dose powder Take 17 g by mouth daily 510 g 0     simvastatin (ZOCOR) 40 MG tablet TAKE ONE TABLET BY MOUTH EVERY NIGHT AT BEDTIME 90 tablet 3     vitamin D3 (CHOLECALCIFEROL) 50 mcg (2000 units) tablet Take 1 tablet by mouth daily 30 capsule      Objective: BP (!) 140/68   Pulse 57   Temp 97.3  F (36.3  C)   Resp 18   Ht 1.676 m (5' 6\")   Wt 59.9 kg (132 lb)   SpO2 98%   BMI 21.31 kg/m     BGs:    Exam:  GENERAL APPEARANCE: Alert, in no distress, cooperative.   RESP: Respiratory effort good, no respiratory distress, Lung sounds clear/diminished. On RA.   CV: Auscultation of heart reveals S1, S2, rate and rhythm regular, no murmur, no rub or gallop, Edema 0+ BLE.  PSYCH: Insight, judgement, and memory are baseline forgetful, affect and mood are happy/engaged.    Labs: Labs done in facility are in EPIC. Please refer to them using [employer] Everywhere.    ASSESSMENT/PLAN:  S/P BKA (below knee amputation) unilateral, left (H)  Type 2 diabetes mellitus with diabetic polyneuropathy, with long-term current use of insulin (H)  Anemia due to blood loss, acute  Peripheral artery disease (H)  Aftercare following surgery of the musculoskeletal system  Acute on chronic. Ongoing.    Noting ongoing hyperglycemia, but good tolerance of metformin increase last week.  Will increase to max later this week at a timed interval.    We will refill oxycodone now that patient is rehabbing, and change prescription slightly as noted below.  He is very rarely using this, and may augment with Tylenol instead.  Therefore, we will schedule extra strength Tylenol and clarify PRN dosing in order to remain below max.    He already has some blood work pending for this Wednesday for routine follow-up.  Follow up w/in 1 week or as needed.    Orders:  1. On 6/1, increase Metformin to 1000mg PO BID. Dx: DM II.   2. Decrease Oxycodone to 5mg " PO TID PRN. Dx: severe pain.    3. Tylenol 1000mg PO TID. Dx: pain.  4. Change PRN Tylenol to 650mg PO Qday PRN. Dx: pain/fever.     Electronically signed by:  Dr. Aurora Nunes, JOSH CNP DNP          Sincerely,        JOSH Sands CNP

## 2023-05-30 ENCOUNTER — LAB REQUISITION (OUTPATIENT)
Dept: LAB | Facility: CLINIC | Age: 78
End: 2023-05-30
Payer: COMMERCIAL

## 2023-05-30 DIAGNOSIS — D64.9 ANEMIA, UNSPECIFIED: ICD-10-CM

## 2023-05-31 LAB
ANION GAP SERPL CALCULATED.3IONS-SCNC: 9 MMOL/L (ref 7–15)
BUN SERPL-MCNC: 28 MG/DL (ref 8–23)
CALCIUM SERPL-MCNC: 9.1 MG/DL (ref 8.8–10.2)
CHLORIDE SERPL-SCNC: 98 MMOL/L (ref 98–107)
CREAT SERPL-MCNC: 0.96 MG/DL (ref 0.67–1.17)
DEPRECATED HCO3 PLAS-SCNC: 27 MMOL/L (ref 22–29)
ERYTHROCYTE [DISTWIDTH] IN BLOOD BY AUTOMATED COUNT: 19.1 % (ref 10–15)
GFR SERPL CREATININE-BSD FRML MDRD: 82 ML/MIN/1.73M2
GLUCOSE SERPL-MCNC: 116 MG/DL (ref 70–99)
HCT VFR BLD AUTO: 28.6 % (ref 40–53)
HGB BLD-MCNC: 8.9 G/DL (ref 13.3–17.7)
MCH RBC QN AUTO: 26.7 PG (ref 26.5–33)
MCHC RBC AUTO-ENTMCNC: 31.1 G/DL (ref 31.5–36.5)
MCV RBC AUTO: 86 FL (ref 78–100)
PLATELET # BLD AUTO: 401 10E3/UL (ref 150–450)
POTASSIUM SERPL-SCNC: 5.3 MMOL/L (ref 3.4–5.3)
RBC # BLD AUTO: 3.33 10E6/UL (ref 4.4–5.9)
SODIUM SERPL-SCNC: 134 MMOL/L (ref 136–145)
WBC # BLD AUTO: 8.1 10E3/UL (ref 4–11)

## 2023-05-31 PROCEDURE — 36415 COLL VENOUS BLD VENIPUNCTURE: CPT | Performed by: NURSE PRACTITIONER

## 2023-05-31 PROCEDURE — 82310 ASSAY OF CALCIUM: CPT | Performed by: NURSE PRACTITIONER

## 2023-05-31 PROCEDURE — 85027 COMPLETE CBC AUTOMATED: CPT | Performed by: NURSE PRACTITIONER

## 2023-05-31 PROCEDURE — P9603 ONE-WAY ALLOW PRORATED MILES: HCPCS | Performed by: NURSE PRACTITIONER

## 2023-05-31 NOTE — PROGRESS NOTES
"[place] GERIATRIC SERVICES  PRIMARY CARE PROVIDER AND CLINIC:  JOSH Becker CNP, [address] [facility] [address]  Chief Complaint   Patient presents with     Hospital F/U     [place] Medical Record Number:  [id number]  Place of Service where encounter took place:  [facility] (TCU) [4002]    Roddy Sidhu  is a 77 year old  (1945), admitted to the above facility from  [facility]. Hospital stay 5/13/23 through 5/24/23..  Admitted to this facility for  rehab, medical management and nursing care.    HPI:    HPI information obtained from: facility chart records, facility staff, patient report and [facility] chart review.   Brief Summary of Hospital Course:   As per DNP's HPI:\"Marvin presented to [facility] on 5/13 w/ worsening left heel infection. He was already s/p debridement from Dr. Flores on 5/9 and was recovering in TCU. He ultimately underwent left BKA on 5/19 and completed abx per ID. He is on post-op Aspirin @ 325mg and was started on PPI ppx. \"    ===========================================  CODE STATUS/ADVANCE DIRECTIVES DISCUSSION:   CPR/Full code   Patient's living condition: lives with spouse  ALLERGIES: Patient has no known allergies.  PAST MEDICAL HISTORY:  has no past medical history on file.  PAST SURGICAL HISTORY:   has a past surgical history that includes surgical history of -  (1990); Incision and drainage foot, combined (Left, 5/9/2023); IR Lower Extremity Angiogram Left (5/16/2023); and Amputate leg below knee (Left, 5/19/2023).  FAMILY HISTORY: family history includes Breast Cancer (age of onset: 60) in his mother; C.A.D. (age of onset: 58) in his brother.  SOCIAL HISTORY:   reports that he has been smoking pipe. He has never used smokeless tobacco. He reports that he does not currently use alcohol. He reports that he does not use drugs.    Post Discharge Medication Reconciliation Status: discharge medications reconciled and changed, " "per note/orders  Current Outpatient Medications   Medication Sig Dispense Refill     acetaminophen (TYLENOL) 500 MG tablet Take 1,000 mg by mouth 3 times daily +650mg PO Qday PRN       aspirin (ASA) 325 MG EC tablet Take 325 mg by mouth daily       dulaglutide (TRULICITY) 0.75 MG/0.5ML pen Inject 0.75 mg Subcutaneous every 7 days 2 mL 1     ferrous gluconate (FERGON) 324 (38 Fe) MG tablet Take 1 tablet (324 mg) by mouth daily (with breakfast) 30 tablet 0     gabapentin (NEURONTIN) 300 MG capsule Take 1 capsule (300 mg) by mouth 3 times daily 270 capsule 3     insulin glargine (LANTUS PEN) 100 UNIT/ML pen Inject 10 Units Subcutaneous every morning 15 mL 1     latanoprost (XALATAN) 0.005 % ophthalmic solution Place 1 drop into both eyes At Bedtime       metFORMIN (GLUCOPHAGE) 500 MG tablet Take 2 tablets (1,000 mg) by mouth 2 times daily (with meals) 180 tablet 1     oxyCODONE (ROXICODONE) 5 MG tablet Take 1 tablet (5 mg) by mouth 3 times daily as needed for pain 10 tablet 0     polyethylene glycol (MIRALAX) 17 GM/Dose powder Take 17 g by mouth daily 510 g 0     simvastatin (ZOCOR) 40 MG tablet TAKE ONE TABLET BY MOUTH EVERY NIGHT AT BEDTIME 90 tablet 3     vitamin D3 (CHOLECALCIFEROL) 50 mcg (2000 units) tablet Take 1 tablet by mouth daily 30 capsule        ROS:  10 point ROS of systems including Constitutional, Eyes, Respiratory, Cardiovascular, Gastroenterology, Genitourinary, Integumentary, Musculoskeletal, Psychiatric were all negative except for pertinent positives noted in my HPI.    Vitals:  /68   Pulse 53   Temp 97.5  F (36.4  C)   Resp 16   Ht 1.676 m (5' 6\")   Wt 57.7 kg (127 lb 1.6 oz)   SpO2 96%   BMI 20.51 kg/m    Exam:  GENERAL APPEARANCE:  in no distress,   RESP:  Unlabored breathing. CTA b/l.   CV:  S1S2 audible, regular HR, no murmur appreciated.   ABDOMEN:  soft, NT/ND, BS audible.   M/S:   Brace for left leg.   SKIN:  No rash noted on observation  NEURO:   No NFD appreciated on " observation.   PSYCH:  affect and mood normal      Lab/Diagnostic data: Reviewed in the chart and EHR.          ASSESSMENT/PLAN:  (Z89.512) S/P BKA (below knee amputation) unilateral, left (H)  (primary encounter diagnosis)  (D50.0) Blood loss anemia  (I73.9) Peripheral artery disease (H)  (Z47.89) Aftercare following surgery of the musculoskeletal system  - today reports sore, nothing unsual, better with medicine, asked for it when he needs it. The wound is getting better.   - clinically stable.  - Surgery follow up  - wound care in place.   - analgesia optimal with the current regimen  - started rehab program, making a progress, continue until desired goal is achieved.       (E11.42,  Z79.4) Type 2 diabetes mellitus with diabetic polyneuropathy, with long-term current use of insulin (H)   A1C 8.3 05/02/2023    A1C 6.5 05/17/2022   - uncontrolled. Metfromin adjusted since admission to TCU, ow on 1000 mg bid. Also, on Trulicity and Lantus  - goal is 7-8%.   - reports appetite is at baseline as well as drinking water.       (I10) Benign essential hypertension  - clinically stable.  No HA or CP. continue current regimen    (E78.5) Hyperlipidemia LDL goal <100  - continue statin      Electronically signed by:  Herbert Urbano MD

## 2023-06-01 ENCOUNTER — TRANSITIONAL CARE UNIT VISIT (OUTPATIENT)
Dept: GERIATRICS | Facility: CLINIC | Age: 78
End: 2023-06-01
Payer: COMMERCIAL

## 2023-06-01 VITALS
TEMPERATURE: 97.5 F | HEART RATE: 53 BPM | HEIGHT: 66 IN | BODY MASS INDEX: 20.43 KG/M2 | WEIGHT: 127.1 LBS | SYSTOLIC BLOOD PRESSURE: 121 MMHG | DIASTOLIC BLOOD PRESSURE: 68 MMHG | OXYGEN SATURATION: 96 % | RESPIRATION RATE: 16 BRPM

## 2023-06-01 DIAGNOSIS — E11.42 TYPE 2 DIABETES MELLITUS WITH DIABETIC POLYNEUROPATHY, WITH LONG-TERM CURRENT USE OF INSULIN (H): ICD-10-CM

## 2023-06-01 DIAGNOSIS — D50.0 BLOOD LOSS ANEMIA: ICD-10-CM

## 2023-06-01 DIAGNOSIS — Z79.4 TYPE 2 DIABETES MELLITUS WITH DIABETIC POLYNEUROPATHY, WITH LONG-TERM CURRENT USE OF INSULIN (H): ICD-10-CM

## 2023-06-01 DIAGNOSIS — Z89.512 S/P BKA (BELOW KNEE AMPUTATION) UNILATERAL, LEFT (H): Primary | ICD-10-CM

## 2023-06-01 DIAGNOSIS — E78.5 HYPERLIPIDEMIA LDL GOAL <100: ICD-10-CM

## 2023-06-01 DIAGNOSIS — I73.9 PERIPHERAL ARTERY DISEASE (H): ICD-10-CM

## 2023-06-01 DIAGNOSIS — I10 BENIGN ESSENTIAL HYPERTENSION: ICD-10-CM

## 2023-06-01 DIAGNOSIS — Z47.89 AFTERCARE FOLLOWING SURGERY OF THE MUSCULOSKELETAL SYSTEM: ICD-10-CM

## 2023-06-01 PROCEDURE — 99305 1ST NF CARE MODERATE MDM 35: CPT | Performed by: FAMILY MEDICINE

## 2023-06-01 NOTE — LETTER
"    6/1/2023        RE: Roddy Sidhu  [address] [facility] [address]        [place] [facility]  PRIMARY CARE PROVIDER AND CLINIC:  [facility], [address] [facility] [address]  Chief Complaint   Patient presents with     Hospital F/U     [facility] Medical Record Number:  [id number]  Place of Service where encounter took place:  [facility] (TCU) [[id number]]    Roddy Sidhu  is a 77 year old  (1945), admitted to the above facility from  [facility]. Hospital stay 5/13/23 through 5/24/23..  Admitted to this facility for  rehab, medical management and nursing care.    HPI:    HPI information obtained from: facility chart records, facility staff, patient report and [facility] chart review.   Brief Summary of Hospital Course:   As per DNP's HPI:\"Marvin presented to [facility] on 5/13 w/ worsening left heel infection. He was already s/p debridement from Dr. Flores on 5/9 and was recovering in TCU. He ultimately underwent left BKA on 5/19 and completed abx per ID. He is on post-op Aspirin @ 325mg and was started on PPI ppx. \"    ===========================================  CODE STATUS/ADVANCE DIRECTIVES DISCUSSION:   CPR/Full code   Patient's living condition: lives with spouse  ALLERGIES: Patient has no known allergies.  PAST MEDICAL HISTORY:  has no past medical history on file.  PAST SURGICAL HISTORY:   has a past surgical history that includes surgical history of -  (1990); Incision and drainage foot, combined (Left, 5/9/2023); IR Lower Extremity Angiogram Left (5/16/2023); and Amputate leg below knee (Left, 5/19/2023).  FAMILY HISTORY: family history includes Breast Cancer (age of onset: 60) in his mother; C.A.D. (age of onset: 58) in his brother.  SOCIAL HISTORY:   reports that he has been smoking pipe. He has never used smokeless tobacco. He reports that he does not currently use alcohol. He reports that he does not use drugs.    Post " "Discharge Medication Reconciliation Status: discharge medications reconciled and changed, per note/orders  Current Outpatient Medications   Medication Sig Dispense Refill     acetaminophen (TYLENOL) 500 MG tablet Take 1,000 mg by mouth 3 times daily +650mg PO Qday PRN       aspirin (ASA) 325 MG EC tablet Take 325 mg by mouth daily       dulaglutide (TRULICITY) 0.75 MG/0.5ML pen Inject 0.75 mg Subcutaneous every 7 days 2 mL 1     ferrous gluconate (FERGON) 324 (38 Fe) MG tablet Take 1 tablet (324 mg) by mouth daily (with breakfast) 30 tablet 0     gabapentin (NEURONTIN) 300 MG capsule Take 1 capsule (300 mg) by mouth 3 times daily 270 capsule 3     insulin glargine (LANTUS PEN) 100 UNIT/ML pen Inject 10 Units Subcutaneous every morning 15 mL 1     latanoprost (XALATAN) 0.005 % ophthalmic solution Place 1 drop into both eyes At Bedtime       metFORMIN (GLUCOPHAGE) 500 MG tablet Take 2 tablets (1,000 mg) by mouth 2 times daily (with meals) 180 tablet 1     oxyCODONE (ROXICODONE) 5 MG tablet Take 1 tablet (5 mg) by mouth 3 times daily as needed for pain 10 tablet 0     polyethylene glycol (MIRALAX) 17 GM/Dose powder Take 17 g by mouth daily 510 g 0     simvastatin (ZOCOR) 40 MG tablet TAKE ONE TABLET BY MOUTH EVERY NIGHT AT BEDTIME 90 tablet 3     vitamin D3 (CHOLECALCIFEROL) 50 mcg (2000 units) tablet Take 1 tablet by mouth daily 30 capsule        ROS:  10 point ROS of systems including Constitutional, Eyes, Respiratory, Cardiovascular, Gastroenterology, Genitourinary, Integumentary, Musculoskeletal, Psychiatric were all negative except for pertinent positives noted in my HPI.    Vitals:  /68   Pulse 53   Temp 97.5  F (36.4  C)   Resp 16   Ht 1.676 m (5' 6\")   Wt 57.7 kg (127 lb 1.6 oz)   SpO2 96%   BMI 20.51 kg/m    Exam:  GENERAL APPEARANCE:  in no distress,   RESP:  Unlabored breathing. CTA b/l.   CV:  S1S2 audible, regular HR, no murmur appreciated.   ABDOMEN:  soft, NT/ND, BS audible.   M/S:   Brace " for left leg.   SKIN:  No rash noted on observation  NEURO:   No NFD appreciated on observation.   PSYCH:  affect and mood normal      Lab/Diagnostic data: Reviewed in the chart and EHR.          ASSESSMENT/PLAN:  (Z89.512) S/P BKA (below knee amputation) unilateral, left (H)  (primary encounter diagnosis)  (D50.0) Blood loss anemia  (I73.9) Peripheral artery disease (H)  (Z47.89) Aftercare following surgery of the musculoskeletal system  - today reports sore, nothing unsual, better with medicine, asked for it when he needs it. The wound is getting better.   - clinically stable.  - Surgery follow up  - wound care in place.   - analgesia optimal with the current regimen  - started rehab program, making a progress, continue until desired goal is achieved.       (E11.42,  Z79.4) Type 2 diabetes mellitus with diabetic polyneuropathy, with long-term current use of insulin (H)   A1C 8.3 05/02/2023    A1C 6.5 05/17/2022   - uncontrolled. Metfromin adjusted since admission to TCU, ow on 1000 mg bid. Also, on Trulicity and Lantus  - goal is 7-8%.   - reports appetite is at baseline as well as drinking water.       (I10) Benign essential hypertension  - clinically stable.  No HA or CP. continue current regimen    (E78.5) Hyperlipidemia LDL goal <100  - continue statin      Electronically signed by:  Herbert Urbano MD      Sincerely,        Herbert Urbano MD

## 2023-06-06 ENCOUNTER — TRANSITIONAL CARE UNIT VISIT (OUTPATIENT)
Dept: GERIATRICS | Facility: CLINIC | Age: 78
End: 2023-06-06
Payer: COMMERCIAL

## 2023-06-06 VITALS
DIASTOLIC BLOOD PRESSURE: 73 MMHG | OXYGEN SATURATION: 99 % | WEIGHT: 125.4 LBS | HEART RATE: 84 BPM | RESPIRATION RATE: 16 BRPM | HEIGHT: 66 IN | TEMPERATURE: 98.5 F | BODY MASS INDEX: 20.15 KG/M2 | SYSTOLIC BLOOD PRESSURE: 122 MMHG

## 2023-06-06 DIAGNOSIS — D50.0 BLOOD LOSS ANEMIA: ICD-10-CM

## 2023-06-06 DIAGNOSIS — I73.9 PERIPHERAL ARTERY DISEASE (H): ICD-10-CM

## 2023-06-06 DIAGNOSIS — Z89.512 S/P BKA (BELOW KNEE AMPUTATION) UNILATERAL, LEFT (H): Primary | ICD-10-CM

## 2023-06-06 DIAGNOSIS — I10 BENIGN ESSENTIAL HYPERTENSION: ICD-10-CM

## 2023-06-06 DIAGNOSIS — Z47.89 AFTERCARE FOLLOWING SURGERY OF THE MUSCULOSKELETAL SYSTEM: ICD-10-CM

## 2023-06-06 DIAGNOSIS — Z79.4 TYPE 2 DIABETES MELLITUS WITH DIABETIC POLYNEUROPATHY, WITH LONG-TERM CURRENT USE OF INSULIN (H): ICD-10-CM

## 2023-06-06 DIAGNOSIS — E11.42 TYPE 2 DIABETES MELLITUS WITH DIABETIC POLYNEUROPATHY, WITH LONG-TERM CURRENT USE OF INSULIN (H): ICD-10-CM

## 2023-06-06 PROCEDURE — 99309 SBSQ NF CARE MODERATE MDM 30: CPT | Performed by: NURSE PRACTITIONER

## 2023-06-06 NOTE — PROGRESS NOTES
[facility]  Episodic/Acute/Follow-Up  [place] MRN: [id number]. Place of Service where encounter took place:  [facility] ([facility]) [[id number]]   Chief Complaint   Patient presents with     RECHECK    HPI: Roddy Sidhu  is a 77 year old (1945), who is being seen today for an episodic care visit. Today's concern is:    Marvin seen today on routine follow-up as he continues to rehab in [place].  Last week, he noted a little bit more pain with the rehab, but was able to manage this.  He also was able to start Trulicity and we increased metformin.    Today, Marvin says that his pain is well controlled as long as he is keeps up with his Tylenol dosing.  He still has a little bit of phantom pain but complains less of this now.  He denies headache, dizziness, nausea, heartburn, shortness of breath, cough, fever.  He has had very little drainage from his incision site.  He confirms that he is not seeing his specialist until July, and only after that time would he get clearance for prosthesis fitting.    Past Medical and Surgical History reviewed in Epic today.  MEDICATIONS:  Current Outpatient Medications   Medication Sig Dispense Refill     acetaminophen (TYLENOL) 500 MG tablet Take 1,000 mg by mouth 3 times daily +650mg PO Qday PRN       aspirin (ASA) 325 MG EC tablet Take 325 mg by mouth daily       dulaglutide (TRULICITY) 0.75 MG/0.5ML pen Inject 0.75 mg Subcutaneous every 7 days 2 mL 1     ferrous gluconate (FERGON) 324 (38 Fe) MG tablet Take 1 tablet (324 mg) by mouth daily (with breakfast) 30 tablet 0     gabapentin (NEURONTIN) 300 MG capsule Take 1 capsule (300 mg) by mouth 3 times daily 270 capsule 3     insulin glargine (LANTUS PEN) 100 UNIT/ML pen Inject 10 Units Subcutaneous every morning 15 mL 1     latanoprost (XALATAN) 0.005 % ophthalmic solution Place 1 drop into both eyes At Bedtime       metFORMIN (GLUCOPHAGE) 500 MG tablet Take 2 tablets (1,000 mg) by mouth 2 times daily (with  "meals) 180 tablet 1     oxyCODONE (ROXICODONE) 5 MG tablet Take 1 tablet (5 mg) by mouth 3 times daily as needed for pain 10 tablet 0     polyethylene glycol (MIRALAX) 17 GM/Dose powder Take 17 g by mouth daily 510 g 0     simvastatin (ZOCOR) 40 MG tablet TAKE ONE TABLET BY MOUTH EVERY NIGHT AT BEDTIME 90 tablet 3     vitamin D3 (CHOLECALCIFEROL) 50 mcg (2000 units) tablet Take 1 tablet by mouth daily 30 capsule      Objective: /73   Pulse 84   Temp 98.5  F (36.9  C)   Resp 16   Ht 1.676 m (5' 6\")   Wt 56.9 kg (125 lb 6.4 oz)   SpO2 99%   BMI 20.24 kg/m     BGs:    Exam:  GENERAL APPEARANCE: Alert, in no distress, cooperative.   RESP: Respiratory effort good, no respiratory distress, Lung sounds clear. On RA.   CV: Auscultation of heart reveals S1, S2, rate and rhythm regular, no murmur, no rub or gallop, Edema 0+ BLE.  PSYCH: Insight, judgement, and memory are forgetful at baseline, affect and mood are happy/engaged.    Labs: Labs done in facility are in EPIC. Please refer to them using "[employer]/Care Everywhere.    ASSESSMENT/PLAN:  S/P BKA (below knee amputation) unilateral, left (H)  Blood loss anemia  Peripheral artery disease (H)  Aftercare following surgery of the musculoskeletal system  Type 2 diabetes mellitus with diabetic polyneuropathy, with long-term current use of insulin (H)  Benign essential hypertension  Acute on chronic. Ongoing.    Marvin is doing well status post below the knee amputation on the left.  Pain is manageable and he is able to get through rehab with just Tylenol.  He is feeling pretty confident about his abilities, but of note we are in a holding pattern for prosthesis fitting secondary to postoperative follow-up with specialist.    Blood glucose readings could benefit from further control.  In fact, if we can max out metformin and continue with Trulicity, we may be able to reduce or eliminate Lantus altogether.  We will increase metformin as noted below.    Blood pressures " appear to be overall controlled.  Continue plan of care.  Follow up w/in 1-2 weeks or as needed.    Orders:  1. Increase Metformin to 1000mg PO BID. DX: DM II.     Electronically signed by:  Dr. Aurora Nunes, JOSH CNP DNP  __________________    Provider notified shortly after this visit that this patient was issued an insurance-driven denial for coverage and will be forced to discharge on 6/13. In order to facilitate a somewhat safe discharge for him, will document F2F in order for him to have services at home and hopefully thrive.       Documentation of Face-to-Face and Certification for Home Health Services   Patient: Roddy Sidhu   YOB: 1945  MR Number: [id number]  Today's Date: 6/6/2023  I certify that patient: Roddy Sidhu is under my care and that I, had a face-to-face encounter that meets the provider face-to-face encounter requirements with this patient on: 6/6/2023.    This encounter with the patient was in whole, or in part, for the following medical condition, which is the primary reason for home health care: s/p BKA. I certify that, based on my findings, the following services are medically necessary home health services: Nursing, Occupational Therapy and Physical Therapy.    My clinical findings support the need for the above services because: Nurse is needed: For complex aftercare of surgical procedures because the patient needs instruction and cannot perform care on their own due to: ROM.., Occupational Therapy Services are needed to assess and treat cognitive ability and address ADL safety due to impairment in mobility. and Physical Therapy Services are needed to assess and treat the following functional impairments: mobility.    Further, I certify that my clinical findings support that this patient is homebound (i.e. absences from home require considerable and taxing effort and are for medical reasons or [employer] or infrequently or of short duration when for  other reasons) because: Requires assistance of another person or specialized equipment to access medical services because patient: Range of motion limitations prevents ability to exit home safely..    Based on the above findings. I certify that this patient is confined to the home and needs intermittent skilled nursing care, physical therapy and/or speech therapy.  The patient is under my care, and I have initiated the establishment of the plan of care.  This patient will be followed by a provider who will periodically review the plan of care.    Provider to give follow up care: Sandra Low    Responsible Medicare certified [employer] Provider: JOSH Garcia CNP DNP  Provider Signature: See electronic signature associated with these discharge orders.  Date: 6/6/2023

## 2023-06-06 NOTE — LETTER
6/6/2023        RE: Roddy Sidhu  [address] [address] [facility] [address]        [facility]  Episodic/Acute/Follow-Up  [place] MRN: [id number]. Place of Service where encounter took place:  [facility] ([facility]) [[id number]]   Chief Complaint   Patient presents with     RECHECK    HPI: Roddy Sidhu  is a 77 year old (1945), who is being seen today for an episodic care visit. Today's concern is:    Marvin seen today on routine follow-up as he continues to rehab in [facility].  Last week, he noted a little bit more pain with the rehab, but was able to manage this.  He also was able to start Trulicity and we increased metformin.    Today, Marvin says that his pain is well controlled as long as he is keeps up with his Tylenol dosing.  He still has a little bit of phantom pain but complains less of this now.  He denies headache, dizziness, nausea, heartburn, shortness of breath, cough, fever.  He has had very little drainage from his incision site.  He confirms that he is not seeing his specialist until July, and only after that time would he get clearance for prosthesis fitting.    Past Medical and Surgical History reviewed in Epic today.  MEDICATIONS:  Current Outpatient Medications   Medication Sig Dispense Refill     acetaminophen (TYLENOL) 500 MG tablet Take 1,000 mg by mouth 3 times daily +650mg PO Qday PRN       aspirin (ASA) 325 MG EC tablet Take 325 mg by mouth daily       dulaglutide (TRULICITY) 0.75 MG/0.5ML pen Inject 0.75 mg Subcutaneous every 7 days 2 mL 1     ferrous gluconate (FERGON) 324 (38 Fe) MG tablet Take 1 tablet (324 mg) by mouth daily (with breakfast) 30 tablet 0     gabapentin (NEURONTIN) 300 MG capsule Take 1 capsule (300 mg) by mouth 3 times daily 270 capsule 3     insulin glargine (LANTUS PEN) 100 UNIT/ML pen Inject 10 Units Subcutaneous every morning 15 mL 1     latanoprost (XALATAN) 0.005 % ophthalmic solution Place 1 drop into both eyes At Bedtime        "metFORMIN (GLUCOPHAGE) 500 MG tablet Take 2 tablets (1,000 mg) by mouth 2 times daily (with meals) 180 tablet 1     oxyCODONE (ROXICODONE) 5 MG tablet Take 1 tablet (5 mg) by mouth 3 times daily as needed for pain 10 tablet 0     polyethylene glycol (MIRALAX) 17 GM/Dose powder Take 17 g by mouth daily 510 g 0     simvastatin (ZOCOR) 40 MG tablet TAKE ONE TABLET BY MOUTH EVERY NIGHT AT BEDTIME 90 tablet 3     vitamin D3 (CHOLECALCIFEROL) 50 mcg (2000 units) tablet Take 1 tablet by mouth daily 30 capsule      Objective: /73   Pulse 84   Temp 98.5  F (36.9  C)   Resp 16   Ht 1.676 m (5' 6\")   Wt 56.9 kg (125 lb 6.4 oz)   SpO2 99%   BMI 20.24 kg/m     BGs:    Exam:  GENERAL APPEARANCE: Alert, in no distress, cooperative.   RESP: Respiratory effort good, no respiratory distress, Lung sounds clear. On RA.   CV: Auscultation of heart reveals S1, S2, rate and rhythm regular, no murmur, no rub or gallop, Edema 0+ BLE.  PSYCH: Insight, judgement, and memory are forgetful at baseline, affect and mood are happy/engaged.    Labs: Labs done in facility are in EPIC. Please refer to them using [employer]/Care Everywhere.    ASSESSMENT/PLAN:  S/P BKA (below knee amputation) unilateral, left (H)  Blood loss anemia  Peripheral artery disease (H)  Aftercare following surgery of the musculoskeletal system  Type 2 diabetes mellitus with diabetic polyneuropathy, with long-term current use of insulin (H)  Benign essential hypertension  Acute on chronic. Ongoing.    Marvin is doing well status post below the knee amputation on the left.  Pain is manageable and he is able to get through rehab with just Tylenol.  He is feeling pretty confident about his abilities, but of note we are in a holding pattern for prosthesis fitting secondary to postoperative follow-up with specialist.    Blood glucose readings could benefit from further control.  In fact, if we can max out metformin and continue with Trulicity, we may be able to reduce or " eliminate Lantus altogether.  We will increase metformin as noted below.    Blood pressures appear to be overall controlled.  Continue plan of care.  Follow up w/in 1-2 weeks or as needed.    Orders:  1. Increase Metformin to 1000mg PO BID. DX: DM II.     Electronically signed by:  Dr. Aurora Nunes, JOSH CNP DNP          Sincerely,        JOSH Sands CNP

## 2023-06-13 ENCOUNTER — DISCHARGE SUMMARY NURSING HOME (OUTPATIENT)
Dept: GERIATRICS | Facility: CLINIC | Age: 78
End: 2023-06-13
Payer: COMMERCIAL

## 2023-06-13 VITALS
DIASTOLIC BLOOD PRESSURE: 70 MMHG | OXYGEN SATURATION: 97 % | TEMPERATURE: 97.1 F | SYSTOLIC BLOOD PRESSURE: 124 MMHG | BODY MASS INDEX: 20.89 KG/M2 | RESPIRATION RATE: 17 BRPM | WEIGHT: 130 LBS | HEART RATE: 87 BPM | HEIGHT: 66 IN

## 2023-06-13 DIAGNOSIS — I73.9 PERIPHERAL ARTERY DISEASE (H): ICD-10-CM

## 2023-06-13 DIAGNOSIS — D50.0 BLOOD LOSS ANEMIA: ICD-10-CM

## 2023-06-13 DIAGNOSIS — Z47.89 AFTERCARE FOLLOWING SURGERY OF THE MUSCULOSKELETAL SYSTEM: ICD-10-CM

## 2023-06-13 DIAGNOSIS — Z89.512 S/P BKA (BELOW KNEE AMPUTATION) UNILATERAL, LEFT (H): Primary | ICD-10-CM

## 2023-06-13 DIAGNOSIS — Z79.4 TYPE 2 DIABETES MELLITUS WITH DIABETIC POLYNEUROPATHY, WITH LONG-TERM CURRENT USE OF INSULIN (H): ICD-10-CM

## 2023-06-13 DIAGNOSIS — E11.42 TYPE 2 DIABETES MELLITUS WITH DIABETIC POLYNEUROPATHY, WITH LONG-TERM CURRENT USE OF INSULIN (H): ICD-10-CM

## 2023-06-13 PROCEDURE — 99316 NF DSCHRG MGMT 30 MIN+: CPT | Performed by: NURSE PRACTITIONER

## 2023-06-13 RX ORDER — OXYCODONE HYDROCHLORIDE 5 MG/1
5 TABLET ORAL 3 TIMES DAILY PRN
Qty: 20 TABLET | Refills: 0 | Status: SHIPPED | OUTPATIENT
Start: 2023-06-13 | End: 2023-11-21

## 2023-06-13 NOTE — LETTER
2023        RE: Roddy Sidhu  [address] [facility] [place] [address]        [facility] DISCHARGE SUMMARY  PATIENT'S NAME: Roddy Sidhu : 1945 MRN: [id number] Place of Service where encounter took place:  [facility] ON [facility] (TCU) [[id number]] PRIMARY CARE PROVIDER AND CLINIC RESPONSIBLE AFTER TRANSFER: Sandra Low, APRN CNP, [address] [facility] [address]; [facility] Provider     Transferring providers: Dr. Aurora Nunes, APRN CNP DNP.  Recent Hospitalization/ED: [facility] stay 23 to 23.  Date of TCU Admission: 2023.  Date of TCU (anticipated) Discharge: 23.  Discharged to: previous independent home  Cognitive Scores: SLUMS: , CPT: 5.1/5.6 and ACL: 4.6/5.8  Physical Function: Sit-to-stand and stand-pivot transfers, independent.  DME: None.  CODE STATUS/ADVANCE DIRECTIVES DISCUSSION: Full Code.  ALLERGIES: Patient has no known allergies.    DISCHARGE DIAGNOSIS/NURSING FACILITY COURSE:   S/P BKA (below knee amputation) unilateral, left (H)  Blood loss anemia  Peripheral artery disease (H)  Aftercare following surgery of the musculoskeletal system  Type 2 diabetes mellitus with diabetic polyneuropathy, with long-term current use of insulin (H)    Hospitalization: Marvin presented to [facility] on  w/ worsening left heel infection. He was already s/p debridement from Dr. Flores on  and was recovering in TCU. He ultimately underwent left BKA on  and completed abx per ID. He is on post-op Aspirin @ 325mg and was started on PPI ppx.     Rehab: Marvin presented to TCU on  s/p the above hospitalization.  He began rehab, his pain was overall well controlled, and we did adjust several diabetes medications in order to achieve simplicity and better control.  He was given an insurance driven discharge and denial of services, though we feel he should continue care.    Today, Marvin says he and his wife have been learning about wound care and already feel  confident about managing blood glucose and insulin injections.  He says his pain is well controlled, but he feels like he should follow-up with surgeon before July.  He denies headache, dizziness, shortness of breath, chest pain, says his appetite is very good and he is sleeping well.    Recommendations for PCP:    We are hoping Marvin's stay be extended. If not, he would benefit from strong outpatient case management. We suspect he and his spouse will need significant outpatient support.     Diabetes management is a work-in-progress.     Ongoing wound care needs may be anticipated and surgical stump should be followed closely.     Past Medical History:  has no past medical history on file.  Discharge Medications:  Current Outpatient Medications   Medication Sig Dispense Refill     acetaminophen (ACETAMINOPHEN 8 HOUR) 650 MG CR tablet Take 1 tablet by mouth every 8 hours as needed for mild pain or fever       acetaminophen (TYLENOL) 500 MG tablet Take 1,000 mg by mouth 3 times daily       aspirin (ASA) 325 MG EC tablet Take 325 mg by mouth daily       dulaglutide (TRULICITY) 0.75 MG/0.5ML pen Inject 0.75 mg Subcutaneous every 7 days 2 mL 1     ferrous gluconate (FERGON) 324 (38 Fe) MG tablet Take 1 tablet (324 mg) by mouth daily (with breakfast) 30 tablet 0     gabapentin (NEURONTIN) 300 MG capsule Take 1 capsule (300 mg) by mouth 3 times daily 270 capsule 3     insulin glargine (LANTUS PEN) 100 UNIT/ML pen Inject 10 Units Subcutaneous every morning 15 mL 1     latanoprost (XALATAN) 0.005 % ophthalmic solution Place 1 drop into both eyes At Bedtime       metFORMIN (GLUCOPHAGE) 500 MG tablet Take 2 tablets (1,000 mg) by mouth 2 times daily (with meals) 180 tablet 1     oxyCODONE (ROXICODONE) 5 MG tablet Take 1 tablet (5 mg) by mouth 3 times daily as needed for pain 20 tablet 0     polyethylene glycol (MIRALAX) 17 GM/Dose powder Take 17 g by mouth daily 510 g 0     simvastatin (ZOCOR) 40 MG tablet TAKE ONE TABLET BY  "MOUTH EVERY NIGHT AT BEDTIME 90 tablet 3     vitamin D3 (CHOLECALCIFEROL) 50 mcg (2000 units) tablet Take 1 tablet by mouth daily 30 capsule       ROS: Limited secondary to cognitive impairment but today pt reports the above and 4 point ROS including Respiratory, CV, GI and , other than that noted in the HPI,  is negative    Physical Exam: Vitals: /70   Pulse 87   Temp 97.1  F (36.2  C)   Resp 17   Ht 1.676 m (5' 6\")   Wt 59 kg (130 lb)   SpO2 97%   BMI 20.98 kg/m     BGs:    GENERAL APPEARANCE: Alert, in no distress, cooperative.   ENT: Mouth/posterior oropharynx intact w/ moist mucous membranes, hearing acuity [place].  EYES: EOM, conjunctivae, lids, pupils and irises normal, PERRL2.   RESP: Respiratory effort good, no respiratory distress, Lung sounds clear. On RA.   CV: Auscultation of heart reveals S1, S2, rate and rhythm regular, no murmur, no rub or gallop, Edema 0+ BLE.  ABDOMEN: Normal bowel sounds, soft, non-tender abdomen, and no masses palpated.  SKIN: Inspection/Palpation of skin and subcutaneous tissue baseline w/ fragility. No wounds/rashes noted except known LLE stump:      NEURO: CN II-XII at patient's baseline, sensation baseline PPS.  PSYCH: Insight, judgement, and memory are forgetful at baseline, affect and mood are happy/engaged.    Facility Labs: Labs done in SNF are in [place] EPIC. Please refer to them using EPIC/Care Everywhere.    DISCHARGE PLAN:    Follow up labs: No labs orders/due    Medical Follow Up:  Follow up with primary care provider in 1-4 weeks    MTM referral needed and placed by this provider: Yes: per [facility] Geriatrics Protocol.    Current [place] scheduled appointments: None.  Discharge Services: Home Care:  Occupational Therapy, Physical Therapy, Registered Nurse and Home Health Aide    Orders:  1. MTM referral x1, routine. Dx: TCU Discharge.  2. Discontinue Current stump site care.   3. Stump care daily and PRN:    Clean w/ vashe/pat dry.    Apply non-stick " telfa.    Secure w/ kerlix/tape.  (Message to surgical team pending). Dx: left BKA.      TOTAL DISCHARGE TIME:   Greater than 30 minutes    Electronically signed by:  JOSH Garcia CNP [facility]  ______________      Documentation of Face-to-Face and Certification for Home Health Services   Patient: Roddy Sidhu YOB: 1945  MRN: [id number]  Today's Date: 6/13/2023  I certify that patient: Roddy Sidhu is under my care and that I had a face-to-face encounter that meets the provider  face-to-face encounter requirements with this patient on: 6/13/2023. This encounter with the patient was in whole, or in part, for the following medical condition, which is the primary reason for home health care: s/p left BKA. I certify that, based on my findings, the following services are medically necessary home health services: Nursing, Occupational Therapy and Physical Therapy. My clinical findings support the need for the above services because: Nurse is needed: For complex aftercare of surgical procedures because the patient needs instruction and cannot perform care on their own due to: loss of limb.., Occupational Therapy Services are needed to assess and treat cognitive ability and address ADL safety due to impairment in mobility. and Physical Therapy Services are needed to assess and treat the following functional impairments: mobility.    Further, I certify that my clinical findings support that this patient is homebound (i.e. absences from home require considerable and taxing effort and are for medical reasons or [employer] services or infrequently or of short duration when for other reasons) because: Requires assistance of another person or specialized equipment to access medical services because patient: Range of motion limitations prevents ability to exit home safely...    Based on the above findings. I certify that this patient is confined to the home and needs intermittent skilled nursing care,  physical therapy and/or speech therapy.  The patient is under my care, and I have initiated the establishment of the plan of care.  This patient will be followed by a provider who will periodically review the plan of care.    Provider to give follow up care: Sandra oLw    Responsible Medicare certified PECOS Provider: JOSH Garcia CNP, DNP  Provider Signature: See electronic signature associated with these discharge orders.  Date: 6/13/2023  __________________    After the conclusion of this visit, provider did hear back from surgeon and they will be taking him in for further debridement and placement of wound VAC on 6/16.  An EKG was ordered by myself, and a preop physical was done by the surgical team already.  Several orders placed by surgical team for preoperative care.  Provider added additional orders for preoperative period as noted below on 6/15:    ORDERS:  1. EKG x1, routine. Dx: Preop.  2.  On day of surgery HOLD:    Trulicity.    Lantus.    Metformin.    FeGlu.    Miralax.    Vitamin D    Surgical team has continued Aspirin dosing, given hygiene, diet orders, etc.     Electronically signed by:  JOSH Garcia CNP, DNP                   Sincerely,        JOSH Sands CNP

## 2023-06-13 NOTE — PROGRESS NOTES
[facility] TCU DISCHARGE SUMMARY  PATIENT'S NAME: Roddy Sidhu : 1945 MRN: [id number] Place of Service where encounter took place:  [facility] ON [facility] (TCU) [4002] PRIMARY CARE PROVIDER AND CLINIC RESPONSIBLE AFTER TRANSFER: Sandra Low, JOSH CNP, [address] [facility] [address]; [facility] Provider     Transferring providers: Dr. Aurora Nunes, APRN CNP DNP.  Recent Hospitalization/ED: [facility] stay 23 to 23.  Date of TCU Admission: 2023.  Date of TCU (anticipated) Discharge: 23.  Discharged to: previous independent home  Cognitive Scores: SLUMS: , CPT: 5.1/5.6 and ACL: 4.6/5.8  Physical Function: Sit-to-stand and stand-pivot transfers, independent.  DME: None.  CODE STATUS/ADVANCE DIRECTIVES DISCUSSION: Full Code.  ALLERGIES: Patient has no known allergies.    DISCHARGE DIAGNOSIS/NURSING FACILITY COURSE:   S/P BKA (below knee amputation) unilateral, left (H)  Blood loss anemia  Peripheral artery disease (H)  Aftercare following surgery of the musculoskeletal system  Type 2 diabetes mellitus with diabetic polyneuropathy, with long-term current use of insulin (H)    Hospitalization: Marvin presented to [facility] on  w/ worsening left heel infection. He was already s/p debridement from Dr. Flores on  and was recovering in TCU. He ultimately underwent left BKA on  and completed abx per ID. He is on post-op Aspirin @ 325mg and was started on PPI ppx.     Rehab: Marvin presented to TCU on  s/p the above hospitalization.  He began rehab, his pain was overall well controlled, and we did adjust several diabetes medications in order to achieve simplicity and better control.  He was given an insurance driven discharge and denial of services, though we feel he should continue care.    Today, Marvin says he and his wife have been learning about wound care and already feel confident about managing blood glucose and insulin injections.  He says his pain is well  controlled, but he feels like he should follow-up with surgeon before July.  He denies headache, dizziness, shortness of breath, chest pain, says his appetite is very good and he is sleeping well.    Recommendations for PCP:    We are hoping Marvin's stay be extended. If not, he would benefit from strong outpatient case management. We suspect he and his spouse will need significant outpatient support.     Diabetes management is a work-in-progress.     Ongoing wound care needs may be anticipated and surgical stump should be followed closely.     Past Medical History:  has no past medical history on file.  Discharge Medications:  Current Outpatient Medications   Medication Sig Dispense Refill     acetaminophen (ACETAMINOPHEN 8 HOUR) 650 MG CR tablet Take 1 tablet by mouth every 8 hours as needed for mild pain or fever       acetaminophen (TYLENOL) 500 MG tablet Take 1,000 mg by mouth 3 times daily       aspirin (ASA) 325 MG EC tablet Take 325 mg by mouth daily       dulaglutide (TRULICITY) 0.75 MG/0.5ML pen Inject 0.75 mg Subcutaneous every 7 days 2 mL 1     ferrous gluconate (FERGON) 324 (38 Fe) MG tablet Take 1 tablet (324 mg) by mouth daily (with breakfast) 30 tablet 0     gabapentin (NEURONTIN) 300 MG capsule Take 1 capsule (300 mg) by mouth 3 times daily 270 capsule 3     insulin glargine (LANTUS PEN) 100 UNIT/ML pen Inject 10 Units Subcutaneous every morning 15 mL 1     latanoprost (XALATAN) 0.005 % ophthalmic solution Place 1 drop into both eyes At Bedtime       metFORMIN (GLUCOPHAGE) 500 MG tablet Take 2 tablets (1,000 mg) by mouth 2 times daily (with meals) 180 tablet 1     oxyCODONE (ROXICODONE) 5 MG tablet Take 1 tablet (5 mg) by mouth 3 times daily as needed for pain 20 tablet 0     polyethylene glycol (MIRALAX) 17 GM/Dose powder Take 17 g by mouth daily 510 g 0     simvastatin (ZOCOR) 40 MG tablet TAKE ONE TABLET BY MOUTH EVERY NIGHT AT BEDTIME 90 tablet 3     vitamin D3 (CHOLECALCIFEROL) 50 mcg (2000  "units) tablet Take 1 tablet by mouth daily 30 capsule       ROS: Limited secondary to cognitive impairment but today pt reports the above and 4 point ROS including Respiratory, CV, GI and , other than that noted in the HPI,  is negative    Physical Exam: Vitals: /70   Pulse 87   Temp 97.1  F (36.2  C)   Resp 17   Ht 1.676 m (5' 6\")   Wt 59 kg (130 lb)   SpO2 97%   BMI 20.98 kg/m     BGs:    GENERAL APPEARANCE: Alert, in no distress, cooperative.   ENT: Mouth/posterior oropharynx intact w/ moist mucous membranes, hearing acuity [place].  EYES: EOM, conjunctivae, lids, pupils and irises normal, PERRL2.   RESP: Respiratory effort good, no respiratory distress, Lung sounds clear. On RA.   CV: Auscultation of heart reveals S1, S2, rate and rhythm regular, no murmur, no rub or gallop, Edema 0+ BLE.  ABDOMEN: Normal bowel sounds, soft, non-tender abdomen, and no masses palpated.  SKIN: Inspection/Palpation of skin and subcutaneous tissue baseline w/ fragility. No wounds/rashes noted except known LLE stump:      NEURO: CN II-XII at patient's baseline, sensation baseline PPS.  PSYCH: Insight, judgement, and memory are forgetful at baseline, affect and mood are happy/engaged.    Facility Labs: Labs done in SNF are in [place] EPIC. Please refer to them using EPIC/Care Everywhere.    DISCHARGE PLAN:    Follow up labs: No labs orders/due    Medical Follow Up:  Follow up with primary care provider in 1-4 weeks    MTM referral needed and placed by this provider: Yes: per [facility] Geriatrics Protocol.    Current [place] scheduled appointments: None.  Discharge Services: Home Care:  Occupational Therapy, Physical Therapy, Registered Nurse and Home Health Aide    Orders:  1. MTM referral x1, routine. Dx: TCU Discharge.  2. Discontinue Current stump site care.   3. Stump care daily and PRN:    Clean w/ vashe/pat dry.    Apply non-stick telfa.    Secure w/ kerlix/tape.  (Message to surgical team pending). Dx: left BKA.  "     TOTAL DISCHARGE TIME:   Greater than 30 minutes    Electronically signed by:  Dr. Aurora Nunes, APRN CNP DNP  ______________      Documentation of Face-to-Face and Certification for Home Health Services   Patient: Roddy Sidhu YOB: 1945  MRN: [id number]  Today's Date: 6/13/2023  I certify that patient: Roddy Sidhu is under my care and that I had a face-to-face encounter that meets the provider  face-to-face encounter requirements with this patient on: 6/13/2023. This encounter with the patient was in whole, or in part, for the following medical condition, which is the primary reason for home health care: s/p left BKA. I certify that, based on my findings, the following services are medically necessary home health services: Nursing, Occupational Therapy and Physical Therapy. My clinical findings support the need for the above services because: Nurse is needed: For complex aftercare of surgical procedures because the patient needs instruction and cannot perform care on their own due to: loss of limb.., Occupational Therapy Services are needed to assess and treat cognitive ability and address ADL safety due to impairment in mobility. and Physical Therapy Services are needed to assess and treat the following functional impairments: mobility.    Further, I certify that my clinical findings support that this patient is homebound (i.e. absences from home require considerable and taxing effort and are for medical reasons or [employer] services or infrequently or of short duration when for other reasons) because: Requires assistance of another person or specialized equipment to access medical services because patient: Range of motion limitations prevents ability to exit home safely...    Based on the above findings. I certify that this patient is confined to the home and needs intermittent skilled nursing care, physical therapy and/or speech therapy.  The patient is under my care, and I have  initiated the establishment of the plan of care.  This patient will be followed by a provider who will periodically review the plan of care.    Provider to give follow up care: Sandra Low    Responsible Medicare certified PECOS Provider: JOSH Garcia CNP, DNP  Provider Signature: See electronic signature associated with these discharge orders.  Date: 6/13/2023  __________________    After the conclusion of this visit, provider did hear back from surgeon and they will be taking him in for further debridement and placement of wound VAC on 6/16.  An EKG was ordered by myself, and a preop physical was done by the surgical team already.  Several orders placed by surgical team for preoperative care.  Provider added additional orders for preoperative period as noted below on 6/15:    ORDERS:  1. EKG x1, routine. Dx: Preop.  2.  On day of surgery HOLD:    Trulicity.    Lantus.    Metformin.    FeGlu.    Miralax.    Vitamin D    Surgical team has continued Aspirin dosing, given hygiene, diet orders, etc.     Electronically signed by:  JOSH Garcia CNP, DNP

## 2023-06-14 ENCOUNTER — DOCUMENTATION ONLY (OUTPATIENT)
Dept: OTHER | Facility: CLINIC | Age: 78
End: 2023-06-14
Payer: COMMERCIAL

## 2023-06-14 ENCOUNTER — OFFICE VISIT (OUTPATIENT)
Dept: OTHER | Facility: CLINIC | Age: 78
End: 2023-06-14
Payer: COMMERCIAL

## 2023-06-14 VITALS — HEART RATE: 79 BPM | SYSTOLIC BLOOD PRESSURE: 200 MMHG | DIASTOLIC BLOOD PRESSURE: 75 MMHG

## 2023-06-14 DIAGNOSIS — T81.31XA POSTOPERATIVE WOUND DEHISCENCE, INITIAL ENCOUNTER: Primary | ICD-10-CM

## 2023-06-14 DIAGNOSIS — Z89.512 STATUS POST BELOW-KNEE AMPUTATION OF LEFT LOWER EXTREMITY (H): Primary | ICD-10-CM

## 2023-06-14 PROCEDURE — G0463 HOSPITAL OUTPT CLINIC VISIT: HCPCS

## 2023-06-14 PROCEDURE — 99024 POSTOP FOLLOW-UP VISIT: CPT

## 2023-06-14 NOTE — PROGRESS NOTES
Progress notes, wound vac RX and patient demographics faxed to [facility] at [phone].  Confirmed via RightFax at [phone].  RX also sent to [facility] to be scanned into record.  CODY MarteN, RN-[facility]

## 2023-06-14 NOTE — PROGRESS NOTES
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 Weinstein, was recovering well. Has been at [facility] for post-operative cares. Marvin has not been seeing the wound however has had excellent wound care every other day with dressing by their staff and stump protector. However recently staff has noticed wound breakdown, pictures are in the chart. The incision is breaking down with erythema noted along the lateral aspect of the incision. Roughly 10 cm by 2 cm of breakdown. No drainage noted.    S1 S2 noted  Lung sounds are clear    RECOMMENDATION/RISKS: Discussed with Marvin that he should undergo debridement with wound vac placement, OR slated for 6/16 at 0825 am. Discussed NPO at midnight. Can continue to take all medications, including aspirin. Dr. Weinstein saw the patient as well.     HPI:  Roddy Sidhu is a 77 year old male with history of diabetes mellitus with polyneuropathy transferred from [facility] on 5/13/2023 with poor healing left heel wound and concern for vascular compromise-requiring vascular surgery evaluation.     Patient admitted at [facility] (3/29-4/4) for fall with R femur fx, R humerus fx, R clavicle fx s/p R femur ORIF and discharged to TCU.     REVIEW OF SYSTEMS:    A 12 point ROS was reviewed and is negative except for what is listed above in HPI.    PHH:  No past medical history on file.       Past Surgical History:   Procedure Laterality Date     AMPUTATE LEG BELOW KNEE Left 5/19/2023    Procedure: LEFT BELOW KNEE AMPUTATION;  Surgeon: Raimundo Weinstein MD;  Location: [facility] OR     INCISION AND DRAINAGE FOOT, COMBINED Left 5/9/2023    Procedure:  Incision and Debidement of Pressure Wound, Left Heel;  Surgeon: Paul Flores DPM;  Location: [facility] OR     IR LOWER EXTREMITY ANGIOGRAM LEFT  5/16/2023     SURGICAL HISTORY OF -   1990    removal of steel from left eye       ALLERGIES:  Patient has no known allergies.    MEDS:    Current Outpatient Medications:      acetaminophen (TYLENOL) 500 MG tablet, Take 1,000 mg by mouth 3 times daily +650mg PO Qday PRN, Disp: , Rfl:      aspirin (ASA) 325 MG EC tablet, Take 325 mg by mouth daily, Disp: , Rfl:      dulaglutide (TRULICITY) 0.75 MG/0.5ML pen, Inject 0.75 mg Subcutaneous every 7 days, Disp: 2 mL, Rfl: 1     ferrous gluconate (FERGON) 324 (38 Fe) MG tablet, Take 1 tablet (324 mg) by mouth daily (with breakfast), Disp: 30 tablet, Rfl: 0     gabapentin (NEURONTIN) 300 MG capsule, Take 1 capsule (300 mg) by mouth 3 times daily, Disp: 270 capsule, Rfl: 3     insulin glargine (LANTUS PEN) 100 UNIT/ML pen, Inject 10 Units Subcutaneous every morning, Disp: 15 mL, Rfl: 1     latanoprost (XALATAN) 0.005 % ophthalmic solution, Place 1 drop into both eyes At Bedtime, Disp: , Rfl:      metFORMIN (GLUCOPHAGE) 500 MG tablet, Take 2 tablets (1,000 mg) by mouth 2 times daily (with meals), Disp: 180 tablet, Rfl: 1     oxyCODONE (ROXICODONE) 5 MG tablet, Take 1 tablet (5 mg) by mouth 3 times daily as needed for pain, Disp: 20 tablet, Rfl: 0     polyethylene glycol (MIRALAX) 17 GM/Dose powder, Take 17 g by mouth daily, Disp: 510 g, Rfl: 0     simvastatin (ZOCOR) 40 MG tablet, TAKE ONE TABLET BY MOUTH EVERY NIGHT AT BEDTIME, Disp: 90 tablet, Rfl: 3     vitamin D3 (CHOLECALCIFEROL) 50 mcg (2000 units) tablet, Take 1 tablet by mouth daily, Disp: 30 capsule, Rfl:     SOCIAL HABITS:    History   Smoking Status     Some Days     Types: Pipe   Smokeless Tobacco     Never     Social History    Substance and Sexual Activity      Alcohol use: Not Currently      History   Drug Use No       FAMILY HISTORY:    Family History   Problem  Relation Age of Onset     Breast Cancer Mother 60     C.A.D. Brother 58        MI       PE:  BP (!) 200/75 (BP Location: Left arm, Patient Position: Chair, Cuff Size: Adult Regular)   Pulse 79   Wt Readings from Last 1 Encounters:   06/13/23 130 lb (59 kg)     There is no height or weight on file to calculate BMI.    EXAM:  GENERAL: well-developed 77 year old male who appears his stated age  CARDIAC: normal   CHEST/LUNG: normal respiratory effort   MUSCULOSKELETAL: grossly normal and both lower extremities are intact, no lower extremity edema  NEUROLOGIC: focally intact, alert and oriented x 3  PSYCH: appropriate affect  VASCULAR:       DIAGNOSTIC STUDIES:     Images:  IR Lower Extremity Angiogram Left    Result Date: 5/16/2023  Preprocedure diagnosis: Left heel nonhealing ulcer, Teague grade 6 disease. Postprocedure diagnosis: Same. Procedure: 1.  Ultrasound-guided right common femoral artery access placement with aortoiliac arteriogram. 2.  Selective catheterization of left common iliac, external iliac, common femoral, superficial femoral and popliteal arteries. 3. Left lower extremity arteriogram with runoff. 4.  Right common femoral arteriogram with right common femoral artery access site closure with 6 Bulgarian Angio-Seal device. Surgeon: Raimundo Weinstein M.D. Assistant: Tricia Cherry M.D. Sedation: Patient received an 1 mg of intravenous Versed and 50 mcg of intravenous fentanyl.  Medication was administered by registered nurse in the department of interventional radiology under my direct supervision.  Patient was continuously monitored. Sedation time: 25 minutes. Fluoroscopy time: 4.9 minutes. Fluoroscopy dose: 31.97 mGy. Contrast: 26 mL. Local anesthetic: 10 mL of 1% lidocaine was used. Indication for procedure: This is a 77-year-old male who had developed a pressure ulcer in his left heel following immobilization due to hip fracture.  Arteriogram was advised to evaluate for any potential for  revascularization and to see if the limb is salvageable. Procedure details: Patient was identified and then taken to the radiology suite and placed in supine position.  Both the groins were prepped and a sterile surgical field was created.  Preprocedure timeout was conducted.  The right common femoral artery was located anterior to the right femoral head by way of fluoroscopy.  It was further localized with ultrasonography.  Local anesthetic was administered.  Images were stored.  The right common femoral artery was accessed with a micropuncture needle and retrograde fashion followed by placement of a microwire.  This was then converted to a 0.035 inch wire system and a short 5 Kazakh sheath was placed.  An Omni Flush catheter was advanced into the distal aorta and aortoiliac arteriogram was performed which showed that the distal aorta, both common iliac arteries, both hypogastric arteries and both external iliac arteries were widely patent.  Wire and catheter were then advanced into the distal left external iliac artery.  Arteriogram showed that the distal left external iliac artery, common femoral artery, superficial femoral artery and deep femoral arteries were patent.  Wire was advanced into the proximal superficial femoral artery.  We then converted over to a Navicross catheter.  Arteriogram showed that the superficial femoral artery was patent.  Wire and catheter were then advanced into the second segment of the popliteal artery.  This showed that the second segment of popliteal artery had tandem stenoses over a short segment.  But it was still patent.  The anterior tibial artery was patent down to the foot.  The tibioperoneal trunk was a very diseased vessel.  Posterior tibial and peroneal arteries were occluded without any reconstitution.  There was no direct inflow or revascularization targets at the level of the angiosomes of the open wound at the calcaneus. Procedure was concluded as there were no targets  for revascularization.  Right common femoral arteriogram was performed and the access site was closed with a 6 Burmese Angio-Seal device. BETHANY HAILE MD   SYSTEM ID:  [id number]    LABS:      Sodium   Date Value Ref Range Status   05/31/2023 134 (L) 136 - 145 mmol/L Final   05/20/2023 136 136 - 145 mmol/L Final   05/19/2023 138 136 - 145 mmol/L Final   05/14/2021 136 133 - 144 mmol/L Final   12/06/2019 138 133 - 144 mmol/L Final   03/12/2019 135 133 - 144 mmol/L Final     Urea Nitrogen   Date Value Ref Range Status   05/31/2023 28.0 (H) 8.0 - 23.0 mg/dL Final   05/20/2023 11.1 8.0 - 23.0 mg/dL Final   05/19/2023 10.5 8.0 - 23.0 mg/dL Final   05/17/2022 27 7 - 30 mg/dL Final   11/02/2021 19 7 - 30 mg/dL Final   05/14/2021 21 7 - 30 mg/dL Final   12/06/2019 20 7 - 30 mg/dL Final   03/12/2019 21 7 - 30 mg/dL Final     Hemoglobin   Date Value Ref Range Status   05/31/2023 8.9 (L) 13.3 - 17.7 g/dL Final   05/24/2023 8.0 (L) 13.3 - 17.7 g/dL Final   05/22/2023 8.5 (L) 13.3 - 17.7 g/dL Final   12/27/2012 15.3 13.3 - 17.7 g/dL Final     Platelet Count   Date Value Ref Range Status   05/31/2023 401 150 - 450 10e3/uL Final   05/24/2023 481 (H) 150 - 450 10e3/uL Final   05/22/2023 505 (H) 150 - 450 10e3/uL Final   12/27/2012 382 150 - 450 10e9/L Final       30 minutes spent on the day of encounter doing chart review, history and exam, documentation, and further activities as noted.       Cheryl Carl NP  VASCULAR SURGERY

## 2023-06-14 NOTE — PATIENT INSTRUCTIONS
[facility]  [id number]  [place] [address]  Phone: [phone]  Fax: [phone]      CLINIC DISCHARGE INSTRUCTIONS    Patient:  Roddy Poseyer  Provider seen in clinic: Cheryl Carl NP    REASON FOR VISIT:    Follow left below knee amputation    TESTING/LABS COMPLETED:  none    RECOMMENDATIONS:  Surgery  Scheduled for Friday, June 16th 2023  Arrive to the Welcome Desk at [facility] at 6:25am.  Stop eating solid foods at 10:25pm on Thursday.  Clear liquids are ok until 4:25am on Friday, nothing additional after 4:25am.  Ok to take morning medications with a small sip of water.  Continue taking aspirin, do not hold.    Due to licensure requirements, you must be in the [employer] for phone or video appointments.    If you had a positive experience, please indicate on your patient satisfaction survey form that [facility] will be sending you.  This may also be completed as a phone call.    If you have any billing related questions please call the [facility] Business office at [phone]. The clinic staff does not handle billing related matters.    Please allow 2 business days for any medication refills to be completed.    Thank you for choosing [facility] for your vascular care.  Please call us if you have any questions or concerns.

## 2023-06-14 NOTE — PROGRESS NOTES
Wound VAC request submitted to [employer].    To view the progress for your rental order with [facility] please visit the following link:    [email or web address]      This link will remain active until the order has been fulfilled, canceled, or until two weeks have elapsed since the product was requested.  If you have further questions about the status of your order, please call our National Contact Center at [phone]    Awaiting clinical notes from today's visit to submit necessary documents to [employer]/CODY FarfanN, RN-[facility] [place]

## 2023-06-14 NOTE — PROGRESS NOTES
[facility]        Patient is here for a  follow up.     Pt is currently taking Aspirin and Statin.    BP (!) 200/75 (BP Location: Left arm, Patient Position: Chair, Cuff Size: Adult Regular)   Pulse 79     The provider has been notified that the patient has no concerns.     Questions patient would like addressed today are: N/A.    Refills are needed: N/A    Has homecare services and agency name:  Adriana Gunderson MA

## 2023-06-14 NOTE — NURSING NOTE
Patient Education    Procedure: Irrigation and debridement of left below knee amputation; wound vac placement to left below knee amputation  Diagnosis: s/p left below knee amputation  Anticoagulation Instruction: continue ASA  Pre-Operative Physical Exam: You need to have a pre-op physical exam within 30 days of your procedure. Your procedure may be cancelled if you do not have a current History and Physical. Call your PCP's office to schedule.  Allergies:  Updated in Epic  Bowel Prep: n/a  NPO for solid 8 hours prior to arrival time.   NPO for clear liquids 2 hours prior to arrival time.   Post Procedure Education: [facility] patient post-procedure fact sheet reviewed with patient.    Showering instructions reviewed: Yes    Learner(s):patient  Method: Listening, Reading  Barriers to Learning:No Barrier  Outcome: Patient did verbalize understanding of above education.    Naheed Dailey, CODYN, RN-[facility] [place]

## 2023-06-15 ENCOUNTER — ANESTHESIA EVENT (OUTPATIENT)
Dept: SURGERY | Facility: CLINIC | Age: 78
End: 2023-06-15
Payer: COMMERCIAL

## 2023-06-15 RX ORDER — SENNOSIDES 8.6 MG
1 CAPSULE ORAL EVERY 8 HOURS PRN
COMMUNITY
End: 2023-11-21

## 2023-06-15 ASSESSMENT — ENCOUNTER SYMPTOMS
SEIZURES: 0
DYSRHYTHMIAS: 0

## 2023-06-15 ASSESSMENT — LIFESTYLE VARIABLES: TOBACCO_USE: 1

## 2023-06-15 NOTE — ANESTHESIA PREPROCEDURE EVALUATION
Anesthesia Pre-Procedure Evaluation    Patient: Roddy Sidhu   MRN: [id number] : 1945        Procedure : Procedure(s):  IRRIGATION AND DEBRIDEMENT OF LEFT BELOW KNEE AMPUTATION; WOUND VAC PLACEMENT OF LEFT BELOW KNEE AMPUTATION          No past medical history on file.   Past Surgical History:   Procedure Laterality Date     AMPUTATE LEG BELOW KNEE Left 2023    Procedure: LEFT BELOW KNEE AMPUTATION;  Surgeon: Raimundo Weinstein MD;  Location: [facility] OR     INCISION AND DRAINAGE FOOT, COMBINED Left 2023    Procedure: Incision and Debidement of Pressure Wound, Left Heel;  Surgeon: Paul Flores DPM;  Location: [facility] OR     IR LOWER EXTREMITY ANGIOGRAM LEFT  2023     SURGICAL HISTORY OF -       removal of steel from left eye      No Known Allergies   Social History     Tobacco Use     Smoking status: Some Days     Types: Pipe     Smokeless tobacco: Never   Vaping Use     Vaping status: Never Used   Substance Use Topics     Alcohol use: Not Currently      Wt Readings from Last 1 Encounters:   23 59 kg (130 lb)        Anesthesia Evaluation   Pt has had prior anesthetic.     No history of anesthetic complications       ROS/MED HX  ENT/Pulmonary:     (+) tobacco use, Current use,  (-) sleep apnea and recent URI   Neurologic:     (+) peripheral neuropathy,  (-) no seizures, no CVA and no TIA   Cardiovascular:     (+) Dyslipidemia -Peripheral Vascular Disease---- (-) arrhythmias   METS/Exercise Tolerance: 1 - Eating, dressing    Hematologic: Comments: Thrombocytosis likely reactive in the setting of infection    (+) anemia (Acute on chronic anemia no active bleeding  Status post PRBC transfusion ),     Musculoskeletal: Comment: S/p BKA      GI/Hepatic:    (-) GERD and liver disease   Renal/Genitourinary:     (+) renal disease,     Endo:     (+) type II DM (Acute on chronic worsening left heel wound), Diabetic complications: neuropathy.     Psychiatric/Substance Use:        Infectious Disease:    (-) Recent Fever   Malignancy:       Other:            Physical Exam    Airway        Mallampati: II   TM distance: > 3 FB   Neck ROM: full   Mouth opening: > 3 cm    Respiratory Devices and Support         Dental     Comment: Upper edentulous, bottom missing and decayed    (+) Multiple visibly decayed, broken teeth      Cardiovascular   cardiovascular exam normal          Pulmonary   pulmonary exam normal                OUTSIDE LABS:  CBC:   Lab Results   Component Value Date    WBC 8.1 05/31/2023    WBC 9.4 05/24/2023    HGB 8.9 (L) 05/31/2023    HGB 8.0 (L) 05/24/2023    HCT 28.6 (L) 05/31/2023    HCT 25.9 (L) 05/24/2023     05/31/2023     (H) 05/24/2023     BMP:   Lab Results   Component Value Date     (L) 05/31/2023     05/20/2023    POTASSIUM 5.3 05/31/2023    POTASSIUM 4.8 05/20/2023    CHLORIDE 98 05/31/2023    CHLORIDE 101 05/20/2023    CO2 27 05/31/2023    CO2 27 05/20/2023    BUN 28.0 (H) 05/31/2023    BUN 11.1 05/20/2023    CR 0.96 05/31/2023    CR 0.74 05/20/2023     (H) 05/31/2023     (H) 05/24/2023     COAGS: No results found for: PTT, INR, FIBR  POC: No results found for: BGM, HCG, HCGS  HEPATIC:   Lab Results   Component Value Date    ALBUMIN 2.9 (L) 05/20/2023    PROTTOTAL 5.7 (L) 05/17/2023    ALT 8 (L) 05/17/2023    AST 11 05/17/2023    ALKPHOS 74 05/17/2023    BILITOTAL 0.2 05/17/2023     OTHER:   Lab Results   Component Value Date    LACT 1.1 05/04/2023    A1C 8.3 (H) 05/02/2023    BEST 9.1 05/31/2023    TSH 4.35 (H) 06/08/2018    T4 1.17 06/17/2016       Anesthesia Plan    ASA Status:  3   NPO Status:  NPO Appropriate    Anesthesia Type: General.     - Airway: LMA   Induction: Intravenous, Propofol.   Maintenance: Balanced.        Consents    Anesthesia Plan(s) and associated risks, benefits, and realistic alternatives discussed. Questions answered and patient/representative(s) expressed understanding.    - Discussed:     -  Discussed with:  Patient         Postoperative Care    Pain management: IV analgesics.   PONV prophylaxis: Ondansetron (or other 5HT-3), Background Propofol Infusion     Comments:                Tai Louis MD

## 2023-06-16 ENCOUNTER — HOSPITAL ENCOUNTER (OUTPATIENT)
Facility: CLINIC | Age: 78
Discharge: HOME | End: 2023-06-16
Attending: SURGERY | Admitting: SURGERY
Payer: COMMERCIAL

## 2023-06-16 ENCOUNTER — ANESTHESIA (OUTPATIENT)
Dept: SURGERY | Facility: CLINIC | Age: 78
End: 2023-06-16
Payer: COMMERCIAL

## 2023-06-16 ENCOUNTER — TELEPHONE (OUTPATIENT)
Dept: OTHER | Facility: CLINIC | Age: 78
End: 2023-06-16

## 2023-06-16 ENCOUNTER — APPOINTMENT (OUTPATIENT)
Dept: SURGERY | Facility: PHYSICIAN GROUP | Age: 78
End: 2023-06-16
Payer: COMMERCIAL

## 2023-06-16 VITALS
OXYGEN SATURATION: 98 % | HEIGHT: 70 IN | DIASTOLIC BLOOD PRESSURE: 72 MMHG | WEIGHT: 129 LBS | BODY MASS INDEX: 18.47 KG/M2 | RESPIRATION RATE: 16 BRPM | SYSTOLIC BLOOD PRESSURE: 139 MMHG | HEART RATE: 84 BPM | TEMPERATURE: 97.7 F

## 2023-06-16 DIAGNOSIS — S88.112A BELOW-KNEE AMPUTATION OF LEFT LOWER EXTREMITY (H): ICD-10-CM

## 2023-06-16 DIAGNOSIS — Z89.519 S/P BKA (BELOW KNEE AMPUTATION) (H): Primary | ICD-10-CM

## 2023-06-16 DIAGNOSIS — I73.9 PERIPHERAL ARTERY DISEASE (H): Primary | ICD-10-CM

## 2023-06-16 DIAGNOSIS — Z89.512 HISTORY OF LEFT BELOW KNEE AMPUTATION (H): Primary | ICD-10-CM

## 2023-06-16 LAB
GLUCOSE BLDC GLUCOMTR-MCNC: 156 MG/DL (ref 70–99)
GLUCOSE BLDC GLUCOMTR-MCNC: 200 MG/DL (ref 70–99)

## 2023-06-16 PROCEDURE — 93000 ELECTROCARDIOGRAM COMPLETE: CPT | Mod: XU

## 2023-06-16 PROCEDURE — 250N000011 HC RX IP 250 OP 636: Performed by: SURGERY

## 2023-06-16 PROCEDURE — 258N000003 HC RX IP 258 OP 636

## 2023-06-16 PROCEDURE — 11042 DBRDMT SUBQ TIS 1ST 20SQCM/<: CPT | Mod: 78 | Performed by: SURGERY

## 2023-06-16 PROCEDURE — 258N000003 HC RX IP 258 OP 636: Performed by: ANESTHESIOLOGY

## 2023-06-16 PROCEDURE — 710N000009 HC RECOVERY PHASE 1, LEVEL 1, PER MIN: Performed by: SURGERY

## 2023-06-16 PROCEDURE — 82962 GLUCOSE BLOOD TEST: CPT

## 2023-06-16 PROCEDURE — 272N000001 HC OR GENERAL SUPPLY STERILE: Performed by: SURGERY

## 2023-06-16 PROCEDURE — 97605 NEG PRS WND THER DME<=50SQCM: CPT | Mod: 78 | Performed by: SURGERY

## 2023-06-16 PROCEDURE — 999N000141 HC STATISTIC PRE-PROCEDURE NURSING ASSESSMENT: Performed by: SURGERY

## 2023-06-16 PROCEDURE — 370N000017 HC ANESTHESIA TECHNICAL FEE, PER MIN: Performed by: SURGERY

## 2023-06-16 PROCEDURE — 250N000011 HC RX IP 250 OP 636

## 2023-06-16 PROCEDURE — 360N000076 HC SURGERY LEVEL 3, PER MIN: Performed by: SURGERY

## 2023-06-16 PROCEDURE — 250N000025 HC SEVOFLURANE, PER MIN: Performed by: SURGERY

## 2023-06-16 PROCEDURE — 250N000009 HC RX 250: Performed by: SURGERY

## 2023-06-16 PROCEDURE — 710N000012 HC RECOVERY PHASE 2, PER MINUTE: Performed by: SURGERY

## 2023-06-16 PROCEDURE — 250N000009 HC RX 250

## 2023-06-16 RX ORDER — FENTANYL CITRATE 0.05 MG/ML
25 INJECTION, SOLUTION INTRAMUSCULAR; INTRAVENOUS EVERY 5 MIN PRN
Status: DISCONTINUED | OUTPATIENT
Start: 2023-06-16 | End: 2023-06-16 | Stop reason: HOSPADM

## 2023-06-16 RX ORDER — PROPOFOL 10 MG/ML
INJECTION, EMULSION INTRAVENOUS PRN
Status: DISCONTINUED | OUTPATIENT
Start: 2023-06-16 | End: 2023-06-16

## 2023-06-16 RX ORDER — HYDROMORPHONE HCL IN WATER/PF 6 MG/30 ML
0.2 PATIENT CONTROLLED ANALGESIA SYRINGE INTRAVENOUS EVERY 5 MIN PRN
Status: DISCONTINUED | OUTPATIENT
Start: 2023-06-16 | End: 2023-06-16 | Stop reason: HOSPADM

## 2023-06-16 RX ORDER — HYDROMORPHONE HCL IN WATER/PF 6 MG/30 ML
0.4 PATIENT CONTROLLED ANALGESIA SYRINGE INTRAVENOUS EVERY 5 MIN PRN
Status: DISCONTINUED | OUTPATIENT
Start: 2023-06-16 | End: 2023-06-16 | Stop reason: HOSPADM

## 2023-06-16 RX ORDER — MAGNESIUM HYDROXIDE 1200 MG/15ML
LIQUID ORAL PRN
Status: DISCONTINUED | OUTPATIENT
Start: 2023-06-16 | End: 2023-06-16 | Stop reason: HOSPADM

## 2023-06-16 RX ORDER — ONDANSETRON 4 MG/1
4 TABLET, ORALLY DISINTEGRATING ORAL EVERY 30 MIN PRN
Status: DISCONTINUED | OUTPATIENT
Start: 2023-06-16 | End: 2023-06-16 | Stop reason: HOSPADM

## 2023-06-16 RX ORDER — OXYCODONE HYDROCHLORIDE 5 MG/1
5 TABLET ORAL
Status: DISCONTINUED | OUTPATIENT
Start: 2023-06-16 | End: 2023-06-16 | Stop reason: HOSPADM

## 2023-06-16 RX ORDER — CEFAZOLIN SODIUM/WATER 2 G/20 ML
2 SYRINGE (ML) INTRAVENOUS
Status: COMPLETED | OUTPATIENT
Start: 2023-06-16 | End: 2023-06-16

## 2023-06-16 RX ORDER — HYDRALAZINE HYDROCHLORIDE 20 MG/ML
2.5-5 INJECTION INTRAMUSCULAR; INTRAVENOUS EVERY 10 MIN PRN
Status: DISCONTINUED | OUTPATIENT
Start: 2023-06-16 | End: 2023-06-16 | Stop reason: HOSPADM

## 2023-06-16 RX ORDER — LABETALOL HYDROCHLORIDE 5 MG/ML
10 INJECTION, SOLUTION INTRAVENOUS
Status: DISCONTINUED | OUTPATIENT
Start: 2023-06-16 | End: 2023-06-16 | Stop reason: HOSPADM

## 2023-06-16 RX ORDER — LIDOCAINE HYDROCHLORIDE 20 MG/ML
INJECTION, SOLUTION INFILTRATION; PERINEURAL PRN
Status: DISCONTINUED | OUTPATIENT
Start: 2023-06-16 | End: 2023-06-16

## 2023-06-16 RX ORDER — SODIUM CHLORIDE, SODIUM LACTATE, POTASSIUM CHLORIDE, CALCIUM CHLORIDE 600; 310; 30; 20 MG/100ML; MG/100ML; MG/100ML; MG/100ML
INJECTION, SOLUTION INTRAVENOUS CONTINUOUS
Status: DISCONTINUED | OUTPATIENT
Start: 2023-06-16 | End: 2023-06-16 | Stop reason: HOSPADM

## 2023-06-16 RX ORDER — ONDANSETRON 2 MG/ML
INJECTION INTRAMUSCULAR; INTRAVENOUS PRN
Status: DISCONTINUED | OUTPATIENT
Start: 2023-06-16 | End: 2023-06-16

## 2023-06-16 RX ORDER — FENTANYL CITRATE 50 UG/ML
INJECTION, SOLUTION INTRAMUSCULAR; INTRAVENOUS PRN
Status: DISCONTINUED | OUTPATIENT
Start: 2023-06-16 | End: 2023-06-16

## 2023-06-16 RX ORDER — PROPOFOL 10 MG/ML
INJECTION, EMULSION INTRAVENOUS CONTINUOUS PRN
Status: DISCONTINUED | OUTPATIENT
Start: 2023-06-16 | End: 2023-06-16

## 2023-06-16 RX ORDER — ACETAMINOPHEN 325 MG/1
650 TABLET ORAL
Status: DISCONTINUED | OUTPATIENT
Start: 2023-06-16 | End: 2023-06-16 | Stop reason: HOSPADM

## 2023-06-16 RX ORDER — FENTANYL CITRATE 0.05 MG/ML
50 INJECTION, SOLUTION INTRAMUSCULAR; INTRAVENOUS EVERY 5 MIN PRN
Status: DISCONTINUED | OUTPATIENT
Start: 2023-06-16 | End: 2023-06-16 | Stop reason: HOSPADM

## 2023-06-16 RX ORDER — SODIUM CHLORIDE, SODIUM LACTATE, POTASSIUM CHLORIDE, CALCIUM CHLORIDE 600; 310; 30; 20 MG/100ML; MG/100ML; MG/100ML; MG/100ML
INJECTION, SOLUTION INTRAVENOUS CONTINUOUS PRN
Status: DISCONTINUED | OUTPATIENT
Start: 2023-06-16 | End: 2023-06-16

## 2023-06-16 RX ORDER — ONDANSETRON 2 MG/ML
4 INJECTION INTRAMUSCULAR; INTRAVENOUS EVERY 30 MIN PRN
Status: DISCONTINUED | OUTPATIENT
Start: 2023-06-16 | End: 2023-06-16 | Stop reason: HOSPADM

## 2023-06-16 RX ORDER — LIDOCAINE 40 MG/G
CREAM TOPICAL
Status: DISCONTINUED | OUTPATIENT
Start: 2023-06-16 | End: 2023-06-16 | Stop reason: HOSPADM

## 2023-06-16 RX ORDER — EPHEDRINE SULFATE 50 MG/ML
INJECTION, SOLUTION INTRAMUSCULAR; INTRAVENOUS; SUBCUTANEOUS PRN
Status: DISCONTINUED | OUTPATIENT
Start: 2023-06-16 | End: 2023-06-16

## 2023-06-16 RX ADMIN — ONDANSETRON 4 MG: 2 INJECTION INTRAMUSCULAR; INTRAVENOUS at 08:16

## 2023-06-16 RX ADMIN — Medication 5 MG: at 08:24

## 2023-06-16 RX ADMIN — PROPOFOL 20 MCG/KG/MIN: 10 INJECTION, EMULSION INTRAVENOUS at 08:10

## 2023-06-16 RX ADMIN — Medication 5 MG: at 08:15

## 2023-06-16 RX ADMIN — PROPOFOL 200 MG: 10 INJECTION, EMULSION INTRAVENOUS at 08:05

## 2023-06-16 RX ADMIN — Medication 2 G: at 07:59

## 2023-06-16 RX ADMIN — SODIUM CHLORIDE, POTASSIUM CHLORIDE, SODIUM LACTATE AND CALCIUM CHLORIDE: 600; 310; 30; 20 INJECTION, SOLUTION INTRAVENOUS at 07:59

## 2023-06-16 RX ADMIN — SODIUM CHLORIDE, POTASSIUM CHLORIDE, SODIUM LACTATE AND CALCIUM CHLORIDE: 600; 310; 30; 20 INJECTION, SOLUTION INTRAVENOUS at 06:52

## 2023-06-16 RX ADMIN — Medication 10 MG: at 08:31

## 2023-06-16 RX ADMIN — Medication 5 MG: at 08:12

## 2023-06-16 RX ADMIN — FENTANYL CITRATE 50 MCG: 50 INJECTION, SOLUTION INTRAMUSCULAR; INTRAVENOUS at 08:05

## 2023-06-16 RX ADMIN — LIDOCAINE HYDROCHLORIDE 100 MG: 20 INJECTION, SOLUTION INFILTRATION; PERINEURAL at 08:05

## 2023-06-16 ASSESSMENT — ACTIVITIES OF DAILY LIVING (ADL)
ADLS_ACUITY_SCORE: 35
ADLS_ACUITY_SCORE: 35

## 2023-06-16 NOTE — TELEPHONE ENCOUNTER
----- Message from Raimundo Weinstein MD sent at 6/16/2023  8:51 AM CDT -----  Regarding: follow up  He is going to his TCU with a jose. Please tell them not to mess with it. We should see him on next Wednesday and remove it in clinic.   Christo     Called Dylan at [phone], spoke to Tammie, explained Jose wound vac is not to be handled until pt sees Dr. Weinstein next Wednesday for follow up, only Dr. Weinstein to change/remove the wound vac. Rosario verbalized understanding.     KRISTOPHER Pastor, RN  [facility]  Office:  [phone] Fax: [phone]

## 2023-06-16 NOTE — OR NURSING
Report and instructions called to Elvia RUIZ at [facility].  Dr. Weinstein's office will call TCU to schedule follow-up visit next Wednesday for wound vac removal.

## 2023-06-16 NOTE — BRIEF OP NOTE
[facility]    Brief Operative Note    Pre-operative diagnosis: Status post below-knee amputation of left lower extremity (H) [Z89.512]  Post-operative diagnosis Same as pre-operative diagnosis    Procedure: Procedure(s):  IRRIGATION AND DEBRIDEMENT OF LEFT BELOW KNEE AMPUTATION; WOUND VAC PLACEMENT OF LEFT BELOW KNEE AMPUTATION  Surgeon: Surgeon(s) and Role:     * Raimundo Weinstein MD - Primary     * Samantha Varela PA-C - Assisting  Anesthesia: General   Estimated Blood Loss: Minimal, see Op Note  Drains: None  Specimens: * No specimens in log *  Findings:   Superficial eschars along BKA incision with some surrounding ecchymosis. These were sharply debrided with scalpel, no underlying abscesses seen. Prevena wound VAC placed along entire incision and wounds. Reinforced with ioban.  Complications: None.  Implants: * No implants in log *

## 2023-06-16 NOTE — ANESTHESIA POSTPROCEDURE EVALUATION
Patient: Roddy Sidhu    Procedure: Procedure(s):  IRRIGATION AND DEBRIDEMENT OF LEFT BELOW KNEE AMPUTATION; WOUND VAC PLACEMENT OF LEFT BELOW KNEE AMPUTATION       Anesthesia Type:  General    Note:     Postop Pain Control: Uneventful            Sign Out: Well controlled pain   PONV: No   Neuro/Psych: Uneventful            Sign Out: Acceptable/Baseline neuro status   Airway/Respiratory: Uneventful            Sign Out: Acceptable/Baseline resp. status   CV/Hemodynamics: Uneventful            Sign Out: Acceptable CV status; No obvious hypovolemia; No obvious fluid overload   Other NRE: NONE   DID A NON-ROUTINE EVENT OCCUR? No           Last vitals:  Vitals Value Taken Time   /59 06/16/23 0845   Temp 36.5  C (97.7  F) 06/16/23 0845   Pulse 78 06/16/23 0856   Resp 8 06/16/23 0856   SpO2 100 % 06/16/23 0856   Vitals shown include unvalidated device data.    Electronically Signed By: Tai Louis MD  June 16, 2023  8:57 AM

## 2023-06-16 NOTE — OR NURSING
Discharge instructions reviewed with patient and family. No prescriptions. SDS staff member escorted pt to discharge door and assisted into vehicle.

## 2023-06-16 NOTE — ANESTHESIA PROCEDURE NOTES
Airway       Patient location during procedure: OR  Staff -        Anesthesiologist:  Tai Louis MD       CRNA: Maritza Rivera APRN CRNA       Other Anesthesia Staff: Gricelda Colvin       Performed By: SRNA  Consent for Airway        Urgency: elective  Indications and Patient Condition       Indications for airway management: janet-procedural       Induction type:intravenous       Mask difficulty assessment: 0 - not attempted    Final Airway Details       Final airway type: supraglottic airway    Supraglottic Airway Details        Type: LMA       Brand: I-Gel       LMA size: 4    Post intubation assessment        Placement verified by: capnometry and chest rise        Number of attempts at approach: 1       Number of other approaches attempted: 0       Secured with: pink tape       Ease of procedure: easy       Dentition: Intact and Unchanged

## 2023-06-16 NOTE — DISCHARGE INSTRUCTIONS
Post Procedure instructions    - Keep your Prevena VAC dressing in place until your follow up visit. This suctions away fluid from your healing wounds. The seal should stay tight and alarms from the machine will tell you if it is not functioning well.  - Our nursing staff will call to schedule follow up visit for next Wednesday with Dr. Weinstein. If you have not received a phone call, please call Monday [phone] to schedule.    [facility] [place] [address]  T: [phone]    PAIN  Expect some tenderness and discomfort at the incision site and your throat.  Use your oxycodone prescription as needed.  If you need a refill of this you should call whoever manages your pain medications.  Expect gradual resolution of your pain over several days.  You may take tylenol and ibuprofen with food (unless you have been told not to) instead of or in addition to your prescribed pain medication.   Do not drink alcohol or drive while you are taking pain medications.  You may apply ice to your incision in 20 minute intervals as needed for the next 48 hours.  After that time, consider switching to heat if you prefer.    EXPECTATIONS  Pain medications can cause constipation.  Limit use when possible.  Take over the counter stool softener/stimulant, such as Colace or Senna, 1-2 times a day with plenty of water.  You may take a mild over the counter laxative, such as Miralax or a suppository, as needed.    You may discontinue these medications once you are having regular bowel movements and/or are no longer taking your narcotic pain medication.            Same Day Surgery Discharge Instructions for  Sedation and General Anesthesia     It's not unusual to feel dizzy, light-headed or faint for up to 24 hours after surgery or while taking pain medication.  If you have these symptoms: sit for a few minutes before standing and have someone assist you when you get up to walk or  use the bathroom.    You should rest and relax for the next 24 hours. We recommend you make arrangements to have an adult stay with you for at least 24 hours after your discharge.  Avoid hazardous and strenuous activity.    DO NOT DRIVE any vehicle or operate mechanical equipment for 24 hours following the end of your surgery.  Even though you may feel normal, your reactions may be affected by the medication you have received.    Do not drink alcoholic beverages for 24 hours following surgery.     Slowly progress to your regular diet as you feel able. It's not unusual to feel nauseated and/or vomit after receiving anesthesia.  If you develop these symptoms, drink clear liquids (apple juice, ginger ale, broth, 7-up, etc. ) until you feel better.  If your nausea and vomiting persists for 24 hours, please notify your surgeon.      All narcotic pain medications, along with inactivity and anesthesia, can cause constipation. Drinking plenty of liquids and increasing fiber intake will help.    For any questions of a medical nature, call your surgeon.    Do not make important decisions for 24 hours.    If you had general anesthesia, you may have a sore throat for a couple of days related to the breathing tube used during surgery.  You may use Cepacol lozenges to help with this discomfort.  If it worsens or if you develop a fever, contact your surgeon.     If you feel your pain is not well managed with the pain medications prescribed by your surgeon, please contact your surgeon's office to let them know so they can address your concerns.         ** If you have questions or concerns about your procedure  call Dr. Weinstein at [phone] **

## 2023-06-16 NOTE — ANESTHESIA CARE TRANSFER NOTE
Patient: Roddy Sidhu    Procedure: Procedure(s):  IRRIGATION AND DEBRIDEMENT OF LEFT BELOW KNEE AMPUTATION; WOUND VAC PLACEMENT OF LEFT BELOW KNEE AMPUTATION       Diagnosis: Status post below-knee amputation of left lower extremity (H) [Z89.512]  Diagnosis Additional Information: No value filed.    Anesthesia Type:   General     Note:    Oropharynx: oropharynx clear of all foreign objects  Level of Consciousness: awake  Oxygen Supplementation: face mask    Independent Airway: airway patency satisfactory and stable  Dentition: dentition unchanged  Vital Signs Stable: post-procedure vital signs reviewed and stable  Report to RN Given: handoff report given  Patient transferred to: PACU    Handoff Report: Identifed the Patient, Identified the Reponsible Provider, Reviewed the pertinent medical history, Discussed the surgical course, Reviewed Intra-OP anesthesia mangement and issues during anesthesia, Set expectations for post-procedure period and Allowed opportunity for questions and acknowledgement of understanding      Vitals:  Vitals Value Taken Time   /59    Temp     Pulse 79    Resp 12    SpO2 100        Electronically Signed By: JOSH Murphy CRNA  June 16, 2023  8:46 AM

## 2023-06-16 NOTE — OP NOTE
Pre operative diagnosis: Superficial wound dehiscence of left below-knee amputation site.    Postprocedure diagnosis: Same.    Procedure:  1.  Sharp excisional debridement of skin and subcutaneous tissue of left below-knee amputation site.  2.  Application of activated Aquacel silver and Prevena device to left below-knee amputation surgical site.    Surgeon: Raimundo Weinstein M.D.   Assistant: Samantha Varela PA-C.  It should be noted that Samantha's help was paramount in patient preparation, procedure and application of Aquacel and Victoria sponge.    Anesthesia: General.  Specimens: None.  Estimated blood loss: About 10 mL.    Indications for procedure: This is a very pleasant 77-year-old male who recently underwent left below-knee amputation.  He is noted to have superficial dehiscence and necrosis of skin margins in the below-knee amputation site.  Debridement is advised to promote healing.    Procedure details: Patient was identified and taken to the operating room and placed in supine position.  General anesthesia was induced.  Preoperative intravenous antibiotics were administered.  The left below-knee amputation site was prepped and a sterile surgical field was created.  Preprocedure timeout was conducted.    There were multiple areas of skin and subcutaneous tissue along the anterior and posterior flap confluence which appeared to show drye schar.  These were sharply debrided with a #15 blade.  I went around the entire circumference of the surgical incision and made sure that there was no unhealthy tissue left behind.  There was excellent bleeding from all margins.  Adequate hemostasis was noted.  Then we applied activated Aquacel silver to the entire wound margin and then applied a 20 cm Victoria sponge and activated it with good seal.  We further reinforced the sealed with Ioban.  There was good seal.    Counts of instruments, sponges and needles were noted to be correct.    Patient was awakened and  extubated and taken to the recovery room in stable condition.

## 2023-06-16 NOTE — TELEPHONE ENCOUNTER
1st PO follow up on 6/21/23 with Dr. Weinstein scheduled for 1:00pm. The 7/10/23 appt has been cancelled as he needs to be seen sooner.    Called Dylan again, spoke to Lady, tried to just give her the updated appointment, she will have RN call [facility] back.     KRISTOPHER Pastor, RN  [facility]  Office:  [phone] Fax: [phone]

## 2023-06-19 ENCOUNTER — PATIENT OUTREACH (OUTPATIENT)
Dept: CARE COORDINATION | Facility: CLINIC | Age: 78
End: 2023-06-19

## 2023-06-19 ENCOUNTER — TRANSITIONAL CARE UNIT VISIT (OUTPATIENT)
Dept: GERIATRICS | Facility: CLINIC | Age: 78
End: 2023-06-19
Payer: COMMERCIAL

## 2023-06-19 VITALS
WEIGHT: 133 LBS | DIASTOLIC BLOOD PRESSURE: 76 MMHG | OXYGEN SATURATION: 100 % | BODY MASS INDEX: 19.36 KG/M2 | HEART RATE: 64 BPM | TEMPERATURE: 97.4 F | SYSTOLIC BLOOD PRESSURE: 130 MMHG | RESPIRATION RATE: 18 BRPM

## 2023-06-19 DIAGNOSIS — Z89.512 S/P BKA (BELOW KNEE AMPUTATION) UNILATERAL, LEFT (H): ICD-10-CM

## 2023-06-19 DIAGNOSIS — Z46.89 ENCOUNTER FOR MANAGEMENT OF WOUND VAC: ICD-10-CM

## 2023-06-19 DIAGNOSIS — Z98.890 S/P DEBRIDEMENT: Primary | ICD-10-CM

## 2023-06-19 DIAGNOSIS — I73.9 PERIPHERAL ARTERY DISEASE (H): ICD-10-CM

## 2023-06-19 DIAGNOSIS — D50.0 BLOOD LOSS ANEMIA: ICD-10-CM

## 2023-06-19 DIAGNOSIS — E11.42 TYPE 2 DIABETES MELLITUS WITH DIABETIC POLYNEUROPATHY, WITH LONG-TERM CURRENT USE OF INSULIN (H): ICD-10-CM

## 2023-06-19 DIAGNOSIS — I10 BENIGN ESSENTIAL HYPERTENSION: ICD-10-CM

## 2023-06-19 DIAGNOSIS — Z47.89 AFTERCARE FOLLOWING SURGERY OF THE MUSCULOSKELETAL SYSTEM: ICD-10-CM

## 2023-06-19 DIAGNOSIS — Z79.4 TYPE 2 DIABETES MELLITUS WITH DIABETIC POLYNEUROPATHY, WITH LONG-TERM CURRENT USE OF INSULIN (H): ICD-10-CM

## 2023-06-19 PROCEDURE — 99309 SBSQ NF CARE MODERATE MDM 30: CPT | Performed by: NURSE PRACTITIONER

## 2023-06-19 NOTE — PROGRESS NOTES
[facility] Follow Up  PCP & CLINIC: Sandra Low, APRN CNP, [address] [facility] [address]  Chief Complaint   Patient presents with     Hospital F/U   [place] MRN: [id number]. Place of Service where encounter took place:  [facility] ([facility]) [4002] Roddy Sidhu  is a 77 year old  (1945), admitted to the above facility from  [facility]. Outpatient Hospital stay on 6/16. Admitted to this facility for  rehab, medical management, and nursing care. HPI information obtained from: facility chart records, facility staff, patient report, [facility] chart review, and Care Everywhere [facility] chart review.     Brief Summary of Hospital Course: Marvin presented to [facility] (from [facility] where he had been recovering) on 6/16 for a planned I&D of left BKA and wound vac placement.     Updates since return to transitional care unit: Marvin returned to U same-day s/p procedure noted above. Today, Marvin is doing great.  He says he has a little bit more pain than before, but it is mostly still the phantom pain that he was having and not necessarily exacerbated.  He says it is weird to have a vacuum attached to his leg, and he is going to have this likely removed at his follow-up this Wednesday.  He otherwise is feeling very well, he denies fever, chills, shortness of breath, bowel or bladder changes.  He does note some higher blood sugars, and says his appetite is not any different than usual.  He denies any headaches or dizziness, and otherwise feels the same postop.    CODE STATUS/ADVANCE DIRECTIVES DISCUSSION: CPR/Full code. Patient's living condition: lives with spouse. ALLERGIES: Patient has no known allergies. PAST MEDICAL HISTORY:  has a past medical history of Diabetes (H).. PAST SURGICAL HISTORY:   has a past surgical history that includes surgical history of -  (1990); Incision and drainage foot, combined (Left, 5/9/2023); IR Lower Extremity Angiogram  Left (5/16/2023); Amputate leg below knee (Left, 5/19/2023); and Irrigation and debridement soft tissue lower extremity, combined (Left, 6/16/2023).. FAMILY HISTORY: family history includes Breast Cancer (age of onset: 60) in his mother; C.A.D. (age of onset: 58) in his brother.. SOCIAL HISTORY:   reports that he has been smoking pipe. He has never used smokeless tobacco. He reports that he does not currently use alcohol. He reports that he does not use drugs.  Post Discharge Medication Reconciliation Status: discharge medications reconciled and changed, per note/orders.  Current Outpatient Medications   Medication Sig Dispense Refill     insulin glargine (LANTUS PEN) 100 UNIT/ML pen Inject 14 Units Subcutaneous every morning 15 mL 1     acetaminophen (ACETAMINOPHEN 8 HOUR) 650 MG CR tablet Take 1 tablet by mouth every 8 hours as needed for mild pain or fever       acetaminophen (TYLENOL) 500 MG tablet Take 1,000 mg by mouth 3 times daily       aspirin (ASA) 325 MG EC tablet Take 325 mg by mouth daily       dulaglutide (TRULICITY) 0.75 MG/0.5ML pen Inject 0.75 mg Subcutaneous every 7 days 2 mL 1     ferrous gluconate (FERGON) 324 (38 Fe) MG tablet Take 1 tablet (324 mg) by mouth daily (with breakfast) 30 tablet 0     gabapentin (NEURONTIN) 300 MG capsule Take 1 capsule (300 mg) by mouth 3 times daily 270 capsule 3     latanoprost (XALATAN) 0.005 % ophthalmic solution Place 1 drop into both eyes At Bedtime       metFORMIN (GLUCOPHAGE) 500 MG tablet Take 2 tablets (1,000 mg) by mouth 2 times daily (with meals) 180 tablet 1     naloxone (NARCAN) 4 MG/0.1ML nasal spray Spray 4 mg into one nostril alternating nostrils as needed for opioid reversal every 2-3 minutes until assistance arrives       oxyCODONE (ROXICODONE) 5 MG tablet Take 1 tablet (5 mg) by mouth 3 times daily as needed for pain 20 tablet 0     polyethylene glycol (MIRALAX) 17 GM/Dose powder Take 17 g by mouth daily 510 g 0     simvastatin (ZOCOR) 40 MG  tablet TAKE ONE TABLET BY MOUTH EVERY NIGHT AT BEDTIME 90 tablet 3     vitamin D3 (CHOLECALCIFEROL) 50 mcg (2000 units) tablet Take 1 tablet by mouth daily 30 capsule      ROS: 10 point ROS of systems including Constitutional, Eyes, Respiratory, Cardiovascular, Gastroenterology, Genitourinary, Integumentary, Musculoskeletal, Psychiatric were all negative except for pertinent positives noted in my HPI.    Vitals: /76   Pulse 64   Temp 97.4  F (36.3  C)   Resp 18   Wt 60.3 kg (133 lb)   SpO2 100%   BMI 19.36 kg/m     BGs:    Exam:  GENERAL APPEARANCE: Alert, in no distress, cooperative.   ENT: Mouth/posterior oropharynx intact w/ moist mucous membranes, hearing acuity [place].  EYES: EOM, conjunctivae, lids, pupils and irises normal, PERRL2.   RESP: Respiratory effort good, no respiratory distress, Lung sounds clear. On RA.   CV: Auscultation of heart reveals S1, S2, rate and rhythm regular, no murmur, no rub or gallop, Edema 0+ BLE.  ABDOMEN: Normal bowel sounds, soft, non-tender abdomen, and no masses palpated.  SKIN: Inspection/Palpation of skin and subcutaneous tissue baseline w/ fragility. No wounds/rashes noted except BKA incision which is covered w/ wound vac in place.   NEURO: CN II-XII at patient's baseline, sensation baseline PPS.  PSYCH: Insight, judgement, and memory are baseline forgetful, affect and mood are happy/engaged.    Lab/Diagnostic data: Recent labs in [facility] reviewed by me today.     ASSESSMENT/PLAN:  S/P debridement  S/P BKA (below knee amputation) unilateral, left (H)  Encounter for management of wound VAC  Peripheral artery disease (H)  Blood loss anemia  Aftercare following surgery of the musculoskeletal system  Type 2 diabetes mellitus with diabetic polyneuropathy, with long-term current use of insulin (H)  Benign essential hypertension  Acute on chronic. Ongoing    Marvin tolerated irrigation and debridement with placement of wound VAC very well, his pain is well controlled, he does  not appear to have postop complications.  We will recheck CBC and BMP given his surgery to assess for postoperative anemia or electrolyte concerns.    Noting mild hyperglycemia, which could be associated with an inflammatory state secondary to surgery or with improved appetite overall.  We will slightly increase Lantus as noted below.    Blood pressure appears to be well controlled postoperatively, will continue plan of care here.  Follow up w/in 1 week or as needed.    Orders:  1. Increase Lantus to 14 units subcutaneous Qday. Dx: DM II.  2. Recheck CBC, BMP x1 on 6/21. Dx: s/p BKA.     Electronically signed by:  Dr. Aurora Nunes, APRN CNP DNP

## 2023-06-19 NOTE — LETTER
6/19/2023        RE: Roddy Sidhu  [address] [address]        [facility] Follow Up  PCP & CLINIC: JOSH Becker CNP, [address] [facility] [address]  Chief Complaint   Patient presents with     Hospital F/U   [place] MRN: [id number]. Place of Service where encounter took place:  [facility] ([facility]) [4002] Roddy Sidhu  is a 77 year old  (1945), admitted to the above facility from  [facility]. Outpatient Hospital stay on 6/16. Admitted to this facility for  rehab, medical management, and nursing care. HPI information obtained from: facility chart records, facility staff, patient report, [facility] chart review, and Care Everywhere [facility] chart review.     Brief Summary of Hospital Course: Marvin presented to [facility] (from [facility] where he had been recovering) on 6/16 for a planned I&D of left BKA and wound vac placement.     Updates since return to transitional care unit: Marvin returned to [facility] same-day s/p procedure noted above. Today, Marvin is doing great.  He says he has a little bit more pain than before, but it is mostly still the phantom pain that he was having and not necessarily exacerbated.  He says it is weird to have a vacuum attached to his leg, and he is going to have this likely removed at his follow-up this Wednesday.  He otherwise is feeling very well, he denies fever, chills, shortness of breath, bowel or bladder changes.  He does note some higher blood sugars, and says his appetite is not any different than usual.  He denies any headaches or dizziness, and otherwise feels the same postop.    CODE STATUS/ADVANCE DIRECTIVES DISCUSSION: CPR/Full code. Patient's living condition: lives with spouse. ALLERGIES: Patient has no known allergies. PAST MEDICAL HISTORY:  has a past medical history of Diabetes (H).. PAST SURGICAL HISTORY:   has a past surgical history that includes surgical history of -  (1990);  Incision and drainage foot, combined (Left, 5/9/2023); IR Lower Extremity Angiogram Left (5/16/2023); Amputate leg below knee (Left, 5/19/2023); and Irrigation and debridement soft tissue lower extremity, combined (Left, 6/16/2023).. FAMILY HISTORY: family history includes Breast Cancer (age of onset: 60) in his mother; C.A.D. (age of onset: 58) in his brother.. SOCIAL HISTORY:   reports that he has been smoking pipe. He has never used smokeless tobacco. He reports that he does not currently use alcohol. He reports that he does not use drugs.  Post Discharge Medication Reconciliation Status: discharge medications reconciled and changed, per note/orders.  Current Outpatient Medications   Medication Sig Dispense Refill     insulin glargine (LANTUS PEN) 100 UNIT/ML pen Inject 14 Units Subcutaneous every morning 15 mL 1     acetaminophen (ACETAMINOPHEN 8 HOUR) 650 MG CR tablet Take 1 tablet by mouth every 8 hours as needed for mild pain or fever       acetaminophen (TYLENOL) 500 MG tablet Take 1,000 mg by mouth 3 times daily       aspirin (ASA) 325 MG EC tablet Take 325 mg by mouth daily       dulaglutide (TRULICITY) 0.75 MG/0.5ML pen Inject 0.75 mg Subcutaneous every 7 days 2 mL 1     ferrous gluconate (FERGON) 324 (38 Fe) MG tablet Take 1 tablet (324 mg) by mouth daily (with breakfast) 30 tablet 0     gabapentin (NEURONTIN) 300 MG capsule Take 1 capsule (300 mg) by mouth 3 times daily 270 capsule 3     latanoprost (XALATAN) 0.005 % ophthalmic solution Place 1 drop into both eyes At Bedtime       metFORMIN (GLUCOPHAGE) 500 MG tablet Take 2 tablets (1,000 mg) by mouth 2 times daily (with meals) 180 tablet 1     naloxone (NARCAN) 4 MG/0.1ML nasal spray Spray 4 mg into one nostril alternating nostrils as needed for opioid reversal every 2-3 minutes until assistance arrives       oxyCODONE (ROXICODONE) 5 MG tablet Take 1 tablet (5 mg) by mouth 3 times daily as needed for pain 20 tablet 0     polyethylene glycol (MIRALAX)  17 GM/Dose powder Take 17 g by mouth daily 510 g 0     simvastatin (ZOCOR) 40 MG tablet TAKE ONE TABLET BY MOUTH EVERY NIGHT AT BEDTIME 90 tablet 3     vitamin D3 (CHOLECALCIFEROL) 50 mcg (2000 units) tablet Take 1 tablet by mouth daily 30 capsule      ROS: 10 point ROS of systems including Constitutional, Eyes, Respiratory, Cardiovascular, Gastroenterology, Genitourinary, Integumentary, Musculoskeletal, Psychiatric were all negative except for pertinent positives noted in my HPI.    Vitals: /76   Pulse 64   Temp 97.4  F (36.3  C)   Resp 18   Wt 60.3 kg (133 lb)   SpO2 100%   BMI 19.36 kg/m     BGs:    Exam:  GENERAL APPEARANCE: Alert, in no distress, cooperative.   ENT: Mouth/posterior oropharynx intact w/ moist mucous membranes, hearing acuity [place].  EYES: EOM, conjunctivae, lids, pupils and irises normal, PERRL2.   RESP: Respiratory effort good, no respiratory distress, Lung sounds clear. On RA.   CV: Auscultation of heart reveals S1, S2, rate and rhythm regular, no murmur, no rub or gallop, Edema 0+ BLE.  ABDOMEN: Normal bowel sounds, soft, non-tender abdomen, and no masses palpated.  SKIN: Inspection/Palpation of skin and subcutaneous tissue baseline w/ fragility. No wounds/rashes noted except BKA incision which is covered w/ wound vac in place.   NEURO: CN II-XII at patient's baseline, sensation baseline PPS.  PSYCH: Insight, judgement, and memory are baseline forgetful, affect and mood are happy/engaged.    Lab/Diagnostic data: Recent labs in [facility] reviewed by me today.     ASSESSMENT/PLAN:  S/P debridement  S/P BKA (below knee amputation) unilateral, left (H)  Encounter for management of wound VAC  Peripheral artery disease (H)  Blood loss anemia  Aftercare following surgery of the musculoskeletal system  Type 2 diabetes mellitus with diabetic polyneuropathy, with long-term current use of insulin (H)  Benign essential hypertension  Acute on chronic. Ongoing    Marvin tolerated irrigation and  debridement with placement of wound VAC very well, his pain is well controlled, he does not appear to have postop complications.  We will recheck CBC and BMP given his surgery to assess for postoperative anemia or electrolyte concerns.    Noting mild hyperglycemia, which could be associated with an inflammatory state secondary to surgery or with improved appetite overall.  We will slightly increase Lantus as noted below.    Blood pressure appears to be well controlled postoperatively, will continue plan of care here.  Follow up w/in 1 week or as needed.    Orders:  1. Increase Lantus to 14 units subcutaneous Qday. Dx: DM II.  2. Recheck CBC, BMP x1 on 6/21. Dx: s/p BKA.     Electronically signed by:  Dr. Aurora Nunes, JOSH CNP DNP                          Sincerely,        JOSH Sands CNP

## 2023-06-19 NOTE — PROGRESS NOTES
Clinic Care Coordination Contact    Situation: Patient chart reviewed by care coordinator.    Background:  Clinical Data:   5/13/2023 - 5/24/2023 (11 days)  [facility]  Discharge Diagnoses  Infected diabetic pressure ulcer of left heel associated with type 2 diabetes mellitus with diabetic neuropathy  Additional diabetic ulcer of right heel without infection  S/p debridement of left pressure ulcer 5/9/2023  Peripheral arterial disease  Sepsis with acute organ dysfunction without septic shock, due to unspecified organism, unspecified type (H)  Acute on chronic anemia  Closed displaced subtrochanteric fracture of right femur 3/29  S/p ORIF right femur fracture with intermedullary nail 3/29     Closed fracture of proximal end of right humerus 3/29   Closed nondisplaced fracture of right clavicle 3/29   Type 2 diabetes mellitus with diabetic polyneuropathy, with long-term current use of insulin   Hyperlipidemia  Transition to Facility:              Facility Name: [facility]  6/16/2023ED visit for I&D   IRRIGATION AND DEBRIDEMENT OF LEFT BELOW KNEE AMPUTATION; WOUND VAC PLACEMENT OF LEFT BELOW KNEE AMPUTATION    Assessment: transferred back to U    Plan/Recommendations:CC RN will follow up when discharged from U       [facility]   Clementina Delgadillo RN, Care Coordinator   [facility]   E-mail [email or web address]   [phone]

## 2023-06-20 ENCOUNTER — DOCUMENTATION ONLY (OUTPATIENT)
Dept: OTHER | Facility: CLINIC | Age: 78
End: 2023-06-20
Payer: COMMERCIAL

## 2023-06-20 ENCOUNTER — LAB REQUISITION (OUTPATIENT)
Dept: LAB | Facility: CLINIC | Age: 78
End: 2023-06-20
Payer: COMMERCIAL

## 2023-06-20 DIAGNOSIS — Z89.512 ACQUIRED ABSENCE OF LEFT LEG BELOW KNEE (H): ICD-10-CM

## 2023-06-21 ENCOUNTER — OFFICE VISIT (OUTPATIENT)
Dept: OTHER | Facility: CLINIC | Age: 78
End: 2023-06-21
Attending: SURGERY
Payer: COMMERCIAL

## 2023-06-21 VITALS — DIASTOLIC BLOOD PRESSURE: 75 MMHG | HEART RATE: 69 BPM | OXYGEN SATURATION: 100 % | SYSTOLIC BLOOD PRESSURE: 175 MMHG

## 2023-06-21 DIAGNOSIS — Z89.512 S/P BELOW KNEE AMPUTATION, LEFT (H): Primary | ICD-10-CM

## 2023-06-21 PROCEDURE — 99024 POSTOP FOLLOW-UP VISIT: CPT | Performed by: SURGERY

## 2023-06-21 PROCEDURE — G0463 HOSPITAL OUTPT CLINIC VISIT: HCPCS

## 2023-06-21 NOTE — PROGRESS NOTES
Patient is here to discuss follow up    BP (!) 175/75 (BP Location: Left arm, Patient Position: Chair, Cuff Size: Adult Regular)   Pulse 69   SpO2 100%     Questions patient would like addressed today are: N/A.    Refills are needed: No    Has homecare services and agency name:  Adriana GAMEZ

## 2023-06-21 NOTE — PROGRESS NOTES
Mr. Marvin Sidhu returns to clinic today.  He is a very pleasant 77-year-old male with a history of left below the knee amputation.  He had problems with wound healing in that site and on the 16th of this month we performed a debridement in the operating room.  There were multiple areas of eschar that were sharply debrided.  The posterior flap otherwise looks healthy.  At that time then I placed activated Aquacel and put on a Victoria sponge and activated it.  He comes back for wound check.  The Victoria sponge has stayed on without any problems.    I took out the Victoria sponge.  The open areas appear healthy although it is too early for them to start granulating.  The posterior flap was viable.  I applied bacitracin followed by Kerlix gauze and then a spandex  dressing.    I also discussed the plan with his wife and 1 other friend who were not in the room but had help with the transportation of the patient.    I have sent written instructions to his transitional care unit.  We will do bacitracin and Kerlix gauze followed by spandex .    I will see him back in 4 weeks.

## 2023-06-23 LAB
ANION GAP SERPL CALCULATED.3IONS-SCNC: 19 MMOL/L (ref 7–15)
BUN SERPL-MCNC: 20.2 MG/DL (ref 8–23)
CALCIUM SERPL-MCNC: 9.3 MG/DL (ref 8.8–10.2)
CHLORIDE SERPL-SCNC: 93 MMOL/L (ref 98–107)
CREAT SERPL-MCNC: 0.78 MG/DL (ref 0.67–1.17)
DEPRECATED HCO3 PLAS-SCNC: 21 MMOL/L (ref 22–29)
ERYTHROCYTE [DISTWIDTH] IN BLOOD BY AUTOMATED COUNT: 18.1 % (ref 10–15)
GFR SERPL CREATININE-BSD FRML MDRD: >90 ML/MIN/1.73M2
GLUCOSE SERPL-MCNC: 85 MG/DL (ref 70–99)
HCT VFR BLD AUTO: 32.3 % (ref 40–53)
HGB BLD-MCNC: 10 G/DL (ref 13.3–17.7)
MCH RBC QN AUTO: 27.6 PG (ref 26.5–33)
MCHC RBC AUTO-ENTMCNC: 31 G/DL (ref 31.5–36.5)
MCV RBC AUTO: 89 FL (ref 78–100)
PLATELET # BLD AUTO: 370 10E3/UL (ref 150–450)
POTASSIUM SERPL-SCNC: 4.6 MMOL/L (ref 3.4–5.3)
RBC # BLD AUTO: 3.62 10E6/UL (ref 4.4–5.9)
SODIUM SERPL-SCNC: 133 MMOL/L (ref 136–145)
WBC # BLD AUTO: 7.2 10E3/UL (ref 4–11)

## 2023-06-23 PROCEDURE — 85027 COMPLETE CBC AUTOMATED: CPT | Performed by: NURSE PRACTITIONER

## 2023-06-23 PROCEDURE — P9604 ONE-WAY ALLOW PRORATED TRIP: HCPCS | Performed by: NURSE PRACTITIONER

## 2023-06-23 PROCEDURE — 80048 BASIC METABOLIC PNL TOTAL CA: CPT | Performed by: NURSE PRACTITIONER

## 2023-06-23 PROCEDURE — 36415 COLL VENOUS BLD VENIPUNCTURE: CPT | Performed by: NURSE PRACTITIONER

## 2023-06-27 ENCOUNTER — TRANSITIONAL CARE UNIT VISIT (OUTPATIENT)
Dept: GERIATRICS | Facility: CLINIC | Age: 78
End: 2023-06-27
Payer: COMMERCIAL

## 2023-06-27 VITALS
TEMPERATURE: 97 F | WEIGHT: 132.9 LBS | BODY MASS INDEX: 19.34 KG/M2 | HEART RATE: 60 BPM | RESPIRATION RATE: 16 BRPM | DIASTOLIC BLOOD PRESSURE: 66 MMHG | SYSTOLIC BLOOD PRESSURE: 106 MMHG | OXYGEN SATURATION: 99 %

## 2023-06-27 DIAGNOSIS — E11.42 TYPE 2 DIABETES MELLITUS WITH DIABETIC POLYNEUROPATHY, WITH LONG-TERM CURRENT USE OF INSULIN (H): ICD-10-CM

## 2023-06-27 DIAGNOSIS — Z47.89 AFTERCARE FOLLOWING SURGERY OF THE MUSCULOSKELETAL SYSTEM: ICD-10-CM

## 2023-06-27 DIAGNOSIS — I73.9 PERIPHERAL ARTERY DISEASE (H): ICD-10-CM

## 2023-06-27 DIAGNOSIS — Z79.4 TYPE 2 DIABETES MELLITUS WITH DIABETIC POLYNEUROPATHY, WITH LONG-TERM CURRENT USE OF INSULIN (H): ICD-10-CM

## 2023-06-27 DIAGNOSIS — Z46.89 ENCOUNTER FOR MANAGEMENT OF WOUND VAC: ICD-10-CM

## 2023-06-27 DIAGNOSIS — Z98.890 S/P DEBRIDEMENT: Primary | ICD-10-CM

## 2023-06-27 DIAGNOSIS — Z89.512 S/P BKA (BELOW KNEE AMPUTATION) UNILATERAL, LEFT (H): ICD-10-CM

## 2023-06-27 PROCEDURE — 99316 NF DSCHRG MGMT 30 MIN+: CPT | Performed by: NURSE PRACTITIONER

## 2023-06-27 NOTE — PROGRESS NOTES
[facility] TCU DISCHARGE SUMMARY  PATIENT'S NAME: Roddy Sidhu : 1945 MRN: [id number] Place of Service where encounter took place:  [facility] (U) [[id number]] PRIMARY CARE PROVIDER AND CLINIC RESPONSIBLE AFTER TRANSFER: Sandra Low, JOSH CNP, [address] [facility] [address]. [facility] Provider     Transferring providers: Dr. Aurora Nunes, APRN CNP DNP.  Recent Hospitalization/ED: [facility] stay  to 23.  Date of TCU Admission: 23  Date of TCU (anticipated) Discharge: 7/3/23.  Discharged to: previous independent home  Cognitive Scores: SLUMS: , CPT: 5.1/5.6 and ACL: 4.6/5.8  Physical Function: Sit-to-stand w pivot transfers.  DME: Wheelchair (attached here).   CODE STATUS/ADVANCE DIRECTIVES DISCUSSION:  Full Code.  ALLERGIES: Patient has no known allergies.    DISCHARGE DIAGNOSIS/NURSING FACILITY COURSE:   S/P debridement  S/P BKA (below knee amputation) unilateral, left (H)  Encounter for management of wound VAC  Peripheral artery disease (H)  Type 2 diabetes mellitus with diabetic polyneuropathy, with long-term current use of insulin (H)  Aftercare following surgery of the musculoskeletal system    Hospitalization: Marvin presented to [facility] on  w/ worsening left heel infection. He was already s/p debridement from Dr. Flores on  and was recovering in TCU. He ultimately underwent left BKA on  and completed abx per ID. He is on post-op Aspirin @ 325mg and was started on PPI ppx.    Rehab: Marvin presented to [facility] on  s/p the above hospitalization.  He began rehab, his pain was overall well controlled, and we did adjust several diabetes medications in order to achieve simplicity and better control. He was going to discharge in early  due to insurance, but had a set-back with his surgical site.    Marvin presented to [facility]from [facility] where he had been [facility]) on  for a planned I&D of left BKA and wound vac placement. Marvin returned to  TCU same-day s/p procedure noted above.   Knowing his insurance coverage was poor, nursing began wound care teaching and family caregiver training w/ family.     Today, Marvin says he's doing well. His phantom pain is ongoing but usually manageable. He denies any bowel/bladder hcnages, denies headache, dizziness, SOB, fever. He and his spouse Ynes feel like they can return home, but will need care and a wheelchair to assist. He follows up w/ surgery routinely and was seen by them last on 6/21. It will be mid-July before he can be fitted for prosthesis, but this is the plan.     Past Medical History:  has a past medical history of Diabetes (H).  Discharge Medications:  Current Outpatient Medications   Medication Sig Dispense Refill     acetaminophen (ACETAMINOPHEN 8 HOUR) 650 MG CR tablet Take 1 tablet by mouth every 8 hours as needed for mild pain or fever       acetaminophen (TYLENOL) 500 MG tablet Take 1,000 mg by mouth 3 times daily       aspirin (ASA) 325 MG EC tablet Take 325 mg by mouth daily       dulaglutide (TRULICITY) 0.75 MG/0.5ML pen Inject 0.75 mg Subcutaneous every 7 days 2 mL 1     ferrous gluconate (FERGON) 324 (38 Fe) MG tablet Take 1 tablet (324 mg) by mouth daily (with breakfast) 30 tablet 0     gabapentin (NEURONTIN) 300 MG capsule Take 1 capsule (300 mg) by mouth 3 times daily 270 capsule 3     insulin glargine (LANTUS PEN) 100 UNIT/ML pen Inject 14 Units Subcutaneous every morning 15 mL 1     latanoprost (XALATAN) 0.005 % ophthalmic solution Place 1 drop into both eyes At Bedtime       metFORMIN (GLUCOPHAGE) 500 MG tablet Take 2 tablets (1,000 mg) by mouth 2 times daily (with meals) 180 tablet 1     naloxone (NARCAN) 4 MG/0.1ML nasal spray Spray 4 mg into one nostril alternating nostrils as needed for opioid reversal every 2-3 minutes until assistance arrives       oxyCODONE (ROXICODONE) 5 MG tablet Take 1 tablet (5 mg) by mouth 3 times daily as needed for pain 20 tablet 0     polyethylene glycol  (MIRALAX) 17 GM/Dose powder Take 17 g by mouth daily 510 g 0     simvastatin (ZOCOR) 40 MG tablet TAKE ONE TABLET BY MOUTH EVERY NIGHT AT BEDTIME 90 tablet 3     vitamin D3 (CHOLECALCIFEROL) 50 mcg (2000 units) tablet Take 1 tablet by mouth daily 30 capsule      ROS: 4 point ROS including Respiratory, CV, GI and , other than that noted in the HPI, is negative.    Physical Exam: Vitals: /66   Pulse 60   Temp 97  F (36.1  C)   Resp 16   Wt 60.3 kg (132 lb 14.4 oz)   SpO2 99%   BMI 19.34 kg/m    GENERAL APPEARANCE: Alert, in no distress, cooperative.   ENT: Mouth/posterior oropharynx intact w/ moist mucous membranes, hearing acuity [place].  EYES: EOM, conjunctivae, lids, pupils and irises normal, PERRL2.   RESP: Respiratory effort good, no respiratory distress, Lung sounds clear. On RA.   CV: Auscultation of heart reveals S1, S2, rate and rhythm regular, no murmur, no rub or gallop, Edema 0+ BLE.  ABDOMEN: Normal bowel sounds, soft, non-tender abdomen, and no masses palpated.  SKIN: Inspection/Palpation of skin and subcutaneous tissue baseline w/ fragility. No wounds/rashes noted. BKA is covered at this time.   NEURO: CN II-XII at patient's baseline, sensation baseline PPS.  PSYCH: Insight, judgement, and memory are forgetful at baseline, affect and mood are happy//engaged.    Facility Labs: Labs done in SNF are in [place] EPIC. Please refer to them using [facility]/Care Everywhere.    DISCHARGE PLAN:    Follow up labs: No labs orders/due    Medical Follow Up:  Follow up with primary care provider in 1-4 weeks    MTM referral needed and placed by this provider: Yes: per [facility] Geriatrics Protocol .    Current [place] scheduled appointments: None.  Discharge Services: Home Care:  Occupational Therapy, Physical Therapy, Registered Nurse and Home Health Aide    Orders:  1. MTM referral x1, routine. Dx: TCU discharge.     TOTAL DISCHARGE TIME:   Greater than 30 minutes    Electronically signed by:  Dr. Aurora Nunes,  APRN CNP DNP  ______________      Documentation of Face-to-Face and Certification for Home Health Services   Patient: Roddy Sidhu YOB: 1945  MRN: [id number]  Today's Date: 6/27/2023  I certify that patient: Roddy Sidhu is under my care and that I had a face-to-face encounter that meets the provider  face-to-face encounter requirements with this patient on: 6/27/2023. This encounter with the patient was in whole, or in part, for the following medical condition, which is the primary reason for home health care: Left BKA. I certify that, based on my findings, the following services are medically necessary home health services: Nursing, Occupational Therapy and Physical Therapy. My clinical findings support the need for the above services because: Nurse is needed: For complex aftercare of surgical procedures because the patient needs instruction and cannot perform care on their own due to: complex wound care.., Occupational Therapy Services are needed to assess and treat cognitive ability and address ADL safety due to impairment in mobility. and Physical Therapy Services are needed to assess and treat the following functional impairments: mobility.    Further, I certify that my clinical findings support that this patient is homebound (i.e. absences from home require considerable and taxing effort and are for medical reasons or [employer] services or infrequently or of short duration when for other reasons) because: Requires assistance of another person or specialized equipment to access medical services because patient: Range of motion limitations prevents ability to exit home safely...    Based on the above findings. I certify that this patient is confined to the home and needs intermittent skilled nursing care, physical therapy and/or speech therapy.  The patient is under my care, and I have initiated the establishment of the plan of care.  This patient will be followed by a provider who will  periodically review the plan of care.    Provider to give follow up care: Sandra Low    Responsible Medicare certified PECOS Provider: JOSH Garcia CNP DNP  Provider Signature: See electronic signature associated with these discharge orders.  Date: 6/27/2023

## 2023-06-27 NOTE — LETTER
2023        RE: Roddy Sidhu  [address] [place] [address]        [facility] TCU DISCHARGE SUMMARY  PATIENT'S NAME: Roddy Sidhu : 1945 MRN: [id number] Place of Service where encounter took place:  [facility] ON [facility] (TCU) [[id number]] PRIMARY CARE PROVIDER AND CLINIC RESPONSIBLE AFTER TRANSFER: Sandra Low, APRN CNP, [address] [facility] [address]. [facility] Provider     Transferring providers: Dr. Aurora Nunes, APRN CNP DNP.  Recent Hospitalization/ED: [facility] stay  to 23.  Date of TCU Admission: 23  Date of TCU (anticipated) Discharge: 7/3/23.  Discharged to: previous independent home  Cognitive Scores: SLUMS: , CPT: 5.1/5.6 and ACL: 4.6/5.8  Physical Function: Sit-to-stand w pivot transfers.  DME: Wheelchair (attached here).   CODE STATUS/ADVANCE DIRECTIVES DISCUSSION:  Full Code.  ALLERGIES: Patient has no known allergies.    DISCHARGE DIAGNOSIS/NURSING FACILITY COURSE:   S/P debridement  S/P BKA (below knee amputation) unilateral, left (H)  Encounter for management of wound VAC  Peripheral artery disease (H)  Type 2 diabetes mellitus with diabetic polyneuropathy, with long-term current use of insulin (H)  Aftercare following surgery of the musculoskeletal system    Hospitalization: Marvin presented to [facility] on  w/ worsening left heel infection. He was already s/p debridement from Dr. Flores on  and was recovering in TCU. He ultimately underwent left BKA on  and completed abx per ID. He is on post-op Aspirin @ 325mg and was started on PPI ppx.    Rehab: Marvin presented [facility] on  s/p the above hospitalization.  He began rehab, his pain was overall well controlled, and we did adjust several diabetes medications in order to achieve simplicity and better control. He was going to discharge in early  due to insurance, but had a set-back with his surgical site.    Marvin presented to [facility] (from [facility] where he had been  recovering) on 6/16 for a planned I&D of left BKA and wound vac placement. Marvin returned to TCU same-day s/p procedure noted above.   Knowing his insurance coverage was poor, nursing began wound care teaching and family caregiver training w/ family.     Today, Marvin says he's doing well. His phantom pain is ongoing but usually manageable. He denies any bowel/bladder hcnages, denies headache, dizziness, SOB, fever. He and his spouse Ynes feel like they can return home, but will need care and a wheelchair to assist. He follows up w/ surgery routinely and was seen by them last on 6/21. It will be mid-July before he can be fitted for prosthesis, but this is the plan.     Past Medical History:  has a past medical history of Diabetes (H).  Discharge Medications:  Current Outpatient Medications   Medication Sig Dispense Refill     acetaminophen (ACETAMINOPHEN 8 HOUR) 650 MG CR tablet Take 1 tablet by mouth every 8 hours as needed for mild pain or fever       acetaminophen (TYLENOL) 500 MG tablet Take 1,000 mg by mouth 3 times daily       aspirin (ASA) 325 MG EC tablet Take 325 mg by mouth daily       dulaglutide (TRULICITY) 0.75 MG/0.5ML pen Inject 0.75 mg Subcutaneous every 7 days 2 mL 1     ferrous gluconate (FERGON) 324 (38 Fe) MG tablet Take 1 tablet (324 mg) by mouth daily (with breakfast) 30 tablet 0     gabapentin (NEURONTIN) 300 MG capsule Take 1 capsule (300 mg) by mouth 3 times daily 270 capsule 3     insulin glargine (LANTUS PEN) 100 UNIT/ML pen Inject 14 Units Subcutaneous every morning 15 mL 1     latanoprost (XALATAN) 0.005 % ophthalmic solution Place 1 drop into both eyes At Bedtime       metFORMIN (GLUCOPHAGE) 500 MG tablet Take 2 tablets (1,000 mg) by mouth 2 times daily (with meals) 180 tablet 1     naloxone (NARCAN) 4 MG/0.1ML nasal spray Spray 4 mg into one nostril alternating nostrils as needed for opioid reversal every 2-3 minutes until assistance arrives       oxyCODONE (ROXICODONE) 5 MG tablet Take  1 tablet (5 mg) by mouth 3 times daily as needed for pain 20 tablet 0     polyethylene glycol (MIRALAX) 17 GM/Dose powder Take 17 g by mouth daily 510 g 0     simvastatin (ZOCOR) 40 MG tablet TAKE ONE TABLET BY MOUTH EVERY NIGHT AT BEDTIME 90 tablet 3     vitamin D3 (CHOLECALCIFEROL) 50 mcg (2000 units) tablet Take 1 tablet by mouth daily 30 capsule      ROS: 4 point ROS including Respiratory, CV, GI and , other than that noted in the HPI, is negative.    Physical Exam: Vitals: /66   Pulse 60   Temp 97  F (36.1  C)   Resp 16   Wt 60.3 kg (132 lb 14.4 oz)   SpO2 99%   BMI 19.34 kg/m    GENERAL APPEARANCE: Alert, in no distress, cooperative.   ENT: Mouth/posterior oropharynx intact w/ moist mucous membranes, hearing acuity [place].  EYES: EOM, conjunctivae, lids, pupils and irises normal, PERRL2.   RESP: Respiratory effort good, no respiratory distress, Lung sounds clear. On RA.   CV: Auscultation of heart reveals S1, S2, rate and rhythm regular, no murmur, no rub or gallop, Edema 0+ BLE.  ABDOMEN: Normal bowel sounds, soft, non-tender abdomen, and no masses palpated.  SKIN: Inspection/Palpation of skin and subcutaneous tissue baseline w/ fragility. No wounds/rashes noted. BKA is covered at this time.   NEURO: CN II-XII at patient's baseline, sensation baseline PPS.  PSYCH: Insight, judgement, and memory are forgetful at baseline, affect and mood are happy//engaged.    Facility Labs: Labs done in SNF are in [place] EPIC. Please refer to them using EPIC/Care Everywhere.    DISCHARGE PLAN:    Follow up labs: No labs orders/due    Medical Follow Up:  Follow up with primary care provider in 1-4 weeks    MTM referral needed and placed by this provider: Yes: per [facility] Geriatrics Protocol .    Current [place] scheduled appointments: None.  Discharge Services: Home Care:  Occupational Therapy, Physical Therapy, Registered Nurse and Home Health Aide    Orders:  1. MTM referral x1, routine. Dx: TCU discharge.      TOTAL DISCHARGE TIME:   Greater than 30 minutes    Electronically signed by:  Dr. Aurora Nunes, APRN CNP DNP  ______________      Documentation of Face-to-Face and Certification for Home Health Services   Patient: Roddy Sidhu YOB: 1945  MRN: [id number]  Today's Date: 6/27/2023  I certify that patient: Roddy Sidhu is under my care and that I had a face-to-face encounter that meets the provider  face-to-face encounter requirements with this patient on: 6/27/2023. This encounter with the patient was in whole, or in part, for the following medical condition, which is the primary reason for home health care: Left BKA. I certify that, based on my findings, the following services are medically necessary home health services: Nursing, Occupational Therapy and Physical Therapy. My clinical findings support the need for the above services because: Nurse is needed: For complex aftercare of surgical procedures because the patient needs instruction and cannot perform care on their own due to: complex wound care.., Occupational Therapy Services are needed to assess and treat cognitive ability and address ADL safety due to impairment in mobility. and Physical Therapy Services are needed to assess and treat the following functional impairments: mobility.    Further, I certify that my clinical findings support that this patient is homebound (i.e. absences from home require considerable and taxing effort and are for medical reasons or [employer] services or infrequently or of short duration when for other reasons) because: Requires assistance of another person or specialized equipment to access medical services because patient: Range of motion limitations prevents ability to exit home safely...    Based on the above findings. I certify that this patient is confined to the home and needs intermittent skilled nursing care, physical therapy and/or speech therapy.  The patient is under my care, and I have  initiated the establishment of the plan of care.  This patient will be followed by a provider who will periodically review the plan of care.    Provider to give follow up care: Sandra Low    Responsible Medicare certified PECOS Provider: JOSH Garcia CNP DNP  Provider Signature: See electronic signature associated with these discharge orders.  Date: 2023                 Face to Face and Medical Necessity Statement for DME Provider visit  Demographic Information on Roddy Sidhu:  Gender: male  : 1945  [address] [address]  [phone] (home)   Medical Record: [id number]  Social Security Number: [id number]  Primary Care Provider: Sandra Low  Insurance: Payor: [employer] / Plan: UNITED HE[employer] MEDICARE ADVANTAGE / Product Type: HMO. HPI: Roddy Sidhu is a 77 year old  (1945), who is being seen today for a face to face provider visit at [facility]; medical necessity statement for DME included. This patient requires the following:    DME Ordered and Medical Necessity Statement     Wheelchair Documentation  Size: standard 16 x 16  Corresponding cushion: Yes.  Standard foot rests: Yes  Elevating leg rests: No  Arm rests: Yes.  Lap tray: No  Dose the patient use oxygen? No   Is the patient able to propel wheelchair? Yes. And is there someone who can? Yes.  1. The patient has mobility limitations that impairs their ability to participate in one or more mobility related activities: Toileting, Feeding, Grooming and Bathing.  The wheelchair is suitable and necessary for use in the patient's home.  2. The patient's mobility limitations cannot be safely resolved by using a cane/walker:No, they cannot.     Reason why a cane or walker will not meet the patient's needs. (ie: balance, tolerance, level of assistance) balance, tolerance, level of assistance.   3. The patients home has adequate access to use a manual wheelchair:Yes  4. The use  of a manual wheelchair on a regular basis will improve the patients ability to participate in mobility related ADL's at home:Yes  5. The patient is willing to use a manual wheelchair at home:Yes  6. The patient has adequate upper body strength and the mental capability to safely use a manual wheelchair and/or has a caregiver that is able to assist: Yes  7. Does the patient have a lower extremity injury or edema?Yes     Reason for Type of Wheelchair  Light Weight Wheelchair: Patient is unable to self-propel a standard wheelchair in the home but can self propel a light weight wheelchair.    Pt needing above DME with expected length of need of 99 years due to medical necessity associated with following diagnosis:     S/P debridement  S/P BKA (below knee amputation) unilateral, left (H)  Encounter for management of wound VAC  Peripheral artery disease (H)  Type 2 diabetes mellitus with diabetic polyneuropathy, with long-term current use of insulin (H)  Aftercare following surgery of the musculoskeletal system      PMH   has a past medical history of Diabetes (H).  ASSESSMENT/PLAN:  1. S/P debridement    2. S/P BKA (below knee amputation) unilateral, left (H)    3. Encounter for management of wound VAC    4. Peripheral artery disease (H)    5. Type 2 diabetes mellitus with diabetic polyneuropathy, with long-term current use of insulin (H)    6. Aftercare following surgery of the musculoskeletal system      Orders:  1. Facility staff/TC to contact DME company to get their order form for provider to fill out.    ELECTRONICALLY SIGNED BY DESIRE CERTIFIED PROVIDER:  JOSH Garcia CNP DNP                Sincerely,        JOSH Sands CNP

## 2023-06-29 NOTE — PROGRESS NOTES
Face to Face and Medical Necessity Statement for DME Provider visit  Demographic Information on Roddy Sidhu:  Gender: male  : 1945  [address] [address]  [phone] (home)   Medical Record: [id number]  Social Security Number: [id number]  Primary Care Provider: Sandra Low  Insurance: Payor: UNITED HEALTHCARE / Plan: UNITED HEALTHCARE MEDICARE ADVANTAGE / Product Type: HMO. HPI: Roddy Sidhu is a 77 year old  (1945), who is being seen today for a face to face provider visit at [facility]; medical necessity statement for DME included. This patient requires the following:    DME Ordered and Medical Necessity Statement     Wheelchair Documentation  Size: standard 16 x 16  Corresponding cushion: Yes.  Standard foot rests: Yes  Elevating leg rests: No  Arm rests: Yes.  Lap tray: No  Dose the patient use oxygen? No   Is the patient able to propel wheelchair? Yes. And is there someone who can? Yes.  1. The patient has mobility limitations that impairs their ability to participate in one or more mobility related activities: Toileting, Feeding, Grooming and Bathing.  The wheelchair is suitable and necessary for use in the patient's home.  2. The patient's mobility limitations cannot be safely resolved by using a cane/walker:No, they cannot.     Reason why a cane or walker will not meet the patient's needs. (ie: balance, tolerance, level of assistance) balance, tolerance, level of assistance.   3. The patients home has adequate access to use a manual wheelchair:Yes  4. The use of a manual wheelchair on a regular basis will improve the patients ability to participate in mobility related ADL's at home:Yes  5. The patient is willing to use a manual wheelchair at home:Yes  6. The patient has adequate upper body strength and the mental capability to safely use a manual wheelchair and/or has a caregiver that is able to assist: Yes  7. Does the patient have a lower  extremity injury or edema?Yes     Reason for Type of Wheelchair  Light Weight Wheelchair: Patient is unable to self-propel a standard wheelchair in the home but can self propel a light weight wheelchair.    Pt needing above DME with expected length of need of 99 years due to medical necessity associated with following diagnosis:     S/P debridement  S/P BKA (below knee amputation) unilateral, left (H)  Encounter for management of wound VAC  Peripheral artery disease (H)  Type 2 diabetes mellitus with diabetic polyneuropathy, with long-term current use of insulin (H)  Aftercare following surgery of the musculoskeletal system      PMH   has a past medical history of Diabetes (H).  ASSESSMENT/PLAN:  1. S/P debridement    2. S/P BKA (below knee amputation) unilateral, left (H)    3. Encounter for management of wound VAC    4. Peripheral artery disease (H)    5. Type 2 diabetes mellitus with diabetic polyneuropathy, with long-term current use of insulin (H)    6. Aftercare following surgery of the musculoskeletal system      Orders:  1. Facility staff/TC to contact DME company to get their order form for provider to fill out.    ELECTRONICALLY SIGNED BY DESIRE CERTIFIED PROVIDER:  Dr. Aurora Nunes, APRN CNP DNP

## 2023-06-30 ENCOUNTER — DOCUMENTATION ONLY (OUTPATIENT)
Dept: OTHER | Facility: CLINIC | Age: 78
End: 2023-06-30
Payer: COMMERCIAL

## 2023-06-30 NOTE — PROGRESS NOTES
Faxed notes to [facility] that wound vac was not used during patient's surgery on 6/16/23.  Requested [facility]  wound vac at [facility] OR control desk.    Fax confirmed via RightFax at [phone].    CODY MarteN, RN-[facility]

## 2023-07-10 ENCOUNTER — MEDICAL CORRESPONDENCE (OUTPATIENT)
Dept: HEALTH INFORMATION MANAGEMENT | Facility: CLINIC | Age: 78
End: 2023-07-10
Payer: COMMERCIAL

## 2023-07-11 ENCOUNTER — TELEPHONE (OUTPATIENT)
Dept: FAMILY MEDICINE | Facility: CLINIC | Age: 78
End: 2023-07-11
Payer: COMMERCIAL

## 2023-07-11 ENCOUNTER — MEDICAL CORRESPONDENCE (OUTPATIENT)
Dept: HEALTH INFORMATION MANAGEMENT | Facility: CLINIC | Age: 78
End: 2023-07-11

## 2023-07-11 DIAGNOSIS — Z53.9 DIAGNOSIS NOT YET DEFINED: Primary | ICD-10-CM

## 2023-07-11 PROCEDURE — G0180 MD CERTIFICATION HHA PATIENT: HCPCS | Performed by: NURSE PRACTITIONER

## 2023-07-11 NOTE — TELEPHONE ENCOUNTER
MTM referral from: Transitions of Care (recent hospital discharge or ED visit)    MT referral outreach attempt #2 on July 11, 2023 at 11:15 AM      Outcome: Patient not reachable after several attempts, will route to [facility] Pharmacist/Provider as an FYI.  [facility] scheduling number is [phone].  Thank you for the referral.    Use VBC for the carrier/Plan on the flowsheet    MAKAYLA Casey

## 2023-07-17 ENCOUNTER — MEDICAL CORRESPONDENCE (OUTPATIENT)
Dept: HEALTH INFORMATION MANAGEMENT | Facility: CLINIC | Age: 78
End: 2023-07-17
Payer: COMMERCIAL

## 2023-07-18 ENCOUNTER — OFFICE VISIT (OUTPATIENT)
Dept: OTHER | Facility: CLINIC | Age: 78
End: 2023-07-18
Attending: SURGERY
Payer: COMMERCIAL

## 2023-07-18 VITALS — SYSTOLIC BLOOD PRESSURE: 155 MMHG | HEART RATE: 73 BPM | DIASTOLIC BLOOD PRESSURE: 74 MMHG

## 2023-07-18 DIAGNOSIS — Z89.512 S/P BELOW KNEE AMPUTATION, LEFT (H): Primary | ICD-10-CM

## 2023-07-18 PROCEDURE — 99024 POSTOP FOLLOW-UP VISIT: CPT

## 2023-07-18 PROCEDURE — G0463 HOSPITAL OUTPT CLINIC VISIT: HCPCS

## 2023-07-18 NOTE — PROGRESS NOTES
[facility]        Patient is here for a  follow up.      Pt is currently taking Aspirin and Statin.    BP (!) 155/74 (BP Location: Left arm, Patient Position: Chair, Cuff Size: Adult Regular)   Pulse 73     The provider has been notified that the patient has no concerns.     Questions patient would like addressed today are: N/A.    Refills are needed: N/A    Has homecare services and agency name:  Adriana Gunderson MA

## 2023-07-19 NOTE — PROGRESS NOTES
VASCULAR SURGERY PROGRESS NOTE    [facility]    Roddy Sidhu  Medical Record #:  [id number]  YOB: 1945  Age:  77 year old     Date of Service: 7/18/2023    PRIMARY CARE PROVIDER: Sandra Lwo    Reason for visit:  Wound check    IMPRESSION:  Marvin Sidhu is a 77 year old male who returns to clinic today, history of left below knee amputation. He has had problems with wound healing in that site, on June 16 underwent debridement of the area. Bacitracin applied fooled by Kerlix gauze and spandex at that time.     Today his wound is slowly improving. Compliant with dressing changes, currently at home.           RECOMMENDATION/RISKS: Xeroform followed by Kerlix and spandex  dressing, to be changed once a day. Follow-up in 4 weeks for wound check.    REVIEW OF SYSTEMS:    A 12 point ROS was reviewed and is negative except for what is listed above in HPI.    PHH:    Past Medical History:   Diagnosis Date     Diabetes (H)           Past Surgical History:   Procedure Laterality Date     AMPUTATE LEG BELOW KNEE Left 5/19/2023    Procedure: LEFT BELOW KNEE AMPUTATION;  Surgeon: Raimundo Weinstein MD;  Location:  OR     INCISION AND DRAINAGE FOOT, COMBINED Left 5/9/2023    Procedure: Incision and Debidement of Pressure Wound, Left Heel;  Surgeon: Paul Flores DPM;  Location: [place] OR     IR LOWER EXTREMITY ANGIOGRAM LEFT  5/16/2023     IRRIGATION AND DEBRIDEMENT SOFT TISSUE LOWER EXTREMITY, COMBINED Left 6/16/2023    Procedure: IRRIGATION AND DEBRIDEMENT OF LEFT BELOW KNEE AMPUTATION; WOUND VAC PLACEMENT OF LEFT BELOW KNEE AMPUTATION;  Surgeon: Raimundo Weinstein MD;  Location:  OR     SURGICAL HISTORY OF -   1990    removal of steel from left eye       ALLERGIES:  Patient has no known allergies.    MEDS:    Current Outpatient Medications:      acetaminophen (ACETAMINOPHEN 8 HOUR) 650 MG CR tablet, Take 1 tablet by mouth every 8 hours as needed for  mild pain or fever, Disp: , Rfl:      acetaminophen (TYLENOL) 500 MG tablet, Take 1,000 mg by mouth 3 times daily, Disp: , Rfl:      aspirin (ASA) 325 MG EC tablet, Take 325 mg by mouth daily, Disp: , Rfl:      dulaglutide (TRULICITY) 0.75 MG/0.5ML pen, Inject 0.75 mg Subcutaneous every 7 days, Disp: 2 mL, Rfl: 1     ferrous gluconate (FERGON) 324 (38 Fe) MG tablet, Take 1 tablet (324 mg) by mouth daily (with breakfast), Disp: 30 tablet, Rfl: 0     gabapentin (NEURONTIN) 300 MG capsule, Take 1 capsule (300 mg) by mouth 3 times daily, Disp: 270 capsule, Rfl: 3     insulin glargine (LANTUS PEN) 100 UNIT/ML pen, Inject 14 Units Subcutaneous every morning, Disp: 15 mL, Rfl: 1     latanoprost (XALATAN) 0.005 % ophthalmic solution, Place 1 drop into both eyes At Bedtime, Disp: , Rfl:      metFORMIN (GLUCOPHAGE) 500 MG tablet, Take 2 tablets (1,000 mg) by mouth 2 times daily (with meals), Disp: 180 tablet, Rfl: 1     naloxone (NARCAN) 4 MG/0.1ML nasal spray, Spray 4 mg into one nostril alternating nostrils as needed for opioid reversal every 2-3 minutes until assistance arrives, Disp: , Rfl:      oxyCODONE (ROXICODONE) 5 MG tablet, Take 1 tablet (5 mg) by mouth 3 times daily as needed for pain, Disp: 20 tablet, Rfl: 0     polyethylene glycol (MIRALAX) 17 GM/Dose powder, Take 17 g by mouth daily, Disp: 510 g, Rfl: 0     simvastatin (ZOCOR) 40 MG tablet, TAKE ONE TABLET BY MOUTH EVERY NIGHT AT BEDTIME, Disp: 90 tablet, Rfl: 3     vitamin D3 (CHOLECALCIFEROL) 50 mcg (2000 units) tablet, Take 1 tablet by mouth daily, Disp: 30 capsule, Rfl:     SOCIAL HABITS:    History   Smoking Status     Some Days     Types: Pipe   Smokeless Tobacco     Never     Social History    Substance and Sexual Activity      Alcohol use: Not Currently      History   Drug Use No       FAMILY HISTORY:    Family History   Problem Relation Age of Onset     Breast Cancer Mother 60     C.A.D. Brother 58        MI       PE:  BP (!) 155/74 (BP Location: Left  arm, Patient Position: Chair, Cuff Size: Adult Regular)   Pulse 73   Wt Readings from Last 1 Encounters:   06/27/23 132 lb 14.4 oz (60.3 kg)     There is no height or weight on file to calculate BMI.    EXAM:  GENERAL: well-developed 77 year old male who appears his stated age  CARDIAC: normal   CHEST/LUNG: normal respiratory effort   MUSCULOSKELETAL: grossly normal and both lower extremities are intact, no lower extremity edema  NEUROLOGIC: focally intact, alert and oriented x 3  PSYCH: appropriate affect  VASCULAR:       DIAGNOSTIC STUDIES:     Images:  No results found.    LABS:      Sodium   Date Value Ref Range Status   06/23/2023 133 (L) 136 - 145 mmol/L Final   05/31/2023 134 (L) 136 - 145 mmol/L Final   05/20/2023 136 136 - 145 mmol/L Final   05/14/2021 136 133 - 144 mmol/L Final   12/06/2019 138 133 - 144 mmol/L Final   03/12/2019 135 133 - 144 mmol/L Final     Urea Nitrogen   Date Value Ref Range Status   06/23/2023 20.2 8.0 - 23.0 mg/dL Final   05/31/2023 28.0 (H) 8.0 - 23.0 mg/dL Final   05/20/2023 11.1 8.0 - 23.0 mg/dL Final   05/17/2022 27 7 - 30 mg/dL Final   11/02/2021 19 7 - 30 mg/dL Final   05/14/2021 21 7 - 30 mg/dL Final   12/06/2019 20 7 - 30 mg/dL Final   03/12/2019 21 7 - 30 mg/dL Final     Hemoglobin   Date Value Ref Range Status   06/23/2023 10.0 (L) 13.3 - 17.7 g/dL Final   05/31/2023 8.9 (L) 13.3 - 17.7 g/dL Final   05/24/2023 8.0 (L) 13.3 - 17.7 g/dL Final   12/27/2012 15.3 13.3 - 17.7 g/dL Final     Platelet Count   Date Value Ref Range Status   06/23/2023 370 150 - 450 10e3/uL Final   05/31/2023 401 150 - 450 10e3/uL Final   05/24/2023 481 (H) 150 - 450 10e3/uL Final   12/27/2012 382 150 - 450 10e9/L Final       30 minutes spent on the day of encounter doing chart review, history and exam, documentation, and further activities as noted.     Cheryl Carl NP  VASCULAR SURGERY

## 2023-07-25 ENCOUNTER — PATIENT OUTREACH (OUTPATIENT)
Dept: CARE COORDINATION | Facility: CLINIC | Age: 78
End: 2023-07-25
Payer: COMMERCIAL

## 2023-07-25 NOTE — PROGRESS NOTES
Clinic Care Coordination Contact  [facility]/Voicemail    Referral Source: SNF/TCU  Clinical Data:   Clinical Data:   5/13/2023 - 5/24/2023 (11 days)  [facility]  Discharge Diagnoses  Infected diabetic pressure ulcer of left heel associated with type 2 diabetes mellitus with diabetic neuropathy  Additional diabetic ulcer of right heel without infection  S/p debridement of left pressure ulcer 5/9/2023  Peripheral arterial disease  Sepsis with acute organ dysfunction without septic shock, due to unspecified organism, unspecified type (H)  Acute on chronic anemia  Closed displaced subtrochanteric fracture of right femur 3/29  S/p ORIF right femur fracture with intermedullary nail 3/29     Closed fracture of proximal end of right humerus 3/29   Closed nondisplaced fracture of right clavicle 3/29   Type 2 diabetes mellitus with diabetic polyneuropathy, with long-term current use of insulin   Hyperlipidemia  Transition to Facility:              Facility Name: [facility]  6/16/2023ED visit for I&D   IRRIGATION AND DEBRIDEMENT OF LEFT BELOW KNEE AMPUTATION; WOUND VAC PLACEMENT OF LEFT BELOW KNEE AMPUTATION     Assessment: transferred back to [facility]  Discharged 7/3/2023     Care Coordinator Outreach  Outreach attempted x 1.  Mailbox full unable to leave a message   Plan: . Care Coordinator will try to reach patient again in 1-2 business days.    [facility]   Clementina Delgadillo RN, Care Coordinator   [facility]'s   E-mail [email or web address]   [phone]

## 2023-07-25 NOTE — LETTER
[facility] CARE COORDINATION  [address]  [place] [address]   July 27, 2023    Roddy Sidhu  [address] [facility] [address]      Dear Roddy,        I am a  clinic care coordinator who works with JOSH Becker CNP with the [facility]. I recently tried to call and was unable to reach you. Below is a description of clinic care coordination and how I can further assist you.       The clinic care coordination team is made up of a registered nurse, , financial resource worker and community health worker who understand the health care system. The goal of clinic care coordination is to help you manage your health and improve access to the health care system. Our team works alongside your provider to assist you in determining your health and social needs. We can help you obtain health care and community resources, providing you with necessary information and education. We can work with you through any barriers and develop a care plan that helps coordinate and strengthen the communication between you and your care team.  Our services are voluntary and are offered without charge to you personally.    Please feel free to contact me with any questions or concerns regarding care coordination and what we can offer.      We are focused on providing you with the highest-quality healthcare experience possible.    Sincerely,     [facility]   Clementina Delgadillo RN, Care Coordinator   [facility]'s   E-mail [email or web address]   [phone]     Enclosed: I have enclosed a copy of a 24 Hour Access Plan. This has helpful phone numbers for you to call when needed. Please keep this in an easy to access place to use as needed.

## 2023-07-25 NOTE — LETTER
[facility]  Patient Centered Plan of Care  About Me:        Patient Name:  Roddy Tesfaye    YOB: 1945  Age:         77 year old   Aria MRN:    [id number] Telephone Information:  Home Phone [phone]   Mobile [phone]       Address:  [address] [place] [address] Email address:  No e-mail address on record      Emergency Contact(s)    Name Relationship Lgl Grd Work Phone Home Phone Mobile Phone   1. JAQUELIN TESFAYE Spouse No  [phone]    2. ANDREIA DURAN* Daughter No  [phone]            Primary language:  English     needed? No   [employer]:  [phone] op. 1  Other communication barriers:Glasses    Preferred Method of Communication:  Mail  Current living arrangement: I live in a private home with spouse    Mobility Status/ Medical Equipment: No data recorded      Health Maintenance  Health Maintenance Reviewed:   Health Maintenance Due   Topic Date Due    COVID-19 Vaccine (1) Never done    ZOSTER IMMUNIZATION (1 of 2) Never done    LUNG CANCER SCREENING  Never done    MICROALBUMIN  11/02/2022    DTAP/TDAP/TD IMMUNIZATION (2 - Td or Tdap) 12/27/2022          My Access Plan  Medical Emergency 911   Primary Clinic Line [facility] [phone]   24 Hour Appointment Line [phone] or  [phone]) (toll-free)   24 Hour Nurse Line [phone] (toll-free)   Preferred Urgent Care No data recorded   Preferred Hospital [facility]  [phone]     Preferred Pharmacy [facility] [address] [facility]     Behavioral Health Crisis Line The National Suicide Prevention Lifeline at 1-842.826.5602 or Text/Call 308             My Care Team Members  Patient Care Team         Relationship Specialty Notifications Start End    Sandra Low APRN CNP PCP - General Family Medicine  11/2/21     Phone: [phone] Fax: [phone]          [address] [facility] [address]    Sandra Low APRN CNP Assigned PCP   11/14/21     Phone: [phone] Fax: [phone]         [address] [facility] [address]    Clementina Delgadillo, RN Clinic Care Coordinator Primary Care - CC Admissions 5/25/23     Phone: [phone]         Sandra Low APRN CNP Assigned Pain Medication Provider   6/17/23     Phone: [phone] Fax: [phone]         [address] [facility] [address]    Cheryl Carl NP Assigned Heart and Vascular Provider   6/24/23     Phone: [phone] Fax: [phone] [address] [facility] [address]              My Care Plans  Self Management and Treatment Plan  Care Plan       Action Plans on File:                       Advance Care Plans/Directives Type:   No data recorded    My Medical and Care Information  Problem List   Patient Active Problem List   Diagnosis    Advanced directives, counseling/discussion    Health Care Home    Hyperlipidemia LDL goal <100    Type 2 diabetes mellitus with diabetic polyneuropathy, with long-term current use of insulin (H)    Tobacco use disorder    Microalbuminuria due to type 2 diabetes mellitus (H)    Closed fracture of right femur (H)    Sepsis with acute organ dysfunction without septic shock, due to unspecified organism, unspecified type (H)    Peripheral artery disease (H)      Current Medications and Allergies:    Current Outpatient Medications   Medication    acetaminophen (ACETAMINOPHEN 8 HOUR) 650 MG CR tablet    acetaminophen (TYLENOL) 500 MG tablet    aspirin (ASA) 325 MG EC tablet    dulaglutide (TRULICITY) 0.75 MG/0.5ML pen    ferrous gluconate (FERGON) 324 (38 Fe) MG tablet    gabapentin (NEURONTIN) 300 MG capsule    insulin glargine (LANTUS PEN) 100 UNIT/ML pen    latanoprost (XALATAN) 0.005 % ophthalmic solution    metFORMIN (GLUCOPHAGE) 500 MG tablet    naloxone (NARCAN) 4 MG/0.1ML nasal spray    oxyCODONE (ROXICODONE) 5 MG tablet     polyethylene glycol (MIRALAX) 17 GM/Dose powder    simvastatin (ZOCOR) 40 MG tablet    vitamin D3 (CHOLECALCIFEROL) 50 mcg (2000 units) tablet     No current facility-administered medications for this visit.        Care Coordination Start Date: 5/25/2023   Frequency of Care Coordination: weekly     Form Last Updated: 07/27/2023

## 2023-07-27 NOTE — PROGRESS NOTES
Clinic Care Coordination Contact  [facility]/Voicemail    Referral Source: SNF/TCU  Clinical Data: Care Coordinator Outreach  Outreach attempted x 2.  -VM full and unable to leave a message   Plan: Care Coordinator will send unable to contact letter with care coordinator contact information via mail. Care Coordinator will do no further outreaches at this time.    [facility]   Clementina Delgadillo RN, Care Coordinator   [facility]'s   E-mail [email or web address]   [phone]

## 2023-08-01 ENCOUNTER — MEDICAL CORRESPONDENCE (OUTPATIENT)
Dept: HEALTH INFORMATION MANAGEMENT | Facility: CLINIC | Age: 78
End: 2023-08-01
Payer: COMMERCIAL

## 2023-08-08 ENCOUNTER — MEDICAL CORRESPONDENCE (OUTPATIENT)
Dept: HEALTH INFORMATION MANAGEMENT | Facility: CLINIC | Age: 78
End: 2023-08-08
Payer: COMMERCIAL

## 2023-08-14 ENCOUNTER — TELEPHONE (OUTPATIENT)
Dept: FAMILY MEDICINE | Facility: CLINIC | Age: 78
End: 2023-08-14
Payer: COMMERCIAL

## 2023-08-14 NOTE — TELEPHONE ENCOUNTER
Home Health Care    Reason for call:  HTN    Orders are needed for this patient.  RN saw pt in home today.  BPs were 180/96 and 162/88.  Pt has appt tomorrow with ABNER Low and RN wants HTN discussed with PCP, since pt was taken off of BP med.  No need to call RN back, unless there are questions or problems.      Pt Provider: Maranda    Phone Number Homecare Nurse can be reached at: [phone]    Can we leave a detailed message on this number? YES

## 2023-08-15 ENCOUNTER — LAB (OUTPATIENT)
Dept: LAB | Facility: CLINIC | Age: 78
End: 2023-08-15
Payer: COMMERCIAL

## 2023-08-15 ENCOUNTER — OFFICE VISIT (OUTPATIENT)
Dept: FAMILY MEDICINE | Facility: CLINIC | Age: 78
End: 2023-08-15
Payer: COMMERCIAL

## 2023-08-15 VITALS
HEART RATE: 89 BPM | OXYGEN SATURATION: 98 % | TEMPERATURE: 97.1 F | RESPIRATION RATE: 16 BRPM | DIASTOLIC BLOOD PRESSURE: 100 MMHG | SYSTOLIC BLOOD PRESSURE: 120 MMHG

## 2023-08-15 DIAGNOSIS — Z79.4 TYPE 2 DIABETES MELLITUS WITH DIABETIC POLYNEUROPATHY, WITH LONG-TERM CURRENT USE OF INSULIN (H): Primary | ICD-10-CM

## 2023-08-15 DIAGNOSIS — I10 ESSENTIAL HYPERTENSION: ICD-10-CM

## 2023-08-15 DIAGNOSIS — E11.42 TYPE 2 DIABETES MELLITUS WITH DIABETIC POLYNEUROPATHY, WITH LONG-TERM CURRENT USE OF INSULIN (H): ICD-10-CM

## 2023-08-15 DIAGNOSIS — L08.9 DIABETIC FOOT INFECTION (H): ICD-10-CM

## 2023-08-15 DIAGNOSIS — E11.42 TYPE 2 DIABETES MELLITUS WITH DIABETIC POLYNEUROPATHY, WITH LONG-TERM CURRENT USE OF INSULIN (H): Primary | ICD-10-CM

## 2023-08-15 DIAGNOSIS — E11.628 DIABETIC FOOT INFECTION (H): ICD-10-CM

## 2023-08-15 DIAGNOSIS — Z79.4 TYPE 2 DIABETES MELLITUS WITH DIABETIC POLYNEUROPATHY, WITH LONG-TERM CURRENT USE OF INSULIN (H): ICD-10-CM

## 2023-08-15 LAB
ANION GAP SERPL CALCULATED.3IONS-SCNC: 15 MMOL/L (ref 7–15)
BUN SERPL-MCNC: 19.4 MG/DL (ref 8–23)
CALCIUM SERPL-MCNC: 9.8 MG/DL (ref 8.8–10.2)
CHLORIDE SERPL-SCNC: 102 MMOL/L (ref 98–107)
CREAT SERPL-MCNC: 0.88 MG/DL (ref 0.67–1.17)
DEPRECATED HCO3 PLAS-SCNC: 20 MMOL/L (ref 22–29)
GFR SERPL CREATININE-BSD FRML MDRD: 90 ML/MIN/1.73M2
GLUCOSE SERPL-MCNC: 154 MG/DL (ref 70–99)
HBA1C MFR BLD: 6.7 % (ref 0–5.6)
HGB BLD-MCNC: 12.6 G/DL (ref 13.3–17.7)
POTASSIUM SERPL-SCNC: 5.2 MMOL/L (ref 3.4–5.3)
SODIUM SERPL-SCNC: 137 MMOL/L (ref 136–145)

## 2023-08-15 PROCEDURE — 99214 OFFICE O/P EST MOD 30 MIN: CPT | Performed by: NURSE PRACTITIONER

## 2023-08-15 PROCEDURE — 80048 BASIC METABOLIC PNL TOTAL CA: CPT

## 2023-08-15 PROCEDURE — 85018 HEMOGLOBIN: CPT

## 2023-08-15 PROCEDURE — 36415 COLL VENOUS BLD VENIPUNCTURE: CPT

## 2023-08-15 PROCEDURE — 83036 HEMOGLOBIN GLYCOSYLATED A1C: CPT

## 2023-08-15 RX ORDER — LISINOPRIL 5 MG/1
5 TABLET ORAL DAILY
Qty: 90 TABLET | Refills: 3 | Status: SHIPPED | OUTPATIENT
Start: 2023-08-15 | End: 2023-08-30

## 2023-08-15 NOTE — LETTER
August 17, 2023      Marvin E Nahum  [address] [address]        Dear ,    We are writing to inform you of your test results.    Your labs have all improved since the hospital. No concerning levels.     Resulted Orders   Hemoglobin A1c   Result Value Ref Range    Hemoglobin A1C 6.7 (H) 0.0 - 5.6 %      Comment:      Normal <5.7%   Prediabetes 5.7-6.4%    Diabetes 6.5% or higher     Note: Adopted from ADA consensus guidelines.   Hemoglobin   Result Value Ref Range    Hemoglobin 12.6 (L) 13.3 - 17.7 g/dL   Basic metabolic panel  (Ca, Cl, CO2, Creat, Gluc, K, Na, BUN)   Result Value Ref Range    Sodium 137 136 - 145 mmol/L    Potassium 5.2 3.4 - 5.3 mmol/L    Chloride 102 98 - 107 mmol/L    Carbon Dioxide (CO2) 20 (L) 22 - 29 mmol/L    Anion Gap 15 7 - 15 mmol/L    Urea Nitrogen 19.4 8.0 - 23.0 mg/dL    Creatinine 0.88 0.67 - 1.17 mg/dL    Calcium 9.8 8.8 - 10.2 mg/dL    Glucose 154 (H) 70 - 99 mg/dL    GFR Estimate 90 >60 mL/min/1.73m2       If you have any questions or concerns, please call the clinic at the number listed above.       Sincerely,      JOSH Becker CNP

## 2023-08-15 NOTE — PROGRESS NOTES
Assessment & Plan     Type 2 diabetes mellitus with diabetic polyneuropathy, with long-term current use of insulin (H)  Controlled, continue current meds  - Hemoglobin A1c; Future  - insulin glargine (LANTUS PEN) 100 UNIT/ML pen; Inject 12 Units Subcutaneous every morning  - Hemoglobin; Future  - Basic metabolic panel  (Ca, Cl, CO2, Creat, Gluc, K, Na, BUN); Future    Essential hypertension  Add back ACE (was previously discontinued inpatient)  - lisinopril (ZESTRIL) 5 MG tablet; Take 1 tablet (5 mg) by mouth daily  - Basic metabolic panel  (Ca, Cl, CO2, Creat, Gluc, K, Na, BUN); Future    Diabetic foot infection (H)    - Wound Care Referral; Future    Prescription drug management         CONSULTATION/REFERRAL to WOUND CARE    FUTURE APPOINTMENTS:       - Follow-up visit in 3 months, sooner PRN. Home nursing to continue to monitor BP and heel    JOSH Becker CNP [facility]   Marvin is a 77 year old, presenting for the following health issues:  Hypertension, Diabetes, and Derm Problem (Wound check on R heel )        8/15/2023     7:06 AM   Additional Questions   Roomed by Guillermina OLIVA     Diabetes Follow-up    How often are you checking your blood sugar? A few times a week  What time of day are you checking your blood sugars (select all that apply)?   When he wakes up before breakfast   Have you had any blood sugars above 200?  No  Have you had any blood sugars below 70?  No  What symptoms do you notice when your blood sugar is low?  None  What concerns do you have today about your diabetes? Other: sore on right heel   Do you have any of these symptoms? (Select all that apply)  Numbness in feet and Redness, sores, or blisters on feet      BP Readings from Last 2 Encounters:   08/15/23 (!) 120/100   07/18/23 (!) 155/74     Hemoglobin A1C (%)   Date Value   08/15/2023 6.7 (H)   05/02/2023 8.3 (H)   05/14/2021 7.2 (H)   06/05/2020 7.8 (H)     LDL Cholesterol  Calculated (mg/dL)   Date Value   05/02/2023 49   05/17/2022 40   05/14/2021 58   12/06/2019 63             Hypertension Follow-up    Do you check your blood pressure regularly outside of the clinic? Yes   Are you following a low salt diet? Yes  Are your blood pressures ever more than 140 on the top number (systolic) OR more   than 90 on the bottom number (diastolic), for example 140/90? Yes    How many servings of fruits and vegetables do you eat daily?  4 or more  On average, how many sweetened beverages do you drink each day (Examples: soda, juice, sweet tea, etc.  Do NOT count diet or artificially sweetened beverages)?   0  How many days per week do you exercise enough to make your heart beat faster? 3 or less  How many minutes a day do you exercise enough to make your heart beat faster? 9 or less  How many days per week do you miss taking your medication? 0  Hypertension Follow-up    Do you check your blood pressure regularly outside of the clinic? Yes   Are you following a low salt diet? No  Are your blood pressures ever more than 140 on the top number (systolic) OR more   than 90 on the bottom number (diastolic), for example 140/90? Yes    Concern - foot wound  Onset: months  Description: right heel   Intensity: mild  Progression of Symptoms:  same  Accompanying Signs & Symptoms: drainage, color changes  Previous history of similar problem: LLE amputation secondary to same thing  Precipitating factors:        Worsened by: walking  Alleviating factors:        Improved by: elevation  Therapies tried and outcome: home nurse is changing bandage and monitoring       Review of Systems   Constitutional, HEENT, cardiovascular, pulmonary, gi and gu systems are negative, except as otherwise noted.      Objective    BP (!) 120/100   Pulse 89   Temp 97.1  F (36.2  C) (Tympanic)   Resp 16   SpO2 98%   There is no height or weight on file to calculate BMI.  Physical Exam   GENERAL: alert and no distress  RESP: no  rhonchi and no wheezes  CV: regular rates and rhythm, normal S1 S2, no S3 or S4, and no murmur, click or rub  SKIN: right postero-lateral heel with 1.5 x 1.5 cm wound with black eschar in the middle   NEURO: Normal strength and tone, mentation intact and speech normal  PSYCH: mentation appears normal, affect normal/bright    Results for orders placed or performed in visit on 08/15/23   Hemoglobin A1c     Status: Abnormal   Result Value Ref Range    Hemoglobin A1C 6.7 (H) 0.0 - 5.6 %   Hemoglobin     Status: Abnormal   Result Value Ref Range    Hemoglobin 12.6 (L) 13.3 - 17.7 g/dL

## 2023-08-16 ENCOUNTER — OFFICE VISIT (OUTPATIENT)
Dept: OTHER | Facility: CLINIC | Age: 78
End: 2023-08-16
Payer: COMMERCIAL

## 2023-08-16 VITALS — HEART RATE: 71 BPM | OXYGEN SATURATION: 100 % | DIASTOLIC BLOOD PRESSURE: 84 MMHG | SYSTOLIC BLOOD PRESSURE: 142 MMHG

## 2023-08-16 DIAGNOSIS — Z89.512 S/P BELOW KNEE AMPUTATION, LEFT (H): Primary | ICD-10-CM

## 2023-08-16 DIAGNOSIS — T81.31XA POSTOPERATIVE WOUND DEHISCENCE, INITIAL ENCOUNTER: ICD-10-CM

## 2023-08-16 PROCEDURE — G0463 HOSPITAL OUTPT CLINIC VISIT: HCPCS

## 2023-08-16 PROCEDURE — 99024 POSTOP FOLLOW-UP VISIT: CPT

## 2023-08-16 NOTE — PROGRESS NOTES
Patient is here to discuss follow up    BP (!) 142/84 (BP Location: Right arm, Patient Position: Chair, Cuff Size: Adult Regular)   Pulse 71   SpO2 100%     Questions patient would like addressed today are: N/A.    Refills are needed: No    Has homecare services and agency name:  Adriana GAMEZ

## 2023-08-17 PROBLEM — T81.31XA POSTOPERATIVE WOUND DEHISCENCE, INITIAL ENCOUNTER: Status: ACTIVE | Noted: 2023-08-17

## 2023-08-17 PROBLEM — Z89.512 S/P BELOW KNEE AMPUTATION, LEFT (H): Status: ACTIVE | Noted: 2023-08-17

## 2023-08-17 NOTE — RESULT ENCOUNTER NOTE
Please send patient letter notifying of labs and provider message.    Your labs have all improved since the hospital. No concerning levels.    Thanks,  JOSH Rincon CNP

## 2023-08-17 NOTE — PROGRESS NOTES
VASCULAR SURGERY PROGRESS NOTE    LOCATION: [facility]     Roddy Sidhu  Medical Record #:  [id number]  YOB: 1945  Age:  77 year old     Date of Service: 8/16/2023    PRIMARY CARE PROVIDER: Sandra Low    Reason for visit:  Wound check    IMPRESSION:  Marvin Sidhu is a 77 year old male who returns to clinic today, history of left below knee amputation and debridement. He has had problems with wound healing in that site, underwent debridement on June 16. Since last visit has been doing Xeroform followed by Kerlix.     Today, wound is significantly improved. Compliant with dressing changes        RECOMMENDATION/RISKS: Continue with current dressing plan. Scheduled to see myself in a few weeks. Please do not begin prosthetics until incision is fully healed.     REVIEW OF SYSTEMS:    A 12 point ROS was reviewed and is negative except for what is listed above in HPI.    PHH:    Past Medical History:   Diagnosis Date    Diabetes (H)           Past Surgical History:   Procedure Laterality Date    AMPUTATE LEG BELOW KNEE Left 5/19/2023    Procedure: LEFT BELOW KNEE AMPUTATION;  Surgeon: Raimundo Weinstein MD;  Location:  OR    INCISION AND DRAINAGE FOOT, COMBINED Left 5/9/2023    Procedure: Incision and Debidement of Pressure Wound, Left Heel;  Surgeon: Paul Flores DPM;  Location: [place] OR    IR LOWER EXTREMITY ANGIOGRAM LEFT  5/16/2023    IRRIGATION AND DEBRIDEMENT SOFT TISSUE LOWER EXTREMITY, COMBINED Left 6/16/2023    Procedure: IRRIGATION AND DEBRIDEMENT OF LEFT BELOW KNEE AMPUTATION; WOUND VAC PLACEMENT OF LEFT BELOW KNEE AMPUTATION;  Surgeon: Raimundo Weinstein MD;  Location:  OR    SURGICAL HISTORY OF -   1990    removal of steel from left eye       ALLERGIES:  Patient has no known allergies.    MEDS:    Current Outpatient Medications:     acetaminophen (ACETAMINOPHEN 8 HOUR) 650 MG CR tablet, Take 1 tablet by mouth every 8 hours as needed for mild pain  or fever, Disp: , Rfl:     aspirin (ASA) 325 MG EC tablet, Take 325 mg by mouth daily, Disp: , Rfl:     dulaglutide (TRULICITY) 0.75 MG/0.5ML pen, Inject 0.75 mg Subcutaneous every 7 days, Disp: 2 mL, Rfl: 1    gabapentin (NEURONTIN) 300 MG capsule, Take 1 capsule (300 mg) by mouth 3 times daily, Disp: 270 capsule, Rfl: 3    insulin glargine (LANTUS PEN) 100 UNIT/ML pen, Inject 12 Units Subcutaneous every morning, Disp: 15 mL, Rfl: 1    latanoprost (XALATAN) 0.005 % ophthalmic solution, Place 1 drop into both eyes At Bedtime, Disp: , Rfl:     lisinopril (ZESTRIL) 5 MG tablet, Take 1 tablet (5 mg) by mouth daily, Disp: 90 tablet, Rfl: 3    metFORMIN (GLUCOPHAGE) 500 MG tablet, Take 2 tablets (1,000 mg) by mouth 2 times daily (with meals), Disp: 180 tablet, Rfl: 1    NAPROXEN PO, , Disp: , Rfl:     simvastatin (ZOCOR) 40 MG tablet, TAKE ONE TABLET BY MOUTH EVERY NIGHT AT BEDTIME, Disp: 90 tablet, Rfl: 3    vitamin D3 (CHOLECALCIFEROL) 50 mcg (2000 units) tablet, Take 1 tablet by mouth daily, Disp: 30 capsule, Rfl:     ferrous gluconate (FERGON) 324 (38 Fe) MG tablet, Take 1 tablet (324 mg) by mouth daily (with breakfast) (Patient not taking: Reported on 8/15/2023), Disp: 30 tablet, Rfl: 0    naloxone (NARCAN) 4 MG/0.1ML nasal spray, Spray 4 mg into one nostril alternating nostrils as needed for opioid reversal every 2-3 minutes until assistance arrives (Patient not taking: Reported on 8/15/2023), Disp: , Rfl:     oxyCODONE (ROXICODONE) 5 MG tablet, Take 1 tablet (5 mg) by mouth 3 times daily as needed for pain (Patient not taking: Reported on 8/15/2023), Disp: 20 tablet, Rfl: 0    polyethylene glycol (MIRALAX) 17 GM/Dose powder, Take 17 g by mouth daily (Patient not taking: Reported on 8/15/2023), Disp: 510 g, Rfl: 0    SOCIAL HABITS:    History   Smoking Status    Some Days    Types: Pipe   Smokeless Tobacco    Never     Social History    Substance and Sexual Activity      Alcohol use: Not Currently      History    Drug Use No       FAMILY HISTORY:    Family History   Problem Relation Age of Onset    Breast Cancer Mother 60    C.A.D. Brother 58        MI       PE:  BP (!) 142/84 (BP Location: Right arm, Patient Position: Chair, Cuff Size: Adult Regular)   Pulse 71   SpO2 100%   Wt Readings from Last 1 Encounters:   06/27/23 132 lb 14.4 oz (60.3 kg)     There is no height or weight on file to calculate BMI.    EXAM:  GENERAL: well-developed 77 year old male who appears his stated age  CARDIAC: normal   CHEST/LUNG: normal respiratory effort   MUSCULOSKELETAL: grossly normal and both lower extremities are intact, no lower extremity edema  NEUROLOGIC: focally intact, alert and oriented x 3  PSYCH: appropriate affect  VASCULAR:       DIAGNOSTIC STUDIES:     Images:  IMAGING                   Result Date: 8/10/2023  This result has an attachment that is not available.    MRI-SCAN    Result Date: 8/10/2023  This result has an attachment that is not available.    ULTRASOUND SC    Result Date: 8/10/2023  This result has an attachment that is not available.    ULTRASOUND SC    Result Date: 8/10/2023  This result has an attachment that is not available.    LABS:      Sodium   Date Value Ref Range Status   08/15/2023 137 136 - 145 mmol/L Final   06/23/2023 133 (L) 136 - 145 mmol/L Final   05/31/2023 134 (L) 136 - 145 mmol/L Final   05/14/2021 136 133 - 144 mmol/L Final   12/06/2019 138 133 - 144 mmol/L Final   03/12/2019 135 133 - 144 mmol/L Final     Urea Nitrogen   Date Value Ref Range Status   08/15/2023 19.4 8.0 - 23.0 mg/dL Final   06/23/2023 20.2 8.0 - 23.0 mg/dL Final   05/31/2023 28.0 (H) 8.0 - 23.0 mg/dL Final   05/17/2022 27 7 - 30 mg/dL Final   11/02/2021 19 7 - 30 mg/dL Final   05/14/2021 21 7 - 30 mg/dL Final   12/06/2019 20 7 - 30 mg/dL Final   03/12/2019 21 7 - 30 mg/dL Final     Hemoglobin   Date Value Ref Range Status   08/15/2023 12.6 (L) 13.3 - 17.7 g/dL Final   06/23/2023 10.0 (L) 13.3 - 17.7 g/dL Final    05/31/2023 8.9 (L) 13.3 - 17.7 g/dL Final   12/27/2012 15.3 13.3 - 17.7 g/dL Final     Platelet Count   Date Value Ref Range Status   06/23/2023 370 150 - 450 10e3/uL Final   05/31/2023 401 150 - 450 10e3/uL Final   05/24/2023 481 (H) 150 - 450 10e3/uL Final   12/27/2012 382 150 - 450 10e9/L Final       30 minutes spent on the day of encounter doing chart review, history and exam, documentation, and further activities as noted.     Cheryl Carl, NP  VASCULAR SURGERY

## 2023-08-18 DIAGNOSIS — E11.42 TYPE 2 DIABETES MELLITUS WITH DIABETIC POLYNEUROPATHY, WITH LONG-TERM CURRENT USE OF INSULIN (H): ICD-10-CM

## 2023-08-18 DIAGNOSIS — Z79.4 TYPE 2 DIABETES MELLITUS WITH DIABETIC POLYNEUROPATHY, WITH LONG-TERM CURRENT USE OF INSULIN (H): ICD-10-CM

## 2023-08-18 RX ORDER — DULAGLUTIDE 0.75 MG/.5ML
0.75 INJECTION, SOLUTION SUBCUTANEOUS
Qty: 2 ML | Refills: 1 | Status: SHIPPED | OUTPATIENT
Start: 2023-08-18 | End: 2023-10-13

## 2023-08-29 ENCOUNTER — HOSPITAL ENCOUNTER (OUTPATIENT)
Dept: WOUND CARE | Facility: CLINIC | Age: 78
Discharge: HOME | End: 2023-08-29
Attending: NURSE PRACTITIONER | Admitting: NURSE PRACTITIONER
Payer: COMMERCIAL

## 2023-08-29 DIAGNOSIS — L08.9 DIABETIC FOOT INFECTION (H): ICD-10-CM

## 2023-08-29 DIAGNOSIS — E11.628 DIABETIC FOOT INFECTION (H): ICD-10-CM

## 2023-08-29 PROCEDURE — 97602 WOUND(S) CARE NON-SELECTIVE: CPT

## 2023-08-29 PROCEDURE — G0463 HOSPITAL OUTPT CLINIC VISIT: HCPCS | Mod: 25

## 2023-08-29 NOTE — DISCHARGE INSTRUCTIONS
Visit Summary/General Recommendations  We did remove some of the dead tissue from your right heel today, this will help with the healing process.    We are starting to use Medihoney alginate, this is to help promote debridement of the dead tissue in the wound.      Continue to keep the pressure off this area using your heel boots.    Continue to push protein in your diet and control your blood sugar as well as you are able to.    Stopping or cutting down on smoking would help with healing.     When you go back to your vascular clinic, make sure they are aware of the wound on your heel, they may want to see this.          Bandage Change  Wash your hands before and after the dressing change. You may wear gloves if you choose. Gloves are available over the counter at [employer], [employer], [employer], etc.   Use scissors at home that you can designate solely for wound care and cleanse well with each use, (rubbing alcohol or alcohol pads work well for this).    Wash hands.  Remove old dressing.  Wash hands.  1.) Cleanse wound with wound cleanser on stream setting and wipe with gauze to remove any loose debris. Cleanse several inches around wound and dry.   2.) Protect surrounding skin with a layer or zinc barrier cream if needed (if needed/skin if is getting white/wet looking- we did not do this today as it did not seem like your wound was draining enough to need this but the Medihoney may increase the wound drainage)   3.) Apply a piece of Medihoney alginate to wound (should be same size as wound and fit into the wound)  4.) Cover with island dressing OR if you can get the Mepilex border foam dressings then may use those also.  Change dressing every Monday-Wednesday and Friday and as needed for drainage    Keep area covered when showering to prevent infection. Wash foot separately.          Signs and symptoms of infection:  Bright green drainage  Foul odor  Increasing pain in area  New redness or swelling at the site of the  wound or streaks up from wound  New warmth to touch around wound accompanied by redness and swelling  Fever    Need to be seen as soon as possible for infection concerns.    Follow up: as scheduled    Please call with any questions or concerns.    Kaylynn Powell RN, CWOCN   [phone]

## 2023-08-30 ENCOUNTER — TELEPHONE (OUTPATIENT)
Dept: FAMILY MEDICINE | Facility: CLINIC | Age: 78
End: 2023-08-30
Payer: COMMERCIAL

## 2023-08-30 DIAGNOSIS — I10 ESSENTIAL HYPERTENSION: ICD-10-CM

## 2023-08-30 RX ORDER — LISINOPRIL 5 MG/1
5 TABLET ORAL 2 TIMES DAILY
Qty: 180 TABLET | Refills: 3 | Status: SHIPPED | OUTPATIENT
Start: 2023-08-30 | End: 2023-10-09

## 2023-08-30 NOTE — TELEPHONE ENCOUNTER
Please increase to take lisinopril 5mg twice daily morning and roughly 8-12 hours later.  Roddy was seen today for blood pressure.    Diagnoses and all orders for this visit:    Essential hypertension  -     lisinopril (ZESTRIL) 5 MG tablet; Take 1 tablet (5 mg) by mouth 2 times daily        Thank you,  Peter Calero MD

## 2023-08-30 NOTE — TELEPHONE ENCOUNTER
Left this detailed message on Hilda's voicemail.    Asked that Hilda call back and confirm that Hilda got this message due to dose increase.    Esther Garcia RN

## 2023-08-30 NOTE — TELEPHONE ENCOUNTER
Hilda from [facility]. Calling regarding patients blood pressure.   Lisinopril was restarted 8/15, since then blood pressures are good in the morning but climb up high later in the day.   Taking Lisinopril 5 mg in am    Today BP this am 120/70's    Noon 146/78    Now 182/86    Patient has no symptoms, denies headache, blurred vision, chest pain, shortness of breath or edema    Please advise    Hilda  [phone]    Kaylie Powell RN on 8/30/2023 at 3:17 PM

## 2023-08-31 NOTE — TELEPHONE ENCOUNTER
Called & spoke with SARA Stevens. She received the msg & has not been able to get a hold of pt. She will call again today.  Priyanka Davison RN

## 2023-09-05 ENCOUNTER — MEDICAL CORRESPONDENCE (OUTPATIENT)
Dept: HEALTH INFORMATION MANAGEMENT | Facility: CLINIC | Age: 78
End: 2023-09-05
Payer: COMMERCIAL

## 2023-09-07 ENCOUNTER — MEDICAL CORRESPONDENCE (OUTPATIENT)
Dept: HEALTH INFORMATION MANAGEMENT | Facility: CLINIC | Age: 78
End: 2023-09-07
Payer: COMMERCIAL

## 2023-09-08 DIAGNOSIS — Z53.9 DIAGNOSIS NOT YET DEFINED: Primary | ICD-10-CM

## 2023-09-08 PROCEDURE — G0179 MD RECERTIFICATION HHA PT: HCPCS | Performed by: NURSE PRACTITIONER

## 2023-09-10 NOTE — PROGRESS NOTES
"[facility]    Start of Care in [facility]: 8/29/2023   Referring Provider: JOSH Rincon, CNP  Primary Care Provider: Sandra Low   Wound Location: right heel, pt also has a wound on his left stump which is being treated by vascular provider     Wound Clinic Visit:   Initial consult    Reason for Visit:       \"diabetic foot ulcer\"    Subjective:     Per pt his homecare nurse is wondering if there is something more we can do to speed up healing, not only for his heel but his stump wound.       Wound History:   From May '23 hospital stay:  Roddy Sidhu was admitted on 5/13/2023 by Osvaldo Scanlon MD and I would refer you to their history and physical.  The following problems were addressed during his hospitalization:     Roddy Sidhu is a 77 year old male with history of diabetes mellitus with polyneuropathy transferred from [facility] on 5/13/2023 with poor healing left heel wound and concern for vascular compromise-requiring vascular surgery evaluation.     Patient admitted at [facility] (3/29-4/4) for fall with R femur fx, R humerus fx, R clavicle fx s/p R femur ORIF and discharged to TCU.      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  S/p debridement of left pressure ulcer 5/9/2023  Peripheral arterial disease.  --Pressure injury to heels noted in TCU and seen by wound care 4/17/2023.  Recent ABIs were normal. WBC 17.1.  .60.  Wound cultures from 5/5 positive for 2+ Proteus, 3+ Enterococcus, 1+ MSSA.    --Was on IV vancomycin 5/4 to 5/8.    Was on intravenous Zosyn (5/4), switched to Unasyn (5/8) to cover Proteus, Enterococcus and MSSA. Then switched to oral Augmentin [5/10- 5/14]  Continue IV Ancef [5/15].  CRP improving from 123.60  >25.6   --Vascular surgery following.  Underwent lower extremity angiogram 5/16,  Left patent iliac, SFA and AK " pop. 50 % at knee pop stenosis, single vessel runoff via VERN.  -- Podiatry following, plan for left BKA 5/19.  Patient underwent surgery as planned on 5/19.  Vascular surgery following for postoperative cares.  Plan for dressing change on Monday.  --Infectious disease following.  Antibiotics discontinued following BKA per ID recommendations.  As needed Tylenol, oxycodone as needed for pain.  Was on aspirin 325 mg oral daily, hold on 5/19 for surgery.  Restarted postoperatively .  Continue simvastatin 40 mg oral daily.  -PT/OT.  Keep knee immobilizer in place, okay to remove for brief.  For comfort.     Acute on chronic anemia no active bleeding  S/p PRBC transfusion 5/5, 5/21.  Hemoglobin was normal 6 months ago, was 8's during recent admission, then 8.2 on 5/4 and dropped to 6.7-6.9 on 5/5, received 1 unit blood transfusion on 5/5.  --No black or bloody stools.  No abdominal pain.   No other apparent source of acute blood loss.   --Guiac negative. Iron sat index 5.  Received 2 doses of IV Venofer, started on oral iron supplements.  -Aspirin was on hold, restarted 5/14 -5/18.  Held 5/19 for amputation surgery, restarted postoperatively.  Hb 8 on 5/20. Down to 6.6 on 5/21, transfused additional PRBC.  Discontinued Celebrex [started this hospitalization], naproxen [PTA meds]  No active bleeding at this time.    Hemoglobin stable at 8 currently     Hypoglycemia 5/19 while NPO  Hyperglycemia from uncontrolled diabetes  Uncontrolled diabetes mellitus with a hemoglobin A1c of 8.3.  Diabetic neuropathy.  PTA on 10 units insulin Lantus every morning.  This hospital stay noted elevated blood sugars, optimized to insulin Lantus 14 units twice daily.  Insulin Lantus on hold on 5/19 AM for surgery while NPO.  Noted hypoglycemia with blood sugars of 64 on 5/19.  Hypoglycemia protocol.  Also received IVF with dextrose for this.  Continue insulin Lantus to 14 units twice daily after surgery.    Reduced Lantus to 14 units every  morning only due to hypoglycemia on twice daily dosing.  Also added prandial aspart 1 unit per 10 g carbs 3 times daily AC.  Continue sliding scale insulin.  Continue PTA med Neurontin.  Hold PTA metformin and Trulicity.  Restart metformin today  -Changed diet to moderate consistent carbohydrate diet     PTA regimen restarted on discharge        Severe malnutrition:  Nutrition services following   Supplements ordered     Hyperlipidemia.  Continue PTA simvastatin.     Physical deconditioning in the setting of ongoing medical illness, senile frailty.  Status post recent fall.  Closed displaced subtrochanteric fracture of right femur 3/29  S/p ORIF right femur fracture with intermedullary nail 3/29     Closed fracture of proximal end of right humerus 3/29   Closed nondisplaced fracture of right clavicle 3/29   Was at TCU prior to transfer  Continue PT/OT, likely TCU on discharge.  Patient and family in agreement with TCU.  Care Management assistance with transition.  Patient tells me that he plans on discharging home if TCU not available soon.  He lives with his daughter and wife.     Hypoalbuminemia likely dilutional, acute illness.  Serum albumin 2.6.   Dietary supplements with Ensure.  Monitor periodically.     Thrombocytosis likely reactive in the setting of infection.  Platelet count 613 > 520  Monitor periodically.     Tobacco dependence  Daily pipe smokier, Smoking cessation emphasized  Nicotine patch     Pt underwent I&D of left BKA site on 6/16/23      Past Medical History:  Patient Active Problem List   Diagnosis    Advanced directives, counseling/discussion    Health Care Home    Hyperlipidemia LDL goal <100    Type 2 diabetes mellitus with diabetic polyneuropathy, with long-term current use of insulin (H)    Tobacco use disorder    Microalbuminuria due to type 2 diabetes mellitus (H)    Closed fracture of right femur (H)    Sepsis with acute organ dysfunction without septic shock, due to unspecified  organism, unspecified type (H)    Peripheral artery disease (H)    Essential hypertension    Diabetic foot infection (H)    Postoperative wound dehiscence, initial encounter    S/P below knee amputation, left (H)                     Tobacco Use:     Tobacco Use      Smoking status: Some Days        Types: Pipe      Smokeless tobacco: Never       Diabetic: yes  HgbA1C:   Hemoglobin A1C   Date Value Ref Range Status   08/15/2023 6.7 (H) 0.0 - 5.6 % Final     Comment:     Normal <5.7%   Prediabetes 5.7-6.4%    Diabetes 6.5% or higher     Note: Adopted from ADA consensus guidelines.   05/14/2021 7.2 (H) 0 - 5.6 % Final     Comment:     Normal <5.7% Prediabetes 5.7-6.4%  Diabetes 6.5% or higher - adopted from ADA   consensus guidelines.       Checks Blood Glucose?:  yes, daily Average Readings: 102-118 recent range per pt      Personal/social history:  Lives with spouse, has homecare seeing him    Objective:   Current treatment plan: Left BKA, xeroform and kerlix. Right heel, xerofoam and bandaide  Last changed:     Wound #1 Right lateral heel    Pre and post conservative debridement of soft loose black eschar  Stage/tissue depth: full thickness  1.5 cm L x 1.1 cm W x 0.1 cm D  Tunneling: no  Undermining: no  Wound bed type/amount: initially soft loose black eschar, post debridement is 5% red, 95% yellow slough; non fluctuant  Wound Edges: attached  Periwound: intact  Drainage: small serous  Odor: no  Pain: some when stepping on heel and reports some neuropathy pain in foot      Wound #2 Left BKA stump    Stage/tissue depth: full thickness  Tunneling: none noted  Undermining: none noted  Wound bed type/amount: mostly dry eschar, slightly moist at edges; non fluctuant  Wound Edges: attached  Periwound: scar  Drainage: minimal serosanguinous  Odor: no  Pain: no    5/12/23 TBI:  FINDINGS: Right brachial pressure is 120, right digital pressure is  111 for an index of 0.93.  Left digital pressure is 99 for an index of 0.83.  Both digital  waveforms appear to have intact amplitude.       5/4/23 MARKY:  Right MARKY:   PT: 1.09.  DP: 1.19  Waveforms: Biphasic and triphasic in the right posterior tibial, right  dorsalis pedis and left dorsalis pedis. Monophasic in the left  posterior tibial.                                                                  IMPRESSION: Normal toe brachial indices bilaterally    Hair growth: no  Capillary Refill: <2 seconds  Feet/toes color: pink, warm  Nails: 1st nail is dystrophic, remainder are WNL  R Leg: Edema trace in ankle    Mobility: wheelchair, standing pivot transfer  Current offloading/footwear: post-op shoe, wearing heel boot at night  Sensation: limited by neuropathy    Other callusing/areas of concern:   Small area of non-blanching erythema     Diet: thin but states he eats well and drinks a protein supplement daily      Assessment:  Right heel unstagable pressure injury in pt with PAD, adequate healing potential per arterial testing done    Wound is autolytically debriding so recommend promotion of ongoing autolytic debridement including sharp removal of soft loose eschar    Factors impacting wound healing:   Delayed healing as part of normal aging process  Underlying Disease: diabetes mellitis   Substance abuse: smokes pipe        Plan:  Debrided loose eschar using forceps and #15 blade, this was conservative with no bleeding. Recommending medihoney alginate for ongoing debridement.    He will continue current plan of care per vascular clinic for his left stump. Advised him to ask vascular clinic to see his heel wound as well at next visit.    Discussed/Education provided: plan of care with rationale      Topical care: Wound/surrounding skin cleansed with Vashe and gauze. Patted dry. Bandaged per plan of care        The following discharge instructions were reviewed with and sent home with the patient:  Visit Summary/General Recommendations  We did remove some of the dead tissue from your right  heel today, this will help with the healing process.    We are starting to use Medihoney alginate, this is to help promote debridement of the dead tissue in the wound.      Continue to keep the pressure off this area using your heel boots.    Continue to push protein in your diet and control your blood sugar as well as you are able to.    Stopping or cutting down on smoking would help with healing.     When you go back to your vascular clinic, make sure they are aware of the wound on your heel, they may want to see this.          Bandage Change  Wash your hands before and after the dressing change. You may wear gloves if you choose. Gloves are available over the counter at [employer], [employer], [employer], etc.   Use scissors at home that you can designate solely for wound care and cleanse well with each use, (rubbing alcohol or alcohol pads work well for this).    Wash hands.  Remove old dressing.  Wash hands.  1.) Cleanse wound with wound cleanser on stream setting and wipe with gauze to remove any loose debris. Cleanse several inches around wound and dry.   2.) Protect surrounding skin with a layer or zinc barrier cream if needed (if needed/skin if is getting white/wet looking- we did not do this today as it did not seem like your wound was draining enough to need this but the Medihoney may increase the wound drainage)   3.) Apply a piece of Medihoney alginate to wound (should be same size as wound and fit into the wound)  4.) Cover with island dressing OR if you can get the Mepilex border foam dressings then may use those also.  Change dressing every Monday-Wednesday and Friday and as needed for drainage    Keep area covered when showering to prevent infection. Wash foot separately.          Signs and symptoms of infection:  Bright green drainage  Foul odor  Increasing pain in area  New redness or swelling at the site of the wound or streaks up from wound  New warmth to touch around wound accompanied by redness and  swelling  Fever    Need to be seen as soon as possible for infection concerns.    Follow up: as scheduled    The following supplies were sent home with the patient:  Rhonda almanzar, per pt, homecare has ordered foam dressings       Verbal, written, & demonstrative education provided.  Face to face time: approximately 50 minutes  Procedure: conservative sharp debridement, bandaged for autolytic debridement    Kaylynn Powell RN, CWOCN  [phone]

## 2023-09-12 ENCOUNTER — MEDICAL CORRESPONDENCE (OUTPATIENT)
Dept: HEALTH INFORMATION MANAGEMENT | Facility: CLINIC | Age: 78
End: 2023-09-12
Payer: COMMERCIAL

## 2023-09-13 ENCOUNTER — TELEPHONE (OUTPATIENT)
Dept: WOUND CARE | Facility: CLINIC | Age: 78
End: 2023-09-13
Payer: COMMERCIAL

## 2023-09-13 NOTE — TELEPHONE ENCOUNTER
Voicemail received from spouse asking about date/time of appointment. Another voicemail received from her with the same question prior to this writer calling back. When this writer returned her call, the voicemail box is full/unable to leave a message.    This writer called the home care agency ([facility] - [phone]) since pt is open to services. Spoke with the clinical nurse and asked them to let the pt/spouse know his next appointment date/time. A home care nurse will see him this Friday.    Laureen King RN, CWOCN  [phone]

## 2023-09-13 NOTE — TELEPHONE ENCOUNTER
Called and spoke with pt's spouse and confirmed date/time of appointment.    Laureen King RN, CWOCN  [phone]

## 2023-09-18 ENCOUNTER — OFFICE VISIT (OUTPATIENT)
Dept: OTHER | Facility: CLINIC | Age: 78
End: 2023-09-18
Payer: COMMERCIAL

## 2023-09-18 VITALS — DIASTOLIC BLOOD PRESSURE: 81 MMHG | SYSTOLIC BLOOD PRESSURE: 150 MMHG | HEART RATE: 76 BPM | OXYGEN SATURATION: 100 %

## 2023-09-18 DIAGNOSIS — Z89.512 S/P BELOW KNEE AMPUTATION, LEFT (H): Primary | ICD-10-CM

## 2023-09-18 PROCEDURE — 99213 OFFICE O/P EST LOW 20 MIN: CPT

## 2023-09-18 PROCEDURE — G0463 HOSPITAL OUTPT CLINIC VISIT: HCPCS

## 2023-09-18 NOTE — PROGRESS NOTES
Patient is here to discuss follow up    BP (!) 150/81 (BP Location: Left arm, Patient Position: Chair, Cuff Size: Adult Regular)   Pulse 76   SpO2 100%     Questions patient would like addressed today are: N/A.    Refills are needed: No    Has homecare services and agency name:  [facility]ri[facility]AME[facility]

## 2023-09-18 NOTE — PROGRESS NOTES
VASCULAR SURGERY PROGRESS NOTE    LOCATION: [facility]     Roddy Sidhu  Medical Record #:  [id number]  YOB: 1945  Age:  78 year old     Date of Service: 9/18/2023    PRIMARY CARE PROVIDER: Sandra Low    Reason for visit:  Wound check    IMPRESSION:  Marvin Sidhu is a 78 year old male who returns to clinic today, histoy of left below knee amputation and debridement. Has had problems with wound healing in that site, underwent debridement on June 16. Xeroform followed by Kerlix.     Today wound is significantly improved. Tiny bit of scab impeding wound healing over medial aspect of incision, debrided. Compliant with dressing changes. Discussed debridement and consent given by Marvin and family.     RECOMMENDATION/RISKS: Continue with current dressing plan. Scheduled to see myself in a few weeks. Please do not begin prosthetics until incision is fully healed.    REVIEW OF SYSTEMS:    A 12 point ROS was reviewed and is negative except for what is listed above in HPI.    PHH:    Past Medical History:   Diagnosis Date    Diabetes (H)           Past Surgical History:   Procedure Laterality Date    AMPUTATE LEG BELOW KNEE Left 5/19/2023    Procedure: LEFT BELOW KNEE AMPUTATION;  Surgeon: Raimundo Weinstein MD;  Location:  OR    INCISION AND DRAINAGE FOOT, COMBINED Left 5/9/2023    Procedure: Incision and Debidement of Pressure Wound, Left Heel;  Surgeon: Paul Flores DPM;  Location: [facility] OR    IR LOWER EXTREMITY ANGIOGRAM LEFT  5/16/2023    IRRIGATION AND DEBRIDEMENT SOFT TISSUE LOWER EXTREMITY, COMBINED Left 6/16/2023    Procedure: IRRIGATION AND DEBRIDEMENT OF LEFT BELOW KNEE AMPUTATION; WOUND VAC PLACEMENT OF LEFT BELOW KNEE AMPUTATION;  Surgeon: Raimundo Weinstein MD;  Location:  OR    SURGICAL HISTORY OF -   1990    removal of steel from left eye       ALLERGIES:  Patient has no known allergies.    MEDS:    Current Outpatient Medications:     acetaminophen  (ACETAMINOPHEN 8 HOUR) 650 MG CR tablet, Take 1 tablet by mouth every 8 hours as needed for mild pain or fever, Disp: , Rfl:     aspirin (ASA) 325 MG EC tablet, Take 325 mg by mouth daily, Disp: , Rfl:     ferrous gluconate (FERGON) 324 (38 Fe) MG tablet, Take 1 tablet (324 mg) by mouth daily (with breakfast), Disp: 30 tablet, Rfl: 0    gabapentin (NEURONTIN) 300 MG capsule, Take 1 capsule (300 mg) by mouth 3 times daily, Disp: 270 capsule, Rfl: 3    insulin glargine (LANTUS PEN) 100 UNIT/ML pen, Inject 12 Units Subcutaneous every morning, Disp: 15 mL, Rfl: 1    latanoprost (XALATAN) 0.005 % ophthalmic solution, Place 1 drop into both eyes At Bedtime, Disp: , Rfl:     lisinopril (ZESTRIL) 5 MG tablet, Take 1 tablet (5 mg) by mouth 2 times daily, Disp: 180 tablet, Rfl: 3    metFORMIN (GLUCOPHAGE) 500 MG tablet, Take 2 tablets (1,000 mg) by mouth 2 times daily (with meals), Disp: 180 tablet, Rfl: 1    naloxone (NARCAN) 4 MG/0.1ML nasal spray, Spray 4 mg into one nostril alternating nostrils as needed for opioid reversal every 2-3 minutes until assistance arrives, Disp: , Rfl:     NAPROXEN PO, , Disp: , Rfl:     oxyCODONE (ROXICODONE) 5 MG tablet, Take 1 tablet (5 mg) by mouth 3 times daily as needed for pain, Disp: 20 tablet, Rfl: 0    polyethylene glycol (MIRALAX) 17 GM/Dose powder, Take 17 g by mouth daily, Disp: 510 g, Rfl: 0    simvastatin (ZOCOR) 40 MG tablet, TAKE ONE TABLET BY MOUTH EVERY NIGHT AT BEDTIME, Disp: 90 tablet, Rfl: 3    TRULICITY 0.75 MG/0.5ML pen, INJECT 0.75 MG SUBCUTANEOUS EVERY 7 DAYS, Disp: 2 mL, Rfl: 1    vitamin D3 (CHOLECALCIFEROL) 50 mcg (2000 units) tablet, Take 1 tablet by mouth daily, Disp: 30 capsule, Rfl:     SOCIAL HABITS:    History   Smoking Status    Some Days    Types: Pipe   Smokeless Tobacco    Never     Social History    Substance and Sexual Activity      Alcohol use: Not Currently      History   Drug Use No       FAMILY HISTORY:    Family History   Problem Relation Age of  Onset    Breast Cancer Mother 60    C.A.D. Brother 58        MI       PE:  BP (!) 150/81 (BP Location: Left arm, Patient Position: Chair, Cuff Size: Adult Regular)   Pulse 76   SpO2 100%   Wt Readings from Last 1 Encounters:   06/27/23 132 lb 14.4 oz (60.3 kg)     There is no height or weight on file to calculate BMI.    EXAM:  GENERAL: well-developed 78 year old male who appears his stated age  CARDIAC: normal   CHEST/LUNG: normal respiratory effort   MUSCULOSKELETAL: grossly normal and both lower extremities are intact, no lower extremity edema  NEUROLOGIC: focally intact, alert and oriented x 3  PSYCH: appropriate affect  VASCULAR:       DIAGNOSTIC STUDIES:     Images:  No results found.      LABS:      Sodium   Date Value Ref Range Status   08/15/2023 137 136 - 145 mmol/L Final   06/23/2023 133 (L) 136 - 145 mmol/L Final   05/31/2023 134 (L) 136 - 145 mmol/L Final   05/14/2021 136 133 - 144 mmol/L Final   12/06/2019 138 133 - 144 mmol/L Final   03/12/2019 135 133 - 144 mmol/L Final     Urea Nitrogen   Date Value Ref Range Status   08/15/2023 19.4 8.0 - 23.0 mg/dL Final   06/23/2023 20.2 8.0 - 23.0 mg/dL Final   05/31/2023 28.0 (H) 8.0 - 23.0 mg/dL Final   05/17/2022 27 7 - 30 mg/dL Final   11/02/2021 19 7 - 30 mg/dL Final   05/14/2021 21 7 - 30 mg/dL Final   12/06/2019 20 7 - 30 mg/dL Final   03/12/2019 21 7 - 30 mg/dL Final     Hemoglobin   Date Value Ref Range Status   08/15/2023 12.6 (L) 13.3 - 17.7 g/dL Final   06/23/2023 10.0 (L) 13.3 - 17.7 g/dL Final   05/31/2023 8.9 (L) 13.3 - 17.7 g/dL Final   12/27/2012 15.3 13.3 - 17.7 g/dL Final     Platelet Count   Date Value Ref Range Status   06/23/2023 370 150 - 450 10e3/uL Final   05/31/2023 401 150 - 450 10e3/uL Final   05/24/2023 481 (H) 150 - 450 10e3/uL Final   12/27/2012 382 150 - 450 10e9/L Final       30 minutes spent on the day of encounter doing chart review, history and exam, documentation, and further activities as noted.     Cheryl Carl,  NP  VASCULAR SURGERY

## 2023-09-19 ENCOUNTER — HOSPITAL ENCOUNTER (OUTPATIENT)
Dept: WOUND CARE | Facility: CLINIC | Age: 78
Discharge: HOME | End: 2023-09-19
Attending: NURSE PRACTITIONER | Admitting: NURSE PRACTITIONER
Payer: COMMERCIAL

## 2023-09-19 PROCEDURE — 97602 WOUND(S) CARE NON-SELECTIVE: CPT

## 2023-09-19 NOTE — PROGRESS NOTES
"[facility]    Start of Care in [facility]: 8/29/2023   Referring Provider: JOSH Rincon, CNP  Primary Care Provider: Sandra Low   Wound Location: right heel, pt also has a wound on his left stump which is being treated by vascular provider     Wound Clinic Visit:   Initial consult    Reason for Visit:       \"diabetic foot ulcer\"    Subjective:     Per pt his homecare nurse stopped the medihoney alginate recently because \"it wasn't draining enough so it was drying it out\". Covering with a bandaide only and changing every few days.       Wound History:   From May '23 hospital stay:  Roddy Sidhu was admitted on 5/13/2023 by Osvaldo Scanlon MD and I would refer you to their history and physical.  The following problems were addressed during his hospitalization:     Roddy Sidhu is a 77 year old male with history of diabetes mellitus with polyneuropathy transferred from [facility] on 5/13/2023 with poor healing left heel wound and concern for vascular compromise-requiring vascular surgery evaluation.     Patient admitted at [facility] (3/29-4/4) for fall with R femur fx, R humerus fx, R clavicle fx s/p R femur ORIF and discharged to TCU.      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  S/p debridement of left pressure ulcer 5/9/2023  Peripheral arterial disease.  --Pressure injury to heels noted in TCU and seen by wound care 4/17/2023.  Recent ABIs were normal. WBC 17.1.  .60.  Wound cultures from 5/5 positive for 2+ Proteus, 3+ Enterococcus, 1+ MSSA.    --Was on IV vancomycin 5/4 to 5/8.    Was on intravenous Zosyn (5/4), switched to Unasyn (5/8) to cover Proteus, Enterococcus and MSSA. Then switched to oral Augmentin [5/10- 5/14]  Continue IV Ancef [5/15].  CRP improving from 123.60  >25.6   --Vascular surgery following.  Underwent lower extremity " angiogram 5/16,  Left patent iliac, SFA and AK pop. 50 % at knee pop stenosis, single vessel runoff via VERN.  -- Podiatry following, plan for left BKA 5/19.  Patient underwent surgery as planned on 5/19.  Vascular surgery following for postoperative cares.  Plan for dressing change on Monday.  --Infectious disease following.  Antibiotics discontinued following BKA per ID recommendations.  As needed Tylenol, oxycodone as needed for pain.  Was on aspirin 325 mg oral daily, hold on 5/19 for surgery.  Restarted postoperatively .  Continue simvastatin 40 mg oral daily.  -PT/OT.  Keep knee immobilizer in place, okay to remove for brief.  For comfort.     Acute on chronic anemia no active bleeding  S/p PRBC transfusion 5/5, 5/21.  Hemoglobin was normal 6 months ago, was 8's during recent admission, then 8.2 on 5/4 and dropped to 6.7-6.9 on 5/5, received 1 unit blood transfusion on 5/5.  --No black or bloody stools.  No abdominal pain.   No other apparent source of acute blood loss.   --Guiac negative. Iron sat index 5.  Received 2 doses of IV Venofer, started on oral iron supplements.  -Aspirin was on hold, restarted 5/14 -5/18.  Held 5/19 for amputation surgery, restarted postoperatively.  Hb 8 on 5/20. Down to 6.6 on 5/21, transfused additional PRBC.  Discontinued Celebrex [started this hospitalization], naproxen [PTA meds]  No active bleeding at this time.    Hemoglobin stable at 8 currently     Hypoglycemia 5/19 while NPO  Hyperglycemia from uncontrolled diabetes  Uncontrolled diabetes mellitus with a hemoglobin A1c of 8.3.  Diabetic neuropathy.  PTA on 10 units insulin Lantus every morning.  This hospital stay noted elevated blood sugars, optimized to insulin Lantus 14 units twice daily.  Insulin Lantus on hold on 5/19 AM for surgery while NPO.  Noted hypoglycemia with blood sugars of 64 on 5/19.  Hypoglycemia protocol.  Also received IVF with dextrose for this.  Continue insulin Lantus to 14 units twice daily after  surgery.    Reduced Lantus to 14 units every morning only due to hypoglycemia on twice daily dosing.  Also added prandial aspart 1 unit per 10 g carbs 3 times daily AC.  Continue sliding scale insulin.  Continue PTA med Neurontin.  Hold PTA metformin and Trulicity.  Restart metformin today  -Changed diet to moderate consistent carbohydrate diet     PTA regimen restarted on discharge        Severe malnutrition:  Nutrition services following   Supplements ordered     Hyperlipidemia.  Continue PTA simvastatin.     Physical deconditioning in the setting of ongoing medical illness, senile frailty.  Status post recent fall.  Closed displaced subtrochanteric fracture of right femur 3/29  S/p ORIF right femur fracture with intermedullary nail 3/29     Closed fracture of proximal end of right humerus 3/29   Closed nondisplaced fracture of right clavicle 3/29   Was at TCU prior to transfer  Continue PT/OT, likely TCU on discharge.  Patient and family in agreement with TCU.  Care Management assistance with transition.  Patient tells me that he plans on discharging home if TCU not available soon.  He lives with his daughter and wife.     Hypoalbuminemia likely dilutional, acute illness.  Serum albumin 2.6.   Dietary supplements with Ensure.  Monitor periodically.     Thrombocytosis likely reactive in the setting of infection.  Platelet count 613 > 520  Monitor periodically.     Tobacco dependence  Daily pipe smokier, Smoking cessation emphasized  Nicotine patch     Pt underwent I&D of left BKA site on 6/16/23      Past Medical History:  Patient Active Problem List   Diagnosis    Advanced directives, counseling/discussion    Health Care Home    Hyperlipidemia LDL goal <100    Type 2 diabetes mellitus with diabetic polyneuropathy, with long-term current use of insulin (H)    Tobacco use disorder    Microalbuminuria due to type 2 diabetes mellitus (H)    Closed fracture of right femur (H)    Sepsis with acute organ dysfunction  without septic shock, due to unspecified organism, unspecified type (H)    Peripheral artery disease (H)    Essential hypertension    Diabetic foot infection (H)    Postoperative wound dehiscence, initial encounter    S/P below knee amputation, left (H)                     Tobacco Use:     Tobacco Use      Smoking status: Some Days        Types: Pipe      Smokeless tobacco: Never       Diabetic: yes  HgbA1C:   Hemoglobin A1C   Date Value Ref Range Status   08/15/2023 6.7 (H) 0.0 - 5.6 % Final     Comment:     Normal <5.7%   Prediabetes 5.7-6.4%    Diabetes 6.5% or higher     Note: Adopted from ADA consensus guidelines.   05/14/2021 7.2 (H) 0 - 5.6 % Final     Comment:     Normal <5.7% Prediabetes 5.7-6.4%  Diabetes 6.5% or higher - adopted from ADA   consensus guidelines.       Checks Blood Glucose?:  yes, daily Average Readings: 102-118 recent range per pt      Personal/social history:  Lives with spouse, has homecare seeing him    Objective:   Current treatment plan: only bandaide in place, per pt just got foam dressings (Mepilex foam 3x3?)  Last changed: Saturday    Wound #1 Right lateral heel    Unable to take photo today as not able to log into the system when pt was here.     1 cm L x 0.8 cm W x 0.1 to hypergranular cm D  Tunneling: no  Undermining: no  Wound bed type/amount: 60% hypergranular (in same area as granular spot noted in above photo from prior visit,) 40% adherent but moist yellow slough; non fluctuant  Wound Edges: attached  Periwound: intact  Drainage: slightly larger than pea sized creamy grayish drainage on bandaide since Saturday  Odor: no  Pain: some when stepping on heel and reports some neuropathy pain in foot, overall thinks is better      Wound #2 Left BKA stump  Not assessed this visit, pt is followed by vascular provider for this    5/12/23 TBI:  FINDINGS: Right brachial pressure is 120, right digital pressure is  111 for an index of 0.93.  Left digital pressure is 99 for an index of  0.83. Both digital  waveforms appear to have intact amplitude.       5/4/23 MARKY:  Right MARKY:   PT: 1.09.  DP: 1.19  Waveforms: Biphasic and triphasic in the right posterior tibial, right  dorsalis pedis and left dorsalis pedis. Monophasic in the left  posterior tibial.                                                                  IMPRESSION: Normal toe brachial indices bilaterally    Hair growth: no  Capillary Refill: <2 seconds  Feet/toes color: pink, warm  Nails: 1st nail is dystrophic, remainder are WNL  R Leg: Edema trace in ankle    Mobility: wheelchair, standing pivot transfer  Current offloading/footwear: post-op shoe, wearing heel boot at night  Sensation: limited by neuropathy    Other callusing/areas of concern:     No new photos, area of non-blanching erythema noted last visit is now 3x3mm dried stable what appears to be slightly raised brown scab.     Diet: thin but states he eats well and drinks a protein supplement daily      Assessment:  Right heel unstagable pressure injury in pt with PAD, adequate healing potential per arterial testing done    Wound is autolytically debriding with granulation forming, area of granulation has become slightly hypergranular, appropriate for ongoing autolytic debridement     Factors impacting wound healing:   Delayed healing as part of normal aging process  Underlying Disease: diabetes mellitis   Substance abuse: smokes pipe        Plan:  Removed some loose slough with forceps and #15 blade, this was conservative with no bleeding. Recommending regular medihoney in place of medihoney alginate for ongoing debridement/moist wound healing, thin (or thick) foam dressing for some occlusion and retention of medihoney and absorption of what will likely be more drainage with medihoney and to control hypergranulation. Change every 2-3 days. Did cauterize hypergranular area with minimal amount of silver nitrate.       He will continue current plan of care per vascular clinic  for his left stump.     Discussed/Education provided: plan of care with rationale      Topical care: Wound/surrounding skin cleansed with Vashe and gauze. Patted dry. Bandaged per plan of care using Mepilex border light 3x3 with medihoney.        The following discharge instructions were reviewed with and sent home with the patient:  Instructions for pt were written due to system being down at time of visit.    Follow up: 2-3 weeks    The following supplies were sent home with the patient:  Medihoney and a few Mepilex border light 3x3 and regular thickness 3x3 foam dressings       Verbal, written, & demonstrative education provided.  Face to face time: approximately 30 minutes  Procedure: conservative sharp debridement, bandaged for autolytic debridement    Kaylynn Powell RN, CWOCN  [phone]

## 2023-09-25 ENCOUNTER — TELEPHONE (OUTPATIENT)
Dept: FAMILY MEDICINE | Facility: CLINIC | Age: 78
End: 2023-09-25
Payer: COMMERCIAL

## 2023-09-25 DIAGNOSIS — E11.42 TYPE 2 DIABETES MELLITUS WITH DIABETIC POLYNEUROPATHY, WITH LONG-TERM CURRENT USE OF INSULIN (H): Primary | ICD-10-CM

## 2023-09-25 DIAGNOSIS — Z79.4 TYPE 2 DIABETES MELLITUS WITH DIABETIC POLYNEUROPATHY, WITH LONG-TERM CURRENT USE OF INSULIN (H): Primary | ICD-10-CM

## 2023-09-25 RX ORDER — FLASH GLUCOSE SENSOR
KIT MISCELLANEOUS
Qty: 2 EACH | Refills: 5 | Status: SHIPPED | OUTPATIENT
Start: 2023-09-25 | End: 2024-02-27

## 2023-09-25 RX ORDER — FLASH GLUCOSE SCANNING READER
EACH MISCELLANEOUS
Qty: 1 EACH | Refills: 0 | Status: SHIPPED | OUTPATIENT
Start: 2023-09-25 | End: 2023-12-29

## 2023-09-25 NOTE — TELEPHONE ENCOUNTER
FYI - Status Update    Who is Calling:     Update: Home care calling. Asking for orders for a deborah due to not being able to get enough blood from finger pricks    Does caller want a call/response back: Yes     Okay to leave a detailed message?: No at Other phone number:  aravind [phone]

## 2023-10-03 ENCOUNTER — OFFICE VISIT (OUTPATIENT)
Dept: OTHER | Facility: CLINIC | Age: 78
End: 2023-10-03
Payer: COMMERCIAL

## 2023-10-03 VITALS — SYSTOLIC BLOOD PRESSURE: 135 MMHG | DIASTOLIC BLOOD PRESSURE: 79 MMHG | HEART RATE: 74 BPM

## 2023-10-03 DIAGNOSIS — Z89.512 S/P BELOW KNEE AMPUTATION, LEFT (H): Primary | ICD-10-CM

## 2023-10-03 PROCEDURE — G0463 HOSPITAL OUTPT CLINIC VISIT: HCPCS

## 2023-10-03 PROCEDURE — 99213 OFFICE O/P EST LOW 20 MIN: CPT

## 2023-10-03 NOTE — PROGRESS NOTES
[facility]        Patient is here for a  follow up.        Pt is currently taking Aspirin and Statin.    /79 (BP Location: Right arm, Patient Position: Chair, Cuff Size: Adult Regular)   Pulse 74     The provider has been notified that the patient has no concerns.     Questions patient would like addressed today are: N/A.    Refills are needed: N/A    Has homecare services and agency name:  Adriana Gunderson MA

## 2023-10-03 NOTE — PROGRESS NOTES
VASCULAR SURGERY PROGRESS NOTE    LOCATION: [facility]     Roddy Sidhu  Medical Record #:  [id number]  YOB: 1945  Age:  78 year old     Date of Service: 10/3/2023    PRIMARY CARE PROVIDER: Sandra Low    Reason for visit:  Wound Check    IMPRESSION:  Marvin Sidhu is a 78 year old male who returns to clinic today, histoy of left below knee amputation and debridement. Has had problems with wound healing in that site, underwent debridement on June 16. Xeroform followed by Kerlix.      Today wound is significantly improved. Tiny bit of scab impeding wound healing over medial aspect of incision, debrided. Compliant with dressing changes. Discussed debridement and consent given by Marvin and family.    RECOMMENDATION/RISKS: Continue with current dressing plan. Scheduled to see myself in a few weeks. Please do not begin prosthetics until incision is fully healed         REVIEW OF SYSTEMS:    A 12 point ROS was reviewed and is negative except for what is listed above in HPI.    PHH:    Past Medical History:   Diagnosis Date    Diabetes (H)           Past Surgical History:   Procedure Laterality Date    AMPUTATE LEG BELOW KNEE Left 5/19/2023    Procedure: LEFT BELOW KNEE AMPUTATION;  Surgeon: Raimundo Weinstein MD;  Location:  OR    INCISION AND DRAINAGE FOOT, COMBINED Left 5/9/2023    Procedure: Incision and Debidement of Pressure Wound, Left Heel;  Surgeon: Paul Flores DPM;  Location: [facility] OR    IR LOWER EXTREMITY ANGIOGRAM LEFT  5/16/2023    IRRIGATION AND DEBRIDEMENT SOFT TISSUE LOWER EXTREMITY, COMBINED Left 6/16/2023    Procedure: IRRIGATION AND DEBRIDEMENT OF LEFT BELOW KNEE AMPUTATION; WOUND VAC PLACEMENT OF LEFT BELOW KNEE AMPUTATION;  Surgeon: Raimundo Weinstein MD;  Location:  OR    SURGICAL HISTORY OF -   1990    removal of steel from left eye       ALLERGIES:  Patient has no known allergies.    MEDS:    Current Outpatient Medications:      acetaminophen (ACETAMINOPHEN 8 HOUR) 650 MG CR tablet, Take 1 tablet by mouth every 8 hours as needed for mild pain or fever, Disp: , Rfl:     aspirin (ASA) 325 MG EC tablet, Take 325 mg by mouth daily, Disp: , Rfl:     Continuous Blood Gluc  (FREESTYLE ABIODUN 14 DAY READER) ESTEFANI, Use to read blood sugars as per 's instructions., Disp: 1 each, Rfl: 0    Continuous Blood Gluc Sensor (FREESTYLE ABIODUN 14 DAY SENSOR) MIS, Change every 14 days., Disp: 2 each, Rfl: 5    ferrous gluconate (FERGON) 324 (38 Fe) MG tablet, Take 1 tablet (324 mg) by mouth daily (with breakfast), Disp: 30 tablet, Rfl: 0    gabapentin (NEURONTIN) 300 MG capsule, Take 1 capsule (300 mg) by mouth 3 times daily, Disp: 270 capsule, Rfl: 3    insulin glargine (LANTUS PEN) 100 UNIT/ML pen, Inject 12 Units Subcutaneous every morning, Disp: 15 mL, Rfl: 1    latanoprost (XALATAN) 0.005 % ophthalmic solution, Place 1 drop into both eyes At Bedtime, Disp: , Rfl:     lisinopril (ZESTRIL) 5 MG tablet, Take 1 tablet (5 mg) by mouth 2 times daily, Disp: 180 tablet, Rfl: 3    metFORMIN (GLUCOPHAGE) 500 MG tablet, Take 2 tablets (1,000 mg) by mouth 2 times daily (with meals), Disp: 180 tablet, Rfl: 1    naloxone (NARCAN) 4 MG/0.1ML nasal spray, Spray 4 mg into one nostril alternating nostrils as needed for opioid reversal every 2-3 minutes until assistance arrives, Disp: , Rfl:     NAPROXEN PO, , Disp: , Rfl:     oxyCODONE (ROXICODONE) 5 MG tablet, Take 1 tablet (5 mg) by mouth 3 times daily as needed for pain, Disp: 20 tablet, Rfl: 0    polyethylene glycol (MIRALAX) 17 GM/Dose powder, Take 17 g by mouth daily, Disp: 510 g, Rfl: 0    simvastatin (ZOCOR) 40 MG tablet, TAKE ONE TABLET BY MOUTH EVERY NIGHT AT BEDTIME, Disp: 90 tablet, Rfl: 3    TRULICITY 0.75 MG/0.5ML pen, INJECT 0.75 MG SUBCUTANEOUS EVERY 7 DAYS, Disp: 2 mL, Rfl: 1    vitamin D3 (CHOLECALCIFEROL) 50 mcg (2000 units) tablet, Take 1 tablet by mouth daily, Disp: 30 capsule, Rfl:      SOCIAL HABITS:    History   Smoking Status    Some Days    Types: Pipe   Smokeless Tobacco    Never     Social History    Substance and Sexual Activity      Alcohol use: Not Currently      History   Drug Use No       FAMILY HISTORY:    Family History   Problem Relation Age of Onset    Breast Cancer Mother 60    C.A.D. Brother 58        MI       PE:  /79 (BP Location: Right arm, Patient Position: Chair, Cuff Size: Adult Regular)   Pulse 74   Wt Readings from Last 1 Encounters:   06/27/23 132 lb 14.4 oz (60.3 kg)     There is no height or weight on file to calculate BMI.    EXAM:  GENERAL: well-developed 78 year old male who appears his stated age  CARDIAC: normal   CHEST/LUNG: normal respiratory effort   MUSCULOSKELETAL: grossly normal and both lower extremities are intact, no lower extremity edema  NEUROLOGIC: focally intact, alert and oriented x 3  PSYCH: appropriate affect  VASCULAR:       DIAGNOSTIC STUDIES:     Images:  No results found.    LABS:      Sodium   Date Value Ref Range Status   08/15/2023 137 136 - 145 mmol/L Final   06/23/2023 133 (L) 136 - 145 mmol/L Final   05/31/2023 134 (L) 136 - 145 mmol/L Final   05/14/2021 136 133 - 144 mmol/L Final   12/06/2019 138 133 - 144 mmol/L Final   03/12/2019 135 133 - 144 mmol/L Final     Urea Nitrogen   Date Value Ref Range Status   08/15/2023 19.4 8.0 - 23.0 mg/dL Final   06/23/2023 20.2 8.0 - 23.0 mg/dL Final   05/31/2023 28.0 (H) 8.0 - 23.0 mg/dL Final   05/17/2022 27 7 - 30 mg/dL Final   11/02/2021 19 7 - 30 mg/dL Final   05/14/2021 21 7 - 30 mg/dL Final   12/06/2019 20 7 - 30 mg/dL Final   03/12/2019 21 7 - 30 mg/dL Final     Hemoglobin   Date Value Ref Range Status   08/15/2023 12.6 (L) 13.3 - 17.7 g/dL Final   06/23/2023 10.0 (L) 13.3 - 17.7 g/dL Final   05/31/2023 8.9 (L) 13.3 - 17.7 g/dL Final   12/27/2012 15.3 13.3 - 17.7 g/dL Final     Platelet Count   Date Value Ref Range Status   06/23/2023 370 150 - 450 10e3/uL Final   05/31/2023 401 150 -  450 10e3/uL Final   05/24/2023 481 (H) 150 - 450 10e3/uL Final   12/27/2012 382 150 - 450 10e9/L Final       20 minutes spent on the day of encounter doing chart review, history and exam, documentation, and further activities as noted.       Cheryl Carl, NP  VASCULAR SURGERY

## 2023-10-06 ENCOUNTER — TELEPHONE (OUTPATIENT)
Dept: FAMILY MEDICINE | Facility: CLINIC | Age: 78
End: 2023-10-06
Payer: COMMERCIAL

## 2023-10-06 DIAGNOSIS — I10 ESSENTIAL HYPERTENSION: ICD-10-CM

## 2023-10-06 NOTE — TELEPHONE ENCOUNTER
[facility], [phone], Requesting orders from: Sandra Low    Provider is following patient: Yes  Is this a 60-day recertification request?  No    Orders Requested    RN sees pt 2-3 times a week and has noticed elevated afternoon BP's.  Tends to run 160-180's /60-65's.      Current dose is lisinopril 5 mg twice a day.    RN asks if provider wants to adjust the dose?    Information was gathered and will be sent to provider for review.  RN will contact Home Care with information after provider review.  Confirmed ok to leave a detailed message with call back.  Contact information confirmed and updated as needed.    Esther Garcia RN

## 2023-10-09 RX ORDER — LISINOPRIL 10 MG/1
10 TABLET ORAL 2 TIMES DAILY
Qty: 180 TABLET | Refills: 1 | Status: SHIPPED | OUTPATIENT
Start: 2023-10-09 | End: 2024-03-21

## 2023-10-09 NOTE — TELEPHONE ENCOUNTER
Patient Contact     Attempt # 1     Was call answered?  No.  Mailbox full and not able to leave a message.    Alicia Carranza RN on 10/9/2023 at 12:09 PM

## 2023-10-10 ENCOUNTER — HOSPITAL ENCOUNTER (OUTPATIENT)
Dept: WOUND CARE | Facility: CLINIC | Age: 78
Discharge: HOME | End: 2023-10-10
Attending: NURSE PRACTITIONER | Admitting: NURSE PRACTITIONER
Payer: COMMERCIAL

## 2023-10-10 ENCOUNTER — TELEPHONE (OUTPATIENT)
Dept: FAMILY MEDICINE | Facility: CLINIC | Age: 78
End: 2023-10-10
Payer: COMMERCIAL

## 2023-10-10 DIAGNOSIS — Z79.4 TYPE 2 DIABETES MELLITUS WITH DIABETIC POLYNEUROPATHY, WITH LONG-TERM CURRENT USE OF INSULIN (H): Primary | ICD-10-CM

## 2023-10-10 DIAGNOSIS — L89.609 PRESSURE INJURY OF SKIN OF HEEL, UNSPECIFIED INJURY STAGE, UNSPECIFIED LATERALITY: ICD-10-CM

## 2023-10-10 DIAGNOSIS — E11.42 TYPE 2 DIABETES MELLITUS WITH DIABETIC POLYNEUROPATHY, WITH LONG-TERM CURRENT USE OF INSULIN (H): Primary | ICD-10-CM

## 2023-10-10 PROCEDURE — 97602 WOUND(S) CARE NON-SELECTIVE: CPT

## 2023-10-10 NOTE — DISCHARGE INSTRUCTIONS
Continue same cares, I did use just a tiny amount of medihoney today to help promote the breakdown of the remaining non-viable tissue in the wound (was able to remove some for forceps today). If area seems too moist and the surrounding skin is getting white/wet looking then can begin using a zinc based barrier cream, (like Desitin,) to the surrounding skin to protect it.    I am sending a request to your provider to ask for a prescription for diabetic shoe and insert so you can be fit as soon as possible. This will allow you to get  a new shoe before the end of the 2023 year, even if you can't wear it just yet. You may find a properly fit and cushioned shoe and insert is more comfortable than the post-op shoe.     Follow-up in 2 weeks as scheduled.    Kaylynn Powell RN, CWOCN   [phone]

## 2023-10-10 NOTE — PROGRESS NOTES
"[facility]    Start of Care in [facility]: 8/29/2023   Referring Provider: JOSH Rincon, CNP  Primary Care Provider: Sandra Low   Wound Location: right heel, pt also has a wound on his left stump which is being treated by vascular provider     Wound Clinic Visit:   Follow up visit, 3rd visit to this clinic    Reason for Visit:       \"diabetic foot ulcer\"    Subjective:     Per pt his homecare nurse did not use the medihoney the last 2 dressing changes because \"she thought it needed to dry up\".       Wound History:   From May '23 hospital stay:  Roddy Sidhu was admitted on 5/13/2023 by Osvaldo Scnalon MD and I would refer you to their history and physical.  The following problems were addressed during his hospitalization:     Roddy Sidhu is a 77 year old male with history of diabetes mellitus with polyneuropathy transferred from [facility] on 5/13/2023 with poor healing left heel wound and concern for vascular compromise-requiring vascular surgery evaluation.     Patient admitted at [facility] (3/29-4/4) for fall with R femur fx, R humerus fx, R clavicle fx s/p R femur ORIF and discharged to TCU.      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  S/p debridement of left pressure ulcer 5/9/2023  Peripheral arterial disease.  --Pressure injury to heels noted in TCU and seen by wound care 4/17/2023.  Recent ABIs were normal. WBC 17.1.  .60.  Wound cultures from 5/5 positive for 2+ Proteus, 3+ Enterococcus, 1+ MSSA.    --Was on IV vancomycin 5/4 to 5/8.    Was on intravenous Zosyn (5/4), switched to Unasyn (5/8) to cover Proteus, Enterococcus and MSSA. Then switched to oral Augmentin [5/10- 5/14]  Continue IV Ancef [5/15].  CRP improving from 123.60  >25.6   --Vascular surgery following.  Underwent lower extremity angiogram 5/16,  Left patent iliac, SFA " and AK pop. 50 % at knee pop stenosis, single vessel runoff via VERN.  -- Podiatry following, plan for left BKA 5/19.  Patient underwent surgery as planned on 5/19.  Vascular surgery following for postoperative cares.  Plan for dressing change on Monday.  --Infectious disease following.  Antibiotics discontinued following BKA per ID recommendations.  As needed Tylenol, oxycodone as needed for pain.  Was on aspirin 325 mg oral daily, hold on 5/19 for surgery.  Restarted postoperatively .  Continue simvastatin 40 mg oral daily.  -PT/OT.  Keep knee immobilizer in place, okay to remove for brief.  For comfort.     Acute on chronic anemia no active bleeding  S/p PRBC transfusion 5/5, 5/21.  Hemoglobin was normal 6 months ago, was 8's during recent admission, then 8.2 on 5/4 and dropped to 6.7-6.9 on 5/5, received 1 unit blood transfusion on 5/5.  --No black or bloody stools.  No abdominal pain.   No other apparent source of acute blood loss.   --Guiac negative. Iron sat index 5.  Received 2 doses of IV Venofer, started on oral iron supplements.  -Aspirin was on hold, restarted 5/14 -5/18.  Held 5/19 for amputation surgery, restarted postoperatively.  Hb 8 on 5/20. Down to 6.6 on 5/21, transfused additional PRBC.  Discontinued Celebrex [started this hospitalization], naproxen [PTA meds]  No active bleeding at this time.    Hemoglobin stable at 8 currently     Hypoglycemia 5/19 while NPO  Hyperglycemia from uncontrolled diabetes  Uncontrolled diabetes mellitus with a hemoglobin A1c of 8.3.  Diabetic neuropathy.  PTA on 10 units insulin Lantus every morning.  This hospital stay noted elevated blood sugars, optimized to insulin Lantus 14 units twice daily.  Insulin Lantus on hold on 5/19 AM for surgery while NPO.  Noted hypoglycemia with blood sugars of 64 on 5/19.  Hypoglycemia protocol.  Also received IVF with dextrose for this.  Continue insulin Lantus to 14 units twice daily after surgery.    Reduced Lantus to 14 units  every morning only due to hypoglycemia on twice daily dosing.  Also added prandial aspart 1 unit per 10 g carbs 3 times daily AC.  Continue sliding scale insulin.  Continue PTA med Neurontin.  Hold PTA metformin and Trulicity.  Restart metformin today  -Changed diet to moderate consistent carbohydrate diet     PTA regimen restarted on discharge        Severe malnutrition:  Nutrition services following   Supplements ordered     Hyperlipidemia.  Continue PTA simvastatin.     Physical deconditioning in the setting of ongoing medical illness, senile frailty.  Status post recent fall.  Closed displaced subtrochanteric fracture of right femur 3/29  S/p ORIF right femur fracture with intermedullary nail 3/29     Closed fracture of proximal end of right humerus 3/29   Closed nondisplaced fracture of right clavicle 3/29   Was at TCU prior to transfer  Continue PT/OT, likely TCU on discharge.  Patient and family in agreement with TCU.  Care Management assistance with transition.  Patient tells me that he plans on discharging home if TCU not available soon.  He lives with his daughter and wife.     Hypoalbuminemia likely dilutional, acute illness.  Serum albumin 2.6.   Dietary supplements with Ensure.  Monitor periodically.     Thrombocytosis likely reactive in the setting of infection.  Platelet count 613 > 520  Monitor periodically.     Tobacco dependence  Daily pipe smokier, Smoking cessation emphasized  Nicotine patch     Pt underwent I&D of left BKA site on 6/16/23      Past Medical History:  Patient Active Problem List   Diagnosis    Advanced directives, counseling/discussion    Health Care Home    Hyperlipidemia LDL goal <100    Type 2 diabetes mellitus with diabetic polyneuropathy, with long-term current use of insulin (H)    Tobacco use disorder    Microalbuminuria due to type 2 diabetes mellitus (H)    Closed fracture of right femur (H)    Sepsis with acute organ dysfunction without septic shock (H)    Peripheral  artery disease (H24)    Essential hypertension    Diabetic foot infection (H)    Postoperative wound dehiscence, initial encounter    S/P below knee amputation, left (H)                     Tobacco Use:     Tobacco Use      Smoking status: Some Days        Types: Pipe      Smokeless tobacco: Never       Diabetic: yes  HgbA1C:   Hemoglobin A1C   Date Value Ref Range Status   08/15/2023 6.7 (H) 0.0 - 5.6 % Final     Comment:     Normal <5.7%   Prediabetes 5.7-6.4%    Diabetes 6.5% or higher     Note: Adopted from ADA consensus guidelines.   05/14/2021 7.2 (H) 0 - 5.6 % Final     Comment:     Normal <5.7% Prediabetes 5.7-6.4%  Diabetes 6.5% or higher - adopted from ADA   consensus guidelines.       Checks Blood Glucose?:  yes, daily Average Readings: 102-118 recent range per pt      Personal/social history:  Lives with spouse, has homecare seeing him    Objective:   Current treatment plan: only bandaide in place, per pt just got foam dressings (Mepilex foam 3x3?)  Last changed: Saturday    Wound #1 Right lateral heel        0.7 (was 1) cm L x 0.3 (was 0.8) cm W x 0.4 (was 0.1 to hypergranular) cm D  Tunneling: no  Undermining: no  Wound bed type/amount: no further hypergranulation, new epithelium now over this area. Remainder of wound is about 15% red, 85% moist yellow some of which I was able to remove with forceps; non fluctuant  Wound Edges: attached  Periwound: intact, some crusting most of which was able to remove with cares  Drainage: slightly larger than pea sized creamy grayish drainage on bandaide since Saturday  Odor: no  Pain: some when stepping on heel and reports some neuropathy pain in foot, overall thinks this continues to get better      Wound #2 Left BKA stump  Not assessed this visit, pt is followed by vascular provider for this    5/12/23 TBI:  FINDINGS: Right brachial pressure is 120, right digital pressure is  111 for an index of 0.93.  Left digital pressure is 99 for an index of 0.83. Both  digital  waveforms appear to have intact amplitude.       5/4/23 MARKY:  Right MARKY:   PT: 1.09.  DP: 1.19  Waveforms: Biphasic and triphasic in the right posterior tibial, right  dorsalis pedis and left dorsalis pedis. Monophasic in the left  posterior tibial.                                                                  IMPRESSION: Normal toe brachial indices bilaterally    Hair growth: no  Capillary Refill: <2 seconds  Feet/toes color: pink, warm  Nails: 1st nail is dystrophic, remainder are WNL  R Leg: Edema trace in ankle    Mobility: wheelchair, standing pivot transfer  Current offloading/footwear: post-op shoe, wearing heel boot at night  Sensation: limited by neuropathy    Other callusing/areas of concern:     At first visit was an area of non-blanching erythema, now 3x3mm dried stable what appears to be slightly raised brown scab.     Diet: thin but states he eats well and drinks a protein supplement daily      Assessment:  Right heel unstagable pressure injury (probable stage III,) in pt with PAD, adequate healing potential per arterial testing done    Wound continues to autolytically debride and decrease in size, appropriate for ongoing autolytic debridement     Factors impacting wound healing:   Delayed healing as part of normal aging process  Underlying Disease: diabetes mellitis   Substance abuse: smokes pipe        Plan:  Removed some non-viable tissue with forceps, this was conservative with no bleeding. Recommending continuing regular medihoney, (in small amount,) for ongoing debridement/moist wound healing, thin (or thick) foam dressing depending on drainge, for some occlusion and retention of medihoney and absorption of what will likely be more drainage with medihoney and to control hypergranulation. Change every 2-3 days. Can use barrier cream on periwound as needed for moisture protection.     Discussed next step for footwear as has been wearing a post-op shoe. Recommend he get fit for an  orthotic shoe with custom insert so that he has an appropriate shoe to wear once healed and may even be able to begin wearing before he is healed depending on shoe/fit as insert would be softer/more supportive than what he is wearing. Will send request to primary provider for prescription. He is seen at [facility] for his prosthetic and can be fit there.     He will continue current plan of care per vascular clinic for his left stump.     Discussed/Education provided: plan of care with rationale      Topical care: Wound/surrounding skin cleansed with Vashe and gauze. Patted dry. Bandaged per plan of care using Mepilex border light 3x3 with medihoney.        The following discharge instructions were reviewed with and sent home with the patient:  See AVS    Follow up: 2 weeks, scheduled    The following supplies were sent home with the patient:  none      Verbal, written, & demonstrative education provided.  Face to face time: approximately 30 minutes  Procedure: conservative sharp debridement, bandaged for autolytic debridement    Kaylynn Powell RN, CWOCN  [phone]

## 2023-10-10 NOTE — TELEPHONE ENCOUNTER
DME (Durable Medical Equipment) Orders and Documentation  Orders Placed This Encounter   Procedures    Orthotics and Prosthetics DME Orthotic; Diabetic Shoe(s)/Insert(s), Foot Orthotics; Right; 2 pair; Progress note must support the need for shoes/orthotics. Use .diabeticfootexam or diabetic foot exam picklist in [employer].        The patient was assessed and it was determined the patient is in need of the following listed DME Supplies/Equipment. Please complete supporting documentation below to demonstrate medical necessity.      Diabetic foot exam: healing pressure wound on the right heel

## 2023-10-10 NOTE — TELEPHONE ENCOUNTER
----- Message from Kaylynn Powell sent at 10/10/2023 12:48 PM CDT -----  Regarding: othotic (diabetic) shoe and insert presription  Hi Marvin Flores's right heel wound is close to healing. I am recommending he be fit for orthotic (diabetic) shoe and insert. He will need a prescription, then he can be fit at [facility] where he goes for his left leg prosthetic.     Thank You,  Kaylynn Powell RN, CWOCN   [facility]  [phone]

## 2023-10-12 ENCOUNTER — MEDICAL CORRESPONDENCE (OUTPATIENT)
Dept: HEALTH INFORMATION MANAGEMENT | Facility: CLINIC | Age: 78
End: 2023-10-12
Payer: COMMERCIAL

## 2023-10-13 DIAGNOSIS — Z79.4 TYPE 2 DIABETES MELLITUS WITH DIABETIC POLYNEUROPATHY, WITH LONG-TERM CURRENT USE OF INSULIN (H): ICD-10-CM

## 2023-10-13 DIAGNOSIS — E11.42 TYPE 2 DIABETES MELLITUS WITH DIABETIC POLYNEUROPATHY, WITH LONG-TERM CURRENT USE OF INSULIN (H): ICD-10-CM

## 2023-10-13 RX ORDER — DULAGLUTIDE 0.75 MG/.5ML
INJECTION, SOLUTION SUBCUTANEOUS
Qty: 2 ML | Refills: 1 | Status: SHIPPED | OUTPATIENT
Start: 2023-10-13 | End: 2023-11-21

## 2023-10-24 ENCOUNTER — LAB (OUTPATIENT)
Dept: LAB | Facility: CLINIC | Age: 78
End: 2023-10-24
Payer: COMMERCIAL

## 2023-10-24 ENCOUNTER — OFFICE VISIT (OUTPATIENT)
Dept: OTHER | Facility: CLINIC | Age: 78
End: 2023-10-24
Payer: COMMERCIAL

## 2023-10-24 VITALS
OXYGEN SATURATION: 99 % | BODY MASS INDEX: 17.9 KG/M2 | HEIGHT: 70 IN | HEART RATE: 68 BPM | SYSTOLIC BLOOD PRESSURE: 149 MMHG | DIASTOLIC BLOOD PRESSURE: 62 MMHG | WEIGHT: 125 LBS

## 2023-10-24 DIAGNOSIS — I10 ESSENTIAL HYPERTENSION: ICD-10-CM

## 2023-10-24 DIAGNOSIS — E11.42 TYPE 2 DIABETES MELLITUS WITH DIABETIC POLYNEUROPATHY, WITH LONG-TERM CURRENT USE OF INSULIN (H): ICD-10-CM

## 2023-10-24 DIAGNOSIS — Z79.4 TYPE 2 DIABETES MELLITUS WITH DIABETIC POLYNEUROPATHY, WITH LONG-TERM CURRENT USE OF INSULIN (H): ICD-10-CM

## 2023-10-24 DIAGNOSIS — Z89.512 S/P BELOW KNEE AMPUTATION, LEFT (H): Primary | ICD-10-CM

## 2023-10-24 LAB
ANION GAP SERPL CALCULATED.3IONS-SCNC: 14 MMOL/L (ref 7–15)
BUN SERPL-MCNC: 25.1 MG/DL (ref 8–23)
CALCIUM SERPL-MCNC: 9.7 MG/DL (ref 8.8–10.2)
CHLORIDE SERPL-SCNC: 108 MMOL/L (ref 98–107)
CREAT SERPL-MCNC: 0.86 MG/DL (ref 0.67–1.17)
DEPRECATED HCO3 PLAS-SCNC: 20 MMOL/L (ref 22–29)
EGFRCR SERPLBLD CKD-EPI 2021: 90 ML/MIN/1.73M2
GLUCOSE SERPL-MCNC: 73 MG/DL (ref 70–99)
HBA1C MFR BLD: 6.5 % (ref 0–5.6)
POTASSIUM SERPL-SCNC: 5.3 MMOL/L (ref 3.4–5.3)
SODIUM SERPL-SCNC: 142 MMOL/L (ref 135–145)

## 2023-10-24 PROCEDURE — 83036 HEMOGLOBIN GLYCOSYLATED A1C: CPT

## 2023-10-24 PROCEDURE — 80048 BASIC METABOLIC PNL TOTAL CA: CPT

## 2023-10-24 PROCEDURE — 36415 COLL VENOUS BLD VENIPUNCTURE: CPT

## 2023-10-24 PROCEDURE — 99213 OFFICE O/P EST LOW 20 MIN: CPT

## 2023-10-24 PROCEDURE — G0463 HOSPITAL OUTPT CLINIC VISIT: HCPCS

## 2023-10-24 NOTE — LETTER
October 26, 2023      Marvin Poseyer  [address] [address]        Dear ,    We are writing to inform you of your test results. Your lab results all look good. Continue current medications/ doses.     Resulted Orders   Hemoglobin A1c   Result Value Ref Range    Hemoglobin A1C 6.5 (H) 0.0 - 5.6 %      Comment:      Normal <5.7%   Prediabetes 5.7-6.4%    Diabetes 6.5% or higher     Note: Adopted from ADA consensus guidelines.   Basic metabolic panel  (Ca, Cl, CO2, Creat, Gluc, K, Na, BUN)   Result Value Ref Range    Sodium 142 135 - 145 mmol/L      Comment:      Reference intervals for this test were updated on 09/26/2023 to more accurately reflect our healthy population. There may be differences in the flagging of prior results with similar values performed with this method. Interpretation of those prior results can be made in the context of the updated reference intervals.     Potassium 5.3 3.4 - 5.3 mmol/L    Chloride 108 (H) 98 - 107 mmol/L    Carbon Dioxide (CO2) 20 (L) 22 - 29 mmol/L    Anion Gap 14 7 - 15 mmol/L    Urea Nitrogen 25.1 (H) 8.0 - 23.0 mg/dL    Creatinine 0.86 0.67 - 1.17 mg/dL    GFR Estimate 90 >60 mL/min/1.73m2    Calcium 9.7 8.8 - 10.2 mg/dL    Glucose 73 70 - 99 mg/dL     If you have any questions or concerns, please call the clinic at the number listed above.     Sincerely,    JOSH Becker CNP

## 2023-10-24 NOTE — PROGRESS NOTES
VASCULAR SURGERY PROGRESS NOTE    [facility]    Roddy Sidhu  Medical Record #:  [id number]  YOB: 1945  Age:  78 year old     Date of Service: 10/24/2023    PRIMARY CARE PROVIDER: Sandra Low    Reason for visit:  Wound check    Marvin Sidhu is a 78 year old male who returns to clinic today, histoy of left below knee amputation and debridement. Has had problems with wound healing in that site, underwent debridement on June 16. Xeroform followed by [facility].      Today wound is significantly improved. Tiny bit of scab impeding wound healing over medial aspect of incision, debrided. Compliant with dressing changes. Discussed debridement and consent given by Marvin and family.        RECOMMENDATION/RISKS: Continue with current dressing plan. Scheduled to see myself in a few weeks. Please do not begin prosthetics until incision is fully healed      REVIEW OF SYSTEMS:    A 12 point ROS was reviewed and is negative except for what is listed above in HPI.    PHH:    Past Medical History:   Diagnosis Date    Diabetes (H)           Past Surgical History:   Procedure Laterality Date    AMPUTATE LEG BELOW KNEE Left 5/19/2023    Procedure: LEFT BELOW KNEE AMPUTATION;  Surgeon: Raimundo Weinstein MD;  Location:  OR    INCISION AND DRAINAGE FOOT, COMBINED Left 5/9/2023    Procedure: Incision and Debidement of Pressure Wound, Left Heel;  Surgeon: Paul Flores DPM;  Location: [facility] OR    IR LOWER EXTREMITY ANGIOGRAM LEFT  5/16/2023    IRRIGATION AND DEBRIDEMENT SOFT TISSUE LOWER EXTREMITY, COMBINED Left 6/16/2023    Procedure: IRRIGATION AND DEBRIDEMENT OF LEFT BELOW KNEE AMPUTATION; WOUND VAC PLACEMENT OF LEFT BELOW KNEE AMPUTATION;  Surgeon: Raimundo Weinstein MD;  Location:  OR    SURGICAL HISTORY OF -   1990    removal of steel from left eye       ALLERGIES:  Patient has no known allergies.    MEDS:    Current Outpatient Medications:     acetaminophen  (ACETAMINOPHEN 8 HOUR) 650 MG CR tablet, Take 1 tablet by mouth every 8 hours as needed for mild pain or fever, Disp: , Rfl:     aspirin (ASA) 325 MG EC tablet, Take 325 mg by mouth daily, Disp: , Rfl:     Continuous Blood Gluc  (FREESTYLE ABIODUN 14 DAY READER) ESTEFANI, Use to read blood sugars as per 's instructions., Disp: 1 each, Rfl: 0    Continuous Blood Gluc Sensor (FREESTYLE ABIODUN 14 DAY SENSOR) MISC, Change every 14 days., Disp: 2 each, Rfl: 5    ferrous gluconate (FERGON) 324 (38 Fe) MG tablet, Take 1 tablet (324 mg) by mouth daily (with breakfast), Disp: 30 tablet, Rfl: 0    gabapentin (NEURONTIN) 300 MG capsule, Take 1 capsule (300 mg) by mouth 3 times daily, Disp: 270 capsule, Rfl: 3    insulin glargine (LANTUS PEN) 100 UNIT/ML pen, Inject 12 Units Subcutaneous every morning, Disp: 15 mL, Rfl: 1    latanoprost (XALATAN) 0.005 % ophthalmic solution, Place 1 drop into both eyes At Bedtime, Disp: , Rfl:     lisinopril (ZESTRIL) 10 MG tablet, Take 1 tablet (10 mg) by mouth 2 times daily, Disp: 180 tablet, Rfl: 1    metFORMIN (GLUCOPHAGE) 500 MG tablet, Take 2 tablets (1,000 mg) by mouth 2 times daily (with meals), Disp: 180 tablet, Rfl: 1    naloxone (NARCAN) 4 MG/0.1ML nasal spray, Spray 4 mg into one nostril alternating nostrils as needed for opioid reversal every 2-3 minutes until assistance arrives, Disp: , Rfl:     NAPROXEN PO, , Disp: , Rfl:     oxyCODONE (ROXICODONE) 5 MG tablet, Take 1 tablet (5 mg) by mouth 3 times daily as needed for pain, Disp: 20 tablet, Rfl: 0    polyethylene glycol (MIRALAX) 17 GM/Dose powder, Take 17 g by mouth daily, Disp: 510 g, Rfl: 0    simvastatin (ZOCOR) 40 MG tablet, TAKE ONE TABLET BY MOUTH EVERY NIGHT AT BEDTIME, Disp: 90 tablet, Rfl: 3    TRULICITY 0.75 MG/0.5ML pen, INJECT 0.75 MG UNDER THE SKIN EVERY 7 DAYS, Disp: 2 mL, Rfl: 1    vitamin D3 (CHOLECALCIFEROL) 50 mcg (2000 units) tablet, Take 1 tablet by mouth daily, Disp: 30 capsule, Rfl:     SOCIAL  "HABITS:    History   Smoking Status    Some Days    Types: Pipe   Smokeless Tobacco    Never     Social History    Substance and Sexual Activity      Alcohol use: Not Currently      History   Drug Use No       FAMILY HISTORY:    Family History   Problem Relation Age of Onset    Breast Cancer Mother 60    C.A.D. Brother 58        MI       PE:  BP (!) 149/62 (BP Location: Left arm, Patient Position: Sitting, Cuff Size: Adult Regular)   Pulse 68   Ht 1.765 m (5' 9.5\")   Wt 56.7 kg (125 lb)   SpO2 99%   BMI 18.19 kg/m    Wt Readings from Last 1 Encounters:   10/24/23 56.7 kg (125 lb)     Body mass index is 18.19 kg/m .    EXAM:  GENERAL: well-developed 78 year old male who appears his stated age  CARDIAC: normal   CHEST/LUNG: normal respiratory effort   MUSCULOSKELETAL: grossly normal and both lower extremities are intact, no lower extremity edema  NEUROLOGIC: focally intact, alert and oriented x 3  PSYCH: appropriate affect  VASCULAR:       DIAGNOSTIC STUDIES:     Images:  No results found.      LABS:      Sodium   Date Value Ref Range Status   08/15/2023 137 136 - 145 mmol/L Final   06/23/2023 133 (L) 136 - 145 mmol/L Final   05/31/2023 134 (L) 136 - 145 mmol/L Final   05/14/2021 136 133 - 144 mmol/L Final   12/06/2019 138 133 - 144 mmol/L Final   03/12/2019 135 133 - 144 mmol/L Final     Urea Nitrogen   Date Value Ref Range Status   08/15/2023 19.4 8.0 - 23.0 mg/dL Final   06/23/2023 20.2 8.0 - 23.0 mg/dL Final   05/31/2023 28.0 (H) 8.0 - 23.0 mg/dL Final   05/17/2022 27 7 - 30 mg/dL Final   11/02/2021 19 7 - 30 mg/dL Final   05/14/2021 21 7 - 30 mg/dL Final   12/06/2019 20 7 - 30 mg/dL Final   03/12/2019 21 7 - 30 mg/dL Final     Hemoglobin   Date Value Ref Range Status   08/15/2023 12.6 (L) 13.3 - 17.7 g/dL Final   06/23/2023 10.0 (L) 13.3 - 17.7 g/dL Final   05/31/2023 8.9 (L) 13.3 - 17.7 g/dL Final   12/27/2012 15.3 13.3 - 17.7 g/dL Final     Platelet Count   Date Value Ref Range Status   06/23/2023 370 150 " - 450 10e3/uL Final   05/31/2023 401 150 - 450 10e3/uL Final   05/24/2023 481 (H) 150 - 450 10e3/uL Final   12/27/2012 382 150 - 450 10e9/L Final       20 minutes spent on the day of encounter doing chart review, history and exam, documentation, and further activities as noted.       Cheryl Carl, NP  VASCULAR SURGERY

## 2023-10-24 NOTE — PROGRESS NOTES
"[facility]        Patient is here for a follow up  to discuss Wound(s) on left stump post amputation.    Pt is currently taking Aspirin and Statin.    BP (!) 149/62 (BP Location: Left arm, Patient Position: Sitting, Cuff Size: Adult Regular)   Pulse 68   Ht 5' 9.5\" (1.765 m)   Wt 125 lb (56.7 kg)   SpO2 99%   BMI 18.19 kg/m      The provider has been notified that the patient has no concerns.     Questions patient would like addressed today are: N/A.    Refills are needed: No    Has homecare services and agency name:  Yes, RN ([facility]). Not sure of agency.      CODY PastorN, RN  [facility]  Office:  [phone] Fax: [phone]            "

## 2023-10-26 DIAGNOSIS — E11.42 TYPE 2 DIABETES MELLITUS WITH DIABETIC POLYNEUROPATHY, WITH LONG-TERM CURRENT USE OF INSULIN (H): ICD-10-CM

## 2023-10-26 DIAGNOSIS — Z79.4 TYPE 2 DIABETES MELLITUS WITH DIABETIC POLYNEUROPATHY, WITH LONG-TERM CURRENT USE OF INSULIN (H): ICD-10-CM

## 2023-10-26 NOTE — TELEPHONE ENCOUNTER
Metformin - Pt needs refill today please.  No need to call patient back, unless there are questions or problems.

## 2023-10-26 NOTE — RESULT ENCOUNTER NOTE
Please send letter:    Efrem Wong-    Your lab results all look good. Continue current medications/ doses. Please let us know if you have any questions.     Take care,    JOSH Rincon CNP

## 2023-10-30 ENCOUNTER — HOSPITAL ENCOUNTER (OUTPATIENT)
Dept: WOUND CARE | Facility: CLINIC | Age: 78
Discharge: HOME | End: 2023-10-30
Attending: NURSE PRACTITIONER | Admitting: NURSE PRACTITIONER
Payer: COMMERCIAL

## 2023-10-30 PROCEDURE — 272N000067 HC DRESSING PROMGRAN

## 2023-10-30 PROCEDURE — G0463 HOSPITAL OUTPT CLINIC VISIT: HCPCS

## 2023-10-30 NOTE — DISCHARGE INSTRUCTIONS
Visit Summary/General Recommendations    Now that your wound is debrided and ready to close, will stop the Medihoney and in it's place use Promogran Nancy collagen dressing. This will dissolve into the wound, if it is dry/not dissolving then add some saline after applied, (can use saline or wound cleanser to soften and remove for cleaning also).     Please try to apply lotion your foot daily. Eucerin is a good lotion.    Prioritize keeping your foot warm. When using this post-op shoe, wear a thick wool stocking.     Set up an appointment to get for for a diabetic shoe.        Bandage Change  Wash your hands before and after the dressing change. You may wear gloves if you choose. Gloves are available over the counter at [employer], [employer], [employer], etc.   Use scissors at home that you can designate solely for wound care and cleanse well with each use, (rubbing alcohol or alcohol pads work well for this).    Wash hands.  Remove old dressing.  Wash hands.  1.) Cleanse wound and surrounding skin with wound cleanser, dry with gauze.   2.)  Apply Promogran nancy to wound   3.) Cover with protective foam or alternate dressing depending on the drainage  Change dressing Monday, Wednesday and Friday and as needed        Signs and symptoms of infection:  Bright green drainage  Foul odor  Increasing pain in area  New redness or swelling at the site of the wound or streaks up from wound  New warmth to touch around wound accompanied by redness and swelling  Fever    Need to be seen as soon as possible for infection concerns.    Follow up: in 4 weeks, may cancel if healed.  Once heeled, take care to continue to protect this area from pressure and friction. Apply moisturizer daily.    Please call with any questions or concerns.    Kaylynn Powell RN, CWOCN   [phone]

## 2023-10-30 NOTE — PROGRESS NOTES
"[facility]    Start of Care in [facility]: 8/29/2023   Referring Provider: JOSH Rincon, CNP  Primary Care Provider: Sandra Low   Wound Location: right heel, pt also has a wound on his left stump which is being treated by vascular provider     Wound Clinic Visit:   Follow up visit, 4th visit to this clinic    Reason for Visit:       \"diabetic foot ulcer\"    Subjective:     Denies concerns, thinks homecare may be closing him to services soon.      Wound History:   From May '23 hospital stay:  Roddy Sidhu was admitted on 5/13/2023 by Osvaldo Scanlon MD and I would refer you to their history and physical.  The following problems were addressed during his hospitalization:     Roddy Sidhu is a 77 year old male with history of diabetes mellitus with polyneuropathy transferred from [facility]13[facility]23 with poor healing left heel wound and concern for vascular compromise-requiring vascular surgery evaluation.     Patient admitted at [facility] (3/29-4/4) for fall with R femur fx, R humerus fx, R clavicle fx s/p R femur ORIF and discharged to TCU.      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  S/p debridement of left pressure ulcer 5/9/2023  Peripheral arterial disease.  --Pressure injury to heels noted in TCU and seen by wound care 4/17/2023.  Recent ABIs were normal. WBC 17.1.  .60.  Wound cultures from 5/5 positive for 2+ Proteus, 3+ Enterococcus, 1+ MSSA.    --Was on IV vancomycin 5/4 to 5/8.    Was on intravenous Zosyn (5/4), switched to Unasyn (5/8) to cover Proteus, Enterococcus and MSSA. Then switched to oral Augmentin [5/10- 5/14]  Continue IV Ancef [5/15].  CRP improving from 123.60  >25.6   --Vascular surgery following.  Underwent lower extremity angiogram 5/16,  Left patent iliac, SFA and AK pop. 50 % at knee pop stenosis, single vessel " runoff via VERN.  -- Podiatry following, plan for left BKA 5/19.  Patient underwent surgery as planned on 5/19.  Vascular surgery following for postoperative cares.  Plan for dressing change on Monday.  --Infectious disease following.  Antibiotics discontinued following BKA per ID recommendations.  As needed Tylenol, oxycodone as needed for pain.  Was on aspirin 325 mg oral daily, hold on 5/19 for surgery.  Restarted postoperatively .  Continue simvastatin 40 mg oral daily.  -PT/OT.  Keep knee immobilizer in place, okay to remove for brief.  For comfort.     Acute on chronic anemia no active bleeding  S/p PRBC transfusion 5/5, 5/21.  Hemoglobin was normal 6 months ago, was 8's during recent admission, then 8.2 on 5/4 and dropped to 6.7-6.9 on 5/5, received 1 unit blood transfusion on 5/5.  --No black or bloody stools.  No abdominal pain.   No other apparent source of acute blood loss.   --Guiac negative. Iron sat index 5.  Received 2 doses of IV Venofer, started on oral iron supplements.  -Aspirin was on hold, restarted 5/14 -5/18.  Held 5/19 for amputation surgery, restarted postoperatively.  Hb 8 on 5/20. Down to 6.6 on 5/21, transfused additional PRBC.  Discontinued Celebrex [started this hospitalization], naproxen [PTA meds]  No active bleeding at this time.    Hemoglobin stable at 8 currently     Hypoglycemia 5/19 while NPO  Hyperglycemia from uncontrolled diabetes  Uncontrolled diabetes mellitus with a hemoglobin A1c of 8.3.  Diabetic neuropathy.  PTA on 10 units insulin Lantus every morning.  This hospital stay noted elevated blood sugars, optimized to insulin Lantus 14 units twice daily.  Insulin Lantus on hold on 5/19 AM for surgery while NPO.  Noted hypoglycemia with blood sugars of 64 on 5/19.  Hypoglycemia protocol.  Also received IVF with dextrose for this.  Continue insulin Lantus to 14 units twice daily after surgery.    Reduced Lantus to 14 units every morning only due to hypoglycemia on twice daily  dosing.  Also added prandial aspart 1 unit per 10 g carbs 3 times daily AC.  Continue sliding scale insulin.  Continue PTA med Neurontin.  Hold PTA metformin and Trulicity.  Restart metformin today  -Changed diet to moderate consistent carbohydrate diet     PTA regimen restarted on discharge        Severe malnutrition:  Nutrition services following   Supplements ordered     Hyperlipidemia.  Continue PTA simvastatin.     Physical deconditioning in the setting of ongoing medical illness, senile frailty.  Status post recent fall.  Closed displaced subtrochanteric fracture of right femur 3/29  S/p ORIF right femur fracture with intermedullary nail 3/29     Closed fracture of proximal end of right humerus 3/29   Closed nondisplaced fracture of right clavicle 3/29   Was at TCU prior to transfer  Continue PT/OT, likely TCU on discharge.  Patient and family in agreement with TCU.  Care Management assistance with transition.  Patient tells me that he plans on discharging home if TCU not available soon.  He lives with his daughter and wife.     Hypoalbuminemia likely dilutional, acute illness.  Serum albumin 2.6.   Dietary supplements with Ensure.  Monitor periodically.     Thrombocytosis likely reactive in the setting of infection.  Platelet count 613 > 520  Monitor periodically.     Tobacco dependence  Daily pipe smokier, Smoking cessation emphasized  Nicotine patch     Pt underwent I&D of left BKA site on 6/16/23      Past Medical History:  Patient Active Problem List   Diagnosis    Advanced directives, counseling/discussion    Health Care Home    Hyperlipidemia LDL goal <100    Type 2 diabetes mellitus with diabetic polyneuropathy, with long-term current use of insulin (H)    Tobacco use disorder    Microalbuminuria due to type 2 diabetes mellitus (H)    Closed fracture of right femur (H)    Sepsis with acute organ dysfunction without septic shock (H)    Peripheral artery disease (H24)    Essential hypertension     Diabetic foot infection (H)    Postoperative wound dehiscence, initial encounter    S/P below knee amputation, left (H)                     Tobacco Use:     Tobacco Use      Smoking status: Some Days        Types: Pipe      Smokeless tobacco: Never       Diabetic: yes  HgbA1C:   Hemoglobin A1C   Date Value Ref Range Status   10/24/2023 6.5 (H) 0.0 - 5.6 % Final     Comment:     Normal <5.7%   Prediabetes 5.7-6.4%    Diabetes 6.5% or higher     Note: Adopted from ADA consensus guidelines.   05/14/2021 7.2 (H) 0 - 5.6 % Final     Comment:     Normal <5.7% Prediabetes 5.7-6.4%  Diabetes 6.5% or higher - adopted from ADA   consensus guidelines.       Checks Blood Glucose?:  yes, daily Average Readings: 102-118 recent range per pt      Personal/social history:  Lives with spouse, has homecare seeing him    Objective:   Current treatment plan: only bandaide in place, per pt just got foam dressings (Mepilex foam 3x3?)  Last changed: Saturday    Wound #1 Right lateral heel        0.4 (was 0.7) cm L x 0.3 (same) cm W x 0.2 (was 0.4) cm D  Tunneling: no  Undermining: no  Wound bed type/amount: 100% red, moist; non fluctuant  Wound Edges: attached  Periwound: intact, some crusting most of which was able to remove with cares  Drainage: small serous and medihoney  Odor: no  Pain: some when stepping on heel and reports some neuropathy pain in foot    Wound #2 Left BKA stump  Not assessed this visit, pt is followed by vascular provider for this    5/12/23 TBI:  FINDINGS: Right brachial pressure is 120, right digital pressure is  111 for an index of 0.93.  Left digital pressure is 99 for an index of 0.83. Both digital  waveforms appear to have intact amplitude.       5/4/23 MARKY:  Right MARKY:   PT: 1.09.  DP: 1.19  Waveforms: Biphasic and triphasic in the right posterior tibial, right  dorsalis pedis and left dorsalis pedis. Monophasic in the left  posterior tibial.                                                                   IMPRESSION: Normal toe brachial indices bilaterally    Hair growth: no  Capillary Refill: <2 seconds  Feet/toes color: pink, warm  Nails: 1st nail is dystrophic, remainder are WNL  R Leg: Edema trace in ankle    Mobility: wheelchair, standing pivot transfer  Current offloading/footwear: post-op shoe, wearing heel boot at night  Sensation: limited by neuropathy    Other callusing/areas of concern:     At first visit was an area of non-blanching erythema, now 3x3mm dried stable what appears to be slightly raised brown scab- this is stable/no change.     Diet: thin but states he eats well and drinks a protein supplement daily      Assessment:  Right heel unstagable pressure injury (probable stage III,) in pt with PAD, adequate healing potential per arterial testing done    Wound is now clean and has decreased in size    Factors impacting wound healing:   Delayed healing as part of normal aging process  Underlying Disease: diabetes mellitis   Substance abuse: smokes pipe        Plan:  Holding medihoney, begin promogran meredith collagen dressing, cover with foam dressing. Change three times per week.      Has been wearing a post-op shoe, needs more insulation and protection as we come into the winter months. Provided with prescription from provider and encouraged him to schedule an appointment ASAP for fitting. He does not have any other shoe he can wear right now, (his old shoes are too tight on his heel,) so recommend a thick wool sock with his post-op shoe until he has new shoes.  He is seen at [facility] for his prosthetic and can be fit there.     He will continue current plan of care per vascular clinic for his left stump.     Foot is dry so recommended daily lotion.     Discussed/Education provided: plan of care with rationale      Topical care: Wound/surrounding skin cleansed with Vashe and gauze. Patted dry. Bandaged per plan of care using Mepilex border light 3x3 with medihoney.        The following discharge  instructions were reviewed with and sent home with the patient:  See AVS    Follow up: 4 weeks, scheduled    The following supplies were sent home with the patient:  none      Verbal, written, & demonstrative education provided.  Face to face time: approximately 30 minutes  Procedure: none    Kaylynn Powell RN, CWOCN  [phone]

## 2023-11-07 NOTE — TELEPHONE ENCOUNTER
"Prescription approved per [facility] [place].  Heidy CARROLL RN BSN      Requested Prescriptions   Pending Prescriptions Disp Refills     insulin pen needle (B-D U/F) 31G X 5 MM miscellaneous 100 each 1     Sig: USE 1 PEN NEEDLE DAILY OR AS DIRECTED       Diabetic Supplies Protocol Passed - 7/3/2020 11:49 AM        Passed - Medication is active on med list        Passed - Patient is 18 years of age or older        Passed - Recent (6 mo) or future (30 days) visit within the authorizing provider's specialty     Patient had office visit in the last 6 months or has a visit in the next 30 days with authorizing provider.  See \"Patient Info\" tab in inbasket, or \"Choose Columns\" in Meds & Orders section of the refill encounter.                 "
Last Written Prescription Date:  BD Pen Needle Mini 31g x 5 mm  6/25/2020 ( wife cancelled it, but they need a new Rx.)  Last Fill Quantity: 100,  # refills: 1  Last office visit: 12/6/2019 with prescribing provider:  Sabrina   Future Office Visit:        Viviane Dailey  [facility] [place]       
Labs/Imaging Studies

## 2023-11-08 ENCOUNTER — VIRTUAL VISIT (OUTPATIENT)
Dept: OTHER | Facility: CLINIC | Age: 78
End: 2023-11-08
Payer: COMMERCIAL

## 2023-11-08 DIAGNOSIS — Z89.512 S/P BELOW KNEE AMPUTATION, LEFT (H): Primary | ICD-10-CM

## 2023-11-08 PROCEDURE — 99024 POSTOP FOLLOW-UP VISIT: CPT | Mod: 95

## 2023-11-08 NOTE — PROGRESS NOTES
Vascular Surgery telephone visit    Marvin's BKA stump wound is fully healed and no issues. He can continue to work with Prosthetics, but is cleared from a Vascular surgery standpoint in terms of wearing prosthetic.     Discussed with Marvin to follow-up with [facility] as needed.     Cheryl Carl CNP   Total visit: 10 minutes

## 2023-11-08 NOTE — PROGRESS NOTES
Marvin is a 78 year old who is being evaluated via a billable telephone visit.      What phone number would you like to be contacted at?   [phone]        How would you like to obtain your AVS? Mail a copy    Monet Gunderson MA

## 2023-11-10 NOTE — TELEPHONE ENCOUNTER
Pt was called, he was napping so wife made appointment for 5/17. She was also informed of   Gabapentin prescription refill sent to pharmacy.   Allison Zamora RN      Ambulatory

## 2023-11-11 DIAGNOSIS — Z79.4 TYPE 2 DIABETES MELLITUS WITH DIABETIC POLYNEUROPATHY, WITH LONG-TERM CURRENT USE OF INSULIN (H): ICD-10-CM

## 2023-11-11 DIAGNOSIS — E11.42 TYPE 2 DIABETES MELLITUS WITH DIABETIC POLYNEUROPATHY, WITH LONG-TERM CURRENT USE OF INSULIN (H): ICD-10-CM

## 2023-11-13 RX ORDER — LANCETS
EACH MISCELLANEOUS
Qty: 100 EACH | Refills: 1 | Status: SHIPPED | OUTPATIENT
Start: 2023-11-13

## 2023-11-13 RX ORDER — BLOOD SUGAR DIAGNOSTIC
STRIP MISCELLANEOUS
Qty: 100 STRIP | Refills: 1 | Status: SHIPPED | OUTPATIENT
Start: 2023-11-13

## 2023-11-13 NOTE — TELEPHONE ENCOUNTER
"Has appointment scheduled for 11/21/23      Requested Prescriptions   Pending Prescriptions Disp Refills    ACCU-CHEK GUIDE test strip [Pharmacy Med Name: ACCU-CHEK GUIDE  STRP] 100 strip 1     Sig: USE TO TEST BLOOD SUGAR ONE TIME DAILY OR AS DIRECTED       Diabetic Supplies Protocol Failed - 11/11/2023  5:02 AM        Failed - Medication is active on med list        Passed - Patient is 18 years of age or older        Passed - Recent (6 mo) or future (30 days) visit within the authorizing provider's specialty     Patient had office visit in the last 6 months or has a visit in the next 30 days with authorizing provider.  See \"Patient Info\" tab in inbasket, or \"Choose Columns\" in Meds & Orders section of the refill encounter.              blood glucose monitoring (SOFTCLIX) lancets [Pharmacy Med Name: ACCU-CHEK SOFTCLIX LANCETS  MISC] 100 each 1     Sig: USE TO TEST BLOOD SUGAR 1 TIMES DAILY OR AS DIRECTED       Diabetic Supplies Protocol Failed - 11/11/2023  5:02 AM        Failed - Medication is active on med list        Passed - Patient is 18 years of age or older        Passed - Recent (6 mo) or future (30 days) visit within the authorizing provider's specialty     Patient had office visit in the last 6 months or has a visit in the next 30 days with authorizing provider.  See \"Patient Info\" tab in inbasket, or \"Choose Columns\" in Meds & Orders section of the refill encounter.                 " No

## 2023-11-21 ENCOUNTER — OFFICE VISIT (OUTPATIENT)
Dept: FAMILY MEDICINE | Facility: CLINIC | Age: 78
End: 2023-11-21
Attending: NURSE PRACTITIONER
Payer: COMMERCIAL

## 2023-11-21 VITALS
SYSTOLIC BLOOD PRESSURE: 126 MMHG | DIASTOLIC BLOOD PRESSURE: 60 MMHG | OXYGEN SATURATION: 98 % | RESPIRATION RATE: 16 BRPM | TEMPERATURE: 97 F | HEART RATE: 68 BPM

## 2023-11-21 DIAGNOSIS — F17.200 TOBACCO USE DISORDER: ICD-10-CM

## 2023-11-21 DIAGNOSIS — Z79.4 TYPE 2 DIABETES MELLITUS WITH DIABETIC POLYNEUROPATHY, WITH LONG-TERM CURRENT USE OF INSULIN (H): ICD-10-CM

## 2023-11-21 DIAGNOSIS — E11.42 TYPE 2 DIABETES MELLITUS WITH DIABETIC POLYNEUROPATHY, WITH LONG-TERM CURRENT USE OF INSULIN (H): ICD-10-CM

## 2023-11-21 DIAGNOSIS — Z89.512 S/P BELOW KNEE AMPUTATION, LEFT (H): Primary | ICD-10-CM

## 2023-11-21 PROBLEM — S72.91XA CLOSED FRACTURE OF RIGHT FEMUR (H): Status: RESOLVED | Noted: 2023-03-29 | Resolved: 2023-11-21

## 2023-11-21 PROBLEM — T81.31XA POSTOPERATIVE WOUND DEHISCENCE, INITIAL ENCOUNTER: Status: RESOLVED | Noted: 2023-08-17 | Resolved: 2023-11-21

## 2023-11-21 PROBLEM — A41.9 SEPSIS WITH ACUTE ORGAN DYSFUNCTION WITHOUT SEPTIC SHOCK (H): Status: RESOLVED | Noted: 2023-05-04 | Resolved: 2023-11-21

## 2023-11-21 PROBLEM — R65.20 SEPSIS WITH ACUTE ORGAN DYSFUNCTION WITHOUT SEPTIC SHOCK (H): Status: RESOLVED | Noted: 2023-05-04 | Resolved: 2023-11-21

## 2023-11-21 PROCEDURE — 99213 OFFICE O/P EST LOW 20 MIN: CPT | Performed by: NURSE PRACTITIONER

## 2023-11-21 RX ORDER — DULAGLUTIDE 0.75 MG/.5ML
0.75 INJECTION, SOLUTION SUBCUTANEOUS
Qty: 6 ML | Refills: 1 | Status: SHIPPED | OUTPATIENT
Start: 2023-11-21 | End: 2024-04-30

## 2023-11-21 NOTE — PROGRESS NOTES
Assessment & Plan     S/P below knee amputation, left (H)    - Physical Therapy Referral; Future  - Orthotics and Prosthetics DME Orthotic; Diabetic Shoe(s)/Insert(s); Progress note must support the need for shoes/orthotics. Use .diabeticfootexam or diabetic foot exam picklist in [facility].    Type 2 diabetes mellitus with diabetic polyneuropathy, with long-term current use of insulin (H)  Controlled, at goal  - dulaglutide (TRULICITY) 0.75 MG/0.5ML pen; Inject 0.75 mg Subcutaneous every 7 days  - Orthotics and Prosthetics DME Orthotic; Diabetic Shoe(s)/Insert(s); Progress note must support the need for shoes/orthotics. Use .diabeticfootexam or diabetic foot exam picklist in [facility].    Tobacco use disorder      Addendum:   Marvin lives in his 2 story house with his wife. They have many grandchildren that come to visit. He has a large garden to tend to. His property requires maintenance including raking, mowing, and he also enjoys hunting. He is able to bathe, cook, dress and groom independently. He desires physical therapy melvi improve his balance and ability to ambulate with his new prostheses as he is only able to go short distances on even grounds right now. His diabetic ulcer on the right foot is healed and only a small superficial scab remains. He has some loss of strength and muscle tone of the residual right leg due to prolonged wheelchair status. Will need diabetic shoes and physical therapy. He is having some difficulty with his new prostheses causing some rubbing and discomfort to the left lower thigh. For Marvin to return to his prior level of functioning and attend to his responsibilities around his property and to function in his 2 story home he needs to have ongoing care and assessment of his prostheses status. To prevent subsequent foot infection and potential complication of amputation he needs to have a diabetic shoe on the right foot. Patient has motivation and desire to return to his prior level of  functioning.          CONSULTATION/REFERRAL to physical therapy, orthotics    FUTURE APPOINTMENTS:       - Follow-up visit in 6 months, sooner JOSH De Oliveira CNP [facility]   Marvin is a 78 year old, presenting for the following health issues:  Referral (For physical therapy. Would like to become more independent with prosthetic leg. ), Hypertension, Diabetes, and Lipids        11/21/2023     7:18 AM   Additional Questions   Roomed by Guillermina OLIVA       Diabetes Follow-up    How often are you checking your blood sugar? Continuous glucose monitor  What time of day are you checking your blood sugars (select all that apply)?  Before and after meals  Have you had any blood sugars above 200?  No  Have you had any blood sugars below 70?  Yes   What symptoms do you notice when your blood sugar is low?  None  What concerns do you have today about your diabetes? None   Do you have any of these symptoms? (Select all that apply)  Numbness in feet, Burning in feet, and Redness, sores, or blisters on feet  Have you had a diabetic eye exam in the last 12 months? No    Has his new prosthesis, wants referral for physical therapy closer to home. Right foot ulcer almost completely healed, will need diabetic shoes.           BP Readings from Last 2 Encounters:   11/21/23 126/60   10/24/23 (!) 149/62     Hemoglobin A1C (%)   Date Value   10/24/2023 6.5 (H)   08/15/2023 6.7 (H)   05/14/2021 7.2 (H)   06/05/2020 7.8 (H)     LDL Cholesterol Calculated (mg/dL)   Date Value   05/02/2023 49   05/17/2022 40   05/14/2021 58   12/06/2019 63     How many servings of fruits and vegetables do you eat daily?  2-3  On average, how many sweetened beverages do you drink each day (Examples: soda, juice, sweet tea, etc.  Do NOT count diet or artificially sweetened beverages)?   0  How many days per week do you exercise enough to make your heart beat faster? 3 or less  How many minutes a day  do you exercise enough to make your heart beat faster? 9 or less  How many days per week do you miss taking your medication? 0        Review of Systems   Constitutional, HEENT, cardiovascular, pulmonary, gi and gu systems are negative, except as otherwise noted.      Objective    /60   Pulse 68   Temp 97  F (36.1  C) (Tympanic)   Resp 16   SpO2 98%   There is no height or weight on file to calculate BMI.  Physical Exam   GENERAL: alert and no distress  RESP: lungs clear to auscultation - no rales, rhonchi or wheezes  CV: regular rates and rhythm, normal S1 S2, no S3 or S4, no murmur, click or rub, and no peripheral edema  MS: LLE exam shows BKA, prosthesis in place  NEURO: Normal strength and tone, mentation intact and speech normal    Diabetic foot exam: reduced sensation at heel on right foot, scab to lateral heel, no eschar, no edema, no erythema, no ulcerations.

## 2023-11-24 ENCOUNTER — THERAPY VISIT (OUTPATIENT)
Dept: PHYSICAL THERAPY | Facility: CLINIC | Age: 78
End: 2023-11-24
Attending: NURSE PRACTITIONER
Payer: COMMERCIAL

## 2023-11-24 DIAGNOSIS — Z89.512 S/P BELOW KNEE AMPUTATION, LEFT (H): ICD-10-CM

## 2023-11-24 PROCEDURE — 97110 THERAPEUTIC EXERCISES: CPT | Mod: GP | Performed by: PHYSICAL THERAPIST

## 2023-11-24 PROCEDURE — 97530 THERAPEUTIC ACTIVITIES: CPT | Mod: GP | Performed by: PHYSICAL THERAPIST

## 2023-11-24 PROCEDURE — 97161 PT EVAL LOW COMPLEX 20 MIN: CPT | Mod: GP | Performed by: PHYSICAL THERAPIST

## 2023-11-24 NOTE — PROGRESS NOTES
PHYSICAL THERAPY EVALUATION  Type of Visit: Evaluation    See electronic medical record for Abuse and Falls Screening details.    Subjective       Presenting condition or subjective complaint:  walk better with prosthesis and have better balance   Date of onset: 05/25/23 (BKA)    Relevant medical history:   R post heel wound healing   Dates & types of surgery:  L BKA may 25, 2023   got prosthetic leg  Nov 2, 2023     Prior diagnostic imaging/testing results:       Prior therapy history for the same diagnosis, illness or injury:        Prior Level of Function  Transfers: Independent, requires UEs for sit to stand  ADL: assist only to don sock    Living Environment  Social support:   wife  Type of home:   ground floor of house; daughter and her family upstairs   Stairs to enter the home: none  Ramp:   yes   Stairs inside the home: no   Help at home:  wife helps donning R sock due to stiff back  Equipment owned:   WC; walker; bath chair; grab bars by toilet    Patient goals for therapy:  walk with cane      Objective   KNEE EVALUATION  INTEGUMENTARY (edema, incisions): healed incision on L residual limb  BALANCE/PROPRIOCEPTION: SLS   unable if no UE assist  ROM: R shoulder 30* flxn due to humerus fx in Jan 2023; other limbs WFL for age    STRENGTH:  knee extn fatigues quickly with extn   Unable to stand from chair no hands   FLEXIBILITY: limited trunk flxn    Assessment & Plan   CLINICAL IMPRESSIONS  Medical Diagnosis: L BKA    Treatment Diagnosis: L BKA   Impression/Assessment: Patient is a 78 year old male with difficulty with balance and gait complaints.  The following significant findings have been identified: Decreased ROM/flexibility, Decreased strength, Impaired balance, Impaired sensation, and Impaired gait. These impairments interfere with their ability to perform recreational activities, household chores, household mobility, and community mobility as compared to previous level of function.     Clinical  Decision Making (Complexity):  Clinical Presentation: Stable/Uncomplicated  Clinical Presentation Rationale: based on medical and personal factors listed in PT evaluation  Clinical Decision Making (Complexity): Low complexity    PLAN OF CARE  Treatment Interventions:  Interventions: Gait Training, Manual Therapy, Neuromuscular Re-education, Therapeutic Activity, Therapeutic Exercise, Self-Care/Home Management, Prosthetic Fitting/Training    Long Term Goals           Frequency of Treatment: 2x/wk  decrease to 1x/wk after 4 wks  Duration of Treatment: 90 days    Education Assessment:   Learner/Method: Patient;Listening;Reading;Demonstration;Pictures/Video  Education Comments: PTRx    Risks and benefits of evaluation/treatment have been explained.   Patient/Family/caregiver agrees with Plan of Care.     Evaluation Time:     PT Eval, Low Complexity Minutes (11051): 15       Signing Clinician: Mora Wilson, PT, DPT      [facility]                                                                                   OUTPATIENT PHYSICAL THERAPY      PLAN OF TREATMENT FOR OUTPATIENT REHABILITATION   Patient's Last Name, First Name, MOIRARoddy Clinton YOB: 1945   Provider's Name   [facility]   Medical Record No.  [id number]     Onset Date: 05/25/23 (BKA)  Start of Care Date: 11/24/23     Medical Diagnosis:  L BKA      PT Treatment Diagnosis:  L BKA Plan of Treatment  Frequency/Duration: 2x/wk  decrease to 1x/wk after 4 wks/ 90 days    Certification date from 11/24/23 to 02/21/24         See note for plan of treatment details and functional goals     Mora Wilson, PT DPT                         I CERTIFY THE NEED FOR THESE SERVICES FURNISHED UNDER        THIS PLAN OF TREATMENT AND WHILE UNDER MY CARE     (Physician attestation of this document indicates review and certification of the therapy plan).              Referring  Provider:  Sandra Low    Initial Assessment  See Epic Evaluation- Start of Care Date: 11/24/23

## 2023-11-27 ENCOUNTER — HOSPITAL ENCOUNTER (OUTPATIENT)
Dept: WOUND CARE | Facility: CLINIC | Age: 78
Discharge: HOME | End: 2023-11-27
Attending: NURSE PRACTITIONER | Admitting: NURSE PRACTITIONER
Payer: COMMERCIAL

## 2023-11-27 PROCEDURE — G0463 HOSPITAL OUTPT CLINIC VISIT: HCPCS

## 2023-11-27 NOTE — DISCHARGE INSTRUCTIONS
Apply a moisturizing ointment such as Aquaphor over the area daily and cover with a bandage if draining or if needed to help retain the Aquaphor.     Follow-up as needed if callus over area continues to get larger and thicker, sometimes this needs to be shaved down so the callus is not applying a lot of extra pressure to the area.     Follow-up if area opens again or is developing signs of a pre-ulcerative callus (meaning there is a wound developing under the callus,) such as area being soft, draining or having a dark discoloration.  Follow-up with your doctor ASAP for any signs of infection (redness, swelling, worsening pain).    Try to get fit for shoes ASAP. Monitor your foot closely as you begin to walk again, need to see someone ASAP for any blistering or persistent redness to troubleshoot and prevent new wounds.     Kaylynn Powell RN, CWOCN     [phone]

## 2023-11-27 NOTE — PROGRESS NOTES
"[facility]    Start of Care in [facility]: 8/29/2023   Referring Provider: JOSH Rincon, CNP  Primary Care Provider: Sandra Low   Wound Location: right heel, pt also has a wound on his left stump which is being treated by vascular provider     Wound Clinic Visit:   Follow up visit, 4th visit to this clinic    Reason for Visit:       \"diabetic foot ulcer\"    Subjective:     Denies concerns. He has and is using his new prosthetic with PT services. States wound on heel is closed. Still has homecare. Saw his provider and received and prescription for orthotics consult for shoes and inserts but has not scheduled this yet.      Wound History:   From May '23 hospital stay:  Roddy Sidhu was admitted on 5/13/2023 by Osvaldo Scanlon MD and I would refer you to their history and physical.  The following problems were addressed during his hospitalization:     Roddy Sidhu is a 77 year old male with history of diabetes mellitus with polyneuropathy transferred from [facility] on 5/13/2023 with poor healing left heel wound and concern for vascular compromise-requiring vascular surgery evaluation.     Patient admitted at [facility] (3/29-4/4) for fall with R femur fx, R humerus fx, R clavicle fx s/p R femur ORIF and discharged to TCU.      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  S/p debridement of left pressure ulcer 5/9/2023  Peripheral arterial disease.  --Pressure injury to heels noted in TCU and seen by wound care 4/17/2023.  Recent ABIs were normal. WBC 17.1.  .60.  Wound cultures from 5/5 positive for 2+ Proteus, 3+ Enterococcus, 1+ MSSA.    --Was on IV vancomycin 5/4 to 5/8.    Was on intravenous Zosyn (5/4), switched to Unasyn (5/8) to cover Proteus, Enterococcus and MSSA. Then switched to oral Augmentin [5/10- 5/14]  Continue IV Ancef [5/15].  CRP " improving from 123.60  >25.6   --Vascular surgery following.  Underwent lower extremity angiogram 5/16,  Left patent iliac, SFA and AK pop. 50 % at knee pop stenosis, single vessel runoff via VERN.  -- Podiatry following, plan for left BKA 5/19.  Patient underwent surgery as planned on 5/19.  Vascular surgery following for postoperative cares.  Plan for dressing change on Monday.  --Infectious disease following.  Antibiotics discontinued following BKA per ID recommendations.  As needed Tylenol, oxycodone as needed for pain.  Was on aspirin 325 mg oral daily, hold on 5/19 for surgery.  Restarted postoperatively .  Continue simvastatin 40 mg oral daily.  -PT/OT.  Keep knee immobilizer in place, okay to remove for brief.  For comfort.     Acute on chronic anemia no active bleeding  S/p PRBC transfusion 5/5, 5/21.  Hemoglobin was normal 6 months ago, was 8's during recent admission, then 8.2 on 5/4 and dropped to 6.7-6.9 on 5/5, received 1 unit blood transfusion on 5/5.  --No black or bloody stools.  No abdominal pain.   No other apparent source of acute blood loss.   --Guiac negative. Iron sat index 5.  Received 2 doses of IV Venofer, started on oral iron supplements.  -Aspirin was on hold, restarted 5/14 -5/18.  Held 5/19 for amputation surgery, restarted postoperatively.  Hb 8 on 5/20. Down to 6.6 on 5/21, transfused additional PRBC.  Discontinued Celebrex [started this hospitalization], naproxen [PTA meds]  No active bleeding at this time.    Hemoglobin stable at 8 currently     Hypoglycemia 5/19 while NPO  Hyperglycemia from uncontrolled diabetes  Uncontrolled diabetes mellitus with a hemoglobin A1c of 8.3.  Diabetic neuropathy.  PTA on 10 units insulin Lantus every morning.  This hospital stay noted elevated blood sugars, optimized to insulin Lantus 14 units twice daily.  Insulin Lantus on hold on 5/19 AM for surgery while NPO.  Noted hypoglycemia with blood sugars of 64 on 5/19.  Hypoglycemia protocol.  Also  received IVF with dextrose for this.  Continue insulin Lantus to 14 units twice daily after surgery.    Reduced Lantus to 14 units every morning only due to hypoglycemia on twice daily dosing.  Also added prandial aspart 1 unit per 10 g carbs 3 times daily AC.  Continue sliding scale insulin.  Continue PTA med Neurontin.  Hold PTA metformin and Trulicity.  Restart metformin today  -Changed diet to moderate consistent carbohydrate diet     PTA regimen restarted on discharge        Severe malnutrition:  Nutrition services following   Supplements ordered     Hyperlipidemia.  Continue PTA simvastatin.     Physical deconditioning in the setting of ongoing medical illness, senile frailty.  Status post recent fall.  Closed displaced subtrochanteric fracture of right femur 3/29  S/p ORIF right femur fracture with intermedullary nail 3/29     Closed fracture of proximal end of right humerus 3/29   Closed nondisplaced fracture of right clavicle 3/29   Was at TCU prior to transfer  Continue PT/OT, likely TCU on discharge.  Patient and family in agreement with TCU.  Care Management assistance with transition.  Patient tells me that he plans on discharging home if TCU not available soon.  He lives with his daughter and wife.     Hypoalbuminemia likely dilutional, acute illness.  Serum albumin 2.6.   Dietary supplements with Ensure.  Monitor periodically.     Thrombocytosis likely reactive in the setting of infection.  Platelet count 613 > 520  Monitor periodically.     Tobacco dependence  Daily pipe smokier, Smoking cessation emphasized  Nicotine patch     Pt underwent I&D of left BKA site on 6/16/23      Past Medical History:  Patient Active Problem List   Diagnosis    Advanced directives, counseling/discussion    Health Care Home    Hyperlipidemia LDL goal <100    Type 2 diabetes mellitus with diabetic polyneuropathy, with long-term current use of insulin (H)    Tobacco use disorder    Microalbuminuria due to type 2 diabetes  mellitus (H)    Peripheral artery disease (H24)    Essential hypertension    Diabetic foot infection (H)    S/P below knee amputation, left (H)                     Tobacco Use:     Tobacco Use      Smoking status: Some Days        Types: Pipe      Smokeless tobacco: Never       Diabetic: yes  HgbA1C:   Hemoglobin A1C   Date Value Ref Range Status   10/24/2023 6.5 (H) 0.0 - 5.6 % Final     Comment:     Normal <5.7%   Prediabetes 5.7-6.4%    Diabetes 6.5% or higher     Note: Adopted from ADA consensus guidelines.   05/14/2021 7.2 (H) 0 - 5.6 % Final     Comment:     Normal <5.7% Prediabetes 5.7-6.4%  Diabetes 6.5% or higher - adopted from ADA   consensus guidelines.       Checks Blood Glucose?:  yes, daily Average Readings: 102-118 recent range per pt      Personal/social history:  Lives with spouse, has homecare seeing him    Objective:   Current treatment plan: no bandage  Last changed: n/a    Wound #1 Right lateral heel          1.2x0.2cm area of thin hyperkeratosis  Periwound: intact, scar  Drainage: none  Odor: no  Pain: some when stepping on heel and reports some neuropathy pain in foot    Wound #2 Left BKA stump  Not assessed this visit, pt is followed by vascular provider for this. Reports this is healed    5/12/23 TBI:  FINDINGS: Right brachial pressure is 120, right digital pressure is  111 for an index of 0.93.  Left digital pressure is 99 for an index of 0.83. Both digital  waveforms appear to have intact amplitude.       5/4/23 MARKY:  Right MARKY:   PT: 1.09.  DP: 1.19  Waveforms: Biphasic and triphasic in the right posterior tibial, right  dorsalis pedis and left dorsalis pedis. Monophasic in the left  posterior tibial.                                                                  IMPRESSION: Normal toe brachial indices bilaterally    Hair growth: no  Capillary Refill: <2 seconds  Feet/toes color: pink, warm  Nails: 1st nail is dystrophic, remainder are WNL  R Leg: Edema trace in ankle    Mobility:  wheelchair, standing pivot transfer  Current offloading/footwear: post-op shoe, wearing heel boot at night  Sensation: limited by neuropathy    Other callusing/areas of concern:     Closed/resolved with minimal blanchable erythema    Diet: thin but states he eats well and drinks a protein supplement daily      Assessment:  Right heel stage III pressure injury in pt with PAD, adequate healing potential per arterial testing done, wound is now closed with thin hyperkeratotic tissue      Factors impacting wound healing:   Delayed healing as part of normal aging process  Underlying Disease: diabetes mellitis   Substance abuse: smokes pipe        Plan:  Daily aquaphor recommend and applied today following cleansing. Time spent discussing signs that would prompt a return to clinic, otherwise may follow-up PRN.      Recommend he schedule orthotic appointment ASAP for shoes and inserts.     The following discharge instructions were reviewed with and sent home with the patient:  See AVS    Follow up: PRN    The following supplies were sent home with the patient:  none      Verbal, written, & demonstrative education provided.  Face to face time: approximately 20 minutes  Procedure: none    Kaylynn Powell RN, CWOCN  [phone]

## 2023-12-04 PROBLEM — L08.9 DIABETIC FOOT INFECTION (H): Status: RESOLVED | Noted: 2023-08-15 | Resolved: 2023-12-04

## 2023-12-04 PROBLEM — E11.628 DIABETIC FOOT INFECTION (H): Status: RESOLVED | Noted: 2023-08-15 | Resolved: 2023-12-04

## 2023-12-05 ENCOUNTER — MEDICAL CORRESPONDENCE (OUTPATIENT)
Dept: HEALTH INFORMATION MANAGEMENT | Facility: CLINIC | Age: 78
End: 2023-12-05
Payer: COMMERCIAL

## 2023-12-14 ENCOUNTER — THERAPY VISIT (OUTPATIENT)
Dept: PHYSICAL THERAPY | Facility: CLINIC | Age: 78
End: 2023-12-14
Attending: NURSE PRACTITIONER
Payer: COMMERCIAL

## 2023-12-14 DIAGNOSIS — Z89.512 S/P BELOW KNEE AMPUTATION, LEFT (H): Primary | ICD-10-CM

## 2023-12-14 PROCEDURE — 97112 NEUROMUSCULAR REEDUCATION: CPT | Mod: GP | Performed by: PHYSICAL THERAPIST

## 2023-12-14 PROCEDURE — 97110 THERAPEUTIC EXERCISES: CPT | Mod: GP | Performed by: PHYSICAL THERAPIST

## 2023-12-14 PROCEDURE — 97116 GAIT TRAINING THERAPY: CPT | Mod: GP | Performed by: PHYSICAL THERAPIST

## 2023-12-19 ENCOUNTER — TELEPHONE (OUTPATIENT)
Dept: FAMILY MEDICINE | Facility: CLINIC | Age: 78
End: 2023-12-19
Payer: COMMERCIAL

## 2023-12-19 ENCOUNTER — MEDICAL CORRESPONDENCE (OUTPATIENT)
Dept: HEALTH INFORMATION MANAGEMENT | Facility: CLINIC | Age: 78
End: 2023-12-19
Payer: COMMERCIAL

## 2023-12-22 ENCOUNTER — THERAPY VISIT (OUTPATIENT)
Dept: PHYSICAL THERAPY | Facility: CLINIC | Age: 78
End: 2023-12-22
Attending: NURSE PRACTITIONER
Payer: COMMERCIAL

## 2023-12-22 DIAGNOSIS — Z89.512 S/P BELOW KNEE AMPUTATION, LEFT (H): Primary | ICD-10-CM

## 2023-12-22 PROCEDURE — 97116 GAIT TRAINING THERAPY: CPT | Mod: GP | Performed by: PHYSICAL THERAPIST

## 2023-12-22 PROCEDURE — 97110 THERAPEUTIC EXERCISES: CPT | Mod: GP | Performed by: PHYSICAL THERAPIST

## 2023-12-27 ENCOUNTER — THERAPY VISIT (OUTPATIENT)
Dept: PHYSICAL THERAPY | Facility: CLINIC | Age: 78
End: 2023-12-27
Attending: NURSE PRACTITIONER
Payer: COMMERCIAL

## 2023-12-27 DIAGNOSIS — Z89.512 S/P BELOW KNEE AMPUTATION, LEFT (H): Primary | ICD-10-CM

## 2023-12-27 PROCEDURE — 97530 THERAPEUTIC ACTIVITIES: CPT | Mod: GP | Performed by: PHYSICAL THERAPIST

## 2023-12-27 PROCEDURE — 97110 THERAPEUTIC EXERCISES: CPT | Mod: GP | Performed by: PHYSICAL THERAPIST

## 2023-12-29 ENCOUNTER — THERAPY VISIT (OUTPATIENT)
Dept: PHYSICAL THERAPY | Facility: CLINIC | Age: 78
End: 2023-12-29
Attending: NURSE PRACTITIONER
Payer: COMMERCIAL

## 2023-12-29 DIAGNOSIS — Z79.4 TYPE 2 DIABETES MELLITUS WITH DIABETIC POLYNEUROPATHY, WITH LONG-TERM CURRENT USE OF INSULIN (H): ICD-10-CM

## 2023-12-29 DIAGNOSIS — Z89.512 S/P BELOW KNEE AMPUTATION, LEFT (H): Primary | ICD-10-CM

## 2023-12-29 DIAGNOSIS — E11.42 TYPE 2 DIABETES MELLITUS WITH DIABETIC POLYNEUROPATHY, WITH LONG-TERM CURRENT USE OF INSULIN (H): ICD-10-CM

## 2023-12-29 PROCEDURE — 97140 MANUAL THERAPY 1/> REGIONS: CPT | Mod: GP | Performed by: PHYSICAL THERAPIST

## 2023-12-29 PROCEDURE — 97110 THERAPEUTIC EXERCISES: CPT | Mod: GP | Performed by: PHYSICAL THERAPIST

## 2023-12-29 RX ORDER — FLASH GLUCOSE SCANNING READER
EACH MISCELLANEOUS
Qty: 6 EACH | Refills: 4 | Status: SHIPPED | OUTPATIENT
Start: 2023-12-29

## 2024-01-04 ENCOUNTER — TRANSFERRED RECORDS (OUTPATIENT)
Dept: HEALTH INFORMATION MANAGEMENT | Facility: CLINIC | Age: 79
End: 2024-01-04
Payer: COMMERCIAL

## 2024-01-04 LAB — RETINOPATHY: NEGATIVE

## 2024-01-11 DIAGNOSIS — E11.42 TYPE 2 DIABETES MELLITUS WITH DIABETIC POLYNEUROPATHY, WITH LONG-TERM CURRENT USE OF INSULIN (H): ICD-10-CM

## 2024-01-11 DIAGNOSIS — Z79.4 TYPE 2 DIABETES MELLITUS WITH DIABETIC POLYNEUROPATHY, WITH LONG-TERM CURRENT USE OF INSULIN (H): ICD-10-CM

## 2024-01-12 ENCOUNTER — THERAPY VISIT (OUTPATIENT)
Dept: PHYSICAL THERAPY | Facility: CLINIC | Age: 79
End: 2024-01-12
Attending: NURSE PRACTITIONER
Payer: COMMERCIAL

## 2024-01-12 DIAGNOSIS — Z89.512 S/P BELOW KNEE AMPUTATION, LEFT (H): Primary | ICD-10-CM

## 2024-01-12 PROCEDURE — 97140 MANUAL THERAPY 1/> REGIONS: CPT | Mod: GP | Performed by: PHYSICAL THERAPIST

## 2024-01-12 PROCEDURE — 97110 THERAPEUTIC EXERCISES: CPT | Mod: GP | Performed by: PHYSICAL THERAPIST

## 2024-01-12 PROCEDURE — 97530 THERAPEUTIC ACTIVITIES: CPT | Mod: GP | Performed by: PHYSICAL THERAPIST

## 2024-01-19 ENCOUNTER — THERAPY VISIT (OUTPATIENT)
Dept: PHYSICAL THERAPY | Facility: CLINIC | Age: 79
End: 2024-01-19
Attending: NURSE PRACTITIONER
Payer: COMMERCIAL

## 2024-01-19 DIAGNOSIS — Z89.512 S/P BELOW KNEE AMPUTATION, LEFT (H): Primary | ICD-10-CM

## 2024-01-19 PROCEDURE — 97530 THERAPEUTIC ACTIVITIES: CPT | Mod: GP | Performed by: PHYSICAL THERAPIST

## 2024-01-19 PROCEDURE — 97110 THERAPEUTIC EXERCISES: CPT | Mod: GP | Performed by: PHYSICAL THERAPIST

## 2024-01-19 PROCEDURE — 97140 MANUAL THERAPY 1/> REGIONS: CPT | Mod: GP | Performed by: PHYSICAL THERAPIST

## 2024-01-26 ENCOUNTER — THERAPY VISIT (OUTPATIENT)
Dept: PHYSICAL THERAPY | Facility: CLINIC | Age: 79
End: 2024-01-26
Attending: NURSE PRACTITIONER
Payer: COMMERCIAL

## 2024-01-26 DIAGNOSIS — Z89.512 S/P BELOW KNEE AMPUTATION, LEFT (H): Primary | ICD-10-CM

## 2024-01-26 PROCEDURE — 97530 THERAPEUTIC ACTIVITIES: CPT | Mod: GP | Performed by: PHYSICAL THERAPIST

## 2024-01-26 PROCEDURE — 97110 THERAPEUTIC EXERCISES: CPT | Mod: GP | Performed by: PHYSICAL THERAPIST

## 2024-01-26 PROCEDURE — 97140 MANUAL THERAPY 1/> REGIONS: CPT | Mod: GP | Performed by: PHYSICAL THERAPIST

## 2024-01-30 ENCOUNTER — TELEPHONE (OUTPATIENT)
Dept: FAMILY MEDICINE | Facility: CLINIC | Age: 79
End: 2024-01-30

## 2024-01-30 ENCOUNTER — THERAPY VISIT (OUTPATIENT)
Dept: PHYSICAL THERAPY | Facility: CLINIC | Age: 79
End: 2024-01-30
Attending: NURSE PRACTITIONER
Payer: COMMERCIAL

## 2024-01-30 DIAGNOSIS — Z89.512 S/P BELOW KNEE AMPUTATION, LEFT (H): ICD-10-CM

## 2024-01-30 DIAGNOSIS — Z89.512 S/P BELOW KNEE AMPUTATION, LEFT (H): Primary | ICD-10-CM

## 2024-01-30 DIAGNOSIS — Z79.4 TYPE 2 DIABETES MELLITUS WITH DIABETIC POLYNEUROPATHY, WITH LONG-TERM CURRENT USE OF INSULIN (H): Primary | ICD-10-CM

## 2024-01-30 DIAGNOSIS — E78.5 HYPERLIPIDEMIA LDL GOAL <100: ICD-10-CM

## 2024-01-30 DIAGNOSIS — I73.9 PERIPHERAL ARTERY DISEASE (H): ICD-10-CM

## 2024-01-30 DIAGNOSIS — I10 ESSENTIAL HYPERTENSION: ICD-10-CM

## 2024-01-30 DIAGNOSIS — E11.42 TYPE 2 DIABETES MELLITUS WITH DIABETIC POLYNEUROPATHY, WITH LONG-TERM CURRENT USE OF INSULIN (H): Primary | ICD-10-CM

## 2024-01-30 PROCEDURE — 97110 THERAPEUTIC EXERCISES: CPT | Mod: GP | Performed by: PHYSICAL THERAPIST

## 2024-01-30 PROCEDURE — 97140 MANUAL THERAPY 1/> REGIONS: CPT | Mod: GP | Performed by: PHYSICAL THERAPIST

## 2024-01-30 PROCEDURE — 97530 THERAPEUTIC ACTIVITIES: CPT | Mod: GP | Performed by: PHYSICAL THERAPIST

## 2024-01-30 NOTE — TELEPHONE ENCOUNTER
[facility] calling. [phone] option 2     Pt needs start of care orders , referred by [facility]     Ambulatory consult for [facility]  Needs skilled nursing and home health aide  Medication list   Diagnosis  Last visit notes    Pt will need a more recent   Face to face visit - please call patient to help schedule.    Fax [phone]

## 2024-01-30 NOTE — TELEPHONE ENCOUNTER
Lucero calling back. Please fax progress notes from 11/21/23 visit. Patient will not need a face to face visit.

## 2024-02-06 ENCOUNTER — THERAPY VISIT (OUTPATIENT)
Dept: PHYSICAL THERAPY | Facility: CLINIC | Age: 79
End: 2024-02-06
Attending: NURSE PRACTITIONER
Payer: COMMERCIAL

## 2024-02-06 DIAGNOSIS — Z89.512 S/P BELOW KNEE AMPUTATION, LEFT (H): Primary | ICD-10-CM

## 2024-02-06 PROCEDURE — 97140 MANUAL THERAPY 1/> REGIONS: CPT | Mod: GP | Performed by: PHYSICAL THERAPIST

## 2024-02-06 PROCEDURE — 97110 THERAPEUTIC EXERCISES: CPT | Mod: GP | Performed by: PHYSICAL THERAPIST

## 2024-02-06 PROCEDURE — 97530 THERAPEUTIC ACTIVITIES: CPT | Mod: GP | Performed by: PHYSICAL THERAPIST

## 2024-02-20 ENCOUNTER — THERAPY VISIT (OUTPATIENT)
Dept: PHYSICAL THERAPY | Facility: CLINIC | Age: 79
End: 2024-02-20
Attending: NURSE PRACTITIONER
Payer: COMMERCIAL

## 2024-02-20 DIAGNOSIS — Z89.512 S/P BELOW KNEE AMPUTATION, LEFT (H): Primary | ICD-10-CM

## 2024-02-20 PROCEDURE — 97110 THERAPEUTIC EXERCISES: CPT | Mod: GP | Performed by: PHYSICAL THERAPIST

## 2024-02-20 PROCEDURE — 97530 THERAPEUTIC ACTIVITIES: CPT | Mod: GP | Performed by: PHYSICAL THERAPIST

## 2024-02-20 PROCEDURE — 97140 MANUAL THERAPY 1/> REGIONS: CPT | Mod: GP | Performed by: PHYSICAL THERAPIST

## 2024-02-20 NOTE — PROGRESS NOTES
02/20/24 0500   Appointment Info   Signing clinician's name / credentials Mora Tang PT DPT   Total/Authorized Visits 4/365  [facility] [employer]   Visits Used 8  soc 11/24/23   Medical Diagnosis L BKA   PT Tx Diagnosis L BKA   Quick Adds Certification   Progress Note/Certification   Start of Care Date 11/24/23   Onset of illness/injury or Date of Surgery 05/25/23  (BKA)   Therapy Frequency 1x/wk   Predicted Duration 90 days   Certification date from 11/24/23   Certification date to 02/21/24   Progress Note Due Date 02/21/24   Progress Note Completed Date 02/20/24   GOALS   PT Goals 2;3   PT Goal 1   Goal Description Independent in HEP to continue strengthening on own   Rationale to maximize safety and independence with performance of ADLs and functional tasks   Goal Progress is satisfied with current HEP   Target Date 02/21/24   Date Met 02/20/24   PT Goal 2   Goal Description independent ambulation community distances with cane   Rationale to maximize safety and independence with performance of ADLs and functional tasks   Goal Progress felt confident and did OK with cane at [facility] over  2/5/24   Target Date 02/21/24   PT Goal 3   Goal Description up down 8 stairs reciprocal pattern with rail and cane   Rationale to maximize safety and independence with performance of ADLs and functional tasks   Target Date 02/21/24   Subjective Report   Subjective Report overall is better, feels safer walking with cane; better ROM with R able to eat with R, manage switchs and open doors; dress self with  more ease   Objective Measure 1   Objective Measure AROM R shoulder sitting   Details 55* fkxn, abd much substitution; previously 35* active flxn   Objective Measure 2   Objective Measure PROM R shoulder supine   Details 130 flxn; 125 abd; 50* ER or IR at 90* abd after MT   Therapeutic Procedure/Exercise   Therapeutic Procedures: strength, endurance, ROM, flexibillity minutes (24907) 30   Ther Proc 1 - Details  "NuStep x 12' at wkld 5, 2 more min wkld at 3 for cool down; supine shoulder strength \"fist punch to ceiling\" min assist to complete range: triceps: elbow flxn/extn in supine shoulder flxd to 90*   Therapeutic Activity   Therapeutic Activities: dynamic activities to improve functional performance minutes (60146) 10   Ther Act 1 - Details walk for endurance 750' x 1; fatigued and catching R and L foot at end of walk   Skilled Intervention functional Strength, balance, wt shift activities   Manual Therapy   Manual Therapy: Mobilization, MFR, MLD, friction massage minutes (58904) 15   Manual Therapy 1 - Details grade 3 GH mobs AP; MWM   Skilled Intervention MT to improve functional mobilty, decrease pain and tension   Patient Response/Progress tolerates w/o, c/o pain   Education   Learner/Method Patient;Listening;Reading;Demonstration;Pictures/Video   Education Comments PTRx   Plan   Home program focus on sit to/from stand   Updates to plan of care 1x/wk   Plan for next session Nu Step; supine shoulder flxn if able; standing march; stand balance;  sit to/from stand         [facility]                                                                                   OUTPATIENT PHYSICAL THERAPY    PLAN OF TREATMENT FOR OUTPATIENT REHABILITATION   Patient's Last Name, First Name, Roddy Jones YOB: 1945   Provider's Name   [facility]   Medical Record No.  [id number]     Onset Date: 05/25/23 (BKA)  Start of Care Date: 11/24/23     Medical Diagnosis:  L BKA      PT Treatment Diagnosis:  L BKA Plan of Treatment  Frequency/Duration: 1x/wk/ 90 days    Certification date from 2/20/24 to 3/21/24         See note for plan of treatment details and functional goals     Mora Wilson, PT DPT                         I CERTIFY THE NEED FOR THESE SERVICES FURNISHED UNDER        THIS PLAN OF TREATMENT AND WHILE UNDER MY CARE     (Physician " attestation of this document indicates review and certification of the therapy plan).              Referring Provider:  Sandra Low    Initial Assessment  See Epic Evaluation- Start of Care Date: 11/24/23

## 2024-02-22 DIAGNOSIS — E11.42 TYPE 2 DIABETES MELLITUS WITH DIABETIC POLYNEUROPATHY, WITH LONG-TERM CURRENT USE OF INSULIN (H): ICD-10-CM

## 2024-02-22 DIAGNOSIS — Z79.4 TYPE 2 DIABETES MELLITUS WITH DIABETIC POLYNEUROPATHY, WITH LONG-TERM CURRENT USE OF INSULIN (H): ICD-10-CM

## 2024-02-27 ENCOUNTER — THERAPY VISIT (OUTPATIENT)
Dept: PHYSICAL THERAPY | Facility: CLINIC | Age: 79
End: 2024-02-27
Attending: NURSE PRACTITIONER
Payer: COMMERCIAL

## 2024-02-27 DIAGNOSIS — Z89.512 S/P BELOW KNEE AMPUTATION, LEFT (H): Primary | ICD-10-CM

## 2024-02-27 DIAGNOSIS — E11.42 TYPE 2 DIABETES MELLITUS WITH DIABETIC POLYNEUROPATHY, WITH LONG-TERM CURRENT USE OF INSULIN (H): ICD-10-CM

## 2024-02-27 DIAGNOSIS — Z79.4 TYPE 2 DIABETES MELLITUS WITH DIABETIC POLYNEUROPATHY, WITH LONG-TERM CURRENT USE OF INSULIN (H): ICD-10-CM

## 2024-02-27 PROCEDURE — 97530 THERAPEUTIC ACTIVITIES: CPT | Mod: GP | Performed by: PHYSICAL THERAPIST

## 2024-02-27 PROCEDURE — 97140 MANUAL THERAPY 1/> REGIONS: CPT | Mod: GP | Performed by: PHYSICAL THERAPIST

## 2024-02-27 PROCEDURE — 97110 THERAPEUTIC EXERCISES: CPT | Mod: GP | Performed by: PHYSICAL THERAPIST

## 2024-02-27 RX ORDER — FLASH GLUCOSE SENSOR
KIT MISCELLANEOUS
Qty: 2 EACH | Refills: 5 | Status: SHIPPED | OUTPATIENT
Start: 2024-02-27 | End: 2024-08-12

## 2024-03-05 ENCOUNTER — THERAPY VISIT (OUTPATIENT)
Dept: PHYSICAL THERAPY | Facility: CLINIC | Age: 79
End: 2024-03-05
Attending: NURSE PRACTITIONER
Payer: COMMERCIAL

## 2024-03-05 DIAGNOSIS — Z89.512 S/P BELOW KNEE AMPUTATION, LEFT (H): Primary | ICD-10-CM

## 2024-03-05 PROCEDURE — 97110 THERAPEUTIC EXERCISES: CPT | Mod: GP | Performed by: PHYSICAL THERAPIST

## 2024-03-05 PROCEDURE — 97530 THERAPEUTIC ACTIVITIES: CPT | Mod: GP | Performed by: PHYSICAL THERAPIST

## 2024-03-05 PROCEDURE — 97140 MANUAL THERAPY 1/> REGIONS: CPT | Mod: GP | Performed by: PHYSICAL THERAPIST

## 2024-03-05 NOTE — PROGRESS NOTES
03/05/24 0500   Appointment Info   Signing clinician's name / credentials Mora Tang PT DPT   Total/Authorized Visits 6/365  [facility] [employer]   Visits Used 9  soc 11/24/23   Medical Diagnosis L BKA   PT Tx Diagnosis L BKA   Quick Adds Certification   Progress Note/Certification   Start of Care Date 11/24/23   Onset of illness/injury or Date of Surgery 05/25/23  (BKA)   Therapy Frequency 1x/wk   Predicted Duration 90 days   Certification date from 11/24/23   Certification date to 02/21/24   Progress Note Due Date 02/21/24   Progress Note Completed Date 02/20/24   GOALS   PT Goals 2;3   PT Goal 1   Goal Description Independent in HEP to continue strengthening on own   Rationale to maximize safety and independence with performance of ADLs and functional tasks   Goal Progress is satisfied with current HEP   Target Date 02/21/24   Date Met 02/20/24   PT Goal 2   Goal Description independent ambulation community distances with cane   Rationale to maximize safety and independence with performance of ADLs and functional tasks   Goal Progress uses walker on uneven terrain only   Target Date 02/21/24   Date Met 02/27/24   PT Goal 3   Goal Description up down 8 stairs reciprocal pattern with rail and cane   Rationale to maximize safety and independence with performance of ADLs and functional tasks   Goal Progress heavy reliance on rail   Target Date 02/21/24   Date Met 03/05/24   Subjective Report   Subjective Report ready to be finished with PT as will be busy gardening and growing seedlings; plans to garden using light wt plastic chair as he has done for years prior to BKA   Objective Measure 1   Objective Measure AROM R shoulder sitting   Details 55* fkxn, abd much substitution; previously 35* active flxn   Objective Measure 2   Objective Measure PROM R shoulder supine   Details 140 flxn; 143 abd scap plane; 60* ER or IR at 90* abd after MT   Therapeutic Procedure/Exercise   Therapeutic Procedures: strength,  "endurance, ROM, flexibillity minutes (45155) 15   Ther Proc 1 - Details NuStep x 13' at wkld 5, +2 more min wkld at 3 for cool down; supine shoulder strength \"fist punch to ceiling\" min assist to complete range; manual resistance shoulder flxn/ext x 6 reps;   Skilled Intervention exercise instruction tempo,dosage, with manual, verbal, tactile cues for proper technique to decrease pain and improve functional mobility   Patient Response/Progress 6-7 OMNI   Therapeutic Activity   Therapeutic Activities: dynamic activities to improve functional performance minutes (88516) 25   Ther Act 1 - Details walk for endurance 750'  with cane; up/down 4 stairs 6 times one rail and cane reciprocal pattern; heavy reliance on rail; 6\" step up with one leg x 9 on R; x4 on L struggles with lateral border of prosthesis too high   Skilled Intervention functional Strength, balance, wt shift activities   Patient Response/Progress johnson walking increased; one episode of \"drifting\" able to recover balance without stopping or reaching with UE   Manual Therapy   Manual Therapy: Mobilization, MFR, MLD, friction massage minutes (21465) 15   Manual Therapy 1 - Details grade 3 GH mobs AP; MWM   Skilled Intervention MT to improve functional mobilty, decrease pain and tension   Patient Response/Progress no clunking with ER or IR mobs   Plan   Home program continue HEP   Updates to plan of care DC PT per pt request; goals met   Comments   Comments less pian in R hip improved stair climbing and descending   Total Session Time   Timed Code Treatment Minutes 55   Total Treatment Time (sum of timed and untimed services) 55         DISCHARGE  Reason for Discharge: Patient has met all goals.  Patient chooses to discontinue therapy.    Equipment Issued: none    Discharge Plan: Patient to continue home program.    Referring Provider:  Sandra MORENO    "

## 2024-03-13 DIAGNOSIS — Z79.4 TYPE 2 DIABETES MELLITUS WITH DIABETIC POLYNEUROPATHY, WITH LONG-TERM CURRENT USE OF INSULIN (H): ICD-10-CM

## 2024-03-13 DIAGNOSIS — E11.42 TYPE 2 DIABETES MELLITUS WITH DIABETIC POLYNEUROPATHY, WITH LONG-TERM CURRENT USE OF INSULIN (H): ICD-10-CM

## 2024-03-14 RX ORDER — INSULIN GLARGINE 100 [IU]/ML
INJECTION, SOLUTION SUBCUTANEOUS
Qty: 15 ML | Refills: 1 | Status: SHIPPED | OUTPATIENT
Start: 2024-03-14 | End: 2024-08-09

## 2024-03-21 DIAGNOSIS — I10 ESSENTIAL HYPERTENSION: ICD-10-CM

## 2024-03-21 RX ORDER — LISINOPRIL 10 MG/1
10 TABLET ORAL 2 TIMES DAILY
Qty: 180 TABLET | Refills: 3 | Status: SHIPPED | OUTPATIENT
Start: 2024-03-21 | End: 2024-08-09

## 2024-03-21 NOTE — TELEPHONE ENCOUNTER
Routing refill request to provider for review/approval because:  Drug interaction warning    Last Written Prescription Date:  10/9/23  Last Fill Quantity: 180,  # refills: 1   Last office visit: 11/21/2023 ; last virtual visit: Visit date not found with prescribing provider:     Future Office Visit:  none    Julie Behrendt RN

## 2024-04-02 ENCOUNTER — PATIENT OUTREACH (OUTPATIENT)
Dept: CARE COORDINATION | Facility: CLINIC | Age: 79
End: 2024-04-02
Payer: COMMERCIAL

## 2024-04-09 ENCOUNTER — PATIENT OUTREACH (OUTPATIENT)
Dept: GERIATRIC MEDICINE | Facility: CLINIC | Age: 79
End: 2024-04-09
Payer: COMMERCIAL

## 2024-04-09 NOTE — LETTER
April 9, 2024    ERIC TESFAYE  [address] [facility] [address]    Dear  Eric,    Welcome to [facility] s MSC+ health program. My name is Esther Montaño MA, GORDON. I am your MSC+ care coordinator. You are eligible for Care Coordination through [facility] MSC+ plan.    As your care coordinator, we ll:  Meet to go over your care coordination benefits  Talk about your physical and mental health care needs   Review your preventative care needs  Create a plan that meets your needs with the services you choose    What happens next?  I ll call you soon to introduce myself and tell you more about my role. We ll then plan time to go over your health and safety needs. Our goal is to keep you as healthy and independent as possible.    Soon, you will receive a new MSC+ member identification (ID) card from [facility]. When you receive it, please use this card along with your [place] [facility] card and Prescription Drug Coverage Program card. When you receive, it please use this card where you get your health services. If you have Medicare, you will need to show your Medicare card when you get health services.    The MSC+ care coordination program is voluntary and offered to you at no cost. If you wish to stop being in the care coordination program or have questions, call me at [phone]. If you reach my voicemail, leave a message and your phone number. TTY users, call the [place] Relay at 976 or [phone] (tjvohy-zj-amggsa relay service).    Sincerely,      Esther Montaño MA, GORDON    E-mail: [email or web address]  Phone: [phone]      [facility]    [id number] accepted   [id number]       [id number] (07/2022)

## 2024-04-09 NOTE — PROGRESS NOTES
[facility] Care Coordination Contact    Member became effective with  [facility] on 4/1/2024 with Ceci MSC+.  Previous Health Plan: MA/Fee For Service  Previous Care System: [place] [facility]  Previous care coordinators name and number: [email or web address]  Waiver Type: EW  Last MMIS Entry: Date 10/15/23 and Type 01 COMM w/ SVC  MMIS visit date (and type) if different from above: 8/29/23 02 PERS ASSMT  Services Listed in MMIS: A3 F [facility]-CSP  UTF received: No: Requested on 4/9 emailed prev CC  Mailed welcome letter and [facility] When to Contact Your Care Coordinator  Address/Phone discrepancy: NA  [place]Choices: [place]  Loaded into [place].       Vidya Guillen  Care Management Specialist   [facility]   [phone]

## 2024-04-16 ENCOUNTER — PATIENT OUTREACH (OUTPATIENT)
Dept: CARE COORDINATION | Facility: CLINIC | Age: 79
End: 2024-04-16
Payer: COMMERCIAL

## 2024-04-17 ENCOUNTER — PATIENT OUTREACH (OUTPATIENT)
Dept: GERIATRIC MEDICINE | Facility: CLINIC | Age: 79
End: 2024-04-17
Payer: COMMERCIAL

## 2024-04-17 NOTE — PROGRESS NOTES
[facility] Care Coordination Contact    [facility] System Change (Transfer)    Member is new enrollee to [facility] effective 4-1-24 with [facility] health plan. Member transferred from [facility].    No home visit required because this care coordinator (CC) has received all required documentation from the previous CC.    Writer t/c to member, introduced self as member's new CC. Confirmed with member that the welcome letter with writer's name and contact information has been received.  Reviewed LTCC/Health Risk Assessment (HRA) and POC with member. No changes noted.  Transitional HRA completed. Care Plan Summary updated and reflects current services.  Required referral authorization information communicated to CMS: Yes    Writer reviewed the following with member:    ER visits: No  Hospitalizations: No  TCU stays: No  Significant health status changes: none member spouse stated that he is doing well with no concerns at this time    Falls/Injuries: No  ADL/IADL changes: No  Changes in services: No    Follow-Up Plan: Member informed of future contact, plan to f/u with member with at next regularly scheduled contact.  Contact information shared with member and family, encouraged member to call with any questions or concerns.    Esther Montaño MA [facility] Care Coordinator   [phone] - dhrg cell phone   [phone] - fcjr fax

## 2024-04-30 DIAGNOSIS — E11.42 TYPE 2 DIABETES MELLITUS WITH DIABETIC POLYNEUROPATHY, WITH LONG-TERM CURRENT USE OF INSULIN (H): ICD-10-CM

## 2024-04-30 DIAGNOSIS — Z79.4 TYPE 2 DIABETES MELLITUS WITH DIABETIC POLYNEUROPATHY, WITH LONG-TERM CURRENT USE OF INSULIN (H): ICD-10-CM

## 2024-04-30 RX ORDER — DULAGLUTIDE 0.75 MG/.5ML
INJECTION, SOLUTION SUBCUTANEOUS
Qty: 6 ML | Refills: 0 | Status: SHIPPED | OUTPATIENT
Start: 2024-04-30 | End: 2024-08-09

## 2024-05-08 DIAGNOSIS — E78.5 HYPERLIPIDEMIA LDL GOAL <100: ICD-10-CM

## 2024-05-08 RX ORDER — SIMVASTATIN 40 MG
TABLET ORAL
Qty: 90 TABLET | Refills: 3 | Status: SHIPPED | OUTPATIENT
Start: 2024-05-08

## 2024-05-08 NOTE — TELEPHONE ENCOUNTER
Routing refill request to provider for review/approval because:  Labs not current:    Recent Labs   Lab Test 05/02/23  1141 05/17/22  0752   CHOL 126 128   HDL 36* 53   LDL 49 40   TRIG 207* 177*    Last Written Prescription Date:  5/2/23  Last Fill Quantity: 90,  # refills: 3   Last office visit: 11/21/2023 ; last virtual visit: Visit date not found with prescribing provider:     Future Office Visit:  none    Julie Behrendt RN                25-Mar-2019 00:00 20-Mar-2019 00:00

## 2024-05-14 ENCOUNTER — TELEPHONE (OUTPATIENT)
Dept: FAMILY MEDICINE | Facility: CLINIC | Age: 79
End: 2024-05-14
Payer: COMMERCIAL

## 2024-05-14 DIAGNOSIS — Z79.4 TYPE 2 DIABETES MELLITUS WITH DIABETIC POLYNEUROPATHY, WITH LONG-TERM CURRENT USE OF INSULIN (H): ICD-10-CM

## 2024-05-14 DIAGNOSIS — Z79.4 TYPE 2 DIABETES MELLITUS WITH DIABETIC POLYNEUROPATHY, WITH LONG-TERM CURRENT USE OF INSULIN (H): Primary | ICD-10-CM

## 2024-05-14 DIAGNOSIS — E11.42 TYPE 2 DIABETES MELLITUS WITH DIABETIC POLYNEUROPATHY, WITH LONG-TERM CURRENT USE OF INSULIN (H): Primary | ICD-10-CM

## 2024-05-14 DIAGNOSIS — E11.42 TYPE 2 DIABETES MELLITUS WITH DIABETIC POLYNEUROPATHY, WITH LONG-TERM CURRENT USE OF INSULIN (H): ICD-10-CM

## 2024-05-14 RX ORDER — FLURBIPROFEN SODIUM 0.3 MG/ML
SOLUTION/ DROPS OPHTHALMIC
Refills: 3 | OUTPATIENT
Start: 2024-05-14

## 2024-05-14 NOTE — TELEPHONE ENCOUNTER
Hi Sandra,     Looks like the last time the pen needles were last filled last may in 2023, and is in need for a new prescription.     Thank you,    Danyell Wilcox  Certified Pharmacy Technician II, [facility]  Ph: [phone]  Fx: [phone]

## 2024-05-15 DIAGNOSIS — Z79.4 TYPE 2 DIABETES MELLITUS WITH DIABETIC POLYNEUROPATHY, WITH LONG-TERM CURRENT USE OF INSULIN (H): ICD-10-CM

## 2024-05-15 DIAGNOSIS — E11.42 TYPE 2 DIABETES MELLITUS WITH DIABETIC POLYNEUROPATHY, WITH LONG-TERM CURRENT USE OF INSULIN (H): ICD-10-CM

## 2024-05-15 RX ORDER — FLURBIPROFEN SODIUM 0.3 MG/ML
SOLUTION/ DROPS OPHTHALMIC
Qty: 400 EACH | Refills: 0 | Status: SHIPPED | OUTPATIENT
Start: 2024-05-15

## 2024-05-16 DIAGNOSIS — Z79.4 TYPE 2 DIABETES MELLITUS WITH DIABETIC POLYNEUROPATHY, WITH LONG-TERM CURRENT USE OF INSULIN (H): ICD-10-CM

## 2024-05-16 DIAGNOSIS — E11.42 TYPE 2 DIABETES MELLITUS WITH DIABETIC POLYNEUROPATHY, WITH LONG-TERM CURRENT USE OF INSULIN (H): ICD-10-CM

## 2024-05-16 RX ORDER — FLURBIPROFEN SODIUM 0.3 MG/ML
SOLUTION/ DROPS OPHTHALMIC
Refills: 3 | OUTPATIENT
Start: 2024-05-16

## 2024-05-17 DIAGNOSIS — E11.42 TYPE 2 DIABETES MELLITUS WITH DIABETIC POLYNEUROPATHY, WITH LONG-TERM CURRENT USE OF INSULIN (H): ICD-10-CM

## 2024-05-17 DIAGNOSIS — Z79.4 TYPE 2 DIABETES MELLITUS WITH DIABETIC POLYNEUROPATHY, WITH LONG-TERM CURRENT USE OF INSULIN (H): ICD-10-CM

## 2024-05-17 RX ORDER — FLURBIPROFEN SODIUM 0.3 MG/ML
SOLUTION/ DROPS OPHTHALMIC
Refills: 3 | OUTPATIENT
Start: 2024-05-17

## 2024-05-17 NOTE — TELEPHONE ENCOUNTER
Spoke with Danyell at pharmacy they have what they need, disregard automated request.     Allyssa Kohler MSN, RN

## 2024-05-18 DIAGNOSIS — E11.42 TYPE 2 DIABETES MELLITUS WITH DIABETIC POLYNEUROPATHY, WITH LONG-TERM CURRENT USE OF INSULIN (H): ICD-10-CM

## 2024-05-18 DIAGNOSIS — Z79.4 TYPE 2 DIABETES MELLITUS WITH DIABETIC POLYNEUROPATHY, WITH LONG-TERM CURRENT USE OF INSULIN (H): ICD-10-CM

## 2024-05-20 RX ORDER — FLURBIPROFEN SODIUM 0.3 MG/ML
SOLUTION/ DROPS OPHTHALMIC
Refills: 3 | OUTPATIENT
Start: 2024-05-20

## 2024-06-04 DIAGNOSIS — E11.42 TYPE 2 DIABETES MELLITUS WITH DIABETIC POLYNEUROPATHY, WITH LONG-TERM CURRENT USE OF INSULIN (H): ICD-10-CM

## 2024-06-04 DIAGNOSIS — Z79.4 TYPE 2 DIABETES MELLITUS WITH DIABETIC POLYNEUROPATHY, WITH LONG-TERM CURRENT USE OF INSULIN (H): ICD-10-CM

## 2024-06-04 RX ORDER — GABAPENTIN 300 MG/1
300 CAPSULE ORAL 3 TIMES DAILY
Qty: 270 CAPSULE | Refills: 3 | Status: SHIPPED | OUTPATIENT
Start: 2024-06-04

## 2024-06-04 RX ORDER — GABAPENTIN 300 MG/1
300 CAPSULE ORAL 3 TIMES DAILY
Qty: 270 CAPSULE | Refills: 3 | OUTPATIENT
Start: 2024-06-04

## 2024-06-29 DIAGNOSIS — Z79.4 TYPE 2 DIABETES MELLITUS WITH DIABETIC POLYNEUROPATHY, WITH LONG-TERM CURRENT USE OF INSULIN (H): ICD-10-CM

## 2024-06-29 DIAGNOSIS — E11.42 TYPE 2 DIABETES MELLITUS WITH DIABETIC POLYNEUROPATHY, WITH LONG-TERM CURRENT USE OF INSULIN (H): ICD-10-CM

## 2024-07-01 NOTE — TELEPHONE ENCOUNTER
Routing refill request to provider for review/approval because:  Patient needs to be seen because:  diabetic follow up needed    Pending Prescriptions:                       Disp   Refills    metFORMIN (GLUCOPHAGE) 500 MG tablet [Pha*360 ta*0            Sig: TAKE TWO TABLETS BY MOUTH TWICE DAILY WITH MEALS      Requested Prescriptions   Pending Prescriptions Disp Refills    metFORMIN (GLUCOPHAGE) 500 MG tablet [Pharmacy Med Name: METFORMIN HCL 500MG TABS] 360 tablet 0     Sig: TAKE TWO TABLETS BY MOUTH TWICE DAILY WITH MEALS       Biguanide Agents Failed - 6/29/2024  5:04 AM        Failed - Patient has documented A1c within the specified period of time.     If HgbA1C is 8 or greater, it needs to be on file within the past 3 months.  If less than 8, must be on file within the past 6 months.     Recent Labs   Lab Test 10/24/23  1603   A1C 6.5*             Failed - Recent (6 mo) or future (90 days) visit within the authorizing provider's specialty     The patient must have completed an in-person or virtual visit within the past 6 months or has a future visit scheduled within the next 90 days with the authorizing provider s specialty.  Urgent care and e-visits do not quality as an office visit for this protocol.          Passed - Patient is age 10 or older        Passed - Patient does NOT have a diagnosis of CHF.        Passed - Medication is active on med list        Passed - Medication indicated for associated diagnosis     Medication is associated with one or more of the following diagnoses:     Gestational diabetes mellitus     Hyperinsulinar obesity     Hypersecretion of ovarian androgens    Non-alcoholic fatty liver    Polycystic ovarian syndrome               Pre-diabetes (DM 2 prevention)    Type 2 diabetes mellitus     Weight gain, antipsychotic therapy-induced    Impaired fasting glucose          Passed - Has GFR on file in past 12 months and most recent value is normal           Kaylie Powell RN on  7/1/2024 at 12:07 PM     Yes

## 2024-07-19 ENCOUNTER — PATIENT OUTREACH (OUTPATIENT)
Dept: GERIATRIC MEDICINE | Facility: CLINIC | Age: 79
End: 2024-07-19
Payer: COMMERCIAL

## 2024-07-23 DIAGNOSIS — E11.42 TYPE 2 DIABETES MELLITUS WITH DIABETIC POLYNEUROPATHY, WITH LONG-TERM CURRENT USE OF INSULIN (H): ICD-10-CM

## 2024-07-23 DIAGNOSIS — Z79.4 TYPE 2 DIABETES MELLITUS WITH DIABETIC POLYNEUROPATHY, WITH LONG-TERM CURRENT USE OF INSULIN (H): ICD-10-CM

## 2024-08-06 ENCOUNTER — PATIENT OUTREACH (OUTPATIENT)
Dept: CARE COORDINATION | Facility: CLINIC | Age: 79
End: 2024-08-06
Payer: COMMERCIAL

## 2024-08-09 ENCOUNTER — OFFICE VISIT (OUTPATIENT)
Dept: FAMILY MEDICINE | Facility: CLINIC | Age: 79
End: 2024-08-09
Payer: COMMERCIAL

## 2024-08-09 VITALS
DIASTOLIC BLOOD PRESSURE: 86 MMHG | OXYGEN SATURATION: 99 % | HEART RATE: 87 BPM | HEIGHT: 70 IN | TEMPERATURE: 97 F | RESPIRATION RATE: 16 BRPM | WEIGHT: 133 LBS | SYSTOLIC BLOOD PRESSURE: 112 MMHG | BODY MASS INDEX: 19.04 KG/M2

## 2024-08-09 DIAGNOSIS — Z79.4 TYPE 2 DIABETES MELLITUS WITH DIABETIC POLYNEUROPATHY, WITH LONG-TERM CURRENT USE OF INSULIN (H): ICD-10-CM

## 2024-08-09 DIAGNOSIS — E11.42 TYPE 2 DIABETES MELLITUS WITH DIABETIC POLYNEUROPATHY, WITH LONG-TERM CURRENT USE OF INSULIN (H): ICD-10-CM

## 2024-08-09 DIAGNOSIS — Z89.512 S/P BELOW KNEE AMPUTATION, LEFT (H): ICD-10-CM

## 2024-08-09 DIAGNOSIS — E11.29 MICROALBUMINURIA DUE TO TYPE 2 DIABETES MELLITUS (H): ICD-10-CM

## 2024-08-09 DIAGNOSIS — I10 ESSENTIAL HYPERTENSION: ICD-10-CM

## 2024-08-09 DIAGNOSIS — H61.23 BILATERAL IMPACTED CERUMEN: ICD-10-CM

## 2024-08-09 DIAGNOSIS — Z00.00 MEDICARE ANNUAL WELLNESS VISIT, SUBSEQUENT: Primary | ICD-10-CM

## 2024-08-09 DIAGNOSIS — I73.9 PERIPHERAL ARTERY DISEASE (H): ICD-10-CM

## 2024-08-09 DIAGNOSIS — R80.9 MICROALBUMINURIA DUE TO TYPE 2 DIABETES MELLITUS (H): ICD-10-CM

## 2024-08-09 LAB
CHOLEST SERPL-MCNC: 149 MG/DL
FASTING STATUS PATIENT QL REPORTED: NO
HBA1C MFR BLD: 6.2 % (ref 0–5.6)
HDLC SERPL-MCNC: 33 MG/DL
LDLC SERPL CALC-MCNC: 75 MG/DL
NONHDLC SERPL-MCNC: 116 MG/DL
TRIGL SERPL-MCNC: 205 MG/DL

## 2024-08-09 PROCEDURE — 69209 REMOVE IMPACTED EAR WAX UNI: CPT | Mod: 50 | Performed by: NURSE PRACTITIONER

## 2024-08-09 PROCEDURE — 36415 COLL VENOUS BLD VENIPUNCTURE: CPT | Performed by: NURSE PRACTITIONER

## 2024-08-09 PROCEDURE — G0439 PPPS, SUBSEQ VISIT: HCPCS | Performed by: NURSE PRACTITIONER

## 2024-08-09 PROCEDURE — 80061 LIPID PANEL: CPT | Performed by: NURSE PRACTITIONER

## 2024-08-09 PROCEDURE — 83036 HEMOGLOBIN GLYCOSYLATED A1C: CPT | Performed by: NURSE PRACTITIONER

## 2024-08-09 PROCEDURE — 99214 OFFICE O/P EST MOD 30 MIN: CPT | Mod: 25 | Performed by: NURSE PRACTITIONER

## 2024-08-09 RX ORDER — DULAGLUTIDE 0.75 MG/.5ML
0.75 INJECTION, SOLUTION SUBCUTANEOUS
Qty: 6 ML | Refills: 3 | Status: SHIPPED | OUTPATIENT
Start: 2024-08-09

## 2024-08-09 RX ORDER — INSULIN GLARGINE 100 [IU]/ML
10 INJECTION, SOLUTION SUBCUTANEOUS AT BEDTIME
Qty: 15 ML | Refills: 1 | Status: SHIPPED | OUTPATIENT
Start: 2024-08-09

## 2024-08-09 RX ORDER — LISINOPRIL 10 MG/1
10 TABLET ORAL 2 TIMES DAILY
Qty: 180 TABLET | Refills: 3 | Status: SHIPPED | OUTPATIENT
Start: 2024-08-09

## 2024-08-09 SDOH — HEALTH STABILITY: PHYSICAL HEALTH: ON AVERAGE, HOW MANY MINUTES DO YOU ENGAGE IN EXERCISE AT THIS LEVEL?: 120 MIN

## 2024-08-09 SDOH — HEALTH STABILITY: PHYSICAL HEALTH: ON AVERAGE, HOW MANY DAYS PER WEEK DO YOU ENGAGE IN MODERATE TO STRENUOUS EXERCISE (LIKE A BRISK WALK)?: 7 DAYS

## 2024-08-09 ASSESSMENT — SOCIAL DETERMINANTS OF HEALTH (SDOH): HOW OFTEN DO YOU GET TOGETHER WITH FRIENDS OR RELATIVES?: MORE THAN THREE TIMES A WEEK

## 2024-08-09 NOTE — PROGRESS NOTES
Bilateral ear lavage performed. Significant amount of cerumen and dead skin removed from each ear. Tympanic membrane visible and intact bilaterally post-procedure. Patient tolerated procedure well.    Kandis Baig LPN

## 2024-08-09 NOTE — LETTER
August 12, 2024      Marvin Sidhu  [address]    [place] [address]        Dear ,    We are writing to inform you of your test results.    Lower insulin dose to 10 units. Lipids are at goal. Follow up in 6 months.     Resulted Orders   HEMOGLOBIN A1C   Result Value Ref Range    Hemoglobin A1C 6.2 (H) 0.0 - 5.6 %      Comment:      Normal <5.7%   Prediabetes 5.7-6.4%    Diabetes 6.5% or higher     Note: Adopted from ADA consensus guidelines.   Lipid panel reflex to direct LDL Non-fasting   Result Value Ref Range    Cholesterol 149 <200 mg/dL    Triglycerides 205 (H) <150 mg/dL    Direct Measure HDL 33 (L) >=40 mg/dL    LDL Cholesterol Calculated 75 <=100 mg/dL    Non HDL Cholesterol 116 <130 mg/dL    Patient Fasting > 8hrs? No     Narrative    Cholesterol  Desirable:  <200 mg/dL    Triglycerides  Normal:  Less than 150 mg/dL  Borderline High:  150-199 mg/dL  High:  200-499 mg/dL  Very High:  Greater than or equal to 500 mg/dL    Direct Measure HDL  Female:  Greater than or equal to 50 mg/dL   Male:  Greater than or equal to 40 mg/dL    LDL Cholesterol  Desirable:  <100mg/dL  Above Desirable:  100-129 mg/dL   Borderline High:  130-159 mg/dL   High:  160-189 mg/dL   Very High:  >= 190 mg/dL    Non HDL Cholesterol  Desirable:  130 mg/dL  Above Desirable:  130-159 mg/dL  Borderline High:  160-189 mg/dL  High:  190-219 mg/dL  Very High:  Greater than or equal to 220 mg/dL       If you have any questions or concerns, please call the clinic at the number listed above.       Sincerely,      JOSH Becker CNP

## 2024-08-09 NOTE — PROGRESS NOTES
Preventive Care Visit  [facility]  JOSH Becker CNP, Family Medicine  Aug 9, 2024      Assessment & Plan     Medicare annual wellness visit, subsequent      Microalbuminuria due to type 2 diabetes mellitus (H)  Controlled  - Albumin Random Urine Quantitative with Creat Ratio; Future  - HEMOGLOBIN A1C; Future  - Lipid panel reflex to direct LDL Non-fasting; Future  - Albumin Random Urine Quantitative with Creat Ratio  - HEMOGLOBIN A1C  - Lipid panel reflex to direct LDL Non-fasting    Type 2 diabetes mellitus with diabetic polyneuropathy, with long-term current use of insulin (H)  Controlled, lower insulin to 10 units at bedtime    - dulaglutide (TRULICITY) 0.75 MG/0.5ML pen; Inject 0.75 mg subcutaneously every 7 days  - insulin glargine (LANTUS SOLOSTAR) 100 UNIT/ML pen; Inject 10 Units subcutaneously at bedtime    Essential hypertension  Controlled, continue  - lisinopril (ZESTRIL) 10 MG tablet; Take 1 tablet (10 mg) by mouth 2 times daily    S/P below knee amputation, left (H)  Known issue that I take into account for their medical decisions, no current exacerbations or new concerns      Peripheral artery disease (H24)  Known issue that I take into account for their medical decisions, no current exacerbations or new concerns      Bilateral impacted cerumen  Irrigated per MA, see her note. Declines audiology referral.  - SD REMOVAL IMPACTED CERUMEN IRRIGATION/LVG UNILAT      Nicotine/Tobacco Cessation  He reports that he has been smoking pipe. He has never used smokeless tobacco.  Nicotine/Tobacco Cessation Plan  Information offered: Patient not interested at this time      Counseling  Appropriate preventive services were addressed with this patient via screening, questionnaire, or discussion as appropriate for fall prevention, nutrition, physical activity, Tobacco-use cessation, weight loss and cognition.  Checklist reviewing preventive services available has been given to  the patient.  Reviewed patient's diet, addressing concerns and/or questions.   The patient was instructed to see the dentist every 6 months.   Updated plan of care.  Patient reported difficulty with activities of daily living were addressed today.The patient was provided with written information regarding signs of hearing loss.       FUTURE APPOINTMENTS:       - Follow-up visit in 6 months, sooner PRN    See Patient Instructions    Subjective   Marvin is a 78 year old, presenting for the following:  Medicare Visit        8/9/2024     7:30 AM   Additional Questions   Roomed by Guillermina PIZARRO       Health Care Directive  Patient has a Health Care Directive on file  Advance care planning document is on file and is current.    HPI      Diabetes Follow-up    How often are you checking your blood sugar? Continuous glucose monitor  What time of day are you checking your blood sugars (select all that apply)?  Not applicable  Have you had any blood sugars above 200?  No  Have you had any blood sugars below 70?  Yes   What symptoms do you notice when your blood sugar is low?  None  What concerns do you have today about your diabetes? None   Do you have any of these symptoms? (Select all that apply)  Numbness in feet          Hyperlipidemia Follow-Up    Are you regularly taking any medication or supplement to lower your cholesterol?   Yes- simvastatin  Are you having muscle aches or other side effects that you think could be caused by your cholesterol lowering medication?  No    Hypertension Follow-up    Do you check your blood pressure regularly outside of the clinic? No   Are you following a low salt diet? Yes  Are your blood pressures ever more than 140 on the top number (systolic) OR more   than 90 on the bottom number (diastolic), for example 140/90? No    BP Readings from Last 2 Encounters:   08/09/24 112/86   11/21/23 126/60     Hemoglobin A1C (%)   Date Value   10/24/2023 6.5 (H)   08/15/2023 6.7 (H)   05/14/2021 7.2 (H)    06/05/2020 7.8 (H)     LDL Cholesterol Calculated (mg/dL)   Date Value   05/02/2023 49   05/17/2022 40   05/14/2021 58   12/06/2019 63           8/9/2024   General Health   How would you rate your overall physical health? Good   Feel stress (tense, anxious, or unable to sleep) Not at all            8/9/2024   Nutrition   Diet: Diabetic            8/9/2024   Exercise   Days per week of moderate/strenous exercise 7 days   Average minutes spent exercising at this level 120 min            8/9/2024   Social Factors   Frequency of gathering with friends or relatives More than three times a week   Worry food won't last until get money to buy more No   Food not last or not have enough money for food? No   Do you have housing? (Housing is defined as stable permanent housing and does not include staying ouside in a car, in a tent, in an abandoned building, in an overnight shelter, or couch-surfing.) Yes   Are you worried about losing your housing? No   Lack of transportation? No   Unable to get utilities (heat,electricity)? No            8/9/2024   Fall Risk   Fallen 2 or more times in the past year? Yes   Trouble with walking or balance? Yes   Gait Speed Test (Document in seconds) 4.5   Gait Speed Test Interpretation Less than or equal to 5.00 seconds - PASS             8/9/2024   Activities of Daily Living- Home Safety   Needs help with the following daily activites Transportation    Shopping    Preparing meals    Housework    Laundry   Safety concerns in the home None of the above       Multiple values from one day are sorted in reverse-chronological order         8/9/2024   Dental   Dentist two times every year? (!) NO            8/9/2024   Hearing Screening   Hearing concerns? (!) I FEEL THAT PEOPLE ARE MUMBLING OR NOT SPEAKING CLEARLY.    (!) I NEED TO ASK PEOPLE TO SPEAK UP OR REPEAT THEMSELVES.    (!) IT'S HARDER TO UNDERSTAND WOMEN'S VOICES THAN MEN'S VOICES.    (!) IT'S HARD TO FOLLOW A CONVERSATION IN A NOISY  RESTAURANT OR CROWDED ROOM.    (!) TROUBLE UNDERSTANDING SOFT OR WHISPERED SPEECH.    (!) TROUBLE UNDERSTANDING SPEECH ON THE TELEPHONE       Multiple values from one day are sorted in reverse-chronological order         8/9/2024   Driving Risk Screening   Patient/family members have concerns about driving (!) DECLINE            8/9/2024   General Alertness/Fatigue Screening   Have you been more tired than usual lately? No            8/9/2024   Urinary Incontinence Screening   Bothered by leaking urine in past 6 months No            8/9/2024   TB Screening   Were you born outside of the [place]? No            Today's PHQ-2 Score:       8/9/2024     7:30 AM   PHQ-2 ( 1999 [employer])   Q1: Little interest or pleasure in doing things 0   Q2: Feeling down, depressed or hopeless 0   PHQ-2 Score 0   Q1: Little interest or pleasure in doing things Not at all   Q2: Feeling down, depressed or hopeless Not at all   PHQ-2 Score 0           8/9/2024   Substance Use   Alcohol more than 3/day or more than 7/wk No   Do you have a current opioid prescription? No   How severe/bad is pain from 1 to 10? 0/10 (No Pain)   Do you use any other substances recreationally? No        Social History     Tobacco Use    Smoking status: Some Days     Types: Pipe    Smokeless tobacco: Never   Vaping Use    Vaping status: Never Used   Substance Use Topics    Alcohol use: Not Currently    Drug use: No       ASCVD Risk   The ASCVD Risk score (Ron PATTERSON, et al., 2019) failed to calculate for the following reasons:    The valid total cholesterol range is 130 to 320 mg/dL            Reviewed and updated as needed this visit by Provider                    BP Readings from Last 3 Encounters:   08/09/24 112/86   11/21/23 126/60   10/24/23 (!) 149/62    Wt Readings from Last 3 Encounters:   08/09/24 60.3 kg (133 lb)   10/24/23 56.7 kg (125 lb)   06/27/23 60.3 kg (132 lb 14.4 oz)                  Patient Active Problem List   Diagnosis    Hyperlipidemia  LDL goal <100    Type 2 diabetes mellitus with diabetic polyneuropathy, with long-term current use of insulin (H)    Tobacco use disorder    Microalbuminuria due to type 2 diabetes mellitus (H)    Peripheral artery disease (H24)    Essential hypertension    S/P below knee amputation, left (H)     Past Surgical History:   Procedure Laterality Date    AMPUTATE LEG BELOW KNEE Left 5/19/2023    Procedure: LEFT BELOW KNEE AMPUTATION;  Surgeon: Raimundo Weinstein MD;  Location: [facility] OR    INCISION AND DRAINAGE FOOT, COMBINED Left 5/9/2023    Procedure: Incision and Debidement of Pressure Wound, Left Heel;  Surgeon: Paul Flores DPM;  Location: [facility] OR    IR LOWER EXTREMITY ANGIOGRAM LEFT  5/16/2023    IRRIGATION AND DEBRIDEMENT SOFT TISSUE LOWER EXTREMITY, COMBINED Left 6/16/2023    Procedure: IRRIGATION AND DEBRIDEMENT OF LEFT BELOW KNEE AMPUTATION; WOUND VAC PLACEMENT OF LEFT BELOW KNEE AMPUTATION;  Surgeon: Raimundo Weinstein MD;  Location:  OR    SURGICAL HISTORY OF -   1990    removal of steel from left eye       Social History     Tobacco Use    Smoking status: Some Days     Types: Pipe    Smokeless tobacco: Never   Substance Use Topics    Alcohol use: Not Currently     Family History   Problem Relation Age of Onset    Breast Cancer Mother 60    C.A.D. Brother 58        MI         Current Outpatient Medications   Medication Sig Dispense Refill    ACCU-CHEK GUIDE test strip USE TO TEST BLOOD SUGAR ONE TIME DAILY OR AS DIRECTED 100 strip 1    ASPIRIN EC PO Take 81 mg by mouth daily      blood glucose monitoring (SOFTCLIX) lancets USE TO TEST BLOOD SUGAR 1 TIMES DAILY OR AS DIRECTED 100 each 1    Continuous Blood Gluc  (FREESTYLE ABIODUN 14 DAY READER) ESTEFANI USE TO READ BLOOD SUGARS AS PER 'S INSTRUCTIONS. 6 each 4    Continuous Blood Gluc Sensor (FREESTYLE ABIODUN 14 DAY SENSOR) [facility] APPLY ONE NEW SENSOR AND REMOVE OLD SENSOR EVERY 14 DAYS AS DIRECTED PER 'S INSTUCTIONS. 2  each 5    dulaglutide (TRULICITY) 0.75 MG/0.5ML pen Inject 0.75 mg subcutaneously every 7 days 6 mL 3    gabapentin (NEURONTIN) 300 MG capsule TAKE 1 CAPSULE (300 MG) BY MOUTH 3 TIMES DAILY 270 capsule 3    insulin glargine (LANTUS SOLOSTAR) 100 UNIT/ML pen Inject 10 Units subcutaneously at bedtime 15 mL 1    insulin pen needle (32G X 4 MM) 32G X 4 MM miscellaneous Use 1 pen needles daily or as directed. 100 each 4    insulin pen needle (B-D U/F) 31G X 5 MM miscellaneous USE 4 PEN NEEDLES DAILY OR AS DIRECTED. 400 each 0    latanoprost (XALATAN) 0.005 % ophthalmic solution Place 1 drop into both eyes At Bedtime      lisinopril (ZESTRIL) 10 MG tablet Take 1 tablet (10 mg) by mouth 2 times daily 180 tablet 3    metFORMIN (GLUCOPHAGE) 500 MG tablet TAKE TWO TABLETS BY MOUTH TWICE DAILY WITH MEALS 360 tablet 0    NAPROXEN PO       simvastatin (ZOCOR) 40 MG tablet TAKE ONE TABLET BY MOUTH EVERY NIGHT AT BEDTIME 90 tablet 3    vitamin D3 (CHOLECALCIFEROL) 50 mcg (2000 units) tablet Take 1 tablet by mouth daily 30 capsule      Current providers sharing in care for this patient include:  Patient Care Team:  Sandra Low APRN CNP as PCP - General (Family Medicine)  Sandra Low APRN CNP as Assigned PCP  Cheryl Carl NP as Assigned Heart and Vascular Provider  Esther Montaño LSW as Lead Care Coordinator (Primary Care - CC)    The following health maintenance items are reviewed in Epic and correct as of today:  Health Maintenance   Topic Date Due    ZOSTER IMMUNIZATION (1 of 2) Never done    LUNG CANCER SCREENING  Never done    RSV VACCINE (Pregnancy & 60+) (1 - 1-dose 60+ series) Never done    MICROALBUMIN  11/02/2022    COVID-19 Vaccine (1 - 2023-24 season) Never done    A1C  04/24/2024    NICOTINE/TOBACCO CESSATION COUNSELING Q 1 YR  05/02/2024    MEDICARE ANNUAL WELLNESS VISIT  05/02/2024    LIPID  05/02/2024    ANNUAL REVIEW OF HM ORDERS  05/02/2024    INFLUENZA VACCINE (1) 09/01/2024    [facility]   "10/24/2024    DIABETIC FOOT EXAM  11/21/2024    EYE EXAM  01/04/2025    FALL RISK ASSESSMENT  08/09/2025    ADVANCE CARE PLANNING  06/20/2028    DTAP/TDAP/TD IMMUNIZATION (3 - Td or Tdap) 11/24/2033    HEPATITIS C SCREENING  Completed    PHQ-2 (once per calendar year)  Completed    Pneumococcal Vaccine: 65+ Years  Completed    IPV IMMUNIZATION  Aged Out    HPV IMMUNIZATION  Aged Out    MENINGITIS IMMUNIZATION  Aged Out    RSV MONOCLONAL ANTIBODY  Aged Out    COLORECTAL CANCER SCREENING  Discontinued         Review of Systems  Constitutional, HEENT, cardiovascular, pulmonary, gi and gu systems are negative, except as otherwise noted.     Objective    Exam  /86   Pulse 87   Temp 97  F (36.1  C) (Tympanic)   Resp 16   Ht 1.765 m (5' 9.5\")   Wt 60.3 kg (133 lb)   SpO2 99%   BMI 19.36 kg/m     Estimated body mass index is 19.36 kg/m  as calculated from the following:    Height as of this encounter: 1.765 m (5' 9.5\").    Weight as of this encounter: 60.3 kg (133 lb).    Physical Exam  GENERAL: alert and no distress  EYES: Eyes grossly normal to inspection and conjunctivae and sclerae normal  HENT: normal cephalic/atraumatic, both ears: occluded with wax, oropharynx clear, and oral mucous membranes moist  NECK: no adenopathy, no asymmetry, masses, or scars  RESP: lungs clear to auscultation - no rales, rhonchi or wheezes  CV: regular rate and rhythm, normal S1 S2, no S3 or S4, no murmur, click or rub, no peripheral edema  ABDOMEN: soft, nontender and no palpable or pulsatile masses  MS: LLE exam shows prosthesis   SKIN: no suspicious lesions or rashes  NEURO: Normal strength and tone, mentation intact and speech normal  PSYCH: mentation appears normal, affect normal/bright        8/9/2024   Mini Cog   Clock Draw Score 2 Normal   3 Item Recall 3 objects recalled   Mini Cog Total Score 5                 Signed Electronically by: JOSH Becker CNP    "

## 2024-08-12 DIAGNOSIS — E11.42 TYPE 2 DIABETES MELLITUS WITH DIABETIC POLYNEUROPATHY, WITH LONG-TERM CURRENT USE OF INSULIN (H): ICD-10-CM

## 2024-08-12 DIAGNOSIS — Z79.4 TYPE 2 DIABETES MELLITUS WITH DIABETIC POLYNEUROPATHY, WITH LONG-TERM CURRENT USE OF INSULIN (H): ICD-10-CM

## 2024-08-12 LAB
CREAT UR-MCNC: 44.5 MG/DL
MICROALBUMIN UR-MCNC: 28.9 MG/L
MICROALBUMIN/CREAT UR: 64.94 MG/G CR (ref 0–17)

## 2024-08-12 PROCEDURE — 82570 ASSAY OF URINE CREATININE: CPT | Performed by: NURSE PRACTITIONER

## 2024-08-12 PROCEDURE — 82043 UR ALBUMIN QUANTITATIVE: CPT | Performed by: NURSE PRACTITIONER

## 2024-08-12 RX ORDER — FLASH GLUCOSE SENSOR
KIT MISCELLANEOUS
Qty: 2 EACH | Refills: 5 | Status: SHIPPED | OUTPATIENT
Start: 2024-08-12

## 2024-08-15 ASSESSMENT — ACTIVITIES OF DAILY LIVING (ADL)
DEPENDENT_IADLS:: CLEANING;COOKING;LAUNDRY;SHOPPING;MEAL PREPARATION;MEDICATION MANAGEMENT;MONEY MANAGEMENT;TRANSPORTATION

## 2024-08-15 NOTE — PROGRESS NOTES
[facility] Care Coordination Contact    [facility] Home Visit Assessment     Home visit for Health Risk Assessment with Roddy Sidhu completed on July 19, 2024    Type of residence:: Private home - no stairs (Marvin lives downstair and man made ramp is present to go other small step inside the house)  Current living arrangement:: I live in a private home with family     Assessment completed with:: Patient, Family, Spouse or significant other    Current Care Plan  Member currently receiving the following home care services: has had PT in the past but is not receiving now       Member currently receiving the following community resources:    MOW has stopped and he would like these to resume.  PERS unit and he would like to see about a scooter      Medication Review  Medication reconciliation completed in Epic: No   Medication set-up & administration: Independent-does not set up.  Self-administers medications.  Medication Risk Assessment Medication (1 or more, place referral to MTM): N/A: No risk factors identified  MTM Referral Placed: No: No risk factors idenified    Mental/Behavioral Health   Depression Screening: no concerns     Mental health DX:: No        Falls Assessment:   Fallen 2 or more times in the past year?: No   Any fall with injury in the past year?: No    ADL/IADL Dependencies:   Dependent ADLs:: Independent  Dependent IADLs:: Cleaning, Cooking, Laundry, Shopping, Meal Preparation, Medication Management, Money Management, Transportation    Health Plan sponsored benefits: [employer]: Shared information regarding preventative health screening and health plan supplemental benefits/incentives. Reviewed medication disposal form.    PCA Assessment completed at visit: No     Elderly Waiver Eligibility: Yes-will continue on EW    Care Plan & Recommendations: Marvin feels he is doing well at this time with no great concerns.  He stated he feels better then has has since his amputation and is  doing well adjusting to this.      See LTCC for detailed assessment information.    Follow-Up Plan: Member informed of future contact, plan to f/u with member with a 6 month telephone assessment.  Contact information shared with member and family, encouraged member to call with any questions or concerns at any time.    [place] care continuum providers: Please see Snapshot and Care Management Flowsheets for Specific details of care plan.    This CC note routed to PCP, Sandra Low.    Esther Montaño MA [facility] Care Coordinator   [phone] - bbad cell phone   [phone] - oaer fax

## 2024-08-19 ENCOUNTER — PATIENT OUTREACH (OUTPATIENT)
Dept: GERIATRIC MEDICINE | Facility: CLINIC | Age: 79
End: 2024-08-19
Payer: COMMERCIAL

## 2024-08-19 NOTE — LETTER
August 19, 2024       Roddy Sidhu  [id number]  [facility] [place] [address]      Dear Roddy,    At [facility], we re dedicated to improving your health and wellness. Enclosed is the Support Plan developed with you on 7/19/2024. Please review the Support Plan carefully.    As a reminder, during your visit we talked about:   Ways to manage your physical and mental health   Using health care to maintain and improve your health    Your preventive care needs      Remember to contact your care coordinator if you:   Are hospitalized or plan to be hospitalized    Have a fall     Have a change in your physical or mental health   Need help finding support or services    If you have questions or don t agree with your Support Plan, call me at [phone]. You can also call me if your needs change. TTY users call the [place] Relay at 545 or 1-124.300.8468 (adaroe-pj-mqdsho relay service).    Sincerely,       Esther Montaño MA, LSW    E-mail: [email or web address]  Phone: [phone]      [employer]                [id number] accepted     (06/2024)                [facility] [place] [address]  [phone]  fax [phone]  [facility]

## 2024-08-19 NOTE — PROGRESS NOTES
[facility] Care Coordination Contact    Received after visit chart from care coordinator.  Completed following tasks: Mailed copy of care plan/support plan to member, Mailed [place] Choices signature sheet pages 3-4, Mailed Safe Medication Disposal , Submitted referrals/auths for PERS & Meals, and Updated services in Database   and Provider Signature - No POC Shared:  Member indicates that they do not want their POC shared with any EW providers.  Uploaded C2C.     Vidya Guillen  Care Management Specialist   [facility]   [phone]

## 2024-08-27 ENCOUNTER — TRANSFERRED RECORDS (OUTPATIENT)
Dept: HEALTH INFORMATION MANAGEMENT | Facility: CLINIC | Age: 79
End: 2024-08-27
Payer: COMMERCIAL

## 2024-09-20 ENCOUNTER — PATIENT OUTREACH (OUTPATIENT)
Dept: GERIATRIC MEDICINE | Facility: CLINIC | Age: 79
End: 2024-09-20
Payer: COMMERCIAL

## 2024-09-20 NOTE — PROGRESS NOTES
9-20-24   CC was notified that member MA is inactive this month. CC had attempted to enter MMIS after visit was done and found this information out so contacted financial worker at that time.  CC was told that it was due to verification needing to be sent in.     CC had called member daughter asking her send in the needed information at that time.     Care Coordinator was notified again by provider this month that member is still inactive so CC reached out to financial worker again asking for update and CC will follow-up as needed.  Esther Montaño MA [facility] Care Coordinator   [phone] - lnqw cell phone   [phone] - fcsl fax

## 2024-10-03 ENCOUNTER — PATIENT OUTREACH (OUTPATIENT)
Dept: GERIATRIC MEDICINE | Facility: CLINIC | Age: 79
End: 2024-10-03
Payer: COMMERCIAL

## 2024-10-03 NOTE — PROGRESS NOTES
10-3-24   CC called and talked with member spouse about MA issue for member.  She shared with CC that she was dx with breast CA and is going in for surgery tomorrow. She asked that CC reach out to her daughter to follow up and get the details if this has been resolved.      Note from financial workers      Looks like we have the Contract for deed, but we need verification of the current value of his life insurance policy, and for a income reduction we need verification of the amount of any other medical insurance he has.       CC then left VM for daughter asking her to call if there are any questions about what else was needed for member to continue to keep him MA active and asked that she call back with questions.     Esther Montaño MA [facility] Care Coordinator   [phone] - work cell phone   [phone] - work fax

## 2024-10-22 DIAGNOSIS — E11.42 TYPE 2 DIABETES MELLITUS WITH DIABETIC POLYNEUROPATHY, WITH LONG-TERM CURRENT USE OF INSULIN (H): ICD-10-CM

## 2024-10-22 DIAGNOSIS — Z79.4 TYPE 2 DIABETES MELLITUS WITH DIABETIC POLYNEUROPATHY, WITH LONG-TERM CURRENT USE OF INSULIN (H): ICD-10-CM

## 2024-11-05 ENCOUNTER — PATIENT OUTREACH (OUTPATIENT)
Dept: GERIATRIC MEDICINE | Facility: CLINIC | Age: 79
End: 2024-11-05
Payer: COMMERCIAL

## 2024-11-05 NOTE — PROGRESS NOTES
[facility] Care Coordination Contact    No longer active with [facility] community case management effective 7-31-24.  Reason for community disenrollment: MA Term     Per [place], member not reinstated with MSC+ after 90 days. Notified CC to disenroll (CLIVE)      CC sent [id number] to Alicia Rouse to let them know what member is off MA and was on EW.      Esther Montaño MA [facility] Care Coordinator   [phone] - qvtg cell phone   [phone] - vpfg fax

## 2025-01-20 DIAGNOSIS — Z79.4 TYPE 2 DIABETES MELLITUS WITH DIABETIC POLYNEUROPATHY, WITH LONG-TERM CURRENT USE OF INSULIN (H): ICD-10-CM

## 2025-01-20 DIAGNOSIS — E11.42 TYPE 2 DIABETES MELLITUS WITH DIABETIC POLYNEUROPATHY, WITH LONG-TERM CURRENT USE OF INSULIN (H): ICD-10-CM

## 2025-01-22 DIAGNOSIS — E11.42 TYPE 2 DIABETES MELLITUS WITH DIABETIC POLYNEUROPATHY, WITH LONG-TERM CURRENT USE OF INSULIN (H): ICD-10-CM

## 2025-01-22 DIAGNOSIS — Z79.4 TYPE 2 DIABETES MELLITUS WITH DIABETIC POLYNEUROPATHY, WITH LONG-TERM CURRENT USE OF INSULIN (H): ICD-10-CM

## 2025-01-23 RX ORDER — FLASH GLUCOSE SENSOR
KIT MISCELLANEOUS
Qty: 2 EACH | Refills: 5 | Status: SHIPPED | OUTPATIENT
Start: 2025-01-23

## 2025-01-27 ENCOUNTER — TELEPHONE (OUTPATIENT)
Dept: FAMILY MEDICINE | Facility: CLINIC | Age: 80
End: 2025-01-27
Payer: COMMERCIAL

## 2025-01-27 NOTE — TELEPHONE ENCOUNTER
Forms/Letter Request    Type of form/letter: Prosthetic supplies-needing signature on Rx        Do we have the form/letter: Yes: placed in providers box for review     Who is the form from? Patient    Where did/will the form come from? Patient or family brought in       When is form/letter needed by: asap    How would you like the form/letter returned: Fax : [phone]    Patient Notified form requests are processed in 5-7 business days:Yes    Okay to leave a detailed message?: Yes at Cell number on file:    Telephone Information:   Mobile [phone]     MANFRED Ortiz

## 2025-01-28 ENCOUNTER — MEDICAL CORRESPONDENCE (OUTPATIENT)
Dept: HEALTH INFORMATION MANAGEMENT | Facility: CLINIC | Age: 80
End: 2025-01-28
Payer: COMMERCIAL

## 2025-01-28 NOTE — TELEPHONE ENCOUNTER
Forms completed and faxed to number provided. Wife, Ynes, notified.     Keisha Navarro, [facility]

## 2025-03-07 NOTE — TELEPHONE ENCOUNTER
Reason for Call: Request for an order or referral:    Order or referral being requested: Patients wife is calling stating that they would like to have the lab orders in so when they come to appointment they can get them done. She is unsure what kind of labs she said labs that check his diabetes.     Date needed: at your convenience    Has the patient been seen by the PCP for this problem? NO  Phone number Patient can be reachedat:  Home number on file [phone] (home)    Best Time:  any    Can we leave a detailed message on this number?  YES    Call taken on 7/23/2019 at 8:30 AM by Dalila Dejesus     Osteomyelitis

## 2025-04-18 DIAGNOSIS — Z79.4 TYPE 2 DIABETES MELLITUS WITH DIABETIC POLYNEUROPATHY, WITH LONG-TERM CURRENT USE OF INSULIN (H): Primary | ICD-10-CM

## 2025-04-18 DIAGNOSIS — E11.42 TYPE 2 DIABETES MELLITUS WITH DIABETIC POLYNEUROPATHY, WITH LONG-TERM CURRENT USE OF INSULIN (H): Primary | ICD-10-CM

## 2025-04-18 NOTE — TELEPHONE ENCOUNTER
Freestyle is discontinuing the Freestyle Jamie 14 day sensors, Freestyle Jamie 2 sensors and Freestyle Jamie 3 sensors. Please change patient to either the Freestyle Jamie 2 PLUS or Freestyle Jamie 3 PLUS sensors. Thank you!    Esther De Jesus, [facility]  [phone]

## 2025-04-22 RX ORDER — BLOOD-GLUCOSE SENSOR
EACH MISCELLANEOUS
Qty: 6 EACH | Refills: 0 | Status: SHIPPED | OUTPATIENT
Start: 2025-04-22

## 2025-05-13 DIAGNOSIS — E78.5 HYPERLIPIDEMIA LDL GOAL <100: ICD-10-CM

## 2025-05-13 RX ORDER — SIMVASTATIN 40 MG
40 TABLET ORAL AT BEDTIME
Qty: 90 TABLET | Refills: 1 | Status: SHIPPED | OUTPATIENT
Start: 2025-05-13

## 2025-06-09 DIAGNOSIS — Z79.4 TYPE 2 DIABETES MELLITUS WITH DIABETIC POLYNEUROPATHY, WITH LONG-TERM CURRENT USE OF INSULIN (H): ICD-10-CM

## 2025-06-09 DIAGNOSIS — E11.42 TYPE 2 DIABETES MELLITUS WITH DIABETIC POLYNEUROPATHY, WITH LONG-TERM CURRENT USE OF INSULIN (H): ICD-10-CM

## 2025-06-09 RX ORDER — GABAPENTIN 300 MG/1
300 CAPSULE ORAL 3 TIMES DAILY
Qty: 270 CAPSULE | Refills: 0 | Status: SHIPPED | OUTPATIENT
Start: 2025-06-09

## 2025-07-09 NOTE — PROGRESS NOTES
"Time: 9262-6842  Reason for Admission: Infected diabetic ulcer left heel    Activity: Assist to turn every 2 hours.  Has not gotten out of bed this shift.    Neuro: Patient is alert and orientated x 4.  Has been calm and cooperative.      Resp: RLL coarse crackles.  No SOB.  O2 sats 100% on RA.    GI/:  Occasional incontinence of urine.  Male external urinary device in place over night.  No c/o nausea.    Skin: Left heel wound dressing is clean, dry and intact.    Diet:Clear liquid diet    IV Access: PIV left lower forearm is saline locked.    Vitals: /59 (BP Location: Right arm)   Pulse 61   Temp 98.2  F (36.8  C) (Oral)   Resp 15   Ht 1.765 m (5' 9.5\")   Wt 59.9 kg (132 lb 0.9 oz)   SpO2 100%   BMI 19.22 kg/m      Pain: Patient denies pain    Plan: TCU 2-3 days    " 25 y.o. male with a ADHD, anxiety/depression, and rib surgery for thoracic outlet syndrome who presented to [facility] 7/7/2025 with recurrent episodes of unresponsiveness, eye-rolling, and drooling lasting 30-60 seconds, occurring multiple times daily (beginning several weeks ago). He has also been experiencing brain fog and amnesia/lapses in time/memory.    His girlfriend described a recent event in which patient was sitting on the toilet when he had rapid eye movements in both directions followed by going unresponsive with eyes rolling back.  Patient did not fall.  No twitching/jerking/convulsive activity.  Episodes last less than a minute.  No tongue bite or incontinence.  Patient has increasing head pressure prior to the episodes.  Heat seems to trigger the events.    Seen at outside hospital in late May 2025 for these episodes (normal routine EEG; left AMA before MRI could be completed; was advised to stop his home mediations (Trazodone, Adderall, and Sertraline).     Episodes have continued; referred to ED on 7/7 by PCP for further workup.    Given normal workup, high suspicion for psychogenic nonepileptic seizures.  Cannot fully rule out syncope/cardiogenic cause.     Neuroimaging/Workup  CT head (5/24/2025): No acute intracranial abnormality. Bilateral maxillary sinus air-fluid levels consistent with acute sinusitis  EEG (5/30/2025): This routine video electroencephalogram capturing wakefulness and drowsiness is normal   MRI brain w/wo contrast (7/9/2025): No acute intracranial abnormalities.  Partially empty sella.  Mild sinus disease.     Plan:  - Patient showed EEG report that was in CareEverywhere (as above).  It does not appear that he had long term monitoring  - Recommend outpatient long term cardiac monitoring  - No need to start AEDs at this time given high suspicion for non-epileptic events  - Seizure precautions/supportive care  - Continue to follow-up with outpatient psychiatry/behavioral health  (future consideration for cognitive behavioral therapy)  Defer management of home medications to outpatient psychiatry including Adderall, Trazodone, Sertratline  - Advised to refrain from driving given ongoing events of LOC/unresponsiveness  - Seizure precautions (no swimming alone, no climbing ladders/heights, no operating dangerous machinery)  - Rest of care per primary team  - No further inpatient Neuro recommendations

## 2025-07-10 ENCOUNTER — TRANSFERRED RECORDS (OUTPATIENT)
Dept: HEALTH INFORMATION MANAGEMENT | Facility: CLINIC | Age: 80
End: 2025-07-10
Payer: COMMERCIAL

## 2025-07-15 ENCOUNTER — TELEPHONE (OUTPATIENT)
Dept: FAMILY MEDICINE | Facility: CLINIC | Age: 80
End: 2025-07-15
Payer: COMMERCIAL

## 2025-07-15 DIAGNOSIS — Z79.4 TYPE 2 DIABETES MELLITUS WITH DIABETIC POLYNEUROPATHY, WITH LONG-TERM CURRENT USE OF INSULIN (H): Primary | ICD-10-CM

## 2025-07-15 DIAGNOSIS — E11.42 TYPE 2 DIABETES MELLITUS WITH DIABETIC POLYNEUROPATHY, WITH LONG-TERM CURRENT USE OF INSULIN (H): Primary | ICD-10-CM

## 2025-07-15 RX ORDER — BLOOD-GLUCOSE SENSOR
EACH MISCELLANEOUS
Qty: 2 EACH | Refills: 5 | Status: SHIPPED | OUTPATIENT
Start: 2025-07-15

## 2025-07-15 NOTE — TELEPHONE ENCOUNTER
Patient is needing a New FreeStyle Jamie 2 Plus [place]. Patient can't read blood sugers with old reader from old sensor reader. Please sent in 2 Plus [place].  Patient's wife said she will be calling md office.    Thanks   Annette Schwab   [facility] [place]  [phone]

## 2025-07-28 DIAGNOSIS — E11.42 TYPE 2 DIABETES MELLITUS WITH DIABETIC POLYNEUROPATHY, WITH LONG-TERM CURRENT USE OF INSULIN (H): Primary | ICD-10-CM

## 2025-07-28 DIAGNOSIS — Z79.4 TYPE 2 DIABETES MELLITUS WITH DIABETIC POLYNEUROPATHY, WITH LONG-TERM CURRENT USE OF INSULIN (H): Primary | ICD-10-CM

## 2025-07-28 RX ORDER — DULAGLUTIDE 0.75 MG/.5ML
INJECTION, SOLUTION SUBCUTANEOUS
Qty: 6 ML | Refills: 0 | Status: SHIPPED | OUTPATIENT
Start: 2025-07-28

## 2025-08-05 DIAGNOSIS — I10 ESSENTIAL HYPERTENSION: ICD-10-CM

## 2025-08-05 RX ORDER — LISINOPRIL 10 MG/1
10 TABLET ORAL
Qty: 180 TABLET | Refills: 0 | Status: SHIPPED | OUTPATIENT
Start: 2025-08-05

## 2025-08-06 ENCOUNTER — PATIENT OUTREACH (OUTPATIENT)
Dept: CARE COORDINATION | Facility: CLINIC | Age: 80
End: 2025-08-06
Payer: COMMERCIAL

## 2025-08-25 ENCOUNTER — RESULTS FOLLOW-UP (OUTPATIENT)
Dept: FAMILY MEDICINE | Facility: CLINIC | Age: 80
End: 2025-08-25
Payer: COMMERCIAL

## 2025-08-25 DIAGNOSIS — E11.29 MICROALBUMINURIA DUE TO TYPE 2 DIABETES MELLITUS (H): ICD-10-CM

## 2025-08-25 DIAGNOSIS — R80.9 MICROALBUMINURIA DUE TO TYPE 2 DIABETES MELLITUS (H): ICD-10-CM

## 2025-08-25 DIAGNOSIS — E11.42 TYPE 2 DIABETES MELLITUS WITH DIABETIC POLYNEUROPATHY, WITH LONG-TERM CURRENT USE OF INSULIN (H): Primary | ICD-10-CM

## 2025-08-25 DIAGNOSIS — Z79.4 TYPE 2 DIABETES MELLITUS WITH DIABETIC POLYNEUROPATHY, WITH LONG-TERM CURRENT USE OF INSULIN (H): Primary | ICD-10-CM

## 2025-09-04 ENCOUNTER — LAB (OUTPATIENT)
Dept: LAB | Facility: CLINIC | Age: 80
End: 2025-09-04
Payer: COMMERCIAL

## 2025-09-04 ENCOUNTER — RESULTS FOLLOW-UP (OUTPATIENT)
Dept: FAMILY MEDICINE | Facility: CLINIC | Age: 80
End: 2025-09-04

## 2025-09-04 DIAGNOSIS — R80.9 MICROALBUMINURIA DUE TO TYPE 2 DIABETES MELLITUS (H): ICD-10-CM

## 2025-09-04 DIAGNOSIS — E11.42 TYPE 2 DIABETES MELLITUS WITH DIABETIC POLYNEUROPATHY, WITH LONG-TERM CURRENT USE OF INSULIN (H): Primary | ICD-10-CM

## 2025-09-04 DIAGNOSIS — Z79.4 TYPE 2 DIABETES MELLITUS WITH DIABETIC POLYNEUROPATHY, WITH LONG-TERM CURRENT USE OF INSULIN (H): Primary | ICD-10-CM

## 2025-09-04 DIAGNOSIS — Z79.4 TYPE 2 DIABETES MELLITUS WITH DIABETIC POLYNEUROPATHY, WITH LONG-TERM CURRENT USE OF INSULIN (H): ICD-10-CM

## 2025-09-04 DIAGNOSIS — E11.29 MICROALBUMINURIA DUE TO TYPE 2 DIABETES MELLITUS (H): ICD-10-CM

## 2025-09-04 DIAGNOSIS — E11.42 TYPE 2 DIABETES MELLITUS WITH DIABETIC POLYNEUROPATHY, WITH LONG-TERM CURRENT USE OF INSULIN (H): ICD-10-CM

## 2025-09-04 LAB
ANION GAP SERPL CALCULATED.3IONS-SCNC: 17 MMOL/L (ref 7–15)
BUN SERPL-MCNC: 30.8 MG/DL (ref 8–23)
CALCIUM SERPL-MCNC: 9.6 MG/DL (ref 8.8–10.4)
CHLORIDE SERPL-SCNC: 98 MMOL/L (ref 98–107)
CREAT SERPL-MCNC: 1.51 MG/DL (ref 0.67–1.17)
EGFRCR SERPLBLD CKD-EPI 2021: 47 ML/MIN/1.73M2
GLUCOSE SERPL-MCNC: 173 MG/DL (ref 70–99)
HCO3 SERPL-SCNC: 18 MMOL/L (ref 22–29)
POTASSIUM SERPL-SCNC: 5.5 MMOL/L (ref 3.4–5.3)
SODIUM SERPL-SCNC: 133 MMOL/L (ref 135–145)

## 2025-09-04 RX ORDER — DAPAGLIFLOZIN 5 MG/1
5 TABLET, FILM COATED ORAL DAILY
Qty: 90 TABLET | Refills: 1 | Status: SHIPPED | OUTPATIENT
Start: 2025-09-04

## (undated) DEVICE — DRSG KERLIX 4 1/2"X4YDS ROLL 6715

## (undated) DEVICE — SOL NACL 0.9% IRRIG 1000ML BOTTLE 07138-09

## (undated) DEVICE — DRAPE EXTREMITY W/ARMBOARD 29405

## (undated) DEVICE — PREP SKIN SCRUB TRAY 4461A

## (undated) DEVICE — PACK EXTREMITY LATEX FREE SOP32HFFCS

## (undated) DEVICE — SU VICRYL 2-0 CT-1 18' J739D

## (undated) DEVICE — ESU GROUND PAD UNIVERSAL W/O CORD

## (undated) DEVICE — SYR BULB IRRIG 50ML LATEX FREE 0035280

## (undated) DEVICE — SYR 10ML LL W/O NDL

## (undated) DEVICE — PACK EXTREMITY SOP15EXFSD

## (undated) DEVICE — DRSG ABDOMINAL 07 1/2X8" 7197D

## (undated) DEVICE — DRAPE SHEET REV FOLD 3/4 9349

## (undated) DEVICE — BLADE KNIFE SURG 15 371115

## (undated) DEVICE — LINEN TOWEL PACK X5 5464

## (undated) DEVICE — CONNECTOR BLAKE DRAIN SGL BCC1

## (undated) DEVICE — SUCTION MANIFOLD NEPTUNE 2 SYS 1 PORT 702-025-000

## (undated) DEVICE — SOL WATER IRRIG 1000ML BOTTLE 2F7114

## (undated) DEVICE — ESU PENCIL W/SMOKE EVAC NEPTUNE STRYKER 0703-046-000

## (undated) DEVICE — NDL 25GA 1.5" 305127

## (undated) DEVICE — GLOVE BIOGEL PI MICRO SZ 8.0 48580

## (undated) DEVICE — BNDG ELASTIC 4"X5YDS UNSTERILE 6611-40

## (undated) DEVICE — BLADE SAW SAGITTAL STRK 25X90X1.37MM 4H SYS 6 6125-137-090

## (undated) DEVICE — PREP PAD ALCOHOL 6818

## (undated) DEVICE — GLOVE BIOGEL PI MICRO INDICATOR UNDERGLOVE SZ 8.0 48980

## (undated) DEVICE — SU VICRYL 0 TIE 12X18" J906G

## (undated) DEVICE — CAST PADDING 4" STERILE 9044S

## (undated) DEVICE — WIRE SAW GIGL 20" LOOP END SS NL851

## (undated) DEVICE — MANIFOLD NEPTUNE 4 PORT 700-20

## (undated) DEVICE — SUCTION TIP YANKAUER W/O VENT K86

## (undated) DEVICE — DRSG KERLIX FLUFFS X5

## (undated) DEVICE — ESU PENCIL SMOKE EVAC W/ROCKER SWITCH 0703-047-000

## (undated) DEVICE — DRSG XEROFORM 5X9" 8884431605

## (undated) DEVICE — SU VICRYL 3-0 SH CR 8X18" J774

## (undated) DEVICE — GOWN LG DISP 9515

## (undated) DEVICE — DRSG AQUACEL EXTRA AG 4X5" 422299

## (undated) DEVICE — LINEN LEG ROLL 5489

## (undated) DEVICE — GLOVE BIOGEL PI MICRO INDICATOR UNDERGLOVE SZ 6.5 48965

## (undated) DEVICE — SOL NACL 0.9% IRRIG 1000ML BOTTLE 2F7124

## (undated) DEVICE — GLOVE BIOGEL PI MICRO SZ 6.0 48560

## (undated) DEVICE — SOL WATER IRRIG 1000ML BOTTLE 07139-09

## (undated) DEVICE — BLADE KNIFE SURG 10 371110

## (undated) DEVICE — SU CHROMIC 3-0 CT-1 27" 810H

## (undated) DEVICE — PACK MINOR SBA15MIFSE

## (undated) DEVICE — GOWN IMPERVIOUS SPECIALTY XLG/XLONG 32474

## (undated) DEVICE — DRAPE IOBAN INCISE 23X17" 6650EZ

## (undated) DEVICE — DRSG PREVENA NEGATIVE PRESSURE 20CM WND PRE1001US

## (undated) DEVICE — PUMP UNIT NEGATIVE PRESSURE PREVENA PLUS PRE4010.S

## (undated) RX ORDER — PROPOFOL 10 MG/ML
INJECTION, EMULSION INTRAVENOUS
Status: DISPENSED
Start: 2023-05-19

## (undated) RX ORDER — PROPOFOL 10 MG/ML
INJECTION, EMULSION INTRAVENOUS
Status: DISPENSED
Start: 2023-06-16

## (undated) RX ORDER — DEXTROSE MONOHYDRATE 25 G/50ML
INJECTION, SOLUTION INTRAVENOUS
Status: DISPENSED
Start: 2023-05-19

## (undated) RX ORDER — FENTANYL CITRATE 50 UG/ML
INJECTION, SOLUTION INTRAMUSCULAR; INTRAVENOUS
Status: DISPENSED
Start: 2023-05-19

## (undated) RX ORDER — HEPARIN SODIUM 200 [USP'U]/100ML
INJECTION, SOLUTION INTRAVENOUS
Status: DISPENSED
Start: 2023-05-16

## (undated) RX ORDER — FENTANYL CITRATE 50 UG/ML
INJECTION, SOLUTION INTRAMUSCULAR; INTRAVENOUS
Status: DISPENSED
Start: 2023-05-16

## (undated) RX ORDER — LIDOCAINE HYDROCHLORIDE 10 MG/ML
INJECTION, SOLUTION INFILTRATION; PERINEURAL
Status: DISPENSED
Start: 2023-05-16

## (undated) RX ORDER — GLYCOPYRROLATE 0.2 MG/ML
INJECTION, SOLUTION INTRAMUSCULAR; INTRAVENOUS
Status: DISPENSED
Start: 2023-05-09

## (undated) RX ORDER — CEFAZOLIN SODIUM/WATER 2 G/20 ML
SYRINGE (ML) INTRAVENOUS
Status: DISPENSED
Start: 2023-06-16

## (undated) RX ORDER — FENTANYL CITRATE 50 UG/ML
INJECTION, SOLUTION INTRAMUSCULAR; INTRAVENOUS
Status: DISPENSED
Start: 2023-06-16

## (undated) RX ORDER — SILVER SULFADIAZINE 10 MG/G
CREAM TOPICAL
Status: DISPENSED
Start: 2023-05-09

## (undated) RX ORDER — FENTANYL CITRATE 50 UG/ML
INJECTION, SOLUTION INTRAMUSCULAR; INTRAVENOUS
Status: DISPENSED
Start: 2023-05-09

## (undated) RX ORDER — ONDANSETRON 2 MG/ML
INJECTION INTRAMUSCULAR; INTRAVENOUS
Status: DISPENSED
Start: 2023-06-16

## (undated) RX ORDER — FENTANYL CITRATE 0.05 MG/ML
INJECTION, SOLUTION INTRAMUSCULAR; INTRAVENOUS
Status: DISPENSED
Start: 2023-05-19

## (undated) RX ORDER — EPHEDRINE SULFATE 50 MG/ML
INJECTION, SOLUTION INTRAMUSCULAR; INTRAVENOUS; SUBCUTANEOUS
Status: DISPENSED
Start: 2023-06-16